# Patient Record
Sex: FEMALE | Race: BLACK OR AFRICAN AMERICAN | Employment: OTHER | URBAN - METROPOLITAN AREA
[De-identification: names, ages, dates, MRNs, and addresses within clinical notes are randomized per-mention and may not be internally consistent; named-entity substitution may affect disease eponyms.]

---

## 2016-12-19 LAB
HBA1C MFR BLD: 6.2 %
HBA1C MFR BLD: 6.2 %

## 2017-01-25 ENCOUNTER — OFFICE VISIT (OUTPATIENT)
Dept: INTERNAL MEDICINE | Age: 77
End: 2017-01-25
Attending: INTERNAL MEDICINE

## 2017-01-25 VITALS
HEART RATE: 71 BPM | WEIGHT: 142 LBS | BODY MASS INDEX: 24.37 KG/M2 | TEMPERATURE: 97.5 F | DIASTOLIC BLOOD PRESSURE: 83 MMHG | OXYGEN SATURATION: 97 % | SYSTOLIC BLOOD PRESSURE: 161 MMHG | RESPIRATION RATE: 22 BRPM

## 2017-01-25 DIAGNOSIS — N18.4 CKD (CHRONIC KIDNEY DISEASE), STAGE 4 (SEVERE): ICD-10-CM

## 2017-01-25 DIAGNOSIS — N18.4 CKD (CHRONIC KIDNEY DISEASE) STAGE 4, GFR 15-29 ML/MIN (HCC): Primary | ICD-10-CM

## 2017-01-25 LAB
ALBUMIN SERPL-MCNC: 3.6 G/DL (ref 3.4–5)
ANION GAP SERPL CALCULATED.3IONS-SCNC: 17 MMOL/L (ref 3–16)
BUN BLDV-MCNC: 67 MG/DL (ref 7–20)
CALCIUM SERPL-MCNC: 8.4 MG/DL (ref 8.3–10.6)
CHLORIDE BLD-SCNC: 105 MMOL/L (ref 99–110)
CO2: 23 MMOL/L (ref 21–32)
CREAT SERPL-MCNC: 4.3 MG/DL (ref 0.6–1.2)
GFR AFRICAN AMERICAN: 12
GFR NON-AFRICAN AMERICAN: 10
GLUCOSE BLD-MCNC: 213 MG/DL (ref 70–99)
PHOSPHORUS: 3.7 MG/DL (ref 2.5–4.9)
POTASSIUM SERPL-SCNC: 4.6 MMOL/L (ref 3.5–5.1)
SODIUM BLD-SCNC: 145 MMOL/L (ref 136–145)

## 2017-01-25 RX ORDER — FLUDROCORTISONE ACETATE 0.1 MG/1
0.2 TABLET ORAL DAILY
Qty: 60 TABLET | Refills: 3 | Status: ON HOLD | OUTPATIENT
Start: 2017-01-25 | End: 2017-03-03 | Stop reason: HOSPADM

## 2017-02-07 ENCOUNTER — TELEPHONE (OUTPATIENT)
Dept: INTERNAL MEDICINE | Age: 77
End: 2017-02-07

## 2017-02-15 ENCOUNTER — OFFICE VISIT (OUTPATIENT)
Dept: INTERNAL MEDICINE | Age: 77
End: 2017-02-15
Attending: INTERNAL MEDICINE

## 2017-02-15 VITALS
RESPIRATION RATE: 20 BRPM | HEART RATE: 69 BPM | DIASTOLIC BLOOD PRESSURE: 92 MMHG | TEMPERATURE: 97.4 F | OXYGEN SATURATION: 98 % | SYSTOLIC BLOOD PRESSURE: 161 MMHG

## 2017-02-15 DIAGNOSIS — Z12.31 ENCOUNTER FOR SCREENING MAMMOGRAM FOR BREAST CANCER: ICD-10-CM

## 2017-02-15 DIAGNOSIS — M25.551 RIGHT HIP PAIN: Primary | ICD-10-CM

## 2017-02-23 ENCOUNTER — HOSPITAL ENCOUNTER (OUTPATIENT)
Dept: OTHER | Age: 77
Discharge: HOME | End: 2017-02-23
Attending: INTERNAL MEDICINE | Admitting: INTERNAL MEDICINE

## 2017-02-23 ENCOUNTER — HOSPITAL ENCOUNTER (OUTPATIENT)
Dept: GENERAL RADIOLOGY | Age: 77
Discharge: HOME | End: 2017-02-23

## 2017-02-23 DIAGNOSIS — M25.551 RIGHT HIP PAIN: ICD-10-CM

## 2017-03-03 DIAGNOSIS — I50.22 SYSTOLIC HEART FAILURE, CHRONIC (HCC): ICD-10-CM

## 2017-03-03 DIAGNOSIS — I10 ESSENTIAL HYPERTENSION: ICD-10-CM

## 2017-03-03 RX ORDER — CARVEDILOL 25 MG/1
25 TABLET ORAL 2 TIMES DAILY WITH MEALS
Qty: 60 TABLET | Refills: 2
Start: 2017-03-03 | End: 2019-02-18 | Stop reason: SDUPTHER

## 2017-03-03 RX ORDER — FUROSEMIDE 40 MG/1
40 TABLET ORAL 2 TIMES DAILY
Qty: 60 TABLET | Refills: 2
Start: 2017-03-03 | End: 2018-11-12 | Stop reason: ALTCHOICE

## 2017-03-03 RX ORDER — ALLOPURINOL 100 MG/1
100 TABLET ORAL DAILY
Qty: 30 TABLET | Refills: 2
Start: 2017-03-03 | End: 2017-04-05 | Stop reason: SDUPTHER

## 2017-03-03 RX ORDER — SIMVASTATIN 40 MG
40 TABLET ORAL NIGHTLY
Qty: 30 TABLET | Refills: 2
Start: 2017-03-03 | End: 2019-02-18 | Stop reason: SDUPTHER

## 2017-03-04 ENCOUNTER — CARE COORDINATION (OUTPATIENT)
Dept: CASE MANAGEMENT | Age: 77
End: 2017-03-04

## 2017-03-06 ENCOUNTER — TELEPHONE (OUTPATIENT)
Dept: PHARMACY | Facility: CLINIC | Age: 77
End: 2017-03-06

## 2017-03-06 ENCOUNTER — CARE COORDINATION (OUTPATIENT)
Dept: CASE MANAGEMENT | Age: 77
End: 2017-03-06

## 2017-03-07 ENCOUNTER — HOSPITAL ENCOUNTER (OUTPATIENT)
Dept: OTHER | Age: 77
Discharge: HOME | End: 2017-03-07
Attending: INTERNAL MEDICINE | Admitting: INTERNAL MEDICINE

## 2017-03-07 LAB
ANION GAP SERPL CALCULATED.3IONS-SCNC: 17 MMOL/L (ref 3–16)
BUN BLDV-MCNC: 60 MG/DL (ref 7–20)
CALCIUM SERPL-MCNC: 6.8 MG/DL (ref 8.3–10.6)
CHLORIDE BLD-SCNC: 99 MMOL/L (ref 99–110)
CO2: 25 MMOL/L (ref 21–32)
CREAT SERPL-MCNC: 5.4 MG/DL (ref 0.6–1.2)
GFR AFRICAN AMERICAN: 9
GFR NON-AFRICAN AMERICAN: 8
GLUCOSE BLD-MCNC: 95 MG/DL (ref 70–99)
POTASSIUM SERPL-SCNC: 4.2 MMOL/L (ref 3.5–5.1)
SODIUM BLD-SCNC: 141 MMOL/L (ref 136–145)

## 2017-03-08 ENCOUNTER — TELEPHONE (OUTPATIENT)
Dept: INTERNAL MEDICINE | Age: 77
End: 2017-03-08

## 2017-03-08 ENCOUNTER — OFFICE VISIT (OUTPATIENT)
Dept: INTERNAL MEDICINE | Age: 77
End: 2017-03-08
Attending: INTERNAL MEDICINE

## 2017-03-08 VITALS
DIASTOLIC BLOOD PRESSURE: 90 MMHG | HEART RATE: 69 BPM | OXYGEN SATURATION: 100 % | BODY MASS INDEX: 2.29 KG/M2 | SYSTOLIC BLOOD PRESSURE: 190 MMHG | RESPIRATION RATE: 20 BRPM | WEIGHT: 14.6 LBS | TEMPERATURE: 98.4 F

## 2017-03-08 DIAGNOSIS — E11.22 CKD STAGE 4 DUE TO TYPE 2 DIABETES MELLITUS (HCC): Primary | ICD-10-CM

## 2017-03-08 DIAGNOSIS — N18.4 CKD STAGE 4 DUE TO TYPE 2 DIABETES MELLITUS (HCC): Primary | ICD-10-CM

## 2017-03-08 DIAGNOSIS — I10 ESSENTIAL HYPERTENSION: ICD-10-CM

## 2017-03-08 RX ORDER — ISOSORBIDE DINITRATE 10 MG/1
10 TABLET ORAL 3 TIMES DAILY
Qty: 90 TABLET | Refills: 2 | Status: SHIPPED | OUTPATIENT
Start: 2017-03-08 | End: 2017-07-28 | Stop reason: SDUPTHER

## 2017-03-08 RX ORDER — ERGOCALCIFEROL (VITAMIN D2) 1250 MCG
50000 CAPSULE ORAL WEEKLY
Qty: 4 CAPSULE | Refills: 3 | Status: SHIPPED | OUTPATIENT
Start: 2017-03-08 | End: 2017-06-06 | Stop reason: SDUPTHER

## 2017-03-13 ENCOUNTER — CARE COORDINATION (OUTPATIENT)
Dept: CASE MANAGEMENT | Age: 77
End: 2017-03-13

## 2017-03-16 ENCOUNTER — TELEPHONE (OUTPATIENT)
Dept: INTERNAL MEDICINE | Age: 77
End: 2017-03-16

## 2017-03-22 ENCOUNTER — CARE COORDINATION (OUTPATIENT)
Dept: CASE MANAGEMENT | Age: 77
End: 2017-03-22

## 2017-04-05 ENCOUNTER — OFFICE VISIT (OUTPATIENT)
Dept: INTERNAL MEDICINE | Age: 77
End: 2017-04-05
Attending: INTERNAL MEDICINE

## 2017-04-05 VITALS
HEART RATE: 62 BPM | SYSTOLIC BLOOD PRESSURE: 160 MMHG | HEIGHT: 67 IN | RESPIRATION RATE: 20 BRPM | DIASTOLIC BLOOD PRESSURE: 90 MMHG | BODY MASS INDEX: 23.23 KG/M2 | TEMPERATURE: 97.6 F | WEIGHT: 148 LBS | OXYGEN SATURATION: 98 %

## 2017-04-05 DIAGNOSIS — M16.11 PRIMARY OSTEOARTHRITIS OF RIGHT HIP: Primary | ICD-10-CM

## 2017-04-05 RX ORDER — ALLOPURINOL 100 MG/1
100 TABLET ORAL DAILY
Qty: 30 TABLET | Refills: 2 | Status: SHIPPED | OUTPATIENT
Start: 2017-04-05 | End: 2018-01-31

## 2017-04-05 RX ORDER — TRAMADOL HYDROCHLORIDE 50 MG/1
50 TABLET ORAL EVERY 6 HOURS PRN
Qty: 20 TABLET | Refills: 0 | Status: SHIPPED | OUTPATIENT
Start: 2017-04-05 | End: 2017-04-15

## 2017-04-27 ENCOUNTER — OFFICE VISIT (OUTPATIENT)
Dept: BARIATRICS/WEIGHT MGMT | Age: 77
End: 2017-04-27

## 2017-04-27 VITALS
SYSTOLIC BLOOD PRESSURE: 132 MMHG | DIASTOLIC BLOOD PRESSURE: 72 MMHG | WEIGHT: 134.6 LBS | BODY MASS INDEX: 21.12 KG/M2 | HEIGHT: 67 IN

## 2017-04-27 DIAGNOSIS — N18.5 CKD (CHRONIC KIDNEY DISEASE), STAGE 5: Primary | ICD-10-CM

## 2017-04-27 PROCEDURE — 3017F COLORECTAL CA SCREEN DOC REV: CPT | Performed by: SURGERY

## 2017-04-27 PROCEDURE — G8427 DOCREV CUR MEDS BY ELIG CLIN: HCPCS | Performed by: SURGERY

## 2017-04-27 PROCEDURE — 4004F PT TOBACCO SCREEN RCVD TLK: CPT | Performed by: SURGERY

## 2017-04-27 PROCEDURE — G8598 ASA/ANTIPLAT THER USED: HCPCS | Performed by: SURGERY

## 2017-04-27 PROCEDURE — 4040F PNEUMOC VAC/ADMIN/RCVD: CPT | Performed by: SURGERY

## 2017-04-27 PROCEDURE — 3014F SCREEN MAMMO DOC REV: CPT | Performed by: SURGERY

## 2017-04-27 PROCEDURE — 1090F PRES/ABSN URINE INCON ASSESS: CPT | Performed by: SURGERY

## 2017-04-27 PROCEDURE — 1123F ACP DISCUSS/DSCN MKR DOCD: CPT | Performed by: SURGERY

## 2017-04-27 PROCEDURE — G8399 PT W/DXA RESULTS DOCUMENT: HCPCS | Performed by: SURGERY

## 2017-04-27 PROCEDURE — 99203 OFFICE O/P NEW LOW 30 MIN: CPT | Performed by: SURGERY

## 2017-04-27 PROCEDURE — G8419 CALC BMI OUT NRM PARAM NOF/U: HCPCS | Performed by: SURGERY

## 2017-05-09 ENCOUNTER — TELEPHONE (OUTPATIENT)
Dept: BARIATRICS/WEIGHT MGMT | Age: 77
End: 2017-05-09

## 2017-05-12 ENCOUNTER — HOSPITAL ENCOUNTER (OUTPATIENT)
Dept: PREADMISSION TESTING | Age: 77
Discharge: HOME | End: 2017-05-12
Admitting: SURGERY

## 2017-05-12 VITALS — WEIGHT: 134 LBS | BODY MASS INDEX: 21.03 KG/M2 | HEIGHT: 67 IN

## 2017-05-12 RX ORDER — HEPARIN SODIUM 5000 [USP'U]/ML
5000 INJECTION, SOLUTION INTRAVENOUS; SUBCUTANEOUS ONCE
Status: CANCELLED | OUTPATIENT
Start: 2017-05-12 | End: 2017-05-12

## 2017-05-12 RX ORDER — CHLORHEXIDINE GLUCONATE 0.12 MG/ML
15 RINSE ORAL 2 TIMES DAILY
Status: CANCELLED | OUTPATIENT
Start: 2017-05-12

## 2017-05-17 ENCOUNTER — OFFICE VISIT (OUTPATIENT)
Dept: INTERNAL MEDICINE | Age: 77
End: 2017-05-17

## 2017-05-17 VITALS
SYSTOLIC BLOOD PRESSURE: 188 MMHG | BODY MASS INDEX: 20.83 KG/M2 | RESPIRATION RATE: 20 BRPM | OXYGEN SATURATION: 96 % | HEART RATE: 75 BPM | WEIGHT: 133 LBS | DIASTOLIC BLOOD PRESSURE: 101 MMHG | TEMPERATURE: 98.1 F

## 2017-05-17 DIAGNOSIS — I10 ESSENTIAL HYPERTENSION: Primary | ICD-10-CM

## 2017-05-18 RX ORDER — SODIUM CHLORIDE 9 MG/ML
INJECTION, SOLUTION INTRAVENOUS CONTINUOUS
Status: DISCONTINUED | OUTPATIENT
Start: 2017-05-18 | End: 2017-05-20 | Stop reason: HOSPADM

## 2017-05-19 ENCOUNTER — HOSPITAL ENCOUNTER (OUTPATIENT)
Dept: SURGERY | Age: 77
Discharge: HOME | End: 2017-05-19
Admitting: SURGERY

## 2017-05-19 VITALS
TEMPERATURE: 98.4 F | RESPIRATION RATE: 16 BRPM | BODY MASS INDEX: 20.88 KG/M2 | HEART RATE: 68 BPM | DIASTOLIC BLOOD PRESSURE: 81 MMHG | WEIGHT: 133 LBS | SYSTOLIC BLOOD PRESSURE: 160 MMHG | HEIGHT: 67 IN | OXYGEN SATURATION: 98 %

## 2017-05-19 LAB
GLUCOSE BLD-MCNC: 101 MG/DL (ref 70–99)
GLUCOSE BLD-MCNC: 133 MG/DL (ref 70–99)
PERFORMED ON: ABNORMAL
PERFORMED ON: ABNORMAL

## 2017-05-19 PROCEDURE — 49324 LAP INSERT TUNNEL IP CATH: CPT | Performed by: SURGERY

## 2017-05-19 PROCEDURE — 49326 LAP W/OMENTOPEXY ADD-ON: CPT | Performed by: SURGERY

## 2017-05-19 RX ORDER — HEPARIN SODIUM 5000 [USP'U]/ML
5000 INJECTION, SOLUTION INTRAVENOUS; SUBCUTANEOUS ONCE
Status: COMPLETED | OUTPATIENT
Start: 2017-05-19 | End: 2017-05-19

## 2017-05-19 RX ORDER — CHLORHEXIDINE GLUCONATE 0.12 MG/ML
15 RINSE ORAL 2 TIMES DAILY
Status: DISCONTINUED | OUTPATIENT
Start: 2017-05-19 | End: 2017-05-20 | Stop reason: HOSPADM

## 2017-05-19 RX ORDER — PROMETHAZINE HYDROCHLORIDE 25 MG/ML
6.25 INJECTION, SOLUTION INTRAMUSCULAR; INTRAVENOUS
Status: ACTIVE | OUTPATIENT
Start: 2017-05-19 | End: 2017-05-19

## 2017-05-19 RX ORDER — ALBUTEROL SULFATE 2.5 MG/3ML
2.5 SOLUTION RESPIRATORY (INHALATION) EVERY 6 HOURS PRN
Status: DISCONTINUED | OUTPATIENT
Start: 2017-05-19 | End: 2017-05-20 | Stop reason: HOSPADM

## 2017-05-19 RX ORDER — ONDANSETRON 2 MG/ML
4 INJECTION INTRAMUSCULAR; INTRAVENOUS
Status: ACTIVE | OUTPATIENT
Start: 2017-05-19 | End: 2017-05-19

## 2017-05-19 RX ORDER — HYDRALAZINE HYDROCHLORIDE 20 MG/ML
5 INJECTION INTRAMUSCULAR; INTRAVENOUS EVERY 10 MIN PRN
Status: DISCONTINUED | OUTPATIENT
Start: 2017-05-19 | End: 2017-05-20 | Stop reason: HOSPADM

## 2017-05-19 RX ORDER — FENTANYL CITRATE 50 UG/ML
50 INJECTION, SOLUTION INTRAMUSCULAR; INTRAVENOUS EVERY 5 MIN PRN
Status: DISCONTINUED | OUTPATIENT
Start: 2017-05-19 | End: 2017-05-20 | Stop reason: HOSPADM

## 2017-05-19 RX ORDER — OXYCODONE HYDROCHLORIDE AND ACETAMINOPHEN 5; 325 MG/1; MG/1
1 TABLET ORAL EVERY 6 HOURS PRN
Qty: 35 TABLET | Refills: 0 | Status: SHIPPED | OUTPATIENT
Start: 2017-05-19 | End: 2017-05-26

## 2017-05-19 RX ORDER — MEPERIDINE HYDROCHLORIDE 25 MG/ML
12.5 INJECTION INTRAMUSCULAR; INTRAVENOUS; SUBCUTANEOUS EVERY 5 MIN PRN
Status: DISCONTINUED | OUTPATIENT
Start: 2017-05-19 | End: 2017-05-20 | Stop reason: HOSPADM

## 2017-05-19 RX ORDER — FENTANYL CITRATE 50 UG/ML
25 INJECTION, SOLUTION INTRAMUSCULAR; INTRAVENOUS EVERY 5 MIN PRN
Status: DISCONTINUED | OUTPATIENT
Start: 2017-05-19 | End: 2017-05-20 | Stop reason: HOSPADM

## 2017-05-19 RX ORDER — DOCUSATE SODIUM 100 MG/1
100 CAPSULE, LIQUID FILLED ORAL DAILY PRN
Qty: 40 CAPSULE | Refills: 1 | Status: SHIPPED | OUTPATIENT
Start: 2017-05-19 | End: 2017-10-13 | Stop reason: SDUPTHER

## 2017-05-19 RX ORDER — LABETALOL HYDROCHLORIDE 5 MG/ML
5 INJECTION, SOLUTION INTRAVENOUS EVERY 10 MIN PRN
Status: DISCONTINUED | OUTPATIENT
Start: 2017-05-19 | End: 2017-05-20 | Stop reason: HOSPADM

## 2017-05-19 RX ADMIN — ALBUTEROL SULFATE 2.5 MG: 2.5 SOLUTION RESPIRATORY (INHALATION) at 09:24

## 2017-05-19 RX ADMIN — HEPARIN SODIUM 5000 UNITS: 5000 INJECTION, SOLUTION INTRAVENOUS; SUBCUTANEOUS at 07:20

## 2017-05-19 RX ADMIN — SODIUM CHLORIDE: 9 INJECTION, SOLUTION INTRAVENOUS at 06:31

## 2017-05-19 ASSESSMENT — PAIN SCALES - GENERAL
PAINLEVEL_OUTOF10: 0
PAINLEVEL_OUTOF10: 0

## 2017-06-05 ENCOUNTER — CARE COORDINATION (OUTPATIENT)
Dept: CASE MANAGEMENT | Age: 77
End: 2017-06-05

## 2017-06-05 DIAGNOSIS — N18.4 CKD STAGE 4 DUE TO TYPE 2 DIABETES MELLITUS (HCC): ICD-10-CM

## 2017-06-05 DIAGNOSIS — E11.22 CKD STAGE 4 DUE TO TYPE 2 DIABETES MELLITUS (HCC): ICD-10-CM

## 2017-06-06 RX ORDER — VIT B COMP NO.3/FOLIC/C/BIOTIN 1 MG-60 MG
1 TABLET ORAL
COMMUNITY
End: 2019-01-01 | Stop reason: SDUPTHER

## 2017-06-06 RX ORDER — METOCLOPRAMIDE 5 MG/1
5 TABLET ORAL 3 TIMES DAILY
Status: ON HOLD | COMMUNITY
End: 2019-02-12 | Stop reason: HOSPADM

## 2017-06-07 RX ORDER — ERGOCALCIFEROL (VITAMIN D2) 1250 MCG
50000 CAPSULE ORAL WEEKLY
Qty: 4 CAPSULE | Refills: 3 | OUTPATIENT
Start: 2017-06-07 | End: 2018-01-31

## 2017-06-12 ENCOUNTER — CARE COORDINATION (OUTPATIENT)
Dept: CASE MANAGEMENT | Age: 77
End: 2017-06-12

## 2017-06-20 ENCOUNTER — CARE COORDINATION (OUTPATIENT)
Dept: CASE MANAGEMENT | Age: 77
End: 2017-06-20

## 2017-07-28 DIAGNOSIS — I10 ESSENTIAL HYPERTENSION: ICD-10-CM

## 2017-07-28 RX ORDER — ISOSORBIDE DINITRATE 10 MG/1
10 TABLET ORAL 3 TIMES DAILY
Qty: 90 TABLET | Refills: 0
Start: 2017-07-28 | End: 2018-01-02 | Stop reason: SDUPTHER

## 2018-01-02 DIAGNOSIS — I10 ESSENTIAL HYPERTENSION: ICD-10-CM

## 2018-01-02 RX ORDER — ISOSORBIDE DINITRATE 10 MG/1
10 TABLET ORAL 3 TIMES DAILY
Qty: 90 TABLET | Refills: 0
Start: 2018-01-02 | End: 2018-01-31 | Stop reason: ALTCHOICE

## 2018-01-31 ENCOUNTER — OFFICE VISIT (OUTPATIENT)
Dept: INTERNAL MEDICINE | Age: 78
End: 2018-01-31

## 2018-01-31 VITALS
HEIGHT: 64 IN | RESPIRATION RATE: 32 BRPM | WEIGHT: 134.2 LBS | HEART RATE: 74 BPM | DIASTOLIC BLOOD PRESSURE: 90 MMHG | SYSTOLIC BLOOD PRESSURE: 157 MMHG | TEMPERATURE: 98.2 F | OXYGEN SATURATION: 98 % | BODY MASS INDEX: 22.91 KG/M2

## 2018-01-31 DIAGNOSIS — I70.209 DIABETES TYPE 2 WITH ATHEROSCLEROSIS OF ARTERIES OF EXTREMITIES (HCC): ICD-10-CM

## 2018-01-31 DIAGNOSIS — E11.51 DIABETES TYPE 2 WITH ATHEROSCLEROSIS OF ARTERIES OF EXTREMITIES (HCC): ICD-10-CM

## 2018-01-31 DIAGNOSIS — R60.9 PERIPHERAL EDEMA: Primary | ICD-10-CM

## 2018-01-31 DIAGNOSIS — I50.32 CHRONIC DIASTOLIC CONGESTIVE HEART FAILURE (HCC): ICD-10-CM

## 2018-01-31 DIAGNOSIS — N18.5 CHRONIC KIDNEY DISEASE, STAGE V (VERY SEVERE) (HCC): ICD-10-CM

## 2018-01-31 DIAGNOSIS — I70.1 RENAL ARTERY STENOSIS (HCC): ICD-10-CM

## 2018-01-31 RX ORDER — ISOSORBIDE MONONITRATE 60 MG/1
60 TABLET, EXTENDED RELEASE ORAL EVERY MORNING
Qty: 30 TABLET | Refills: 3 | Status: SHIPPED | OUTPATIENT
Start: 2018-01-31 | End: 2018-08-27 | Stop reason: SDUPTHER

## 2018-01-31 NOTE — PROGRESS NOTES
Department of Internal Medicine  Clinic Progress Note    Date: 01/31/18                                               Subjective     Last seen: my first time seeing her, seen by Dr. Reji Bright 5/17/2017   Patient presents to clinic today for routine follow up. Pt has started peritoneal dialysis nightly per Dr. Rhett Ho recommendations since she was last seen here. She saw him earlier this month and he thought she was doing well. She has no complaints other than some swelling in both of her feet that has been going on for a few weeks or so. She gets short of breath when she walks, but states that she is at her baseline. She denies any chest pain, palpitations, lightheadedness or dizziness. She does still smoke, about 1 pack every three days. She states she could quit if she wants to because she isn't addicted, but she doesn't want to. Objectives     Patient Active Problem List    Diagnosis Date Noted    ESRD (end stage renal disease) ([facility]     Unexplained weight loss 11/25/2015    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 mellitus ([facility] [address] 07/09/2015    Renal artery stenosis (HCC)     Acute renal insufficiency     Hyperlipidemia     Hyperkalemia 02/12/2015    Hypertensive urgency 05/30/2013    HTN (hypertension) 89/29/1266    Systolic heart failure ([facility] [address] 10/17/2011    DM (diabetes mellitus) ([facility] [address] 10/17/2011    History of macrocytic anemia 10/17/2011    Smoking 10/17/2011    Osteoarthritis 10/17/2011       Review of Systems  Pertinent information noted in HPI. A full review of systems was obtained; all others not listed above are negative.     Vitals:  BP (!) 157/90 (Site: Left Arm, Position: Sitting, Cuff Size: Medium Adult)   Pulse 74   Temp 98.2 °F (36.8 °C) (Oral)   Resp (!) 32   Ht 5' 4\" (1.626 m)   Wt 134 lb 3.2 oz (60.9 kg)   LMP  (LMP Unknown)   SpO2 98%   BMI 23.04 kg/m²     Physical Exam   Constitutional: She is oriented to person, place, and time and well-developed, well-nourished, and in no

## 2018-01-31 NOTE — PATIENT INSTRUCTIONS
Stop taking isosorbid dinitrate. Instead you will take Imdur 60mg once per day. Please wear compression stockings during the day when you are up walking around. Also please keep you feet elevated when sitting if you can. Both of these will help with the swelling in your feet. Please keep your appointment with the podiatry clinic (foot doctors) so they can evaluate the swelling in your feet. Return for Yusuf Mendez in 3 months. Call your pharmacy for medication refills at least 48 hours ahead at [phone] (Monday -Friday, 08:00 AM to 4:00 PM .If you become ill when the clinic is closed, please call [facility] [employer] [facility]  at [phone] and ask the  to page the AOD between 6:00 AM and 6:00 PM or page the AON between 6:00 PM & 6:00 AM.    The clinic is not able to process [facility] requests for appointments or messages including refill requests. Please call the [facility] at [phone] for appointments and messages. The [facility] [employer]  Telephone:[phone]    Instructions reviewed with patient and copy given to patient upon discharge.      1:47 PM1/31/2018

## 2018-05-16 ENCOUNTER — OFFICE VISIT (OUTPATIENT)
Dept: INTERNAL MEDICINE | Age: 78
End: 2018-05-16
Attending: STUDENT IN AN ORGANIZED HEALTH CARE EDUCATION/TRAINING PROGRAM

## 2018-05-16 VITALS
HEART RATE: 80 BPM | RESPIRATION RATE: 20 BRPM | SYSTOLIC BLOOD PRESSURE: 130 MMHG | WEIGHT: 130.5 LBS | HEIGHT: 62 IN | BODY MASS INDEX: 24.01 KG/M2 | TEMPERATURE: 98.5 F | OXYGEN SATURATION: 99 % | DIASTOLIC BLOOD PRESSURE: 77 MMHG

## 2018-05-16 DIAGNOSIS — I10 ESSENTIAL HYPERTENSION: ICD-10-CM

## 2018-05-16 DIAGNOSIS — E11.9 TYPE 2 DIABETES MELLITUS WITHOUT COMPLICATION, WITHOUT LONG-TERM CURRENT USE OF INSULIN (HCC): Primary | ICD-10-CM

## 2018-05-16 DIAGNOSIS — Z99.2 ESRD (END STAGE RENAL DISEASE) ON DIALYSIS (HCC): ICD-10-CM

## 2018-05-16 DIAGNOSIS — N18.6 ESRD (END STAGE RENAL DISEASE) ON DIALYSIS (HCC): ICD-10-CM

## 2018-05-16 LAB
A/G RATIO: 0.8 (ref 1.1–2.2)
ALBUMIN SERPL-MCNC: 2.7 G/DL (ref 3.4–5)
ALP BLD-CCNC: 52 U/L (ref 40–129)
ALT SERPL-CCNC: 11 U/L (ref 10–40)
ANION GAP SERPL CALCULATED.3IONS-SCNC: 17 MMOL/L (ref 3–16)
AST SERPL-CCNC: 11 U/L (ref 15–37)
BASOPHILS ABSOLUTE: 0.1 K/UL (ref 0–0.2)
BASOPHILS RELATIVE PERCENT: 1 %
BILIRUB SERPL-MCNC: 0.3 MG/DL (ref 0–1)
BUN BLDV-MCNC: 49 MG/DL (ref 7–20)
CALCIUM SERPL-MCNC: 7.5 MG/DL (ref 8.3–10.6)
CHLORIDE BLD-SCNC: 104 MMOL/L (ref 99–110)
CO2: 26 MMOL/L (ref 21–32)
CREAT SERPL-MCNC: 7.4 MG/DL (ref 0.6–1.2)
EOSINOPHILS ABSOLUTE: 0.1 K/UL (ref 0–0.6)
EOSINOPHILS RELATIVE PERCENT: 1.3 %
GFR AFRICAN AMERICAN: 6
GFR NON-AFRICAN AMERICAN: 5
GLOBULIN: 3.6 G/DL
GLUCOSE BLD-MCNC: 151 MG/DL (ref 70–99)
HCT VFR BLD CALC: 31.3 % (ref 36–48)
HEMOGLOBIN: 10.3 G/DL (ref 12–16)
LYMPHOCYTES ABSOLUTE: 1 K/UL (ref 1–5.1)
LYMPHOCYTES RELATIVE PERCENT: 12.2 %
MCH RBC QN AUTO: 32.8 PG (ref 26–34)
MCHC RBC AUTO-ENTMCNC: 32.9 G/DL (ref 31–36)
MCV RBC AUTO: 99.7 FL (ref 80–100)
MONOCYTES ABSOLUTE: 0.6 K/UL (ref 0–1.3)
MONOCYTES RELATIVE PERCENT: 7.7 %
NEUTROPHILS ABSOLUTE: 6.4 K/UL (ref 1.7–7.7)
NEUTROPHILS RELATIVE PERCENT: 77.8 %
PDW BLD-RTO: 13.6 % (ref 12.4–15.4)
PLATELET # BLD: 181 K/UL (ref 135–450)
PMV BLD AUTO: 8.8 FL (ref 5–10.5)
POTASSIUM SERPL-SCNC: 3.3 MMOL/L (ref 3.5–5.1)
RBC # BLD: 3.14 M/UL (ref 4–5.2)
SODIUM BLD-SCNC: 147 MMOL/L (ref 136–145)
TOTAL PROTEIN: 6.3 G/DL (ref 6.4–8.2)
TSH REFLEX FT4: 1.18 UIU/ML (ref 0.27–4.2)
WBC # BLD: 8.3 K/UL (ref 4–11)

## 2018-05-17 ENCOUNTER — TELEPHONE (OUTPATIENT)
Dept: INTERNAL MEDICINE | Age: 78
End: 2018-05-17

## 2018-05-17 LAB
ESTIMATED AVERAGE GLUCOSE: 231.7 MG/DL
HBA1C MFR BLD: 9.7 %

## 2018-06-27 ENCOUNTER — HOSPITAL ENCOUNTER (OUTPATIENT)
Dept: PHYSICAL THERAPY | Age: 78
Discharge: HOME | End: 2018-06-30
Attending: INTERNAL MEDICINE | Admitting: INTERNAL MEDICINE

## 2018-06-27 NOTE — PLAN OF CARE
Outpatient Physical Therapy  Phone: [phone] Fax: [phone]    To: Referring Practitioner: Dr. Maddison Vargas  From: Rosario Cates, PT, DPT [id number]   Date: 2018  Patient: Shannan Hankins     : 1941 MRN: [id number]  Diagnosis: Diagnosis: debility R53.81   Treatment Diagnosis: Treatment Diagnosis: B LE weakness, impaired balance     Physical Therapy Certification/Re-Certification Form  Dear Dr. Salud Peterson,   The following patient has been evaluated for physical therapy services and for therapy to continue, Medicare requires monthly physician review of the treatment plan. Please review the attached evaluation and/or summary of the patient's plan of care, and verify that you agree therapy should continue by signing the attached document and sending it back to our office.     Plan of Care/Treatment to date:  [x] Therapeutic Exercise (Review/Progress HEP and provide verbal/tactile cueing for activities related to strengthening, flexibility,  endurance, ROM.)       [x] Therapeutic Activity (Provide verbal/tactile cueing for dynamic activities to promote functional tasks.)          [x] Gait Training (Provide verbal/tactile/visual cueing for facilitation of normalized gait pattern without or with the least restrictive AD to decrease pain and/or risk for falling.)          [x] Neuromuscular Re-education (Review/Progress HEP and provide verbal/tactile cueing for activities related to improving balance, coordination, kinesthetic sense, posture, motor skill, proprioception.)         [x] Manual Therapy (Provide manual therapy to mobilize soft tissue/joints for the purpose of modulating pain, promoting relaxation, increasing ROM, reducing/eliminating soft tissue swelling/inflammation/tightness, improving soft tissue extensibility)                [] Modalities (For modulating pain/tenderness/paresthesias, reducing swelling/inflammation/tightness, improving soft tissue extensibility, and/or to increase muscle tone/strength):     [] Ultrasound  [] Electrical Stimulation        [] Cervical Traction [] Lumbar Traction    ? [] Cold/hotpack [] Iontophoresis   Other:      []          []      Assessment:  Conditions Requiring Skilled Therapeutic Intervention  Body structures, Functions, Activity limitations: Decreased functional mobility , Decreased strength, Decreased balance  Assessment: Pt presents with decreased strength, balance limiting N gait, steps, transfers, at risk for falls  Treatment Diagnosis: B LE weakness, impaired balance  Prognosis: Good  Decision Making: Medium Complexity  REQUIRES PT FOLLOW UP: Yes    Goals:  Short term goals  Time Frame for Short term goals: 4 weeks  Short term goal 1: Pt will demo good understanding and knowledge of HEP  Short term goal 2: TUG = 21 seconds with standard cane for decreased fall risk  Short term goal 3: 30 second chair stand test = 7 reps with B UE A for improved functional strength with sit<>stand  Long term goals  Time Frame for Long term goals : 8 weeks  Long term goal 1: Pt will demo good understanding and knowledge of HEP progressions  Long term goal 2: TUG = 16 seconds with standard cane for decreased fall risk  Long term goal 3: 30 second chair stand test = 10 reps with B UE A for improved functional strength with sit<>stand  Long term goal 4: B SLS balance 5 seconds each for decreased fall risk  Long term goal 5: Strength B LE 4+/5 to allow pt to ascend/descend steps reciprocally with railings    Frequency/Duration:  # Days per week: [x] 1 day # Weeks: [] 1 week [] 5 weeks     [] 2 days? [] 2 weeks [] 6 weeks     [] 3 days   [] 3 weeks [] 7 weeks     [] 4 days   [] 4 weeks [x] 8 weeks    Rehab Potential: [] Excellent [x] Good [] Fair  [] Poor       Electronically signed by: Ceasar Collins, PT, DPT [id number]        If you have any questions or concerns, please don't hesitate to call.   Thank you for your referral.      Physician Signature:________________________________Date:__________________  By signing above, therapists plan is approved by physician

## 2018-06-27 NOTE — FLOWSHEET NOTE
Physical Therapy Daily Treatment Note  Date:  2018    Patient Name:  Newton Montano    :  1941  MRN: [id number]  Restrictions/Precautions:  Hx DVT, DM, HTN, R WILDER, on kidney dialysis every weeknight at home, OA, L knee sx in 80's \"tore everything\", fall risk  Medical/Treatment Diagnosis Information:  · Diagnosis: debility R53.81  · Treatment Diagnosis: B LE weakness, impaired balance  Insurance/Certification information:  PT Insurance Information: Medicare, Medicaid  Physician Information:  Referring Practitioner: Dr. Jace Keith MD Follow-up: 18  Plan of care signed (Y/N):  Sent to Dr via [facility]  Visit# / total visits:    Pain level: 0/10     G-Code noted on 2018:   PT G-Codes  Functional Assessment Tool Used: TUG  Score: 26.40 seconds = 100% impaired  Functional Limitation: Mobility: Walking and moving around  Mobility: Walking and Moving Around Current Status (): 100 percent impaired, limited or restricted  Mobility: Walking and Moving Around Goal Status (): At least 20 percent but less than 40 percent impaired, limited or restricted    Progress Note: []  Yes  [x]  No  Next due by: Visit #10      Subjective:  18: Pt reports after having her R total hip replacement in  she slowed down after that - used walker and eventually was able to progress to no AD. However beginning in 2018 required use of a cane and has felt B leg weakness since with decreased mobility. Pt reports no complaints of pain or paresthesias, just \"weakness in legs. \"  Pt notes trouble walking, steps, getting in/out of car. Did do home health PT but ended a few months ago. Pt reports has not been shopping in mall for about a year due to difficulty with walking. Hx fall around [place] time  in which R leg gave out.   Pt with PLOF: no AD and improved mobility, able to shop/walk in mall    Objective: 18  Observation:    Observation: Pt ambulates with slow pace with

## 2018-06-27 NOTE — PROGRESS NOTES
Timed Get Up and Go Test  Measures mobility in people who are able to walk on their own (assistive device permitted)        Name: Dub Olszewski  Date: 6/27/2018    Instructions: The person may wear their usual footwear and can use any assistive device they normally use. 1. Have the person sit in the chair with their back to the chair and their arms resting on the back rests  2. Ask the person to stand up from a standard chair and walk a distance of 10 ft. (3 m). 3. Have the person turn around, walk back to the chair and sit down again. Timing begins when the person starts to rise from the chair and ends when he or she returns to the chair and sits down. Time to Complete:  26.40 seconds with standard cane      Predictive Results:    Seconds Rating    <10  Freely mobile    <20  Mostly independent    20-29  Variable mobility    >20  Impaired mobility       Source: Carlene Cruz S. The timed 'Up and Go' Test: a Test of Basic Functional Mobility for Frail Elderly Persons. Journal of [address] [place] [address]. 1991;39:142-148.         G-Code Crosswalk:  TUG time (onds) Disability Index CMS Modifier   12 or faster 0% []CH   13-14 1-19% []CI   15-16 20-39% []CJ   17-18 40-59% []CK   19-20  60-79% []CL   21-22  80-99% []CM   23 or slower 100% [x]ED Lindsay, PT, DPT [id number]

## 2018-07-01 ENCOUNTER — HOSPITAL ENCOUNTER (OUTPATIENT)
Dept: OTHER | Age: 78
Discharge: HOME | End: 2018-07-01
Attending: INTERNAL MEDICINE | Admitting: INTERNAL MEDICINE

## 2018-07-18 ENCOUNTER — HOSPITAL ENCOUNTER (OUTPATIENT)
Dept: PHYSICAL THERAPY | Age: 78
Setting detail: THERAPIES SERIES
Discharge: HOME | End: 2018-07-18
Payer: MEDICARE

## 2018-07-18 PROCEDURE — 97112 NEUROMUSCULAR REEDUCATION: CPT

## 2018-07-18 PROCEDURE — 97110 THERAPEUTIC EXERCISES: CPT

## 2018-07-18 NOTE — FLOWSHEET NOTE
Physical Therapy Daily Treatment Note  Date:  2018    Patient Name:  Luisito Pantoja    :  1940  MRN: [id number]  Restrictions/Precautions:  Hx DVT, DM, HTN, R WILDER, on kidney dialysis every weeknight at home, OA, L knee sx in 80's \"tore everything\", fall risk  Medical/Treatment Diagnosis Information:  · Diagnosis: debility R53.81  · Treatment Diagnosis: B LE weakness, impaired balance  Insurance/Certification information:  PT Insurance Information: Medicare, Medicaid  Physician Information:  Referring Practitioner: Dr. Lavinia Salazar MD Follow-up: 18  Plan of care signed (Y/N):  Y  Visit# / total visits:  3/8  Pain level: 0/10     G-Code noted on 2018:   PT G-Codes  Functional Assessment Tool Used: TUG  Score: 26.40 seconds = 100% impaired  Functional Limitation: Mobility: Walking and moving around  Mobility: Walking and Moving Around Current Status (): 100 percent impaired, limited or restricted  Mobility: Walking and Moving Around Goal Status (): At least 20 percent but less than 40 percent impaired, limited or restricted    Progress Note: []  Yes  [x]  No  Next due by: Visit #10      Subjective:  18: Pt reports after having her R total hip replacement in  she slowed down after that - used walker and eventually was able to progress to no AD. However beginning in 2018 required use of a cane and has felt B leg weakness since with decreased mobility. Pt reports no complaints of pain or paresthesias, just \"weakness in legs. \"  Pt notes trouble walking, steps, getting in/out of car. Did do home health PT but ended a few months ago. Pt reports has not been shopping in mall for about a year due to difficulty with walking. Hx fall around [place] time  in which R leg gave out. Pt with PLOF: no AD and improved mobility, able to shop/walk in mall    18: Pt reports good compliance and tolerance to initial HEP.   R leg has not given out on her since IE. Patient tolerated treatment well [] Patient limited by fatigue  [] Patient limited by pain  [] Patient limited by other medical complications  [] Other:     Assessment: good tolerance to exercises today, fatigued after though    Prognosis: [x] Good [] Fair  [] Poor    Patient Requires Follow-up: [x] Yes  [] No    Goals:  Short term goals  Time Frame for Short term goals: 4 weeks  Short term goal 1: Pt will demo good understanding and knowledge of HEP  Short term goal 2: TUG = 21 seconds with standard cane for decreased fall risk  Short term goal 3: 30 second chair stand test = 7 reps with B UE A for improved functional strength with sit<>stand  Long term goals  Time Frame for Long term goals : 8 weeks  Long term goal 1: Pt will demo good understanding and knowledge of HEP progressions  Long term goal 2: TUG = 16 seconds with standard cane for decreased fall risk  Long term goal 3: 30 second chair stand test = 10 reps with B UE A for improved functional strength with sit<>stand  Long term goal 4: B SLS balance 5 seconds each for decreased fall risk  Long term goal 5: Strength B LE 4+/5 to allow pt to ascend/descend steps reciprocally with railings    Plan:   [x] Continue per plan of care [] Alter current plan (see comments)  [] Plan of care initiated [] Hold pending MD visit [] Discharge    Plan for Next Session:  Review HEP, add exercises as tolerated    Electronically signed by:   Rachel Hernandez DPT [id number]

## 2018-07-27 ENCOUNTER — HOSPITAL ENCOUNTER (OUTPATIENT)
Dept: PHYSICAL THERAPY | Age: 78
Setting detail: THERAPIES SERIES
Discharge: HOME | End: 2018-07-27
Payer: MEDICARE

## 2018-07-27 PROCEDURE — G8979 MOBILITY GOAL STATUS: HCPCS

## 2018-07-27 PROCEDURE — 97110 THERAPEUTIC EXERCISES: CPT

## 2018-07-27 PROCEDURE — 97112 NEUROMUSCULAR REEDUCATION: CPT

## 2018-07-27 PROCEDURE — G8978 MOBILITY CURRENT STATUS: HCPCS

## 2018-07-27 NOTE — PROGRESS NOTES
Outpatient Physical Therapy  Phone: [phone] Fax: [phone]    To: Dr. Dominique Gutiérrez   From: Elías Ricci PT, DPT [id number]   Date: 2018  Patient: Pebbles Gannon     : 1940 MRN: [id number]  Medical Diagnosis:  debility R53.81  Treatment Diagnosis:    B LE weakness, impaired balance    Physical Therapy Progress Note    Time Period for Report:  18 - 18    Total Visits to date:  4  Cancels/No-shows to date:  0/0    Plan of Care/Treatment to date:  [x] Therapeutic Exercise (Review/Progress HEP and provide verbal/tactile cueing for activities related to strengthening, flexibility,  endurance, ROM.)       [] Therapeutic Activity (Provide verbal/tactile cueing for dynamic activities to promote functional tasks.)          [] Gait Training (Provide verbal/tactile/visual cueing for facilitation of normalized gait pattern without or with the least restrictive AD to decrease pain and/or risk for falling.)          [x] Neuromuscular Re-education (Review/Progress HEP and provide verbal/tactile cueing for activities related to improving balance, coordination, kinesthetic sense, posture, motor skill, proprioception.)         [] Manual Therapy (Provide manual therapy to mobilize soft tissue/joints for the purpose of modulating pain, promoting relaxation, increasing ROM, reducing/eliminating soft tissue swelling/inflammation/tightness, improving soft tissue extensibility)                [] Modalities (For modulating pain/tenderness/paresthesias, reducing swelling/inflammation/tightness, improving soft tissue extensibility, and/or to increase muscle tone/strength):     [] Ultrasound  [] Electrical Stimulation        [] Cervical Traction [] Lumbar Traction    ? [] Cold/hotpack [] Iontophoresis   Other:      []          []     Significant Findings At Last Visit/Comments:    · No complaints of pain. Pt reports that she thinks overall is walking better. Gets HEP done when thinks about it.   TUG = 19.9 seconds with standard cane. 30 second chair stand test = 5 reps with B UE A. Encouraged regular HEP consistently for maximum PT benefit. Fatigue noted B LE L > R with exercises. Progress towards goals:    Short term goals  Time Frame for Short term goals: 4 weeks  Short term goal 1: Pt will demo good understanding and knowledge of HEP partially met  Short term goal 2: TUG = 21 seconds with standard cane for decreased fall risk MET  Short term goal 3: 30 second chair stand test = 7 reps with B UE A for improved functional strength with sit<>stand partially met    Frequency/Duration:  # Days per week: [x] 1 day # Weeks: [] 1 week [] 4 weeks      [] 2 days? [] 2 weeks [] 5 weeks      [] 3 days   [] 3 weeks [x] 8 weeks     Rehab Potential: [] Excellent [x] Good [] Fair  [] Poor     Goal Status:  [] Achieved [x] Partially Achieved  [] Not Achieved     Patient Status: [x] Continue per initial plan of Care, pt has 4 visits remaining on initial POC. Pt has made steady progress towards goals but still with weakness present in B LE with exercises and still with fall risk per TUG score although is improving. Further improvement expected with additional therapy per POC. [] Patient now discharged     [] Additional visits requested, Please re-certify for additional visits:      Requested frequency/duration:  X/week for weeks    Electronically signed by: Rakesh Shannon, PT, DPT [id number]    If you have any questions or concerns, please don't hesitate to call.   Thank you for your referral.    Physician Signature:________________________________Date:__________________  By signing above, therapists plan is approved by physician

## 2018-07-27 NOTE — FLOWSHEET NOTE
Treatments:  NA    Modalities:  NA    Timed Code Treatment Minutes:  TE: 29, Neuro: 13     Total Treatment Minutes:  42    Treatment/Activity Tolerance:  [x] Patient tolerated treatment well [] Patient limited by fatigue  [] Patient limited by pain  [] Patient limited by other medical complications  [] Other:     Assessment: improving TUG score indicating decreasing fall risk and slightly improved 30 second chair stand test    Prognosis: [x] Good [] Fair  [] Poor    Patient Requires Follow-up: [x] Yes  [] No    Goals:  Short term goals  Time Frame for Short term goals: 4 weeks  Short term goal 1: Pt will demo good understanding and knowledge of HEP partially met  Short term goal 2: TUG = 21 seconds with standard cane for decreased fall risk MET  Short term goal 3: 30 second chair stand test = 7 reps with B UE A for improved functional strength with sit<>stand partially met  Long term goals  Time Frame for Long term goals : 8 weeks   Long term goal 1: Pt will demo good understanding and knowledge of HEP progressions  Long term goal 2: TUG = 16 seconds with standard cane for decreased fall risk  Long term goal 3: 30 second chair stand test = 10 reps with B UE A for improved functional strength with sit<>stand  Long term goal 4: B SLS balance 5 seconds each for decreased fall risk  Long term goal 5: Strength B LE 4+/5 to allow pt to ascend/descend steps reciprocally with railings    Plan:   [x] Continue per plan of care [] Alter current plan (see comments)  [] Plan of care initiated [] Hold pending MD visit [] Discharge    Plan for Next Session:  Review HEP, add exercises as tolerated    Electronically signed by:   Sarah Elias DPT [id number]

## 2018-08-03 ENCOUNTER — HOSPITAL ENCOUNTER (OUTPATIENT)
Dept: PHYSICAL THERAPY | Age: 78
Setting detail: THERAPIES SERIES
Discharge: HOME | End: 2018-08-03
Payer: MEDICARE

## 2018-08-03 PROCEDURE — 97112 NEUROMUSCULAR REEDUCATION: CPT

## 2018-08-03 PROCEDURE — 97110 THERAPEUTIC EXERCISES: CPT

## 2018-08-03 NOTE — FLOWSHEET NOTE
Physical Therapy Daily Treatment Note  Date:  8/3/2018    Patient Name:  Papo Cohen    :  1940  MRN: [id number]  Restrictions/Precautions:  Hx DVT, DM, HTN, R WILDER, on kidney dialysis every weeknight at home, OA, L knee sx in 80's \"tore everything\", fall risk  Medical/Treatment Diagnosis Information:  · Diagnosis: debility R53.81  · Treatment Diagnosis: B LE weakness, impaired balance  Insurance/Certification information:  PT Insurance Information: Medicare, Medicaid  Physician Information:  Referring Practitioner: Dr. Roxanne Samano MD Follow-up: 18  Plan of care signed (Y/N):  Y  Visit# / total visits:    Pain level: 0/10     G-Code noted on 2018:   PT G-Codes  Functional Assessment Tool Used: TUG  Score: 19.9seconds = 60-79% impaired  Functional Limitation: Mobility: Walking and moving around  Mobility: Walking and Moving Around Current Status (): CL  Mobility: Walking and Moving Around Goal Status (): At least 20 percent but less than 40 percent impaired, limited or restricted    Progress Note: []  Yes  [x]  No  Next due by: Visit #10      Subjective:  18: Pt reports after having her R total hip replacement in  she slowed down after that - used walker and eventually was able to progress to no AD. However beginning in 2018 required use of a cane and has felt B leg weakness since with decreased mobility. Pt reports no complaints of pain or paresthesias, just \"weakness in legs. \"  Pt notes trouble walking, steps, getting in/out of car. Did do home health PT but ended a few months ago. Pt reports has not been shopping in mall for about a year due to difficulty with walking. Hx fall around [place] time 2017 in which R leg gave out. Pt with PLOF: no AD and improved mobility, able to shop/walk in mall    18: Pt reports good compliance and tolerance to initial HEP.   R leg has not given out on her since IE.      18: Pt notes has done HEP when

## 2018-08-27 RX ORDER — ISOSORBIDE MONONITRATE 60 MG/1
60 TABLET, EXTENDED RELEASE ORAL EVERY MORNING
Qty: 30 TABLET | Refills: 0
Start: 2018-08-27 | End: 2018-11-12 | Stop reason: SDUPTHER

## 2018-09-21 ENCOUNTER — HOSPITAL ENCOUNTER (OUTPATIENT)
Dept: PHYSICAL THERAPY | Age: 78
Setting detail: THERAPIES SERIES
Discharge: HOME | End: 2018-09-21
Payer: MEDICARE

## 2018-09-21 NOTE — PROGRESS NOTES
Outpatient Physical Therapy Phone: [phone] Fax: [phone]    Discharge Date: 18    To: Dr. Eva Garay   From: Felix Collazo [place], [facility] [id number]     Patient: Rosenda Mcghee     : 1940 MRN: [id number]  Medical Diagnosis: debility R53.81  Treatment Diagnosis: B LE weakness, impaired balance    Physical Therapy Discharge Note    Time Period for Report:  18 - 18    Total Visits to date:   5   Cancels/No-shows to date: 0/1    Plan of Care/Treatment to date:  [x] Therapeutic Exercise  [] Therapeutic Activity   [] Gait Training  [x] Neuromuscular Re-education  [] Manual Therapy  [] Modalities:         [] Ultrasound  [] Electrical Stimulation            [] Cervical Traction [] Lumbar Traction    ? [] Cold/hotpack [] Iontophoresis   Comments:    [x] Pt did not adhere to Plan of Care/did not return for follow-up visits. Pain (Eval) 0/10   Pain (D/C) 0/10     Functional Outcome used:   TUG   Functional Outcome (Eval) 26.40 seconds = 100% impaired   Functional Outcome (D/C) 19.9 seconds = 60-79% impaired at visit 4     Other significant findings at last visit:  · At last visit seen, no complaints of pain. Pt reports that she thinks overall is walking better.  Able to do HEP more regularly the past week since previous visit. TUG = 19.9 seconds with standard cane.  30 second chair stand test = 5 reps with B UE A. Encouraged regular HEP consistently for maximum PT benefit. Fatigue noted B LE L > R with exercises. Significant quad lag noted with SLR on R - addressed with HEP.        Progress towards goals:    Short term goals  Time Frame for Short term goals: 4 weeks  Short term goal 1: Pt will demo good understanding and knowledge of HEP partially met  Short term goal 2: TUG = 21 seconds with standard cane for decreased fall risk MET  Short term goal 3: 30 second chair stand test = 7 reps with B UE A for improved functional strength with sit<>stand partially met  Long term goals  Time Frame for Long term goals : 8 weeks   Long term goal 1: Pt will demo good understanding and knowledge of HEP progressions partially met  Long term goal 2: TUG = 16 seconds with standard cane for decreased fall risk partially met  Long term goal 3: 30 second chair stand test = 10 reps with B UE A for improved functional strength with sit<>stand partially met  Long term goal 4: B SLS balance 5 seconds each for decreased fall risk not assessed  Long term goal 5: Strength B LE 4+/5 to allow pt to ascend/descend steps reciprocally with railings  not assessed     Goal Status:  [] Achieved [x] Partially Achieved  [] Not Achieved     Patient Status: [x] Patient now discharged, after last visit seen, pt no show visit and no return call to reschedule. Recommend pt continue with  HEP as instructed and F/U with Dr as needed. Electronically signed by:   Brando Martin, DPT [id number]

## 2018-09-26 ENCOUNTER — HOSPITAL ENCOUNTER (OUTPATIENT)
Dept: VASCULAR LAB | Age: 78
Discharge: HOME | End: 2018-09-26
Payer: MEDICARE

## 2018-09-26 PROCEDURE — 93971 EXTREMITY STUDY: CPT

## 2018-11-12 RX ORDER — ISOSORBIDE MONONITRATE 60 MG/1
TABLET, EXTENDED RELEASE ORAL
Qty: 30 TABLET | Refills: 0 | OUTPATIENT
Start: 2018-11-12

## 2018-11-12 RX ORDER — ISOSORBIDE MONONITRATE 60 MG/1
60 TABLET, EXTENDED RELEASE ORAL EVERY MORNING
Qty: 30 TABLET | Refills: 2 | Status: ON HOLD
Start: 2018-11-12 | End: 2019-02-12 | Stop reason: HOSPADM

## 2018-11-12 RX ORDER — FUROSEMIDE 80 MG
80 TABLET ORAL 2 TIMES DAILY
Qty: 60 TABLET | Refills: 0 | Status: ON HOLD
Start: 2018-11-12 | End: 2019-01-30 | Stop reason: HOSPADM

## 2019-01-01 ENCOUNTER — OFFICE VISIT (OUTPATIENT)
Dept: INTERNAL MEDICINE CLINIC | Age: 79
End: 2019-01-01
Payer: COMMERCIAL

## 2019-01-01 ENCOUNTER — APPOINTMENT (OUTPATIENT)
Dept: VASCULAR LAB | Age: 79
DRG: 299 | End: 2019-01-01
Payer: COMMERCIAL

## 2019-01-01 ENCOUNTER — APPOINTMENT (OUTPATIENT)
Dept: MRI IMAGING | Age: 79
DRG: 551 | End: 2019-01-01
Payer: COMMERCIAL

## 2019-01-01 ENCOUNTER — APPOINTMENT (OUTPATIENT)
Dept: GENERAL RADIOLOGY | Age: 79
DRG: 919 | End: 2019-01-01
Payer: COMMERCIAL

## 2019-01-01 ENCOUNTER — ANESTHESIA EVENT (OUTPATIENT)
Dept: ENDOSCOPY | Age: 79
DRG: 919 | End: 2019-01-01
Payer: COMMERCIAL

## 2019-01-01 ENCOUNTER — APPOINTMENT (OUTPATIENT)
Dept: CT IMAGING | Age: 79
DRG: 551 | End: 2019-01-01
Payer: COMMERCIAL

## 2019-01-01 ENCOUNTER — HOSPITAL ENCOUNTER (OUTPATIENT)
Age: 79
Discharge: HOME | End: 2019-08-26
Payer: COMMERCIAL

## 2019-01-01 ENCOUNTER — ANESTHESIA (OUTPATIENT)
Dept: ENDOSCOPY | Age: 79
DRG: 919 | End: 2019-01-01
Payer: COMMERCIAL

## 2019-01-01 ENCOUNTER — APPOINTMENT (OUTPATIENT)
Dept: VASCULAR LAB | Age: 79
DRG: 919 | End: 2019-01-01
Payer: COMMERCIAL

## 2019-01-01 ENCOUNTER — APPOINTMENT (OUTPATIENT)
Dept: GENERAL RADIOLOGY | Age: 79
DRG: 551 | End: 2019-01-01
Payer: COMMERCIAL

## 2019-01-01 ENCOUNTER — HOSPITAL ENCOUNTER (INPATIENT)
Age: 79
LOS: 7 days | Discharge: HOME | DRG: 299 | End: 2019-05-31
Attending: EMERGENCY MEDICINE | Admitting: STUDENT IN AN ORGANIZED HEALTH CARE EDUCATION/TRAINING PROGRAM
Payer: COMMERCIAL

## 2019-01-01 ENCOUNTER — HOSPITAL ENCOUNTER (INPATIENT)
Age: 79
LOS: 8 days | Discharge: NURSING, REHAB OR LONG-TERM CARE | DRG: 919 | End: 2019-08-15
Attending: EMERGENCY MEDICINE | Admitting: INTERNAL MEDICINE
Payer: COMMERCIAL

## 2019-01-01 ENCOUNTER — HOSPITAL ENCOUNTER (EMERGENCY)
Age: 79
Discharge: HOME | End: 2019-10-28
Attending: EMERGENCY MEDICINE
Payer: COMMERCIAL

## 2019-01-01 ENCOUNTER — HOSPITAL ENCOUNTER (INPATIENT)
Age: 79
LOS: 3 days | Discharge: NURSING, REHAB OR LONG-TERM CARE | DRG: 377 | End: 2019-08-21
Attending: EMERGENCY MEDICINE | Admitting: INTERNAL MEDICINE
Payer: COMMERCIAL

## 2019-01-01 ENCOUNTER — HOSPITAL ENCOUNTER (INPATIENT)
Age: 79
LOS: 3 days | Discharge: HOME HEALTH | DRG: 551 | End: 2019-03-27
Attending: EMERGENCY MEDICINE
Payer: COMMERCIAL

## 2019-01-01 ENCOUNTER — APPOINTMENT (OUTPATIENT)
Dept: CT IMAGING | Age: 79
DRG: 919 | End: 2019-01-01
Payer: COMMERCIAL

## 2019-01-01 ENCOUNTER — APPOINTMENT (OUTPATIENT)
Dept: MRI IMAGING | Age: 79
DRG: 947 | End: 2019-01-01
Payer: COMMERCIAL

## 2019-01-01 ENCOUNTER — APPOINTMENT (OUTPATIENT)
Dept: GENERAL RADIOLOGY | Age: 79
DRG: 947 | End: 2019-01-01
Payer: COMMERCIAL

## 2019-01-01 ENCOUNTER — NURSE TRIAGE (OUTPATIENT)
Dept: OTHER | Facility: CLINIC | Age: 79
End: 2019-01-01

## 2019-01-01 ENCOUNTER — HOSPITAL ENCOUNTER (OUTPATIENT)
Dept: GENERAL RADIOLOGY | Age: 79
Discharge: HOME | End: 2019-08-30
Payer: COMMERCIAL

## 2019-01-01 ENCOUNTER — TELEPHONE (OUTPATIENT)
Dept: INTERNAL MEDICINE CLINIC | Age: 79
End: 2019-01-01

## 2019-01-01 ENCOUNTER — HOSPITAL ENCOUNTER (OUTPATIENT)
Dept: GENERAL RADIOLOGY | Age: 79
Discharge: HOME | End: 2019-08-26
Payer: COMMERCIAL

## 2019-01-01 ENCOUNTER — APPOINTMENT (OUTPATIENT)
Dept: CT IMAGING | Age: 79
DRG: 947 | End: 2019-01-01
Payer: COMMERCIAL

## 2019-01-01 ENCOUNTER — APPOINTMENT (OUTPATIENT)
Dept: ULTRASOUND IMAGING | Age: 79
DRG: 377 | End: 2019-01-01
Payer: COMMERCIAL

## 2019-01-01 ENCOUNTER — HOSPITAL ENCOUNTER (OUTPATIENT)
Age: 79
Discharge: HOME | End: 2019-08-30
Payer: COMMERCIAL

## 2019-01-01 ENCOUNTER — HOSPITAL ENCOUNTER (INPATIENT)
Age: 79
LOS: 7 days | Discharge: NURSING, REHAB OR LONG-TERM CARE | DRG: 947 | End: 2019-04-15
Attending: EMERGENCY MEDICINE | Admitting: INTERNAL MEDICINE
Payer: COMMERCIAL

## 2019-01-01 VITALS
BODY MASS INDEX: 21.87 KG/M2 | HEIGHT: 67 IN | SYSTOLIC BLOOD PRESSURE: 117 MMHG | OXYGEN SATURATION: 98 % | DIASTOLIC BLOOD PRESSURE: 72 MMHG | RESPIRATION RATE: 16 BRPM | HEART RATE: 74 BPM | WEIGHT: 139.33 LBS | TEMPERATURE: 99.7 F

## 2019-01-01 VITALS
BODY MASS INDEX: 22.91 KG/M2 | HEIGHT: 67 IN | SYSTOLIC BLOOD PRESSURE: 96 MMHG | HEART RATE: 87 BPM | OXYGEN SATURATION: 98 % | TEMPERATURE: 98.8 F | DIASTOLIC BLOOD PRESSURE: 56 MMHG | RESPIRATION RATE: 16 BRPM | WEIGHT: 146 LBS

## 2019-01-01 VITALS
WEIGHT: 148.81 LBS | HEIGHT: 67 IN | BODY MASS INDEX: 23.36 KG/M2 | RESPIRATION RATE: 16 BRPM | OXYGEN SATURATION: 98 % | TEMPERATURE: 97.4 F | SYSTOLIC BLOOD PRESSURE: 132 MMHG | DIASTOLIC BLOOD PRESSURE: 74 MMHG | HEART RATE: 64 BPM

## 2019-01-01 VITALS
RESPIRATION RATE: 16 BRPM | WEIGHT: 133.6 LBS | SYSTOLIC BLOOD PRESSURE: 113 MMHG | BODY MASS INDEX: 21.47 KG/M2 | TEMPERATURE: 98.1 F | DIASTOLIC BLOOD PRESSURE: 75 MMHG | HEART RATE: 76 BPM | OXYGEN SATURATION: 97 % | HEIGHT: 66 IN

## 2019-01-01 VITALS
RESPIRATION RATE: 18 BRPM | TEMPERATURE: 97.7 F | BODY MASS INDEX: 22.35 KG/M2 | HEIGHT: 67 IN | HEART RATE: 95 BPM | SYSTOLIC BLOOD PRESSURE: 132 MMHG | OXYGEN SATURATION: 96 % | WEIGHT: 142.42 LBS | DIASTOLIC BLOOD PRESSURE: 82 MMHG

## 2019-01-01 VITALS
WEIGHT: 148.15 LBS | BODY MASS INDEX: 23.25 KG/M2 | OXYGEN SATURATION: 99 % | HEART RATE: 79 BPM | RESPIRATION RATE: 16 BRPM | DIASTOLIC BLOOD PRESSURE: 72 MMHG | SYSTOLIC BLOOD PRESSURE: 110 MMHG | HEIGHT: 67 IN | TEMPERATURE: 97.8 F

## 2019-01-01 VITALS
DIASTOLIC BLOOD PRESSURE: 75 MMHG | WEIGHT: 143.2 LBS | SYSTOLIC BLOOD PRESSURE: 119 MMHG | BODY MASS INDEX: 22.43 KG/M2 | RESPIRATION RATE: 18 BRPM | TEMPERATURE: 98 F | HEART RATE: 85 BPM

## 2019-01-01 VITALS — OXYGEN SATURATION: 98 % | SYSTOLIC BLOOD PRESSURE: 85 MMHG | DIASTOLIC BLOOD PRESSURE: 64 MMHG

## 2019-01-01 DIAGNOSIS — I10 ESSENTIAL HYPERTENSION: ICD-10-CM

## 2019-01-01 DIAGNOSIS — M79.89 PAIN AND SWELLING OF LEFT LOWER EXTREMITY: Primary | ICD-10-CM

## 2019-01-01 DIAGNOSIS — E86.0 DEHYDRATION: Primary | ICD-10-CM

## 2019-01-01 DIAGNOSIS — R60.9 EDEMA, UNSPECIFIED TYPE: Primary | ICD-10-CM

## 2019-01-01 DIAGNOSIS — I70.209 DIABETES TYPE 2 WITH ATHEROSCLEROSIS OF ARTERIES OF EXTREMITIES (HCC): Primary | ICD-10-CM

## 2019-01-01 DIAGNOSIS — K92.0 HEMATEMESIS WITH NAUSEA: Primary | ICD-10-CM

## 2019-01-01 DIAGNOSIS — B35.1 ONYCHOMYCOSIS: ICD-10-CM

## 2019-01-01 DIAGNOSIS — M79.671 FOOT PAIN, RIGHT: Primary | ICD-10-CM

## 2019-01-01 DIAGNOSIS — M79.605 PAIN AND SWELLING OF LEFT LOWER EXTREMITY: Primary | ICD-10-CM

## 2019-01-01 DIAGNOSIS — I96 DRY GANGRENE (HCC): Primary | ICD-10-CM
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
## 2019-01-01 DIAGNOSIS — M79.671 FOOT PAIN, RIGHT: ICD-10-CM

## 2019-01-01 DIAGNOSIS — R53.1 WEAKNESS: ICD-10-CM

## 2019-01-01 DIAGNOSIS — I96 GANGRENE OF TOE OF RIGHT FOOT (HCC): Primary | ICD-10-CM

## 2019-01-01 DIAGNOSIS — I50.22 SYSTOLIC HEART FAILURE, CHRONIC (HCC): ICD-10-CM

## 2019-01-01 DIAGNOSIS — E11.51 DIABETES TYPE 2 WITH ATHEROSCLEROSIS OF ARTERIES OF EXTREMITIES (HCC): Primary | ICD-10-CM

## 2019-01-01 DIAGNOSIS — I96 GANGRENE OF TOE OF RIGHT FOOT (HCC): ICD-10-CM

## 2019-01-01 DIAGNOSIS — I96 DRY GANGRENE (HCC): ICD-10-CM

## 2019-01-01 LAB
A/G RATIO: 0.6 (ref 1.1–2.2)
A/G RATIO: 0.7 (ref 1.1–2.2)
ABO/RH: NORMAL
ALBUMIN SERPL-MCNC: 1.8 G/DL (ref 3.4–5)
ALBUMIN SERPL-MCNC: 1.9 G/DL (ref 3.4–5)
ALBUMIN SERPL-MCNC: 1.9 G/DL (ref 3.4–5)
ALBUMIN SERPL-MCNC: 2 G/DL (ref 3.4–5)
ALBUMIN SERPL-MCNC: 2.1 G/DL (ref 3.4–5)
ALBUMIN SERPL-MCNC: 2.2 G/DL (ref 3.4–5)
ALBUMIN SERPL-MCNC: 2.3 G/DL (ref 3.4–5)
ALBUMIN SERPL-MCNC: 2.3 G/DL (ref 3.4–5)
ALBUMIN SERPL-MCNC: 2.4 G/DL (ref 3.4–5)
ALBUMIN SERPL-MCNC: 2.5 G/DL (ref 3.4–5)
ALBUMIN SERPL-MCNC: 2.5 G/DL (ref 3.4–5)
ALBUMIN SERPL-MCNC: 2.6 G/DL (ref 3.4–5)
ALBUMIN SERPL-MCNC: 2.8 G/DL (ref 3.4–5)
ALP BLD-CCNC: 68 U/L (ref 40–129)
ALP BLD-CCNC: 78 U/L (ref 40–129)
ALP BLD-CCNC: 84 U/L (ref 40–129)
ALP BLD-CCNC: 86 U/L (ref 40–129)
ALT SERPL-CCNC: 10 U/L (ref 10–40)
ALT SERPL-CCNC: 12 U/L (ref 10–40)
ALT SERPL-CCNC: 14 U/L (ref 10–40)
ALT SERPL-CCNC: 82 U/L (ref 10–40)
ANION GAP SERPL CALCULATED.3IONS-SCNC: 12 MMOL/L (ref 3–16)
ANION GAP SERPL CALCULATED.3IONS-SCNC: 14 MMOL/L (ref 3–16)
ANION GAP SERPL CALCULATED.3IONS-SCNC: 15 MMOL/L (ref 3–16)
ANION GAP SERPL CALCULATED.3IONS-SCNC: 16 MMOL/L (ref 3–16)
ANION GAP SERPL CALCULATED.3IONS-SCNC: 17 MMOL/L (ref 3–16)
ANION GAP SERPL CALCULATED.3IONS-SCNC: 18 MMOL/L (ref 3–16)
ANION GAP SERPL CALCULATED.3IONS-SCNC: 19 MMOL/L (ref 3–16)
ANION GAP SERPL CALCULATED.3IONS-SCNC: 19 MMOL/L (ref 3–16)
ANION GAP SERPL CALCULATED.3IONS-SCNC: 20 MMOL/L (ref 3–16)
ANION GAP SERPL CALCULATED.3IONS-SCNC: 21 MMOL/L (ref 3–16)
ANION GAP SERPL CALCULATED.3IONS-SCNC: 22 MMOL/L (ref 3–16)
ANION GAP SERPL CALCULATED.3IONS-SCNC: 22 MMOL/L (ref 3–16)
ANTI-NUCLEAR ANTIBODY (ANA): NEGATIVE
ANTI-XA UNFRAC HEPARIN: 0.35 IU/ML (ref 0.3–0.7)
ANTI-XA UNFRAC HEPARIN: 0.44 IU/ML (ref 0.3–0.7)
ANTI-XA UNFRAC HEPARIN: 0.46 IU/ML (ref 0.3–0.7)
ANTI-XA UNFRAC HEPARIN: 0.59 IU/ML (ref 0.3–0.7)
ANTI-XA UNFRAC HEPARIN: 0.63 IU/ML (ref 0.3–0.7)
ANTI-XA UNFRAC HEPARIN: 0.63 IU/ML (ref 0.3–0.7)
ANTI-XA UNFRAC HEPARIN: 0.69 IU/ML (ref 0.3–0.7)
ANTI-XA UNFRAC HEPARIN: 0.74 IU/ML (ref 0.3–0.7)
ANTI-XA UNFRAC HEPARIN: 1 IU/ML (ref 0.3–0.7)
ANTI-XA UNFRAC HEPARIN: 1.44 IU/ML (ref 0.3–0.7)
ANTIBODY SCREEN: NORMAL
APPEARANCE FLUID: CLEAR
APPEARANCE FLUID: NORMAL
APTT: 191.3 SEC (ref 26–36)
APTT: 39 SEC (ref 26–36)
AST SERPL-CCNC: 10 U/L (ref 15–37)
AST SERPL-CCNC: 11 U/L (ref 15–37)
AST SERPL-CCNC: 12 U/L (ref 15–37)
AST SERPL-CCNC: 34 U/L (ref 15–37)
BACTERIA: ABNORMAL /HPF
BASE EXCESS VENOUS: -0.8 MMOL/L (ref -2–3)
BASE EXCESS VENOUS: 8 (ref -3–3)
BASOPHILS ABSOLUTE: 0 K/UL (ref 0–0.2)
BASOPHILS ABSOLUTE: 0.1 K/UL (ref 0–0.2)
BASOPHILS ABSOLUTE: 0.2 K/UL (ref 0–0.2)
BASOPHILS ABSOLUTE: 0.2 K/UL (ref 0–0.2)
BASOPHILS RELATIVE PERCENT: 0.4 %
BASOPHILS RELATIVE PERCENT: 0.5 %
BASOPHILS RELATIVE PERCENT: 0.5 %
BASOPHILS RELATIVE PERCENT: 0.6 %
BASOPHILS RELATIVE PERCENT: 0.7 %
BASOPHILS RELATIVE PERCENT: 0.7 %
BASOPHILS RELATIVE PERCENT: 0.9 %
BASOPHILS RELATIVE PERCENT: 0.9 %
BASOPHILS RELATIVE PERCENT: 1 %
BASOPHILS RELATIVE PERCENT: 1.1 %
BASOPHILS RELATIVE PERCENT: 1.2 %
BASOPHILS RELATIVE PERCENT: 3.1 %
BASOPHILS RELATIVE PERCENT: 3.8 %
BILIRUB SERPL-MCNC: <0.2 MG/DL (ref 0–1)
BILIRUBIN DIRECT: <0.2 MG/DL (ref 0–0.3)
BILIRUBIN DIRECT: <0.2 MG/DL (ref 0–0.3)
BILIRUBIN URINE: ABNORMAL
BILIRUBIN, INDIRECT: ABNORMAL MG/DL (ref 0–1)
BILIRUBIN, INDIRECT: ABNORMAL MG/DL (ref 0–1)
BLOOD CULTURE, ROUTINE: NORMAL
BLOOD, URINE: ABNORMAL
BODY FLUID CULTURE, STERILE: NORMAL
BODY FLUID CULTURE, STERILE: NORMAL
BUN BLDV-MCNC: 38 MG/DL (ref 7–20)
BUN BLDV-MCNC: 38 MG/DL (ref 7–20)
BUN BLDV-MCNC: 42 MG/DL (ref 7–20)
BUN BLDV-MCNC: 43 MG/DL (ref 7–20)
BUN BLDV-MCNC: 44 MG/DL (ref 7–20)
BUN BLDV-MCNC: 46 MG/DL (ref 7–20)
BUN BLDV-MCNC: 48 MG/DL (ref 7–20)
BUN BLDV-MCNC: 48 MG/DL (ref 7–20)
BUN BLDV-MCNC: 49 MG/DL (ref 7–20)
BUN BLDV-MCNC: 50 MG/DL (ref 7–20)
BUN BLDV-MCNC: 51 MG/DL (ref 7–20)
BUN BLDV-MCNC: 53 MG/DL (ref 7–20)
BUN BLDV-MCNC: 54 MG/DL (ref 7–20)
BUN BLDV-MCNC: 54 MG/DL (ref 7–20)
BUN BLDV-MCNC: 55 MG/DL (ref 7–20)
BUN BLDV-MCNC: 55 MG/DL (ref 7–20)
BUN BLDV-MCNC: 56 MG/DL (ref 7–20)
BUN BLDV-MCNC: 56 MG/DL (ref 7–20)
BUN BLDV-MCNC: 57 MG/DL (ref 7–20)
BUN BLDV-MCNC: 58 MG/DL (ref 7–20)
BUN BLDV-MCNC: 62 MG/DL (ref 7–20)
BUN BLDV-MCNC: 62 MG/DL (ref 7–20)
BUN BLDV-MCNC: 63 MG/DL (ref 7–20)
BUN BLDV-MCNC: 98 MG/DL (ref 7–20)
CALCIUM SERPL-MCNC: 6.5 MG/DL (ref 8.3–10.6)
CALCIUM SERPL-MCNC: 6.6 MG/DL (ref 8.3–10.6)
CALCIUM SERPL-MCNC: 6.7 MG/DL (ref 8.3–10.6)
CALCIUM SERPL-MCNC: 6.8 MG/DL (ref 8.3–10.6)
CALCIUM SERPL-MCNC: 6.8 MG/DL (ref 8.3–10.6)
CALCIUM SERPL-MCNC: 6.9 MG/DL (ref 8.3–10.6)
CALCIUM SERPL-MCNC: 7 MG/DL (ref 8.3–10.6)
CALCIUM SERPL-MCNC: 7.1 MG/DL (ref 8.3–10.6)
CALCIUM SERPL-MCNC: 7.2 MG/DL (ref 8.3–10.6)
CALCIUM SERPL-MCNC: 7.3 MG/DL (ref 8.3–10.6)
CALCIUM SERPL-MCNC: 7.3 MG/DL (ref 8.3–10.6)
CALCIUM SERPL-MCNC: 7.4 MG/DL (ref 8.3–10.6)
CALCIUM SERPL-MCNC: 7.5 MG/DL (ref 8.3–10.6)
CALCIUM SERPL-MCNC: 7.5 MG/DL (ref 8.3–10.6)
CALCIUM SERPL-MCNC: 7.6 MG/DL (ref 8.3–10.6)
CALCIUM SERPL-MCNC: 7.7 MG/DL (ref 8.3–10.6)
CALCIUM SERPL-MCNC: 8.2 MG/DL (ref 8.3–10.6)
CALCIUM SERPL-MCNC: 8.4 MG/DL (ref 8.3–10.6)
CARBOXYHEMOGLOBIN: 3.3 % (ref 0–1.5)
CELL COUNT FLUID TYPE: NORMAL
CHLORIDE BLD-SCNC: 100 MMOL/L (ref 99–110)
CHLORIDE BLD-SCNC: 101 MMOL/L (ref 99–110)
CHLORIDE BLD-SCNC: 102 MMOL/L (ref 99–110)
CHLORIDE BLD-SCNC: 103 MMOL/L (ref 99–110)
CHLORIDE BLD-SCNC: 103 MMOL/L (ref 99–110)
CHLORIDE BLD-SCNC: 104 MMOL/L (ref 99–110)
CHLORIDE BLD-SCNC: 94 MMOL/L (ref 99–110)
CHLORIDE BLD-SCNC: 94 MMOL/L (ref 99–110)
CHLORIDE BLD-SCNC: 95 MMOL/L (ref 99–110)
CHLORIDE BLD-SCNC: 96 MMOL/L (ref 99–110)
CHLORIDE BLD-SCNC: 97 MMOL/L (ref 99–110)
CHLORIDE BLD-SCNC: 98 MMOL/L (ref 99–110)
CHLORIDE BLD-SCNC: 99 MMOL/L (ref 99–110)
CLARITY: ABNORMAL
CLOT EVALUATION: NORMAL
CO2: 16 MMOL/L (ref 21–32)
CO2: 19 MMOL/L (ref 21–32)
CO2: 22 MMOL/L (ref 21–32)
CO2: 22 MMOL/L (ref 21–32)
CO2: 23 MMOL/L (ref 21–32)
CO2: 24 MMOL/L (ref 21–32)
CO2: 25 MMOL/L (ref 21–32)
CO2: 26 MMOL/L (ref 21–32)
CO2: 27 MMOL/L (ref 21–32)
CO2: 28 MMOL/L (ref 21–32)
CO2: 29 MMOL/L (ref 21–32)
COLOR FLUID: COLORLESS
COLOR FLUID: NORMAL
COLOR: YELLOW
CORTISOL 30 MIN: 17.3 UG/DL
CORTISOL 60 MIN: 21.4 UG/DL
CORTISOL BASE: 10.7 UG/DL
CORTISOL TOTAL: 12.4 UG/DL
CREAT SERPL-MCNC: 10.1 MG/DL (ref 0.6–1.2)
CREAT SERPL-MCNC: 10.1 MG/DL (ref 0.6–1.2)
CREAT SERPL-MCNC: 10.2 MG/DL (ref 0.6–1.2)
CREAT SERPL-MCNC: 10.7 MG/DL (ref 0.6–1.2)
CREAT SERPL-MCNC: 11.2 MG/DL (ref 0.6–1.2)
CREAT SERPL-MCNC: 12.5 MG/DL (ref 0.6–1.2)
CREAT SERPL-MCNC: 6.7 MG/DL (ref 0.6–1.2)
CREAT SERPL-MCNC: 6.9 MG/DL (ref 0.6–1.2)
CREAT SERPL-MCNC: 6.9 MG/DL (ref 0.6–1.2)
CREAT SERPL-MCNC: 7.3 MG/DL (ref 0.6–1.2)
CREAT SERPL-MCNC: 8.1 MG/DL (ref 0.6–1.2)
CREAT SERPL-MCNC: 8.3 MG/DL (ref 0.6–1.2)
CREAT SERPL-MCNC: 8.5 MG/DL (ref 0.6–1.2)
CREAT SERPL-MCNC: 8.5 MG/DL (ref 0.6–1.2)
CREAT SERPL-MCNC: 8.7 MG/DL (ref 0.6–1.2)
CREAT SERPL-MCNC: 8.7 MG/DL (ref 0.6–1.2)
CREAT SERPL-MCNC: 8.8 MG/DL (ref 0.6–1.2)
CREAT SERPL-MCNC: 8.8 MG/DL (ref 0.6–1.2)
CREAT SERPL-MCNC: 9 MG/DL (ref 0.6–1.2)
CREAT SERPL-MCNC: 9.4 MG/DL (ref 0.6–1.2)
CREAT SERPL-MCNC: 9.4 MG/DL (ref 0.6–1.2)
CREAT SERPL-MCNC: 9.5 MG/DL (ref 0.6–1.2)
CREAT SERPL-MCNC: 9.6 MG/DL (ref 0.6–1.2)
CREAT SERPL-MCNC: 9.7 MG/DL (ref 0.6–1.2)
CREAT SERPL-MCNC: 9.7 MG/DL (ref 0.6–1.2)
CREAT SERPL-MCNC: 9.8 MG/DL (ref 0.6–1.2)
CREAT SERPL-MCNC: 9.8 MG/DL (ref 0.6–1.2)
CREAT SERPL-MCNC: 9.9 MG/DL (ref 0.6–1.2)
CULTURE, BLOOD 2: NORMAL
CULTURE, BLOOD 2: NORMAL
EKG ATRIAL RATE: 127 BPM
EKG ATRIAL RATE: 127 BPM
EKG ATRIAL RATE: 80 BPM
EKG ATRIAL RATE: 82 BPM
EKG ATRIAL RATE: 86 BPM
EKG ATRIAL RATE: 88 BPM
EKG ATRIAL RATE: 89 BPM
EKG DIAGNOSIS: NORMAL
EKG P AXIS: -8 DEGREES
EKG P AXIS: 28 DEGREES
EKG P AXIS: 48 DEGREES
EKG P AXIS: 48 DEGREES
EKG P AXIS: 66 DEGREES
EKG P-R INTERVAL: 140 MS
EKG P-R INTERVAL: 144 MS
EKG P-R INTERVAL: 148 MS
EKG P-R INTERVAL: 148 MS
EKG P-R INTERVAL: 162 MS
EKG Q-T INTERVAL: 358 MS
EKG Q-T INTERVAL: 364 MS
EKG Q-T INTERVAL: 394 MS
EKG Q-T INTERVAL: 406 MS
EKG Q-T INTERVAL: 418 MS
EKG Q-T INTERVAL: 422 MS
EKG Q-T INTERVAL: 424 MS
EKG QRS DURATION: 100 MS
EKG QRS DURATION: 102 MS
EKG QRS DURATION: 104 MS
EKG QRS DURATION: 92 MS
EKG QRS DURATION: 96 MS
EKG QRS DURATION: 96 MS
EKG QRS DURATION: 98 MS
EKG QTC CALCULATION (BAZETT): 470 MS
EKG QTC CALCULATION (BAZETT): 476 MS
EKG QTC CALCULATION (BAZETT): 476 MS
EKG QTC CALCULATION (BAZETT): 482 MS
EKG QTC CALCULATION (BAZETT): 485 MS
EKG QTC CALCULATION (BAZETT): 495 MS
EKG QTC CALCULATION (BAZETT): 513 MS
EKG R AXIS: -37 DEGREES
EKG R AXIS: -52 DEGREES
EKG R AXIS: -60 DEGREES
EKG R AXIS: -65 DEGREES
EKG R AXIS: -70 DEGREES
EKG R AXIS: -72 DEGREES
EKG R AXIS: -75 DEGREES
EKG T AXIS: -3 DEGREES
EKG T AXIS: 18 DEGREES
EKG T AXIS: 5 DEGREES
EKG T AXIS: 55 DEGREES
EKG T AXIS: 73 DEGREES
EKG T AXIS: 86 DEGREES
EKG T AXIS: 94 DEGREES
EKG VENTRICULAR RATE: 103 BPM
EKG VENTRICULAR RATE: 104 BPM
EKG VENTRICULAR RATE: 80 BPM
EKG VENTRICULAR RATE: 82 BPM
EKG VENTRICULAR RATE: 86 BPM
EKG VENTRICULAR RATE: 88 BPM
EKG VENTRICULAR RATE: 89 BPM
EOSINOPHILS ABSOLUTE: 0 K/UL (ref 0–0.6)
EOSINOPHILS ABSOLUTE: 0.1 K/UL (ref 0–0.6)
EOSINOPHILS ABSOLUTE: 0.2 K/UL (ref 0–0.6)
EOSINOPHILS ABSOLUTE: 0.3 K/UL (ref 0–0.6)
EOSINOPHILS ABSOLUTE: 0.4 K/UL (ref 0–0.6)
EOSINOPHILS ABSOLUTE: 0.5 K/UL (ref 0–0.6)
EOSINOPHILS ABSOLUTE: 0.5 K/UL (ref 0–0.6)
EOSINOPHILS RELATIVE PERCENT: 0 %
EOSINOPHILS RELATIVE PERCENT: 0 %
EOSINOPHILS RELATIVE PERCENT: 0.4 %
EOSINOPHILS RELATIVE PERCENT: 0.6 %
EOSINOPHILS RELATIVE PERCENT: 0.8 %
EOSINOPHILS RELATIVE PERCENT: 0.8 %
EOSINOPHILS RELATIVE PERCENT: 1.2 %
EOSINOPHILS RELATIVE PERCENT: 1.2 %
EOSINOPHILS RELATIVE PERCENT: 1.9 %
EOSINOPHILS RELATIVE PERCENT: 2 %
EOSINOPHILS RELATIVE PERCENT: 2 %
EOSINOPHILS RELATIVE PERCENT: 2.3 %
EOSINOPHILS RELATIVE PERCENT: 2.4 %
EOSINOPHILS RELATIVE PERCENT: 2.6 %
EOSINOPHILS RELATIVE PERCENT: 2.7 %
EOSINOPHILS RELATIVE PERCENT: 2.8 %
EOSINOPHILS RELATIVE PERCENT: 2.9 %
EOSINOPHILS RELATIVE PERCENT: 3.2 %
EOSINOPHILS RELATIVE PERCENT: 4.5 %
EOSINOPHILS RELATIVE PERCENT: 4.7 %
EOSINOPHILS RELATIVE PERCENT: 4.8 %
EOSINOPHILS RELATIVE PERCENT: 5 %
EOSINOPHILS RELATIVE PERCENT: 5.9 %
EOSINOPHILS RELATIVE PERCENT: 6.6 %
EOSINOPHILS RELATIVE PERCENT: 7 %
EPITHELIAL CELLS, UA: ABNORMAL /HPF
ESTIMATED AVERAGE GLUCOSE: 139.9 MG/DL
FLUID DIFF COMMENT: NORMAL
FOLATE: 13.41 NG/ML (ref 4.78–24.2)
FOLATE: 9.18 NG/ML (ref 4.78–24.2)
FOLATE: >20 NG/ML (ref 4.78–24.2)
FUNGUS (MYCOLOGY) CULTURE: NORMAL
FUNGUS STAIN: NORMAL
GFR AFRICAN AMERICAN: 4
GFR AFRICAN AMERICAN: 5
GFR AFRICAN AMERICAN: 6
GFR AFRICAN AMERICAN: 6
GFR AFRICAN AMERICAN: 7
GFR NON-AFRICAN AMERICAN: 3
GFR NON-AFRICAN AMERICAN: 4
GFR NON-AFRICAN AMERICAN: 5
GFR NON-AFRICAN AMERICAN: 6
GLOBULIN: 4 G/DL
GLOBULIN: 4.2 G/DL
GLUCOSE BLD-MCNC: 100 MG/DL (ref 70–99)
GLUCOSE BLD-MCNC: 101 MG/DL (ref 70–99)
GLUCOSE BLD-MCNC: 101 MG/DL (ref 70–99)
GLUCOSE BLD-MCNC: 103 MG/DL (ref 70–99)
GLUCOSE BLD-MCNC: 105 MG/DL (ref 70–99)
GLUCOSE BLD-MCNC: 106 MG/DL (ref 70–99)
GLUCOSE BLD-MCNC: 114 MG/DL (ref 70–99)
GLUCOSE BLD-MCNC: 115 MG/DL (ref 70–99)
GLUCOSE BLD-MCNC: 116 MG/DL (ref 70–99)
GLUCOSE BLD-MCNC: 117 MG/DL (ref 70–99)
GLUCOSE BLD-MCNC: 118 MG/DL (ref 70–99)
GLUCOSE BLD-MCNC: 120 MG/DL (ref 70–99)
GLUCOSE BLD-MCNC: 120 MG/DL (ref 70–99)
GLUCOSE BLD-MCNC: 121 MG/DL (ref 70–99)
GLUCOSE BLD-MCNC: 122 MG/DL (ref 70–99)
GLUCOSE BLD-MCNC: 122 MG/DL (ref 70–99)
GLUCOSE BLD-MCNC: 123 MG/DL (ref 70–99)
GLUCOSE BLD-MCNC: 125 MG/DL (ref 70–99)
GLUCOSE BLD-MCNC: 125 MG/DL (ref 70–99)
GLUCOSE BLD-MCNC: 128 MG/DL (ref 70–99)
GLUCOSE BLD-MCNC: 128 MG/DL (ref 70–99)
GLUCOSE BLD-MCNC: 131 MG/DL (ref 70–99)
GLUCOSE BLD-MCNC: 132 MG/DL (ref 70–99)
GLUCOSE BLD-MCNC: 133 MG/DL (ref 70–99)
GLUCOSE BLD-MCNC: 136 MG/DL (ref 70–99)
GLUCOSE BLD-MCNC: 137 MG/DL (ref 70–99)
GLUCOSE BLD-MCNC: 138 MG/DL (ref 70–99)
GLUCOSE BLD-MCNC: 139 MG/DL (ref 70–99)
GLUCOSE BLD-MCNC: 140 MG/DL (ref 70–99)
GLUCOSE BLD-MCNC: 140 MG/DL (ref 70–99)
GLUCOSE BLD-MCNC: 141 MG/DL (ref 70–99)
GLUCOSE BLD-MCNC: 141 MG/DL (ref 70–99)
GLUCOSE BLD-MCNC: 142 MG/DL (ref 70–99)
GLUCOSE BLD-MCNC: 143 MG/DL (ref 70–99)
GLUCOSE BLD-MCNC: 144 MG/DL (ref 70–99)
GLUCOSE BLD-MCNC: 144 MG/DL (ref 70–99)
GLUCOSE BLD-MCNC: 145 MG/DL (ref 70–99)
GLUCOSE BLD-MCNC: 145 MG/DL (ref 70–99)
GLUCOSE BLD-MCNC: 146 MG/DL (ref 70–99)
GLUCOSE BLD-MCNC: 147 MG/DL (ref 70–99)
GLUCOSE BLD-MCNC: 147 MG/DL (ref 70–99)
GLUCOSE BLD-MCNC: 148 MG/DL (ref 70–99)
GLUCOSE BLD-MCNC: 150 MG/DL (ref 70–99)
GLUCOSE BLD-MCNC: 151 MG/DL (ref 70–99)
GLUCOSE BLD-MCNC: 151 MG/DL (ref 70–99)
GLUCOSE BLD-MCNC: 152 MG/DL (ref 70–99)
GLUCOSE BLD-MCNC: 153 MG/DL (ref 70–99)
GLUCOSE BLD-MCNC: 153 MG/DL (ref 70–99)
GLUCOSE BLD-MCNC: 154 MG/DL (ref 70–99)
GLUCOSE BLD-MCNC: 155 MG/DL (ref 70–99)
GLUCOSE BLD-MCNC: 157 MG/DL (ref 70–99)
GLUCOSE BLD-MCNC: 157 MG/DL (ref 70–99)
GLUCOSE BLD-MCNC: 159 MG/DL (ref 70–99)
GLUCOSE BLD-MCNC: 160 MG/DL (ref 70–99)
GLUCOSE BLD-MCNC: 160 MG/DL (ref 70–99)
GLUCOSE BLD-MCNC: 162 MG/DL (ref 70–99)
GLUCOSE BLD-MCNC: 163 MG/DL (ref 70–99)
GLUCOSE BLD-MCNC: 164 MG/DL (ref 70–99)
GLUCOSE BLD-MCNC: 165 MG/DL (ref 70–99)
GLUCOSE BLD-MCNC: 165 MG/DL (ref 70–99)
GLUCOSE BLD-MCNC: 167 MG/DL (ref 70–99)
GLUCOSE BLD-MCNC: 168 MG/DL (ref 70–99)
GLUCOSE BLD-MCNC: 169 MG/DL (ref 70–99)
GLUCOSE BLD-MCNC: 171 MG/DL (ref 70–99)
GLUCOSE BLD-MCNC: 173 MG/DL (ref 70–99)
GLUCOSE BLD-MCNC: 174 MG/DL (ref 70–99)
GLUCOSE BLD-MCNC: 175 MG/DL (ref 70–99)
GLUCOSE BLD-MCNC: 175 MG/DL (ref 70–99)
GLUCOSE BLD-MCNC: 177 MG/DL (ref 70–99)
GLUCOSE BLD-MCNC: 177 MG/DL (ref 70–99)
GLUCOSE BLD-MCNC: 179 MG/DL (ref 70–99)
GLUCOSE BLD-MCNC: 179 MG/DL (ref 70–99)
GLUCOSE BLD-MCNC: 180 MG/DL (ref 70–99)
GLUCOSE BLD-MCNC: 181 MG/DL (ref 70–99)
GLUCOSE BLD-MCNC: 183 MG/DL (ref 70–99)
GLUCOSE BLD-MCNC: 184 MG/DL (ref 70–99)
GLUCOSE BLD-MCNC: 186 MG/DL (ref 70–99)
GLUCOSE BLD-MCNC: 186 MG/DL (ref 70–99)
GLUCOSE BLD-MCNC: 187 MG/DL (ref 70–99)
GLUCOSE BLD-MCNC: 189 MG/DL (ref 70–99)
GLUCOSE BLD-MCNC: 190 MG/DL (ref 70–99)
GLUCOSE BLD-MCNC: 191 MG/DL (ref 70–99)
GLUCOSE BLD-MCNC: 193 MG/DL (ref 70–99)
GLUCOSE BLD-MCNC: 194 MG/DL (ref 70–99)
GLUCOSE BLD-MCNC: 195 MG/DL (ref 70–99)
GLUCOSE BLD-MCNC: 197 MG/DL (ref 70–99)
GLUCOSE BLD-MCNC: 199 MG/DL (ref 70–99)
GLUCOSE BLD-MCNC: 208 MG/DL (ref 70–99)
GLUCOSE BLD-MCNC: 211 MG/DL (ref 70–99)
GLUCOSE BLD-MCNC: 220 MG/DL (ref 70–99)
GLUCOSE BLD-MCNC: 222 MG/DL (ref 70–99)
GLUCOSE BLD-MCNC: 225 MG/DL (ref 70–99)
GLUCOSE BLD-MCNC: 230 MG/DL (ref 70–99)
GLUCOSE BLD-MCNC: 235 MG/DL (ref 70–99)
GLUCOSE BLD-MCNC: 237 MG/DL (ref 70–99)
GLUCOSE BLD-MCNC: 252 MG/DL (ref 70–99)
GLUCOSE BLD-MCNC: 258 MG/DL (ref 70–99)
GLUCOSE BLD-MCNC: 263 MG/DL (ref 70–99)
GLUCOSE BLD-MCNC: 264 MG/DL (ref 70–99)
GLUCOSE BLD-MCNC: 271 MG/DL (ref 70–99)
GLUCOSE BLD-MCNC: 285 MG/DL (ref 70–99)
GLUCOSE BLD-MCNC: 294 MG/DL (ref 70–99)
GLUCOSE BLD-MCNC: 302 MG/DL (ref 70–99)
GLUCOSE BLD-MCNC: 330 MG/DL (ref 70–99)
GLUCOSE BLD-MCNC: 347 MG/DL (ref 70–99)
GLUCOSE BLD-MCNC: 50 MG/DL (ref 70–99)
GLUCOSE BLD-MCNC: 54 MG/DL (ref 70–99)
GLUCOSE BLD-MCNC: 55 MG/DL (ref 70–99)
GLUCOSE BLD-MCNC: 59 MG/DL (ref 70–99)
GLUCOSE BLD-MCNC: 60 MG/DL (ref 70–99)
GLUCOSE BLD-MCNC: 74 MG/DL (ref 70–99)
GLUCOSE BLD-MCNC: 79 MG/DL (ref 70–99)
GLUCOSE BLD-MCNC: 81 MG/DL (ref 70–99)
GLUCOSE BLD-MCNC: 82 MG/DL (ref 70–99)
GLUCOSE BLD-MCNC: 86 MG/DL (ref 70–99)
GLUCOSE BLD-MCNC: 87 MG/DL (ref 70–99)
GLUCOSE BLD-MCNC: 88 MG/DL (ref 70–99)
GLUCOSE BLD-MCNC: 90 MG/DL (ref 70–99)
GLUCOSE BLD-MCNC: 94 MG/DL (ref 70–99)
GLUCOSE BLD-MCNC: 95 MG/DL (ref 70–99)
GLUCOSE BLD-MCNC: 97 MG/DL (ref 70–99)
GLUCOSE BLD-MCNC: 99 MG/DL (ref 70–99)
GLUCOSE URINE: 100 MG/DL
GRAM STAIN RESULT: ABNORMAL
GRAM STAIN RESULT: NORMAL
GRAM STAIN RESULT: NORMAL
HAV IGM SER IA-ACNC: NORMAL
HBA1C MFR BLD: 6.5 %
HBV SURFACE AB TITR SER: <3.5 MIU/ML
HCO3 VENOUS: 24.7 MMOL/L (ref 24–28)
HCO3 VENOUS: 33.2 MMOL/L (ref 23–29)
HCT VFR BLD CALC: 28.3 % (ref 36–48)
HCT VFR BLD CALC: 28.4 % (ref 36–48)
HCT VFR BLD CALC: 28.6 % (ref 36–48)
HCT VFR BLD CALC: 28.9 % (ref 36–48)
HCT VFR BLD CALC: 29.1 % (ref 36–48)
HCT VFR BLD CALC: 29.1 % (ref 36–48)
HCT VFR BLD CALC: 29.2 % (ref 36–48)
HCT VFR BLD CALC: 29.6 % (ref 36–48)
HCT VFR BLD CALC: 29.6 % (ref 36–48)
HCT VFR BLD CALC: 30 % (ref 36–48)
HCT VFR BLD CALC: 30.2 % (ref 36–48)
HCT VFR BLD CALC: 30.3 % (ref 36–48)
HCT VFR BLD CALC: 30.8 % (ref 36–48)
HCT VFR BLD CALC: 31.3 % (ref 36–48)
HCT VFR BLD CALC: 31.6 % (ref 36–48)
HCT VFR BLD CALC: 31.9 % (ref 36–48)
HCT VFR BLD CALC: 32 % (ref 36–48)
HCT VFR BLD CALC: 32.4 % (ref 36–48)
HCT VFR BLD CALC: 32.7 % (ref 36–48)
HCT VFR BLD CALC: 33.1 % (ref 36–48)
HCT VFR BLD CALC: 33.9 % (ref 36–48)
HCT VFR BLD CALC: 34.7 % (ref 36–48)
HCT VFR BLD CALC: 35.3 % (ref 36–48)
HCT VFR BLD CALC: 35.8 % (ref 36–48)
HCT VFR BLD CALC: 36.2 % (ref 36–48)
HCT VFR BLD CALC: 38.5 % (ref 36–48)
HCT VFR BLD CALC: 38.9 % (ref 36–48)
HCT VFR BLD CALC: 39.2 % (ref 36–48)
HCT VFR BLD CALC: 40.1 % (ref 36–48)
HCT VFR BLD CALC: 40.6 % (ref 36–48)
HCT VFR BLD CALC: 41 % (ref 36–48)
HCT VFR BLD CALC: 42 % (ref 36–48)
HCT VFR BLD CALC: 42.7 % (ref 36–48)
HCT VFR BLD CALC: 44.2 % (ref 36–48)
HCT VFR BLD CALC: 46.1 % (ref 36–48)
HCT VFR BLD CALC: 46.5 % (ref 36–48)
HEMOGLOBIN, VEN, REDUCED: 18.2 %
HEMOGLOBIN: 10.1 G/DL (ref 12–16)
HEMOGLOBIN: 10.2 G/DL (ref 12–16)
HEMOGLOBIN: 10.2 G/DL (ref 12–16)
HEMOGLOBIN: 10.3 G/DL (ref 12–16)
HEMOGLOBIN: 10.3 G/DL (ref 12–16)
HEMOGLOBIN: 10.5 G/DL (ref 12–16)
HEMOGLOBIN: 10.7 G/DL (ref 12–16)
HEMOGLOBIN: 11.1 G/DL (ref 12–16)
HEMOGLOBIN: 11.4 G/DL (ref 12–16)
HEMOGLOBIN: 11.5 G/DL (ref 12–16)
HEMOGLOBIN: 11.8 G/DL (ref 12–16)
HEMOGLOBIN: 12.3 G/DL (ref 12–16)
HEMOGLOBIN: 12.6 G/DL (ref 12–16)
HEMOGLOBIN: 12.7 G/DL (ref 12–16)
HEMOGLOBIN: 12.9 G/DL (ref 12–16)
HEMOGLOBIN: 13 G/DL (ref 12–16)
HEMOGLOBIN: 13 G/DL (ref 12–16)
HEMOGLOBIN: 13.1 G/DL (ref 12–16)
HEMOGLOBIN: 13.7 G/DL (ref 12–16)
HEMOGLOBIN: 14.1 G/DL (ref 12–16)
HEMOGLOBIN: 14.7 G/DL (ref 12–16)
HEMOGLOBIN: 14.8 G/DL (ref 12–16)
HEMOGLOBIN: 9.1 G/DL (ref 12–16)
HEMOGLOBIN: 9.4 G/DL (ref 12–16)
HEMOGLOBIN: 9.6 G/DL (ref 12–16)
HEMOGLOBIN: 9.7 G/DL (ref 12–16)
HEMOGLOBIN: 9.7 G/DL (ref 12–16)
HEMOGLOBIN: 9.8 G/DL (ref 12–16)
HEPATITIS B CORE IGM ANTIBODY: NORMAL
HEPATITIS B SURFACE ANTIGEN INTERPRETATION: NORMAL
HEPATITIS C ANTIBODY INTERPRETATION: NORMAL
INR BLD: 0.93 (ref 0.86–1.14)
INR BLD: 1.03 (ref 0.86–1.14)
INR BLD: 1.06 (ref 0.86–1.14)
INR BLD: 1.08 (ref 0.86–1.14)
INR BLD: 1.09 (ref 0.86–1.14)
INR BLD: 1.17 (ref 0.86–1.14)
INR BLD: 1.37 (ref 0.86–1.14)
INR BLD: 2.9 (ref 0.86–1.14)
KETONES, URINE: ABNORMAL MG/DL
LACTATE: 2.34 MMOL/L (ref 0.4–2)
LACTIC ACID: 1.2 MMOL/L (ref 0.4–2)
LACTIC ACID: 1.5 MMOL/L (ref 0.4–2)
LACTIC ACID: 1.6 MMOL/L (ref 0.4–2)
LACTIC ACID: 1.9 MMOL/L (ref 0.4–2)
LACTIC ACID: 1.9 MMOL/L (ref 0.4–2)
LACTIC ACID: 2 MMOL/L (ref 0.4–2)
LACTIC ACID: 2.4 MMOL/L (ref 0.4–2)
LACTIC ACID: 2.5 MMOL/L (ref 0.4–2)
LACTIC ACID: 2.7 MMOL/L (ref 0.4–2)
LACTIC ACID: 2.7 MMOL/L (ref 0.4–2)
LEUKOCYTE ESTERASE, URINE: ABNORMAL
LIPASE: 22 U/L (ref 13–60)
LIPASE: 52 U/L (ref 13–60)
LV EF: 65 %
LVEF MODALITY: NORMAL
LYMPHOCYTES ABSOLUTE: 0.6 K/UL (ref 1–5.1)
LYMPHOCYTES ABSOLUTE: 0.8 K/UL (ref 1–5.1)
LYMPHOCYTES ABSOLUTE: 0.9 K/UL (ref 1–5.1)
LYMPHOCYTES ABSOLUTE: 1 K/UL (ref 1–5.1)
LYMPHOCYTES ABSOLUTE: 1.1 K/UL (ref 1–5.1)
LYMPHOCYTES ABSOLUTE: 1.2 K/UL (ref 1–5.1)
LYMPHOCYTES ABSOLUTE: 1.3 K/UL (ref 1–5.1)
LYMPHOCYTES ABSOLUTE: 1.4 K/UL (ref 1–5.1)
LYMPHOCYTES ABSOLUTE: 1.6 K/UL (ref 1–5.1)
LYMPHOCYTES ABSOLUTE: 1.7 K/UL (ref 1–5.1)
LYMPHOCYTES RELATIVE PERCENT: 10.7 %
LYMPHOCYTES RELATIVE PERCENT: 10.8 %
LYMPHOCYTES RELATIVE PERCENT: 12.3 %
LYMPHOCYTES RELATIVE PERCENT: 12.3 %
LYMPHOCYTES RELATIVE PERCENT: 12.4 %
LYMPHOCYTES RELATIVE PERCENT: 12.5 %
LYMPHOCYTES RELATIVE PERCENT: 12.7 %
LYMPHOCYTES RELATIVE PERCENT: 13.1 %
LYMPHOCYTES RELATIVE PERCENT: 13.7 %
LYMPHOCYTES RELATIVE PERCENT: 14.3 %
LYMPHOCYTES RELATIVE PERCENT: 14.7 %
LYMPHOCYTES RELATIVE PERCENT: 14.9 %
LYMPHOCYTES RELATIVE PERCENT: 15 %
LYMPHOCYTES RELATIVE PERCENT: 15.2 %
LYMPHOCYTES RELATIVE PERCENT: 15.3 %
LYMPHOCYTES RELATIVE PERCENT: 16.7 %
LYMPHOCYTES RELATIVE PERCENT: 17.4 %
LYMPHOCYTES RELATIVE PERCENT: 17.9 %
LYMPHOCYTES RELATIVE PERCENT: 18.9 %
LYMPHOCYTES RELATIVE PERCENT: 19.4 %
LYMPHOCYTES RELATIVE PERCENT: 19.8 %
LYMPHOCYTES RELATIVE PERCENT: 20.2 %
LYMPHOCYTES RELATIVE PERCENT: 20.4 %
LYMPHOCYTES RELATIVE PERCENT: 21.5 %
LYMPHOCYTES RELATIVE PERCENT: 5.2 %
LYMPHOCYTES RELATIVE PERCENT: 8.4 %
LYMPHOCYTES RELATIVE PERCENT: 9 %
LYMPHOCYTES, BODY FLUID: 3 %
LYMPHOCYTES, BODY FLUID: 8 %
MAGNESIUM: 1.5 MG/DL (ref 1.8–2.4)
MAGNESIUM: 1.6 MG/DL (ref 1.8–2.4)
MAGNESIUM: 1.7 MG/DL (ref 1.8–2.4)
MAGNESIUM: 1.8 MG/DL (ref 1.8–2.4)
MAGNESIUM: 1.9 MG/DL (ref 1.8–2.4)
MAGNESIUM: 1.9 MG/DL (ref 1.8–2.4)
MAGNESIUM: 2.6 MG/DL (ref 1.8–2.4)
MAGNESIUM: 3 MG/DL (ref 1.8–2.4)
MCH RBC QN AUTO: 29.4 PG (ref 26–34)
MCH RBC QN AUTO: 31.6 PG (ref 26–34)
MCH RBC QN AUTO: 32 PG (ref 26–34)
MCH RBC QN AUTO: 32.2 PG (ref 26–34)
MCH RBC QN AUTO: 32.3 PG (ref 26–34)
MCH RBC QN AUTO: 32.4 PG (ref 26–34)
MCH RBC QN AUTO: 32.5 PG (ref 26–34)
MCH RBC QN AUTO: 32.6 PG (ref 26–34)
MCH RBC QN AUTO: 32.7 PG (ref 26–34)
MCH RBC QN AUTO: 32.7 PG (ref 26–34)
MCH RBC QN AUTO: 32.8 PG (ref 26–34)
MCH RBC QN AUTO: 32.8 PG (ref 26–34)
MCH RBC QN AUTO: 32.9 PG (ref 26–34)
MCH RBC QN AUTO: 33 PG (ref 26–34)
MCH RBC QN AUTO: 33.2 PG (ref 26–34)
MCH RBC QN AUTO: 33.3 PG (ref 26–34)
MCH RBC QN AUTO: 33.4 PG (ref 26–34)
MCH RBC QN AUTO: 33.4 PG (ref 26–34)
MCH RBC QN AUTO: 33.5 PG (ref 26–34)
MCH RBC QN AUTO: 33.5 PG (ref 26–34)
MCH RBC QN AUTO: 34.7 PG (ref 26–34)
MCHC RBC AUTO-ENTMCNC: 30.6 G/DL (ref 31–36)
MCHC RBC AUTO-ENTMCNC: 30.9 G/DL (ref 31–36)
MCHC RBC AUTO-ENTMCNC: 31.3 G/DL (ref 31–36)
MCHC RBC AUTO-ENTMCNC: 31.4 G/DL (ref 31–36)
MCHC RBC AUTO-ENTMCNC: 31.5 G/DL (ref 31–36)
MCHC RBC AUTO-ENTMCNC: 31.6 G/DL (ref 31–36)
MCHC RBC AUTO-ENTMCNC: 31.7 G/DL (ref 31–36)
MCHC RBC AUTO-ENTMCNC: 31.8 G/DL (ref 31–36)
MCHC RBC AUTO-ENTMCNC: 31.9 G/DL (ref 31–36)
MCHC RBC AUTO-ENTMCNC: 31.9 G/DL (ref 31–36)
MCHC RBC AUTO-ENTMCNC: 32 G/DL (ref 31–36)
MCHC RBC AUTO-ENTMCNC: 32.1 G/DL (ref 31–36)
MCHC RBC AUTO-ENTMCNC: 32.2 G/DL (ref 31–36)
MCHC RBC AUTO-ENTMCNC: 32.4 G/DL (ref 31–36)
MCHC RBC AUTO-ENTMCNC: 32.5 G/DL (ref 31–36)
MCHC RBC AUTO-ENTMCNC: 32.6 G/DL (ref 31–36)
MCHC RBC AUTO-ENTMCNC: 32.9 G/DL (ref 31–36)
MCV RBC AUTO: 100.2 FL (ref 80–100)
MCV RBC AUTO: 100.3 FL (ref 80–100)
MCV RBC AUTO: 100.4 FL (ref 80–100)
MCV RBC AUTO: 100.7 FL (ref 80–100)
MCV RBC AUTO: 101 FL (ref 80–100)
MCV RBC AUTO: 101 FL (ref 80–100)
MCV RBC AUTO: 101.2 FL (ref 80–100)
MCV RBC AUTO: 101.3 FL (ref 80–100)
MCV RBC AUTO: 101.4 FL (ref 80–100)
MCV RBC AUTO: 101.6 FL (ref 80–100)
MCV RBC AUTO: 101.6 FL (ref 80–100)
MCV RBC AUTO: 101.7 FL (ref 80–100)
MCV RBC AUTO: 102 FL (ref 80–100)
MCV RBC AUTO: 102 FL (ref 80–100)
MCV RBC AUTO: 102.3 FL (ref 80–100)
MCV RBC AUTO: 102.5 FL (ref 80–100)
MCV RBC AUTO: 102.7 FL (ref 80–100)
MCV RBC AUTO: 102.9 FL (ref 80–100)
MCV RBC AUTO: 103 FL (ref 80–100)
MCV RBC AUTO: 103.2 FL (ref 80–100)
MCV RBC AUTO: 103.2 FL (ref 80–100)
MCV RBC AUTO: 103.4 FL (ref 80–100)
MCV RBC AUTO: 103.6 FL (ref 80–100)
MCV RBC AUTO: 104.1 FL (ref 80–100)
MCV RBC AUTO: 104.9 FL (ref 80–100)
MCV RBC AUTO: 105.3 FL (ref 80–100)
MCV RBC AUTO: 105.6 FL (ref 80–100)
MCV RBC AUTO: 105.8 FL (ref 80–100)
MCV RBC AUTO: 94.9 FL (ref 80–100)
MCV RBC AUTO: 98.8 FL (ref 80–100)
MCV RBC AUTO: 99.8 FL (ref 80–100)
MESOTHELIAL FLUID: 7 %
METAMYELOCYTES RELATIVE PERCENT: 1 %
METHEMOGLOBIN VENOUS: 0.2 % (ref 0–1.5)
METHYLMALONIC ACID: 1.05 UMOL/L (ref 0–0.4)
MICROSCOPIC EXAMINATION: YES
MONOCYTE, FLUID: 60 %
MONOCYTE, FLUID: 86 %
MONOCYTES ABSOLUTE: 0.2 K/UL (ref 0–1.3)
MONOCYTES ABSOLUTE: 0.3 K/UL (ref 0–1.3)
MONOCYTES ABSOLUTE: 0.4 K/UL (ref 0–1.3)
MONOCYTES ABSOLUTE: 0.6 K/UL (ref 0–1.3)
MONOCYTES ABSOLUTE: 0.7 K/UL (ref 0–1.3)
MONOCYTES ABSOLUTE: 0.8 K/UL (ref 0–1.3)
MONOCYTES ABSOLUTE: 0.9 K/UL (ref 0–1.3)
MONOCYTES ABSOLUTE: 1 K/UL (ref 0–1.3)
MONOCYTES ABSOLUTE: 1.2 K/UL (ref 0–1.3)
MONOCYTES ABSOLUTE: 1.3 K/UL (ref 0–1.3)
MONOCYTES RELATIVE PERCENT: 10 %
MONOCYTES RELATIVE PERCENT: 10.3 %
MONOCYTES RELATIVE PERCENT: 10.8 %
MONOCYTES RELATIVE PERCENT: 11 %
MONOCYTES RELATIVE PERCENT: 11.4 %
MONOCYTES RELATIVE PERCENT: 11.5 %
MONOCYTES RELATIVE PERCENT: 11.8 %
MONOCYTES RELATIVE PERCENT: 12.5 %
MONOCYTES RELATIVE PERCENT: 12.6 %
MONOCYTES RELATIVE PERCENT: 12.8 %
MONOCYTES RELATIVE PERCENT: 2 %
MONOCYTES RELATIVE PERCENT: 2.6 %
MONOCYTES RELATIVE PERCENT: 6.6 %
MONOCYTES RELATIVE PERCENT: 7.4 %
MONOCYTES RELATIVE PERCENT: 7.5 %
MONOCYTES RELATIVE PERCENT: 7.6 %
MONOCYTES RELATIVE PERCENT: 7.8 %
MONOCYTES RELATIVE PERCENT: 8.2 %
MONOCYTES RELATIVE PERCENT: 8.5 %
MONOCYTES RELATIVE PERCENT: 9 %
MONOCYTES RELATIVE PERCENT: 9.1 %
MONOCYTES RELATIVE PERCENT: 9.4 %
MONOCYTES RELATIVE PERCENT: 9.5 %
MONOCYTES RELATIVE PERCENT: 9.5 %
MONOCYTES RELATIVE PERCENT: 9.8 %
NEUTROPHIL, FLUID: 30 %
NEUTROPHIL, FLUID: 6 %
NEUTROPHILS ABSOLUTE: 11.4 K/UL (ref 1.7–7.7)
NEUTROPHILS ABSOLUTE: 3.8 K/UL (ref 1.7–7.7)
NEUTROPHILS ABSOLUTE: 3.9 K/UL (ref 1.7–7.7)
NEUTROPHILS ABSOLUTE: 4 K/UL (ref 1.7–7.7)
NEUTROPHILS ABSOLUTE: 4 K/UL (ref 1.7–7.7)
NEUTROPHILS ABSOLUTE: 4.1 K/UL (ref 1.7–7.7)
NEUTROPHILS ABSOLUTE: 4.2 K/UL (ref 1.7–7.7)
NEUTROPHILS ABSOLUTE: 4.3 K/UL (ref 1.7–7.7)
NEUTROPHILS ABSOLUTE: 4.3 K/UL (ref 1.7–7.7)
NEUTROPHILS ABSOLUTE: 4.4 K/UL (ref 1.7–7.7)
NEUTROPHILS ABSOLUTE: 5 K/UL (ref 1.7–7.7)
NEUTROPHILS ABSOLUTE: 5.5 K/UL (ref 1.7–7.7)
NEUTROPHILS ABSOLUTE: 5.5 K/UL (ref 1.7–7.7)
NEUTROPHILS ABSOLUTE: 5.8 K/UL (ref 1.7–7.7)
NEUTROPHILS ABSOLUTE: 5.8 K/UL (ref 1.7–7.7)
NEUTROPHILS ABSOLUTE: 5.9 K/UL (ref 1.7–7.7)
NEUTROPHILS ABSOLUTE: 6.3 K/UL (ref 1.7–7.7)
NEUTROPHILS ABSOLUTE: 6.7 K/UL (ref 1.7–7.7)
NEUTROPHILS ABSOLUTE: 6.7 K/UL (ref 1.7–7.7)
NEUTROPHILS ABSOLUTE: 7.1 K/UL (ref 1.7–7.7)
NEUTROPHILS ABSOLUTE: 7.1 K/UL (ref 1.7–7.7)
NEUTROPHILS ABSOLUTE: 7.8 K/UL (ref 1.7–7.7)
NEUTROPHILS ABSOLUTE: 7.9 K/UL (ref 1.7–7.7)
NEUTROPHILS ABSOLUTE: 8.2 K/UL (ref 1.7–7.7)
NEUTROPHILS ABSOLUTE: 8.8 K/UL (ref 1.7–7.7)
NEUTROPHILS ABSOLUTE: 9.2 K/UL (ref 1.7–7.7)
NEUTROPHILS ABSOLUTE: 9.3 K/UL (ref 1.7–7.7)
NEUTROPHILS RELATIVE PERCENT: 58.6 %
NEUTROPHILS RELATIVE PERCENT: 60.1 %
NEUTROPHILS RELATIVE PERCENT: 60.2 %
NEUTROPHILS RELATIVE PERCENT: 63.4 %
NEUTROPHILS RELATIVE PERCENT: 64.2 %
NEUTROPHILS RELATIVE PERCENT: 64.6 %
NEUTROPHILS RELATIVE PERCENT: 66.6 %
NEUTROPHILS RELATIVE PERCENT: 67.3 %
NEUTROPHILS RELATIVE PERCENT: 69.1 %
NEUTROPHILS RELATIVE PERCENT: 70.2 %
NEUTROPHILS RELATIVE PERCENT: 70.5 %
NEUTROPHILS RELATIVE PERCENT: 70.8 %
NEUTROPHILS RELATIVE PERCENT: 70.9 %
NEUTROPHILS RELATIVE PERCENT: 71.2 %
NEUTROPHILS RELATIVE PERCENT: 73.9 %
NEUTROPHILS RELATIVE PERCENT: 73.9 %
NEUTROPHILS RELATIVE PERCENT: 74.8 %
NEUTROPHILS RELATIVE PERCENT: 75.2 %
NEUTROPHILS RELATIVE PERCENT: 75.5 %
NEUTROPHILS RELATIVE PERCENT: 76.3 %
NEUTROPHILS RELATIVE PERCENT: 76.6 %
NEUTROPHILS RELATIVE PERCENT: 78.4 %
NEUTROPHILS RELATIVE PERCENT: 80.2 %
NEUTROPHILS RELATIVE PERCENT: 80.3 %
NEUTROPHILS RELATIVE PERCENT: 81.8 %
NEUTROPHILS RELATIVE PERCENT: 87 %
NEUTROPHILS RELATIVE PERCENT: 91.8 %
NITRITE, URINE: NEGATIVE
NUCLEATED CELLS FLUID: 1 /CUMM
NUCLEATED CELLS FLUID: 112 /CUMM
NUCLEATED CELLS FLUID: 330 /CUMM
NUCLEATED CELLS FLUID: 59 /CUMM
NUMBER OF CELLS COUNTED FLUID: 100
O2 SAT, VEN: 27 %
O2 SAT, VEN: 81 %
OCCULT BLOOD DIAGNOSTIC: NORMAL
ORGANISM: ABNORMAL
PARATHYROID HORMONE INTACT: 348.2 PG/ML (ref 14–72)
PARATHYROID HORMONE INTACT: 468.9 PG/ML (ref 14–72)
PARATHYROID HORMONE INTACT: 537.2 PG/ML (ref 14–72)
PARATHYROID HORMONE INTACT: 604.7 PG/ML (ref 14–72)
PCO2, VEN: 46.4 MMHG (ref 41–51)
PCO2, VEN: 52.5 MM HG (ref 40–50)
PDW BLD-RTO: 13.3 % (ref 12.4–15.4)
PDW BLD-RTO: 13.3 % (ref 12.4–15.4)
PDW BLD-RTO: 13.4 % (ref 12.4–15.4)
PDW BLD-RTO: 13.4 % (ref 12.4–15.4)
PDW BLD-RTO: 13.5 % (ref 12.4–15.4)
PDW BLD-RTO: 13.6 % (ref 12.4–15.4)
PDW BLD-RTO: 13.7 % (ref 12.4–15.4)
PDW BLD-RTO: 14 % (ref 12.4–15.4)
PDW BLD-RTO: 14.1 % (ref 12.4–15.4)
PDW BLD-RTO: 14.1 % (ref 12.4–15.4)
PDW BLD-RTO: 14.2 % (ref 12.4–15.4)
PDW BLD-RTO: 14.5 % (ref 12.4–15.4)
PDW BLD-RTO: 14.8 % (ref 12.4–15.4)
PDW BLD-RTO: 15.7 % (ref 12.4–15.4)
PDW BLD-RTO: 16.1 % (ref 12.4–15.4)
PDW BLD-RTO: 16.3 % (ref 12.4–15.4)
PDW BLD-RTO: 16.4 % (ref 12.4–15.4)
PDW BLD-RTO: 16.6 % (ref 12.4–15.4)
PDW BLD-RTO: 16.8 % (ref 12.4–15.4)
PDW BLD-RTO: 16.9 % (ref 12.4–15.4)
PDW BLD-RTO: 17 % (ref 12.4–15.4)
PDW BLD-RTO: 17.1 % (ref 12.4–15.4)
PDW BLD-RTO: 17.1 % (ref 12.4–15.4)
PDW BLD-RTO: 17.5 % (ref 12.4–15.4)
PDW BLD-RTO: 17.5 % (ref 12.4–15.4)
PDW BLD-RTO: 17.7 % (ref 12.4–15.4)
PERFORMED ON: ABNORMAL
PERFORMED ON: NORMAL
PH UA: 6 (ref 5–8)
PH VENOUS: 7.34 (ref 7.35–7.45)
PH VENOUS: 7.41 (ref 7.35–7.45)
PHOSPHORUS: 2.3 MG/DL (ref 2.5–4.9)
PHOSPHORUS: 2.4 MG/DL (ref 2.5–4.9)
PHOSPHORUS: 2.5 MG/DL (ref 2.5–4.9)
PHOSPHORUS: 2.7 MG/DL (ref 2.5–4.9)
PHOSPHORUS: 2.8 MG/DL (ref 2.5–4.9)
PHOSPHORUS: 3.2 MG/DL (ref 2.5–4.9)
PHOSPHORUS: 3.5 MG/DL (ref 2.5–4.9)
PHOSPHORUS: 3.5 MG/DL (ref 2.5–4.9)
PHOSPHORUS: 3.6 MG/DL (ref 2.5–4.9)
PHOSPHORUS: 3.9 MG/DL (ref 2.5–4.9)
PHOSPHORUS: 4.4 MG/DL (ref 2.5–4.9)
PHOSPHORUS: 4.8 MG/DL (ref 2.5–4.9)
PHOSPHORUS: 5.1 MG/DL (ref 2.5–4.9)
PHOSPHORUS: 5.7 MG/DL (ref 2.5–4.9)
PHOSPHORUS: 5.8 MG/DL (ref 2.5–4.9)
PHOSPHORUS: 7.9 MG/DL (ref 2.5–4.9)
PLATELET # BLD: 134 K/UL (ref 135–450)
PLATELET # BLD: 138 K/UL (ref 135–450)
PLATELET # BLD: 140 K/UL (ref 135–450)
PLATELET # BLD: 155 K/UL (ref 135–450)
PLATELET # BLD: 160 K/UL (ref 135–450)
PLATELET # BLD: 161 K/UL (ref 135–450)
PLATELET # BLD: 161 K/UL (ref 135–450)
PLATELET # BLD: 163 K/UL (ref 135–450)
PLATELET # BLD: 163 K/UL (ref 135–450)
PLATELET # BLD: 170 K/UL (ref 135–450)
PLATELET # BLD: 172 K/UL (ref 135–450)
PLATELET # BLD: 174 K/UL (ref 135–450)
PLATELET # BLD: 175 K/UL (ref 135–450)
PLATELET # BLD: 176 K/UL (ref 135–450)
PLATELET # BLD: 181 K/UL (ref 135–450)
PLATELET # BLD: 182 K/UL (ref 135–450)
PLATELET # BLD: 184 K/UL (ref 135–450)
PLATELET # BLD: 189 K/UL (ref 135–450)
PLATELET # BLD: 191 K/UL (ref 135–450)
PLATELET # BLD: 192 K/UL (ref 135–450)
PLATELET # BLD: 193 K/UL (ref 135–450)
PLATELET # BLD: 195 K/UL (ref 135–450)
PLATELET # BLD: 198 K/UL (ref 135–450)
PLATELET # BLD: 201 K/UL (ref 135–450)
PLATELET # BLD: 202 K/UL (ref 135–450)
PLATELET # BLD: 215 K/UL (ref 135–450)
PLATELET # BLD: 217 K/UL (ref 135–450)
PLATELET # BLD: 217 K/UL (ref 135–450)
PLATELET # BLD: 228 K/UL (ref 135–450)
PLATELET # BLD: 251 K/UL (ref 135–450)
PLATELET # BLD: 256 K/UL (ref 135–450)
PMV BLD AUTO: 6.9 FL (ref 5–10.5)
PMV BLD AUTO: 7.3 FL (ref 5–10.5)
PMV BLD AUTO: 7.3 FL (ref 5–10.5)
PMV BLD AUTO: 7.4 FL (ref 5–10.5)
PMV BLD AUTO: 7.5 FL (ref 5–10.5)
PMV BLD AUTO: 7.5 FL (ref 5–10.5)
PMV BLD AUTO: 7.6 FL (ref 5–10.5)
PMV BLD AUTO: 7.7 FL (ref 5–10.5)
PMV BLD AUTO: 7.8 FL (ref 5–10.5)
PMV BLD AUTO: 7.9 FL (ref 5–10.5)
PMV BLD AUTO: 8 FL (ref 5–10.5)
PMV BLD AUTO: 8.2 FL (ref 5–10.5)
PMV BLD AUTO: 8.2 FL (ref 5–10.5)
PMV BLD AUTO: 8.3 FL (ref 5–10.5)
PMV BLD AUTO: 8.6 FL (ref 5–10.5)
PMV BLD AUTO: 8.7 FL (ref 5–10.5)
PO2, VEN: 18 MM HG
PO2, VEN: 52.7 MMHG (ref 25–40)
POC SAMPLE TYPE: ABNORMAL
POTASSIUM REFLEX MAGNESIUM: 2.7 MMOL/L (ref 3.5–5.1)
POTASSIUM REFLEX MAGNESIUM: 3.1 MMOL/L (ref 3.5–5.1)
POTASSIUM REFLEX MAGNESIUM: 3.2 MMOL/L (ref 3.5–5.1)
POTASSIUM REFLEX MAGNESIUM: 3.5 MMOL/L (ref 3.5–5.1)
POTASSIUM REFLEX MAGNESIUM: 3.5 MMOL/L (ref 3.5–5.1)
POTASSIUM REFLEX MAGNESIUM: 3.6 MMOL/L (ref 3.5–5.1)
POTASSIUM REFLEX MAGNESIUM: 3.6 MMOL/L (ref 3.5–5.1)
POTASSIUM REFLEX MAGNESIUM: 3.7 MMOL/L (ref 3.5–5.1)
POTASSIUM REFLEX MAGNESIUM: 3.8 MMOL/L (ref 3.5–5.1)
POTASSIUM REFLEX MAGNESIUM: 3.9 MMOL/L (ref 3.5–5.1)
POTASSIUM REFLEX MAGNESIUM: 3.9 MMOL/L (ref 3.5–5.1)
POTASSIUM REFLEX MAGNESIUM: 4 MMOL/L (ref 3.5–5.1)
POTASSIUM REFLEX MAGNESIUM: 4.1 MMOL/L (ref 3.5–5.1)
POTASSIUM REFLEX MAGNESIUM: 4.4 MMOL/L (ref 3.5–5.1)
POTASSIUM SERPL-SCNC: 2.8 MMOL/L (ref 3.5–5.1)
POTASSIUM SERPL-SCNC: 3 MMOL/L (ref 3.5–5.1)
POTASSIUM SERPL-SCNC: 3.2 MMOL/L (ref 3.5–5.1)
POTASSIUM SERPL-SCNC: 3.3 MMOL/L (ref 3.5–5.1)
POTASSIUM SERPL-SCNC: 3.4 MMOL/L (ref 3.5–5.1)
POTASSIUM SERPL-SCNC: 3.8 MMOL/L (ref 3.5–5.1)
POTASSIUM SERPL-SCNC: 3.8 MMOL/L (ref 3.5–5.1)
POTASSIUM SERPL-SCNC: 3.9 MMOL/L (ref 3.5–5.1)
POTASSIUM SERPL-SCNC: 3.9 MMOL/L (ref 3.5–5.1)
POTASSIUM SERPL-SCNC: 4.1 MMOL/L (ref 3.5–5.1)
POTASSIUM SERPL-SCNC: 4.2 MMOL/L (ref 3.5–5.1)
POTASSIUM SERPL-SCNC: 4.2 MMOL/L (ref 3.5–5.1)
POTASSIUM SERPL-SCNC: 4.3 MMOL/L (ref 3.5–5.1)
POTASSIUM SERPL-SCNC: 4.6 MMOL/L (ref 3.5–5.1)
POTASSIUM SERPL-SCNC: 5.2 MMOL/L (ref 3.5–5.1)
PRO-BNP: 6622 PG/ML (ref 0–449)
PRO-BNP: 9434 PG/ML (ref 0–449)
PROCALCITONIN: 0.51 NG/ML (ref 0–0.15)
PROTEIN UA: 100 MG/DL
PROTHROMBIN TIME: 10.6 SEC (ref 9.8–13)
PROTHROMBIN TIME: 11.7 SEC (ref 9.8–13)
PROTHROMBIN TIME: 12.1 SEC (ref 9.8–13)
PROTHROMBIN TIME: 12.3 SEC (ref 9.8–13)
PROTHROMBIN TIME: 12.4 SEC (ref 9.8–13)
PROTHROMBIN TIME: 13.3 SEC (ref 9.8–13)
PROTHROMBIN TIME: 15.6 SEC (ref 9.8–13)
PROTHROMBIN TIME: 33.1 SEC (ref 9.8–13)
RBC # BLD: 2.73 M/UL (ref 4–5.2)
RBC # BLD: 2.76 M/UL (ref 4–5.2)
RBC # BLD: 2.8 M/UL (ref 4–5.2)
RBC # BLD: 2.83 M/UL (ref 4–5.2)
RBC # BLD: 2.83 M/UL (ref 4–5.2)
RBC # BLD: 2.86 M/UL (ref 4–5.2)
RBC # BLD: 2.87 M/UL (ref 4–5.2)
RBC # BLD: 2.94 M/UL (ref 4–5.2)
RBC # BLD: 2.96 M/UL (ref 4–5.2)
RBC # BLD: 3.04 M/UL (ref 4–5.2)
RBC # BLD: 3.06 M/UL (ref 4–5.2)
RBC # BLD: 3.1 M/UL (ref 4–5.2)
RBC # BLD: 3.11 M/UL (ref 4–5.2)
RBC # BLD: 3.11 M/UL (ref 4–5.2)
RBC # BLD: 3.3 M/UL (ref 4–5.2)
RBC # BLD: 3.48 M/UL (ref 4–5.2)
RBC # BLD: 3.52 M/UL (ref 4–5.2)
RBC # BLD: 3.52 M/UL (ref 4–5.2)
RBC # BLD: 3.57 M/UL (ref 4–5.2)
RBC # BLD: 3.57 M/UL (ref 4–5.2)
RBC # BLD: 3.78 M/UL (ref 4–5.2)
RBC # BLD: 3.88 M/UL (ref 4–5.2)
RBC # BLD: 3.89 M/UL (ref 4–5.2)
RBC # BLD: 3.97 M/UL (ref 4–5.2)
RBC # BLD: 3.98 M/UL (ref 4–5.2)
RBC # BLD: 4.04 M/UL (ref 4–5.2)
RBC # BLD: 4.13 M/UL (ref 4–5.2)
RBC # BLD: 4.19 M/UL (ref 4–5.2)
RBC # BLD: 4.35 M/UL (ref 4–5.2)
RBC # BLD: 4.53 M/UL (ref 4–5.2)
RBC # BLD: 4.55 M/UL (ref 4–5.2)
RBC FLUID: 0 /CUMM
RBC FLUID: 1300 /CUMM
RBC FLUID: 4900 /CUMM
RBC FLUID: 73 /CUMM
RBC UA: ABNORMAL /HPF (ref 0–2)
REASON FOR REJECTION: NORMAL
REJECTED TEST: NORMAL
SEDIMENTATION RATE, ERYTHROCYTE: 53 MM/HR (ref 0–30)
SODIUM BLD-SCNC: 133 MMOL/L (ref 136–145)
SODIUM BLD-SCNC: 134 MMOL/L (ref 136–145)
SODIUM BLD-SCNC: 135 MMOL/L (ref 136–145)
SODIUM BLD-SCNC: 136 MMOL/L (ref 136–145)
SODIUM BLD-SCNC: 136 MMOL/L (ref 136–145)
SODIUM BLD-SCNC: 137 MMOL/L (ref 136–145)
SODIUM BLD-SCNC: 138 MMOL/L (ref 136–145)
SODIUM BLD-SCNC: 139 MMOL/L (ref 136–145)
SODIUM BLD-SCNC: 140 MMOL/L (ref 136–145)
SODIUM BLD-SCNC: 141 MMOL/L (ref 136–145)
SODIUM BLD-SCNC: 142 MMOL/L (ref 136–145)
SODIUM BLD-SCNC: 143 MMOL/L (ref 136–145)
SODIUM BLD-SCNC: 143 MMOL/L (ref 136–145)
SODIUM BLD-SCNC: 144 MMOL/L (ref 136–145)
SODIUM BLD-SCNC: 144 MMOL/L (ref 136–145)
SODIUM BLD-SCNC: 146 MMOL/L (ref 136–145)
SODIUM BLD-SCNC: 147 MMOL/L (ref 136–145)
SPECIFIC GRAVITY UA: 1.02 (ref 1–1.03)
TCO2 CALC VENOUS: 26 MMOL/L
TCO2 CALC VENOUS: 35 MMOL/L
TOTAL PROTEIN: 5.3 G/DL (ref 6.4–8.2)
TOTAL PROTEIN: 6.4 G/DL (ref 6.4–8.2)
TOTAL PROTEIN: 6.6 G/DL (ref 6.4–8.2)
TOTAL PROTEIN: 6.8 G/DL (ref 6.4–8.2)
TROPONIN: 0.08 NG/ML
TROPONIN: 0.12 NG/ML
TROPONIN: 0.13 NG/ML
TROPONIN: 0.13 NG/ML
TROPONIN: 0.14 NG/ML
TROPONIN: 0.14 NG/ML
TROPONIN: 0.16 NG/ML
TSH REFLEX: 1.07 UIU/ML (ref 0.27–4.2)
TSH REFLEX: 1.8 UIU/ML (ref 0.27–4.2)
URIC ACID, SERUM: 7 MG/DL (ref 2.6–6)
URINE CULTURE, ROUTINE: NORMAL
URINE TYPE: ABNORMAL
UROBILINOGEN, URINE: 0.2 E.U./DL
VITAMIN B-12: 1001 PG/ML (ref 211–911)
VITAMIN B-12: 1137 PG/ML (ref 211–911)
VITAMIN B-12: 761 PG/ML (ref 211–911)
VITAMIN D 25-HYDROXY: 19.3 NG/ML
WBC # BLD: 10.2 K/UL (ref 4–11)
WBC # BLD: 10.3 K/UL (ref 4–11)
WBC # BLD: 10.5 K/UL (ref 4–11)
WBC # BLD: 10.6 K/UL (ref 4–11)
WBC # BLD: 10.7 K/UL (ref 4–11)
WBC # BLD: 10.7 K/UL (ref 4–11)
WBC # BLD: 11.3 K/UL (ref 4–11)
WBC # BLD: 11.4 K/UL (ref 4–11)
WBC # BLD: 11.4 K/UL (ref 4–11)
WBC # BLD: 11.7 K/UL (ref 4–11)
WBC # BLD: 12.4 K/UL (ref 4–11)
WBC # BLD: 5.8 K/UL (ref 4–11)
WBC # BLD: 5.9 K/UL (ref 4–11)
WBC # BLD: 6.1 K/UL (ref 4–11)
WBC # BLD: 6.1 K/UL (ref 4–11)
WBC # BLD: 6.4 K/UL (ref 4–11)
WBC # BLD: 6.6 K/UL (ref 4–11)
WBC # BLD: 6.7 K/UL (ref 4–11)
WBC # BLD: 6.8 K/UL (ref 4–11)
WBC # BLD: 6.9 K/UL (ref 4–11)
WBC # BLD: 6.9 K/UL (ref 4–11)
WBC # BLD: 7.3 K/UL (ref 4–11)
WBC # BLD: 7.7 K/UL (ref 4–11)
WBC # BLD: 7.8 K/UL (ref 4–11)
WBC # BLD: 7.9 K/UL (ref 4–11)
WBC # BLD: 7.9 K/UL (ref 4–11)
WBC # BLD: 8.2 K/UL (ref 4–11)
WBC # BLD: 8.5 K/UL (ref 4–11)
WBC # BLD: 8.6 K/UL (ref 4–11)
WBC # BLD: 8.7 K/UL (ref 4–11)
WBC # BLD: 9.3 K/UL (ref 4–11)
WBC UA: ABNORMAL /HPF (ref 0–5)
WOUND/ABSCESS: ABNORMAL

## 2019-01-01 PROCEDURE — 6370000000 HC RX 637 (ALT 250 FOR IP): Performed by: INTERNAL MEDICINE

## 2019-01-01 PROCEDURE — 80076 HEPATIC FUNCTION PANEL: CPT

## 2019-01-01 PROCEDURE — 2580000003 HC RX 258: Performed by: STUDENT IN AN ORGANIZED HEALTH CARE EDUCATION/TRAINING PROGRAM

## 2019-01-01 PROCEDURE — 85520 HEPARIN ASSAY: CPT

## 2019-01-01 PROCEDURE — 74018 RADEX ABDOMEN 1 VIEW: CPT

## 2019-01-01 PROCEDURE — 96372 THER/PROPH/DIAG INJ SC/IM: CPT

## 2019-01-01 PROCEDURE — 6370000000 HC RX 637 (ALT 250 FOR IP): Performed by: EMERGENCY MEDICINE

## 2019-01-01 PROCEDURE — 5A1D70Z PERFORMANCE OF URINARY FILTRATION, INTERMITTENT, LESS THAN 6 HOURS PER DAY: ICD-10-PCS | Performed by: INTERNAL MEDICINE

## 2019-01-01 PROCEDURE — 97166 OT EVAL MOD COMPLEX 45 MIN: CPT

## 2019-01-01 PROCEDURE — 97116 GAIT TRAINING THERAPY: CPT

## 2019-01-01 PROCEDURE — 87040 BLOOD CULTURE FOR BACTERIA: CPT

## 2019-01-01 PROCEDURE — 6360000002 HC RX W HCPCS: Performed by: EMERGENCY MEDICINE

## 2019-01-01 PROCEDURE — 1200000000 HC SEMI PRIVATE

## 2019-01-01 PROCEDURE — 6370000000 HC RX 637 (ALT 250 FOR IP): Performed by: STUDENT IN AN ORGANIZED HEALTH CARE EDUCATION/TRAINING PROGRAM

## 2019-01-01 PROCEDURE — 6360000002 HC RX W HCPCS: Performed by: STUDENT IN AN ORGANIZED HEALTH CARE EDUCATION/TRAINING PROGRAM

## 2019-01-01 PROCEDURE — 87086 URINE CULTURE/COLONY COUNT: CPT

## 2019-01-01 PROCEDURE — 96375 TX/PRO/DX INJ NEW DRUG ADDON: CPT

## 2019-01-01 PROCEDURE — 94760 N-INVAS EAR/PLS OXIMETRY 1: CPT

## 2019-01-01 PROCEDURE — 87102 FUNGUS ISOLATION CULTURE: CPT

## 2019-01-01 PROCEDURE — 85025 COMPLETE CBC W/AUTO DIFF WBC: CPT

## 2019-01-01 PROCEDURE — 70491 CT SOFT TISSUE NECK W/DYE: CPT

## 2019-01-01 PROCEDURE — 73630 X-RAY EXAM OF FOOT: CPT

## 2019-01-01 PROCEDURE — 36415 COLL VENOUS BLD VENIPUNCTURE: CPT

## 2019-01-01 PROCEDURE — 90945 DIALYSIS ONE EVALUATION: CPT

## 2019-01-01 PROCEDURE — 80048 BASIC METABOLIC PNL TOTAL CA: CPT

## 2019-01-01 PROCEDURE — 84484 ASSAY OF TROPONIN QUANT: CPT

## 2019-01-01 PROCEDURE — 89051 BODY FLUID CELL COUNT: CPT

## 2019-01-01 PROCEDURE — 6360000002 HC RX W HCPCS: Performed by: INTERNAL MEDICINE

## 2019-01-01 PROCEDURE — 82607 VITAMIN B-12: CPT

## 2019-01-01 PROCEDURE — 2580000003 HC RX 258: Performed by: INTERNAL MEDICINE

## 2019-01-01 PROCEDURE — 6370000000 HC RX 637 (ALT 250 FOR IP)

## 2019-01-01 PROCEDURE — 97161 PT EVAL LOW COMPLEX 20 MIN: CPT

## 2019-01-01 PROCEDURE — 72141 MRI NECK SPINE W/O DYE: CPT

## 2019-01-01 PROCEDURE — G0257 UNSCHED DIALYSIS ESRD PT HOS: HCPCS

## 2019-01-01 PROCEDURE — 99285 EMERGENCY DEPT VISIT HI MDM: CPT

## 2019-01-01 PROCEDURE — 71046 X-RAY EXAM CHEST 2 VIEWS: CPT

## 2019-01-01 PROCEDURE — 36592 COLLECT BLOOD FROM PICC: CPT

## 2019-01-01 PROCEDURE — 76705 ECHO EXAM OF ABDOMEN: CPT

## 2019-01-01 PROCEDURE — 3E1M39Z IRRIGATION OF PERITONEAL CAVITY USING DIALYSATE, PERCUTANEOUS APPROACH: ICD-10-PCS | Performed by: INTERNAL MEDICINE

## 2019-01-01 PROCEDURE — 99221 1ST HOSP IP/OBS SF/LOW 40: CPT | Performed by: NURSE PRACTITIONER

## 2019-01-01 PROCEDURE — 80069 RENAL FUNCTION PANEL: CPT

## 2019-01-01 PROCEDURE — 83735 ASSAY OF MAGNESIUM: CPT

## 2019-01-01 PROCEDURE — 93005 ELECTROCARDIOGRAM TRACING: CPT | Performed by: STUDENT IN AN ORGANIZED HEALTH CARE EDUCATION/TRAINING PROGRAM

## 2019-01-01 PROCEDURE — 97110 THERAPEUTIC EXERCISES: CPT

## 2019-01-01 PROCEDURE — 85610 PROTHROMBIN TIME: CPT

## 2019-01-01 PROCEDURE — 97535 SELF CARE MNGMENT TRAINING: CPT

## 2019-01-01 PROCEDURE — 97530 THERAPEUTIC ACTIVITIES: CPT

## 2019-01-01 PROCEDURE — 83690 ASSAY OF LIPASE: CPT

## 2019-01-01 PROCEDURE — 85018 HEMOGLOBIN: CPT

## 2019-01-01 PROCEDURE — 96376 TX/PRO/DX INJ SAME DRUG ADON: CPT

## 2019-01-01 PROCEDURE — 99213 OFFICE O/P EST LOW 20 MIN: CPT | Performed by: STUDENT IN AN ORGANIZED HEALTH CARE EDUCATION/TRAINING PROGRAM

## 2019-01-01 PROCEDURE — 80074 ACUTE HEPATITIS PANEL: CPT

## 2019-01-01 PROCEDURE — 2060000000 HC ICU INTERMEDIATE R&B

## 2019-01-01 PROCEDURE — 86038 ANTINUCLEAR ANTIBODIES: CPT

## 2019-01-01 PROCEDURE — 97162 PT EVAL MOD COMPLEX 30 MIN: CPT

## 2019-01-01 PROCEDURE — 7100000010 HC PHASE II RECOVERY - FIRST 15 MIN: Performed by: INTERNAL MEDICINE

## 2019-01-01 PROCEDURE — 87205 SMEAR GRAM STAIN: CPT

## 2019-01-01 PROCEDURE — 84145 PROCALCITONIN (PCT): CPT

## 2019-01-01 PROCEDURE — C9113 INJ PANTOPRAZOLE SODIUM, VIA: HCPCS | Performed by: STUDENT IN AN ORGANIZED HEALTH CARE EDUCATION/TRAINING PROGRAM

## 2019-01-01 PROCEDURE — 93306 TTE W/DOPPLER COMPLETE: CPT

## 2019-01-01 PROCEDURE — 99213 OFFICE O/P EST LOW 20 MIN: CPT | Performed by: PODIATRIST

## 2019-01-01 PROCEDURE — 2580000003 HC RX 258: Performed by: PHYSICIAN ASSISTANT

## 2019-01-01 PROCEDURE — 99222 1ST HOSP IP/OBS MODERATE 55: CPT | Performed by: SURGERY

## 2019-01-01 PROCEDURE — 85652 RBC SED RATE AUTOMATED: CPT

## 2019-01-01 PROCEDURE — 85049 AUTOMATED PLATELET COUNT: CPT

## 2019-01-01 PROCEDURE — 72114 X-RAY EXAM L-S SPINE BENDING: CPT

## 2019-01-01 PROCEDURE — 99232 SBSQ HOSP IP/OBS MODERATE 35: CPT | Performed by: INTERNAL MEDICINE

## 2019-01-01 PROCEDURE — 6360000002 HC RX W HCPCS: Performed by: FAMILY MEDICINE

## 2019-01-01 PROCEDURE — 81001 URINALYSIS AUTO W/SCOPE: CPT

## 2019-01-01 PROCEDURE — 82040 ASSAY OF SERUM ALBUMIN: CPT

## 2019-01-01 PROCEDURE — 99233 SBSQ HOSP IP/OBS HIGH 50: CPT | Performed by: INTERNAL MEDICINE

## 2019-01-01 PROCEDURE — 02HV33Z INSERTION OF INFUSION DEVICE INTO SUPERIOR VENA CAVA, PERCUTANEOUS APPROACH: ICD-10-PCS | Performed by: SURGERY

## 2019-01-01 PROCEDURE — 88305 TISSUE EXAM BY PATHOLOGIST: CPT

## 2019-01-01 PROCEDURE — 96367 TX/PROPH/DG ADDL SEQ IV INF: CPT

## 2019-01-01 PROCEDURE — 83880 ASSAY OF NATRIURETIC PEPTIDE: CPT

## 2019-01-01 PROCEDURE — 96366 THER/PROPH/DIAG IV INF ADDON: CPT

## 2019-01-01 PROCEDURE — 7100000011 HC PHASE II RECOVERY - ADDTL 15 MIN: Performed by: INTERNAL MEDICINE

## 2019-01-01 PROCEDURE — 2580000003 HC RX 258: Performed by: NURSE ANESTHETIST, CERTIFIED REGISTERED

## 2019-01-01 PROCEDURE — 99232 SBSQ HOSP IP/OBS MODERATE 35: CPT | Performed by: NURSE PRACTITIONER

## 2019-01-01 PROCEDURE — 8040000000 HC CCPD INPATIENT

## 2019-01-01 PROCEDURE — 90935 HEMODIALYSIS ONE EVALUATION: CPT

## 2019-01-01 PROCEDURE — 87340 HEPATITIS B SURFACE AG IA: CPT

## 2019-01-01 PROCEDURE — 70450 CT HEAD/BRAIN W/O DYE: CPT

## 2019-01-01 PROCEDURE — 83605 ASSAY OF LACTIC ACID: CPT

## 2019-01-01 PROCEDURE — 80400 ACTH STIMULATION PANEL: CPT

## 2019-01-01 PROCEDURE — 2580000003 HC RX 258: Performed by: EMERGENCY MEDICINE

## 2019-01-01 PROCEDURE — 83921 ORGANIC ACID SINGLE QUANT: CPT

## 2019-01-01 PROCEDURE — 3700000001 HC ADD 15 MINUTES (ANESTHESIA): Performed by: INTERNAL MEDICINE

## 2019-01-01 PROCEDURE — 93005 ELECTROCARDIOGRAM TRACING: CPT | Performed by: EMERGENCY MEDICINE

## 2019-01-01 PROCEDURE — 0DB68ZX EXCISION OF STOMACH, VIA NATURAL OR ARTIFICIAL OPENING ENDOSCOPIC, DIAGNOSTIC: ICD-10-PCS | Performed by: INTERNAL MEDICINE

## 2019-01-01 PROCEDURE — 86850 RBC ANTIBODY SCREEN: CPT

## 2019-01-01 PROCEDURE — 0DD28ZX EXTRACTION OF MIDDLE ESOPHAGUS, VIA NATURAL OR ARTIFICIAL OPENING ENDOSCOPIC, DIAGNOSTIC: ICD-10-PCS | Performed by: INTERNAL MEDICINE

## 2019-01-01 PROCEDURE — 96361 HYDRATE IV INFUSION ADD-ON: CPT

## 2019-01-01 PROCEDURE — 82803 BLOOD GASES ANY COMBINATION: CPT

## 2019-01-01 PROCEDURE — 83970 ASSAY OF PARATHORMONE: CPT

## 2019-01-01 PROCEDURE — 84443 ASSAY THYROID STIM HORMONE: CPT

## 2019-01-01 PROCEDURE — C9113 INJ PANTOPRAZOLE SODIUM, VIA: HCPCS | Performed by: EMERGENCY MEDICINE

## 2019-01-01 PROCEDURE — 83036 HEMOGLOBIN GLYCOSYLATED A1C: CPT

## 2019-01-01 PROCEDURE — 85027 COMPLETE CBC AUTOMATED: CPT

## 2019-01-01 PROCEDURE — 11730 AVULSION NAIL PLATE SIMPLE 1: CPT | Performed by: PODIATRIST

## 2019-01-01 PROCEDURE — 0DD38ZX EXTRACTION OF LOWER ESOPHAGUS, VIA NATURAL OR ARTIFICIAL OPENING ENDOSCOPIC, DIAGNOSTIC: ICD-10-PCS | Performed by: INTERNAL MEDICINE

## 2019-01-01 PROCEDURE — C9113 INJ PANTOPRAZOLE SODIUM, VIA: HCPCS | Performed by: INTERNAL MEDICINE

## 2019-01-01 PROCEDURE — 87186 SC STD MICRODIL/AGAR DIL: CPT

## 2019-01-01 PROCEDURE — 99232 SBSQ HOSP IP/OBS MODERATE 35: CPT | Performed by: SURGERY

## 2019-01-01 PROCEDURE — 3700000000 HC ANESTHESIA ATTENDED CARE: Performed by: INTERNAL MEDICINE

## 2019-01-01 PROCEDURE — 82306 VITAMIN D 25 HYDROXY: CPT

## 2019-01-01 PROCEDURE — 71045 X-RAY EXAM CHEST 1 VIEW: CPT

## 2019-01-01 PROCEDURE — 0DB38ZX EXCISION OF LOWER ESOPHAGUS, VIA NATURAL OR ARTIFICIAL OPENING ENDOSCOPIC, DIAGNOSTIC: ICD-10-PCS | Performed by: INTERNAL MEDICINE

## 2019-01-01 PROCEDURE — 87070 CULTURE OTHR SPECIMN AEROBIC: CPT

## 2019-01-01 PROCEDURE — 3609012400 HC EGD TRANSORAL BIOPSY SINGLE/MULTIPLE: Performed by: INTERNAL MEDICINE

## 2019-01-01 PROCEDURE — 11721 DEBRIDE NAIL 6 OR MORE: CPT

## 2019-01-01 PROCEDURE — 85730 THROMBOPLASTIN TIME PARTIAL: CPT

## 2019-01-01 PROCEDURE — 85014 HEMATOCRIT: CPT

## 2019-01-01 PROCEDURE — 84550 ASSAY OF BLOOD/URIC ACID: CPT

## 2019-01-01 PROCEDURE — 2709999900 HC NON-CHARGEABLE SUPPLY: Performed by: INTERNAL MEDICINE

## 2019-01-01 PROCEDURE — 84100 ASSAY OF PHOSPHORUS: CPT

## 2019-01-01 PROCEDURE — 93970 EXTREMITY STUDY: CPT

## 2019-01-01 PROCEDURE — 82746 ASSAY OF FOLIC ACID SERUM: CPT

## 2019-01-01 PROCEDURE — 80053 COMPREHEN METABOLIC PANEL: CPT

## 2019-01-01 PROCEDURE — G0328 FECAL BLOOD SCRN IMMUNOASSAY: HCPCS

## 2019-01-01 PROCEDURE — 86900 BLOOD TYPING SEROLOGIC ABO: CPT

## 2019-01-01 PROCEDURE — 96365 THER/PROPH/DIAG IV INF INIT: CPT

## 2019-01-01 PROCEDURE — G0009 ADMIN PNEUMOCOCCAL VACCINE: HCPCS | Performed by: INTERNAL MEDICINE

## 2019-01-01 PROCEDURE — 72146 MRI CHEST SPINE W/O DYE: CPT

## 2019-01-01 PROCEDURE — 99223 1ST HOSP IP/OBS HIGH 75: CPT | Performed by: INTERNAL MEDICINE

## 2019-01-01 PROCEDURE — 93923 UPR/LXTR ART STDY 3+ LVLS: CPT

## 2019-01-01 PROCEDURE — 93010 ELECTROCARDIOGRAM REPORT: CPT | Performed by: INTERNAL MEDICINE

## 2019-01-01 PROCEDURE — 0DB78ZX EXCISION OF STOMACH, PYLORUS, VIA NATURAL OR ARTIFICIAL OPENING ENDOSCOPIC, DIAGNOSTIC: ICD-10-PCS | Performed by: INTERNAL MEDICINE

## 2019-01-01 PROCEDURE — 2500000003 HC RX 250 WO HCPCS: Performed by: NURSE ANESTHETIST, CERTIFIED REGISTERED

## 2019-01-01 PROCEDURE — 86706 HEP B SURFACE ANTIBODY: CPT

## 2019-01-01 PROCEDURE — 97165 OT EVAL LOW COMPLEX 30 MIN: CPT

## 2019-01-01 PROCEDURE — 90670 PCV13 VACCINE IM: CPT | Performed by: INTERNAL MEDICINE

## 2019-01-01 PROCEDURE — 82533 TOTAL CORTISOL: CPT

## 2019-01-01 PROCEDURE — 93005 ELECTROCARDIOGRAM TRACING: CPT | Performed by: INTERNAL MEDICINE

## 2019-01-01 PROCEDURE — 96360 HYDRATION IV INFUSION INIT: CPT

## 2019-01-01 PROCEDURE — 84132 ASSAY OF SERUM POTASSIUM: CPT

## 2019-01-01 PROCEDURE — P9047 ALBUMIN (HUMAN), 25%, 50ML: HCPCS | Performed by: INTERNAL MEDICINE

## 2019-01-01 PROCEDURE — 87077 CULTURE AEROBIC IDENTIFY: CPT

## 2019-01-01 PROCEDURE — 0DB28ZX EXCISION OF MIDDLE ESOPHAGUS, VIA NATURAL OR ARTIFICIAL OPENING ENDOSCOPIC, DIAGNOSTIC: ICD-10-PCS | Performed by: INTERNAL MEDICINE

## 2019-01-01 PROCEDURE — 6360000002 HC RX W HCPCS: Performed by: PHYSICIAN ASSISTANT

## 2019-01-01 PROCEDURE — 6370000000 HC RX 637 (ALT 250 FOR IP): Performed by: PHYSICIAN ASSISTANT

## 2019-01-01 PROCEDURE — 72148 MRI LUMBAR SPINE W/O DYE: CPT

## 2019-01-01 PROCEDURE — 36556 INSERT NON-TUNNEL CV CATH: CPT

## 2019-01-01 PROCEDURE — 86901 BLOOD TYPING SEROLOGIC RH(D): CPT

## 2019-01-01 PROCEDURE — 6360000004 HC RX CONTRAST MEDICATION: Performed by: STUDENT IN AN ORGANIZED HEALTH CARE EDUCATION/TRAINING PROGRAM

## 2019-01-01 RX ORDER — 0.9 % SODIUM CHLORIDE 0.9 %
1000 INTRAVENOUS SOLUTION INTRAVENOUS ONCE
Status: COMPLETED | OUTPATIENT
Start: 2019-01-01 | End: 2019-01-01

## 2019-01-01 RX ORDER — ALBUTEROL SULFATE 2.5 MG/3ML
SOLUTION RESPIRATORY (INHALATION)
Status: DISPENSED
Start: 2019-01-01 | End: 2019-01-01

## 2019-01-01 RX ORDER — CINACALCET 60 MG/1
TABLET, FILM COATED ORAL
Qty: 30 TABLET | Refills: 0 | Status: SHIPPED | OUTPATIENT
Start: 2019-01-01 | End: 2019-01-01 | Stop reason: SDUPTHER

## 2019-01-01 RX ORDER — HEPARIN SODIUM 10000 [USP'U]/100ML
15 INJECTION, SOLUTION INTRAVENOUS CONTINUOUS
Status: DISCONTINUED | OUTPATIENT
Start: 2019-01-01 | End: 2019-01-01 | Stop reason: DRUGHIGH

## 2019-01-01 RX ORDER — CHOLECALCIFEROL (VITAMIN D3) 10 MCG
1 TABLET ORAL
Status: DISCONTINUED | OUTPATIENT
Start: 2019-01-01 | End: 2019-01-01 | Stop reason: HOSPADM

## 2019-01-01 RX ORDER — HEPARIN SODIUM 5000 [USP'U]/ML
80 INJECTION, SOLUTION INTRAVENOUS; SUBCUTANEOUS PRN
Status: DISCONTINUED | OUTPATIENT
Start: 2019-01-01 | End: 2019-01-01

## 2019-01-01 RX ORDER — GABAPENTIN 100 MG/1
200 CAPSULE ORAL NIGHTLY
Qty: 60 CAPSULE | Refills: 0 | Status: ON HOLD | OUTPATIENT
Start: 2019-01-01 | End: 2019-01-01 | Stop reason: HOSPADM

## 2019-01-01 RX ORDER — MELATONIN
2000 DAILY
Qty: 60 TABLET | Refills: 5 | Status: SHIPPED | OUTPATIENT
Start: 2019-01-01 | End: 2019-01-01 | Stop reason: SDUPTHER

## 2019-01-01 RX ORDER — DEXTROSE MONOHYDRATE 25 G/50ML
12.5 INJECTION, SOLUTION INTRAVENOUS PRN
Status: DISCONTINUED | OUTPATIENT
Start: 2019-01-01 | End: 2019-01-01 | Stop reason: HOSPADM

## 2019-01-01 RX ORDER — ONDANSETRON 4 MG/1
4 TABLET, FILM COATED ORAL EVERY 8 HOURS PRN
Status: DISCONTINUED | OUTPATIENT
Start: 2019-01-01 | End: 2019-01-01 | Stop reason: HOSPADM

## 2019-01-01 RX ORDER — HEPARIN SODIUM 5000 [USP'U]/ML
40 INJECTION, SOLUTION INTRAVENOUS; SUBCUTANEOUS PRN
Status: DISCONTINUED | OUTPATIENT
Start: 2019-01-01 | End: 2019-01-01 | Stop reason: HOSPADM

## 2019-01-01 RX ORDER — SODIUM CHLORIDE 1000 MG
1 TABLET, SOLUBLE MISCELLANEOUS
Status: DISCONTINUED | OUTPATIENT
Start: 2019-01-01 | End: 2019-01-01

## 2019-01-01 RX ORDER — ERGOCALCIFEROL (VITAMIN D2) 1250 MCG
50000 CAPSULE ORAL WEEKLY
Status: ON HOLD | COMMUNITY
End: 2019-01-01 | Stop reason: HOSPADM

## 2019-01-01 RX ORDER — HEPARIN SODIUM 5000 [USP'U]/ML
80 INJECTION, SOLUTION INTRAVENOUS; SUBCUTANEOUS ONCE
Status: COMPLETED | OUTPATIENT
Start: 2019-01-01 | End: 2019-01-01

## 2019-01-01 RX ORDER — MAGNESIUM SULFATE IN WATER 40 MG/ML
4 INJECTION, SOLUTION INTRAVENOUS ONCE
Status: DISCONTINUED | OUTPATIENT
Start: 2019-01-01 | End: 2019-01-01 | Stop reason: CLARIF

## 2019-01-01 RX ORDER — ACETAMINOPHEN 325 MG/1
650 TABLET ORAL EVERY 6 HOURS PRN
Status: DISCONTINUED | OUTPATIENT
Start: 2019-01-01 | End: 2019-01-01 | Stop reason: HOSPADM

## 2019-01-01 RX ORDER — ASPIRIN 81 MG/1
81 TABLET ORAL DAILY
Qty: 30 TABLET | Refills: 3 | Status: ON HOLD | OUTPATIENT
Start: 2019-01-01 | End: 2019-01-01 | Stop reason: SDUPTHER

## 2019-01-01 RX ORDER — SODIUM CHLORIDE 9 MG/ML
INJECTION, SOLUTION INTRAVENOUS CONTINUOUS
Status: DISCONTINUED | OUTPATIENT
Start: 2019-01-01 | End: 2019-01-01

## 2019-01-01 RX ORDER — GENTAMICIN SULFATE 1 MG/G
CREAM TOPICAL PRN
Status: DISCONTINUED | OUTPATIENT
Start: 2019-01-01 | End: 2019-01-01 | Stop reason: HOSPADM

## 2019-01-01 RX ORDER — GABAPENTIN 100 MG/1
100 CAPSULE ORAL NIGHTLY
Status: DISCONTINUED | OUTPATIENT
Start: 2019-01-01 | End: 2019-01-01

## 2019-01-01 RX ORDER — CINACALCET 30 MG/1
60 TABLET, FILM COATED ORAL DAILY
Status: DISCONTINUED | OUTPATIENT
Start: 2019-01-01 | End: 2019-01-01

## 2019-01-01 RX ORDER — SEVELAMER CARBONATE 800 MG/1
400 TABLET, FILM COATED ORAL
Status: DISCONTINUED | OUTPATIENT
Start: 2019-01-01 | End: 2019-01-01 | Stop reason: HOSPADM

## 2019-01-01 RX ORDER — CALCITRIOL 0.25 UG/1
0.25 CAPSULE, LIQUID FILLED ORAL DAILY
Status: DISCONTINUED | OUTPATIENT
Start: 2019-01-01 | End: 2019-01-01

## 2019-01-01 RX ORDER — ACETAMINOPHEN 325 MG/1
650 TABLET ORAL EVERY 6 HOURS PRN
Qty: 60 TABLET | Refills: 0 | Status: SHIPPED | OUTPATIENT
Start: 2019-01-01

## 2019-01-01 RX ORDER — CINACALCET 30 MG/1
60 TABLET, FILM COATED ORAL DAILY
Status: DISCONTINUED | OUTPATIENT
Start: 2019-01-01 | End: 2019-01-01 | Stop reason: HOSPADM

## 2019-01-01 RX ORDER — CALCITRIOL 0.25 UG/1
0.25 CAPSULE, LIQUID FILLED ORAL DAILY
Qty: 30 CAPSULE | Refills: 0 | Status: SHIPPED | OUTPATIENT
Start: 2019-01-01 | End: 2019-01-01 | Stop reason: SDUPTHER

## 2019-01-01 RX ORDER — WARFARIN SODIUM 2.5 MG/1
2.5 TABLET ORAL DAILY
Qty: 30 TABLET | Refills: 5 | Status: SHIPPED | OUTPATIENT
Start: 2019-01-01 | End: 2019-01-01 | Stop reason: ALTCHOICE

## 2019-01-01 RX ORDER — SODIUM CHLORIDE 0.9 % (FLUSH) 0.9 %
10 SYRINGE (ML) INJECTION EVERY 12 HOURS SCHEDULED
Status: DISCONTINUED | OUTPATIENT
Start: 2019-01-01 | End: 2019-01-01 | Stop reason: HOSPADM

## 2019-01-01 RX ORDER — CARVEDILOL 25 MG/1
25 TABLET ORAL 2 TIMES DAILY WITH MEALS
Status: CANCELLED | OUTPATIENT
Start: 2019-01-01

## 2019-01-01 RX ORDER — LIDOCAINE 4 G/G
1 PATCH TOPICAL DAILY
Status: DISCONTINUED | OUTPATIENT
Start: 2019-01-01 | End: 2019-01-01 | Stop reason: HOSPADM

## 2019-01-01 RX ORDER — SODIUM CHLORIDE, SODIUM LACTATE, CALCIUM CHLORIDE, MAGNESIUM CHLORIDE AND DEXTROSE 1.5; 538; 448; 18.3; 5.08 G/100ML; MG/100ML; MG/100ML; MG/100ML; MG/100ML
5000 INJECTION, SOLUTION INTRAPERITONEAL CONTINUOUS
Status: DISCONTINUED | OUTPATIENT
Start: 2019-01-01 | End: 2019-01-01

## 2019-01-01 RX ORDER — CYCLOBENZAPRINE HCL 10 MG
10 TABLET ORAL 3 TIMES DAILY PRN
Status: DISCONTINUED | OUTPATIENT
Start: 2019-01-01 | End: 2019-01-01 | Stop reason: HOSPADM

## 2019-01-01 RX ORDER — SIMVASTATIN 40 MG
40 TABLET ORAL NIGHTLY
Status: DISCONTINUED | OUTPATIENT
Start: 2019-01-01 | End: 2019-01-01 | Stop reason: HOSPADM

## 2019-01-01 RX ORDER — PREGABALIN 75 MG/1
75 CAPSULE ORAL DAILY
Status: DISCONTINUED | OUTPATIENT
Start: 2019-01-01 | End: 2019-01-01 | Stop reason: HOSPADM

## 2019-01-01 RX ORDER — SEVELAMER CARBONATE 800 MG/1
1600 TABLET, FILM COATED ORAL
Status: DISCONTINUED | OUTPATIENT
Start: 2019-01-01 | End: 2019-01-01 | Stop reason: HOSPADM

## 2019-01-01 RX ORDER — GLYCOPYRROLATE 0.2 MG/ML
INJECTION INTRAMUSCULAR; INTRAVENOUS PRN
Status: DISCONTINUED | OUTPATIENT
Start: 2019-01-01 | End: 2019-01-01 | Stop reason: SDUPTHER

## 2019-01-01 RX ORDER — AMMONIUM LACTATE 12 G/100G
CREAM TOPICAL DAILY
Status: DISCONTINUED | OUTPATIENT
Start: 2019-01-01 | End: 2019-01-01 | Stop reason: HOSPADM

## 2019-01-01 RX ORDER — SODIUM CHLORIDE, SODIUM LACTATE, CALCIUM CHLORIDE, MAGNESIUM CHLORIDE AND DEXTROSE 2.5; 538; 448; 18.3; 5.08 G/100ML; MG/100ML; MG/100ML; MG/100ML; MG/100ML
5000 INJECTION, SOLUTION INTRAPERITONEAL CONTINUOUS
Status: DISCONTINUED | OUTPATIENT
Start: 2019-01-01 | End: 2019-01-01

## 2019-01-01 RX ORDER — 0.9 % SODIUM CHLORIDE 0.9 %
500 INTRAVENOUS SOLUTION INTRAVENOUS ONCE
Status: COMPLETED | OUTPATIENT
Start: 2019-01-01 | End: 2019-01-01

## 2019-01-01 RX ORDER — HEPARIN SODIUM 5000 [USP'U]/ML
5000 INJECTION, SOLUTION INTRAVENOUS; SUBCUTANEOUS EVERY 8 HOURS SCHEDULED
Status: DISCONTINUED | OUTPATIENT
Start: 2019-01-01 | End: 2019-01-01 | Stop reason: HOSPADM

## 2019-01-01 RX ORDER — UREA 10 %
3 LOTION (ML) TOPICAL ONCE
Status: DISCONTINUED | OUTPATIENT
Start: 2019-01-01 | End: 2019-01-01 | Stop reason: HOSPADM

## 2019-01-01 RX ORDER — SODIUM CHLORIDE 1000 MG
1 TABLET, SOLUBLE MISCELLANEOUS 2 TIMES DAILY WITH MEALS
Qty: 90 TABLET | Refills: 3 | Status: ON HOLD | DISCHARGE
Start: 2019-01-01 | End: 2020-01-01

## 2019-01-01 RX ORDER — POTASSIUM CHLORIDE 20 MEQ/1
40 TABLET, EXTENDED RELEASE ORAL ONCE
Status: COMPLETED | OUTPATIENT
Start: 2019-01-01 | End: 2019-01-01

## 2019-01-01 RX ORDER — SODIUM CHLORIDE 0.9 % (FLUSH) 0.9 %
10 SYRINGE (ML) INJECTION PRN
Status: DISCONTINUED | OUTPATIENT
Start: 2019-01-01 | End: 2019-01-01 | Stop reason: HOSPADM

## 2019-01-01 RX ORDER — SODIUM CHLORIDE 1000 MG
1 TABLET, SOLUBLE MISCELLANEOUS 2 TIMES DAILY WITH MEALS
Status: DISCONTINUED | OUTPATIENT
Start: 2019-01-01 | End: 2019-01-01 | Stop reason: HOSPADM

## 2019-01-01 RX ORDER — GABAPENTIN 300 MG/1
300 CAPSULE ORAL 3 TIMES DAILY
Status: DISCONTINUED | OUTPATIENT
Start: 2019-01-01 | End: 2019-01-01

## 2019-01-01 RX ORDER — 0.9 % SODIUM CHLORIDE 0.9 %
250 INTRAVENOUS SOLUTION INTRAVENOUS ONCE
Status: COMPLETED | OUTPATIENT
Start: 2019-01-01 | End: 2019-01-01

## 2019-01-01 RX ORDER — SENNA PLUS 8.6 MG/1
1 TABLET ORAL NIGHTLY
Status: DISCONTINUED | OUTPATIENT
Start: 2019-01-01 | End: 2019-01-01 | Stop reason: HOSPADM

## 2019-01-01 RX ORDER — POLYETHYLENE GLYCOL 3350 17 G/17G
17 POWDER, FOR SOLUTION ORAL DAILY PRN
Status: DISCONTINUED | OUTPATIENT
Start: 2019-01-01 | End: 2019-01-01 | Stop reason: HOSPADM

## 2019-01-01 RX ORDER — PREGABALIN 75 MG/1
75 CAPSULE ORAL DAILY
Qty: 30 CAPSULE | Refills: 0 | Status: SHIPPED | OUTPATIENT
Start: 2019-01-01 | End: 2020-01-01

## 2019-01-01 RX ORDER — HEPARIN SODIUM 5000 [USP'U]/ML
40 INJECTION, SOLUTION INTRAVENOUS; SUBCUTANEOUS PRN
Status: DISCONTINUED | OUTPATIENT
Start: 2019-01-01 | End: 2019-01-01

## 2019-01-01 RX ORDER — DOCUSATE SODIUM 100 MG/1
100 CAPSULE, LIQUID FILLED ORAL 2 TIMES DAILY
Qty: 30 CAPSULE | Refills: 0 | Status: SHIPPED | OUTPATIENT
Start: 2019-01-01 | End: 2019-01-01

## 2019-01-01 RX ORDER — GENTAMICIN SULFATE 1 MG/G
CREAM TOPICAL NIGHTLY PRN
Status: DISCONTINUED | OUTPATIENT
Start: 2019-01-01 | End: 2019-01-01 | Stop reason: HOSPADM

## 2019-01-01 RX ORDER — ASPIRIN 81 MG/1
162 TABLET, CHEWABLE ORAL ONCE
Status: COMPLETED | OUTPATIENT
Start: 2019-01-01 | End: 2019-01-01

## 2019-01-01 RX ORDER — GABAPENTIN 100 MG/1
100 CAPSULE ORAL 2 TIMES DAILY
Status: DISCONTINUED | OUTPATIENT
Start: 2019-01-01 | End: 2019-01-01

## 2019-01-01 RX ORDER — POTASSIUM CHLORIDE 7.45 MG/ML
10 INJECTION INTRAVENOUS ONCE
Status: DISCONTINUED | OUTPATIENT
Start: 2019-01-01 | End: 2019-01-01

## 2019-01-01 RX ORDER — SCOLOPAMINE TRANSDERMAL SYSTEM 1 MG/1
1 PATCH, EXTENDED RELEASE TRANSDERMAL
Status: DISCONTINUED | OUTPATIENT
Start: 2019-01-01 | End: 2019-01-01 | Stop reason: HOSPADM

## 2019-01-01 RX ORDER — UREA 10 %
3 LOTION (ML) TOPICAL NIGHTLY
Status: DISCONTINUED | OUTPATIENT
Start: 2019-01-01 | End: 2019-01-01 | Stop reason: HOSPADM

## 2019-01-01 RX ORDER — SEVELAMER CARBONATE 800 MG/1
1600 TABLET, FILM COATED ORAL
Qty: 90 TABLET | Refills: 2 | Status: SHIPPED | OUTPATIENT
Start: 2019-01-01

## 2019-01-01 RX ORDER — ONDANSETRON 4 MG/1
4 TABLET, ORALLY DISINTEGRATING ORAL EVERY 8 HOURS PRN
Status: DISCONTINUED | OUTPATIENT
Start: 2019-01-01 | End: 2019-01-01 | Stop reason: HOSPADM

## 2019-01-01 RX ORDER — MAGNESIUM SULFATE IN WATER 40 MG/ML
2 INJECTION, SOLUTION INTRAVENOUS
Status: COMPLETED | OUTPATIENT
Start: 2019-01-01 | End: 2019-01-01

## 2019-01-01 RX ORDER — ASPIRIN 81 MG/1
81 TABLET ORAL DAILY
Status: DISCONTINUED | OUTPATIENT
Start: 2019-01-01 | End: 2019-01-01 | Stop reason: HOSPADM

## 2019-01-01 RX ORDER — WARFARIN SODIUM 7.5 MG/1
7.5 TABLET ORAL
Status: COMPLETED | OUTPATIENT
Start: 2019-01-01 | End: 2019-01-01

## 2019-01-01 RX ORDER — CIPROFLOXACIN 2 MG/ML
400 INJECTION, SOLUTION INTRAVENOUS EVERY 24 HOURS
Status: DISCONTINUED | OUTPATIENT
Start: 2019-01-01 | End: 2019-01-01 | Stop reason: HOSPADM

## 2019-01-01 RX ORDER — MIDODRINE HYDROCHLORIDE 5 MG/1
5 TABLET ORAL
Qty: 90 TABLET | Refills: 3 | Status: ON HOLD | DISCHARGE
Start: 2019-01-01 | End: 2020-01-01 | Stop reason: DRUGHIGH

## 2019-01-01 RX ORDER — CINACALCET 60 MG/1
60 TABLET, FILM COATED ORAL DAILY
Qty: 30 TABLET | Refills: 0
Start: 2019-01-01 | End: 2019-01-01 | Stop reason: SDUPTHER

## 2019-01-01 RX ORDER — VIT B COMP NO.3/FOLIC/C/BIOTIN 1 MG-60 MG
1 TABLET ORAL
Qty: 30 TABLET | Refills: 3 | Status: SHIPPED | OUTPATIENT
Start: 2019-01-01

## 2019-01-01 RX ORDER — SODIUM CHLORIDE, SODIUM LACTATE, POTASSIUM CHLORIDE, AND CALCIUM CHLORIDE .6; .31; .03; .02 G/100ML; G/100ML; G/100ML; G/100ML
500 INJECTION, SOLUTION INTRAVENOUS ONCE
Status: COMPLETED | OUTPATIENT
Start: 2019-01-01 | End: 2019-01-01

## 2019-01-01 RX ORDER — PANTOPRAZOLE SODIUM 40 MG/1
40 TABLET, DELAYED RELEASE ORAL
Qty: 112 TABLET | Refills: 0 | Status: SHIPPED | OUTPATIENT
Start: 2019-01-01 | End: 2020-01-01

## 2019-01-01 RX ORDER — ONDANSETRON 2 MG/ML
4 INJECTION INTRAMUSCULAR; INTRAVENOUS EVERY 6 HOURS PRN
Status: DISCONTINUED | OUTPATIENT
Start: 2019-01-01 | End: 2019-01-01 | Stop reason: HOSPADM

## 2019-01-01 RX ORDER — POTASSIUM CHLORIDE 1.5 G/1.77G
40 POWDER, FOR SOLUTION ORAL ONCE
Status: COMPLETED | OUTPATIENT
Start: 2019-01-01 | End: 2019-01-01

## 2019-01-01 RX ORDER — NICOTINE POLACRILEX 4 MG
15 LOZENGE BUCCAL PRN
Status: DISCONTINUED | OUTPATIENT
Start: 2019-01-01 | End: 2019-01-01 | Stop reason: HOSPADM

## 2019-01-01 RX ORDER — POLYETHYLENE GLYCOL 3350 17 G/17G
17 POWDER, FOR SOLUTION ORAL 2 TIMES DAILY
Status: DISCONTINUED | OUTPATIENT
Start: 2019-01-01 | End: 2019-01-01 | Stop reason: HOSPADM

## 2019-01-01 RX ORDER — GENTAMICIN SULFATE 1 MG/G
CREAM TOPICAL DAILY
Status: DISCONTINUED | OUTPATIENT
Start: 2019-01-01 | End: 2019-01-01

## 2019-01-01 RX ORDER — FLUDROCORTISONE ACETATE 0.1 MG/1
0.1 TABLET ORAL 2 TIMES DAILY
Status: DISCONTINUED | OUTPATIENT
Start: 2019-01-01 | End: 2019-01-01

## 2019-01-01 RX ORDER — GABAPENTIN 100 MG/1
100 CAPSULE ORAL 3 TIMES DAILY
Status: DISCONTINUED | OUTPATIENT
Start: 2019-01-01 | End: 2019-01-01

## 2019-01-01 RX ORDER — CALCITRIOL 0.25 UG/1
0.25 CAPSULE, LIQUID FILLED ORAL DAILY
Status: DISCONTINUED | OUTPATIENT
Start: 2019-01-01 | End: 2019-01-01 | Stop reason: HOSPADM

## 2019-01-01 RX ORDER — GABAPENTIN 100 MG/1
200 CAPSULE ORAL NIGHTLY
Qty: 60 CAPSULE | Refills: 0 | Status: SHIPPED | OUTPATIENT
Start: 2019-01-01 | End: 2019-01-01

## 2019-01-01 RX ORDER — WARFARIN SODIUM 5 MG/1
5 TABLET ORAL DAILY
Status: DISCONTINUED | OUTPATIENT
Start: 2019-01-01 | End: 2019-01-01

## 2019-01-01 RX ORDER — SEVELAMER CARBONATE 800 MG/1
1600 TABLET, FILM COATED ORAL
Qty: 90 TABLET | Refills: 0 | Status: SHIPPED | OUTPATIENT
Start: 2019-01-01 | End: 2019-01-01 | Stop reason: SDUPTHER

## 2019-01-01 RX ORDER — POTASSIUM CHLORIDE 20 MEQ/1
20 TABLET, EXTENDED RELEASE ORAL 2 TIMES DAILY WITH MEALS
Status: DISCONTINUED | OUTPATIENT
Start: 2019-01-01 | End: 2019-01-01

## 2019-01-01 RX ORDER — WARFARIN SODIUM 2.5 MG/1
2.5 TABLET ORAL DAILY
Status: DISCONTINUED | OUTPATIENT
Start: 2019-01-01 | End: 2019-01-01 | Stop reason: HOSPADM

## 2019-01-01 RX ORDER — GABAPENTIN 100 MG/1
200 CAPSULE ORAL NIGHTLY
Qty: 90 CAPSULE | Refills: 3 | Status: SHIPPED | OUTPATIENT
Start: 2019-01-01 | End: 2019-01-01

## 2019-01-01 RX ORDER — CINACALCET 30 MG/1
30 TABLET, FILM COATED ORAL DAILY
Status: DISCONTINUED | OUTPATIENT
Start: 2019-01-01 | End: 2019-01-01 | Stop reason: HOSPADM

## 2019-01-01 RX ORDER — CALCITRIOL 0.25 UG/1
0.5 CAPSULE, LIQUID FILLED ORAL DAILY
Status: DISCONTINUED | OUTPATIENT
Start: 2019-01-01 | End: 2019-01-01 | Stop reason: HOSPADM

## 2019-01-01 RX ORDER — COSYNTROPIN 0.25 MG/ML
250 INJECTION, POWDER, FOR SOLUTION INTRAMUSCULAR; INTRAVENOUS ONCE
Status: COMPLETED | OUTPATIENT
Start: 2019-01-01 | End: 2019-01-01

## 2019-01-01 RX ORDER — SODIUM CHLORIDE, SODIUM LACTATE, POTASSIUM CHLORIDE, CALCIUM CHLORIDE 600; 310; 30; 20 MG/100ML; MG/100ML; MG/100ML; MG/100ML
INJECTION, SOLUTION INTRAVENOUS CONTINUOUS
Status: DISCONTINUED | OUTPATIENT
Start: 2019-01-01 | End: 2019-01-01

## 2019-01-01 RX ORDER — SEVELAMER CARBONATE 800 MG/1
800 TABLET, FILM COATED ORAL
Status: DISCONTINUED | OUTPATIENT
Start: 2019-01-01 | End: 2019-01-01

## 2019-01-01 RX ORDER — SIMVASTATIN 40 MG
40 TABLET ORAL NIGHTLY
Qty: 30 TABLET | Refills: 2 | Status: SHIPPED | OUTPATIENT
Start: 2019-01-01

## 2019-01-01 RX ORDER — HEPARIN SODIUM 1000 [USP'U]/ML
1000 INJECTION, SOLUTION INTRAVENOUS; SUBCUTANEOUS PRN
Status: DISCONTINUED | OUTPATIENT
Start: 2019-01-01 | End: 2019-01-01 | Stop reason: HOSPADM

## 2019-01-01 RX ORDER — HEPARIN SODIUM 1000 [USP'U]/ML
1000 INJECTION, SOLUTION INTRAVENOUS; SUBCUTANEOUS NIGHTLY
Status: DISCONTINUED | OUTPATIENT
Start: 2019-01-01 | End: 2019-01-01 | Stop reason: HOSPADM

## 2019-01-01 RX ORDER — HEPARIN SODIUM 5000 [USP'U]/ML
80 INJECTION, SOLUTION INTRAVENOUS; SUBCUTANEOUS PRN
Status: DISCONTINUED | OUTPATIENT
Start: 2019-01-01 | End: 2019-01-01 | Stop reason: HOSPADM

## 2019-01-01 RX ORDER — PANTOPRAZOLE SODIUM 40 MG/1
40 TABLET, DELAYED RELEASE ORAL
Qty: 60 TABLET | Refills: 0 | Status: CANCELLED | OUTPATIENT
Start: 2019-01-01

## 2019-01-01 RX ORDER — DOCUSATE SODIUM 100 MG/1
100 CAPSULE, LIQUID FILLED ORAL DAILY
Status: DISCONTINUED | OUTPATIENT
Start: 2019-01-01 | End: 2019-01-01

## 2019-01-01 RX ORDER — HEPARIN SODIUM 10000 [USP'U]/100ML
10 INJECTION, SOLUTION INTRAVENOUS CONTINUOUS
Status: DISCONTINUED | OUTPATIENT
Start: 2019-01-01 | End: 2019-01-01 | Stop reason: HOSPADM

## 2019-01-01 RX ORDER — MELATONIN
2000 DAILY
Qty: 60 TABLET | Refills: 5 | Status: SHIPPED | OUTPATIENT
Start: 2019-01-01

## 2019-01-01 RX ORDER — DOCUSATE SODIUM 100 MG/1
100 CAPSULE, LIQUID FILLED ORAL 2 TIMES DAILY
Status: DISCONTINUED | OUTPATIENT
Start: 2019-01-01 | End: 2019-01-01 | Stop reason: HOSPADM

## 2019-01-01 RX ORDER — POTASSIUM CHLORIDE 750 MG/1
10 TABLET, EXTENDED RELEASE ORAL 2 TIMES DAILY WITH MEALS
Status: DISCONTINUED | OUTPATIENT
Start: 2019-01-01 | End: 2019-01-01

## 2019-01-01 RX ORDER — ASPIRIN 81 MG/1
81 TABLET ORAL DAILY
Qty: 30 TABLET | Refills: 3 | Status: SHIPPED | OUTPATIENT
Start: 2019-01-01

## 2019-01-01 RX ORDER — DEXTROSE MONOHYDRATE 50 MG/ML
100 INJECTION, SOLUTION INTRAVENOUS PRN
Status: DISCONTINUED | OUTPATIENT
Start: 2019-01-01 | End: 2019-01-01 | Stop reason: HOSPADM

## 2019-01-01 RX ORDER — DEXAMETHASONE SODIUM PHOSPHATE 4 MG/ML
4 INJECTION, SOLUTION INTRA-ARTICULAR; INTRALESIONAL; INTRAMUSCULAR; INTRAVENOUS; SOFT TISSUE EVERY 6 HOURS
Status: DISCONTINUED | OUTPATIENT
Start: 2019-01-01 | End: 2019-01-01

## 2019-01-01 RX ORDER — 0.9 % SODIUM CHLORIDE 0.9 %
250 INTRAVENOUS SOLUTION INTRAVENOUS ONCE
Status: DISCONTINUED | OUTPATIENT
Start: 2019-01-01 | End: 2019-01-01

## 2019-01-01 RX ORDER — SEVELAMER CARBONATE 800 MG/1
2400 TABLET, FILM COATED ORAL
Status: DISCONTINUED | OUTPATIENT
Start: 2019-01-01 | End: 2019-01-01 | Stop reason: HOSPADM

## 2019-01-01 RX ORDER — SUCRALFATE ORAL 1 G/10ML
1 SUSPENSION ORAL EVERY 6 HOURS SCHEDULED
Status: DISCONTINUED | OUTPATIENT
Start: 2019-01-01 | End: 2019-01-01 | Stop reason: HOSPADM

## 2019-01-01 RX ORDER — POLYETHYLENE GLYCOL 3350 17 G/17G
17 POWDER, FOR SOLUTION ORAL DAILY PRN
Status: DISCONTINUED | OUTPATIENT
Start: 2019-01-01 | End: 2019-01-01

## 2019-01-01 RX ORDER — SEVELAMER CARBONATE 800 MG/1
1600 TABLET, FILM COATED ORAL
Status: DISCONTINUED | OUTPATIENT
Start: 2019-01-01 | End: 2019-01-01

## 2019-01-01 RX ORDER — PANTOPRAZOLE SODIUM 40 MG/10ML
40 INJECTION, POWDER, LYOPHILIZED, FOR SOLUTION INTRAVENOUS 2 TIMES DAILY
Status: DISCONTINUED | OUTPATIENT
Start: 2019-01-01 | End: 2019-01-01 | Stop reason: HOSPADM

## 2019-01-01 RX ORDER — SUCRALFATE ORAL 1 G/10ML
1 SUSPENSION ORAL
Qty: 1200 ML | Refills: 3 | Status: ON HOLD | DISCHARGE
Start: 2019-01-01 | End: 2020-01-01

## 2019-01-01 RX ORDER — HEPARIN SODIUM 10000 [USP'U]/100ML
18 INJECTION, SOLUTION INTRAVENOUS CONTINUOUS
Status: DISCONTINUED | OUTPATIENT
Start: 2019-01-01 | End: 2019-01-01

## 2019-01-01 RX ORDER — ACETAMINOPHEN 325 MG/1
650 TABLET ORAL ONCE
Status: COMPLETED | OUTPATIENT
Start: 2019-01-01 | End: 2019-01-01

## 2019-01-01 RX ORDER — SENNA PLUS 8.6 MG/1
1 TABLET ORAL NIGHTLY PRN
Qty: 30 TABLET | Refills: 0 | Status: SHIPPED | OUTPATIENT
Start: 2019-01-01 | End: 2019-01-01

## 2019-01-01 RX ORDER — MAGNESIUM SULFATE IN WATER 40 MG/ML
2 INJECTION, SOLUTION INTRAVENOUS ONCE
Status: COMPLETED | OUTPATIENT
Start: 2019-01-01 | End: 2019-01-01

## 2019-01-01 RX ORDER — ONDANSETRON 4 MG/1
4 TABLET, ORALLY DISINTEGRATING ORAL ONCE
Status: DISCONTINUED | OUTPATIENT
Start: 2019-01-01 | End: 2019-01-01

## 2019-01-01 RX ORDER — VITAMIN E 268 MG
400 CAPSULE ORAL NIGHTLY
Qty: 30 CAPSULE | Refills: 3 | Status: SHIPPED | OUTPATIENT
Start: 2019-01-01 | End: 2019-01-01 | Stop reason: SDUPTHER

## 2019-01-01 RX ORDER — POLYETHYLENE GLYCOL 3350 17 G/17G
17 POWDER, FOR SOLUTION ORAL DAILY PRN
Status: DISCONTINUED | OUTPATIENT
Start: 2019-01-01 | End: 2019-01-01 | Stop reason: SDUPTHER

## 2019-01-01 RX ORDER — GABAPENTIN 100 MG/1
200 CAPSULE ORAL NIGHTLY
Status: DISCONTINUED | OUTPATIENT
Start: 2019-01-01 | End: 2019-01-01 | Stop reason: HOSPADM

## 2019-01-01 RX ORDER — POTASSIUM CHLORIDE 20 MEQ/1
20 TABLET, EXTENDED RELEASE ORAL
Status: DISCONTINUED | OUTPATIENT
Start: 2019-01-01 | End: 2019-01-01 | Stop reason: HOSPADM

## 2019-01-01 RX ORDER — ONDANSETRON 4 MG/1
4 TABLET, FILM COATED ORAL EVERY 8 HOURS PRN
Qty: 20 TABLET | Refills: 0 | Status: SHIPPED | OUTPATIENT
Start: 2019-01-01

## 2019-01-01 RX ORDER — CINACALCET 60 MG/1
60 TABLET, FILM COATED ORAL DAILY
Qty: 30 TABLET | Refills: 0 | Status: SHIPPED | OUTPATIENT
Start: 2019-01-01 | End: 2019-01-01 | Stop reason: SDUPTHER

## 2019-01-01 RX ORDER — ALBUMIN (HUMAN) 12.5 G/50ML
25 SOLUTION INTRAVENOUS ONCE
Status: COMPLETED | OUTPATIENT
Start: 2019-01-01 | End: 2019-01-01

## 2019-01-01 RX ORDER — HEPARIN SODIUM 5000 [USP'U]/ML
40 INJECTION, SOLUTION INTRAVENOUS; SUBCUTANEOUS PRN
Status: DISCONTINUED | OUTPATIENT
Start: 2019-01-01 | End: 2019-01-01 | Stop reason: DRUGHIGH

## 2019-01-01 RX ORDER — GABAPENTIN 100 MG/1
200 CAPSULE ORAL NIGHTLY
Status: DISCONTINUED | OUTPATIENT
Start: 2019-01-01 | End: 2019-01-01

## 2019-01-01 RX ORDER — MIDODRINE HYDROCHLORIDE 5 MG/1
5 TABLET ORAL
Status: DISCONTINUED | OUTPATIENT
Start: 2019-01-01 | End: 2019-01-01 | Stop reason: HOSPADM

## 2019-01-01 RX ORDER — POLYETHYLENE GLYCOL 3350 17 G/17G
17 POWDER, FOR SOLUTION ORAL DAILY PRN
Qty: 527 G | Refills: 1 | DISCHARGE
Start: 2019-01-01 | End: 2019-01-01

## 2019-01-01 RX ORDER — ONDANSETRON 4 MG/1
4 TABLET, ORALLY DISINTEGRATING ORAL EVERY 8 HOURS PRN
Status: DISCONTINUED | OUTPATIENT
Start: 2019-01-01 | End: 2019-01-01

## 2019-01-01 RX ORDER — UREA 10 %
3 LOTION (ML) TOPICAL NIGHTLY
Qty: 3 TABLET | Refills: 0 | DISCHARGE
Start: 2019-01-01

## 2019-01-01 RX ORDER — VITAMIN E 268 MG
400 CAPSULE ORAL NIGHTLY
Status: DISCONTINUED | OUTPATIENT
Start: 2019-01-01 | End: 2019-01-01 | Stop reason: HOSPADM

## 2019-01-01 RX ORDER — LIDOCAINE 4 G/G
1 PATCH TOPICAL DAILY
Qty: 30 PATCH | Refills: 0 | Status: SHIPPED | OUTPATIENT
Start: 2019-01-01 | End: 2019-01-01 | Stop reason: CLARIF

## 2019-01-01 RX ORDER — HEPARIN SODIUM 5000 [USP'U]/ML
80 INJECTION, SOLUTION INTRAVENOUS; SUBCUTANEOUS PRN
Status: DISCONTINUED | OUTPATIENT
Start: 2019-01-01 | End: 2019-01-01 | Stop reason: DRUGHIGH

## 2019-01-01 RX ORDER — SEVELAMER CARBONATE 800 MG/1
1600 TABLET, FILM COATED ORAL
Qty: 90 TABLET | Refills: 2
Start: 2019-01-01 | End: 2019-01-01 | Stop reason: SDUPTHER

## 2019-01-01 RX ORDER — VITAMIN E 268 MG
400 CAPSULE ORAL NIGHTLY
Qty: 30 CAPSULE | Refills: 3 | Status: SHIPPED | OUTPATIENT
Start: 2019-01-01

## 2019-01-01 RX ORDER — POLYETHYLENE GLYCOL 3350 17 G/17G
17 POWDER, FOR SOLUTION ORAL DAILY
Status: DISCONTINUED | OUTPATIENT
Start: 2019-01-01 | End: 2019-01-01 | Stop reason: HOSPADM

## 2019-01-01 RX ORDER — SODIUM CHLORIDE 9 MG/ML
INJECTION, SOLUTION INTRAVENOUS CONTINUOUS PRN
Status: DISCONTINUED | OUTPATIENT
Start: 2019-01-01 | End: 2019-01-01 | Stop reason: SDUPTHER

## 2019-01-01 RX ADMIN — DOCUSATE SODIUM 100 MG: 100 CAPSULE, LIQUID FILLED ORAL at 21:27

## 2019-01-01 RX ADMIN — SEVELAMER CARBONATE 2400 MG: 800 TABLET, FILM COATED ORAL at 13:06

## 2019-01-01 RX ADMIN — SEVELAMER CARBONATE 1600 MG: 800 TABLET, FILM COATED ORAL at 12:16

## 2019-01-01 RX ADMIN — VITAMIN D, TAB 1000IU (100/BT) 2000 UNITS: 25 TAB at 09:04

## 2019-01-01 RX ADMIN — VITAMIN E CAP 400 UNIT 400 UNITS: 400 CAP at 21:09

## 2019-01-01 RX ADMIN — DICLOFENAC 4 G: 10 GEL TOPICAL at 07:09

## 2019-01-01 RX ADMIN — PANTOPRAZOLE SODIUM 40 MG: 40 INJECTION, POWDER, LYOPHILIZED, FOR SOLUTION INTRAVENOUS at 22:24

## 2019-01-01 RX ADMIN — NEPHROCAP 1 MG: 1 CAP ORAL at 09:07

## 2019-01-01 RX ADMIN — ACETAMINOPHEN 650 MG: 325 TABLET ORAL at 14:03

## 2019-01-01 RX ADMIN — Medication 1 PACKET: at 10:31

## 2019-01-01 RX ADMIN — SEVELAMER CARBONATE 1600 MG: 800 TABLET, FILM COATED ORAL at 18:11

## 2019-01-01 RX ADMIN — SEVELAMER CARBONATE 1600 MG: 800 TABLET, FILM COATED ORAL at 17:19

## 2019-01-01 RX ADMIN — SEVELAMER CARBONATE 1600 MG: 800 TABLET, FILM COATED ORAL at 09:19

## 2019-01-01 RX ADMIN — SIMVASTATIN 40 MG: 40 TABLET, FILM COATED ORAL at 20:54

## 2019-01-01 RX ADMIN — CINACALCET HYDROCHLORIDE 60 MG: 30 TABLET, FILM COATED ORAL at 08:37

## 2019-01-01 RX ADMIN — ZIKS ARTHRITIS PAIN RELIEF: 6.79; .566; .014 CREAM TOPICAL at 13:51

## 2019-01-01 RX ADMIN — CYCLOBENZAPRINE HYDROCHLORIDE 10 MG: 10 TABLET, FILM COATED ORAL at 17:59

## 2019-01-01 RX ADMIN — STANDARDIZED SENNA CONCENTRATE 8.6 MG: 8.6 TABLET ORAL at 22:21

## 2019-01-01 RX ADMIN — SEVELAMER CARBONATE 1600 MG: 800 TABLET, FILM COATED ORAL at 18:40

## 2019-01-01 RX ADMIN — GABAPENTIN 100 MG: 100 CAPSULE ORAL at 14:16

## 2019-01-01 RX ADMIN — NEPHROCAP 1 MG: 1 CAP ORAL at 10:46

## 2019-01-01 RX ADMIN — SIMVASTATIN 40 MG: 40 TABLET, FILM COATED ORAL at 22:04

## 2019-01-01 RX ADMIN — WARFARIN SODIUM 5 MG: 5 TABLET ORAL at 18:54

## 2019-01-01 RX ADMIN — HEPARIN SODIUM 5000 UNITS: 5000 INJECTION INTRAVENOUS; SUBCUTANEOUS at 15:32

## 2019-01-01 RX ADMIN — CALCITRIOL CAPSULES 0.25 MCG 0.25 MCG: 0.25 CAPSULE ORAL at 09:55

## 2019-01-01 RX ADMIN — INSULIN LISPRO 2 UNITS: 100 INJECTION, SOLUTION INTRAVENOUS; SUBCUTANEOUS at 08:07

## 2019-01-01 RX ADMIN — CINACALCET HYDROCHLORIDE 60 MG: 30 TABLET, COATED ORAL at 09:48

## 2019-01-01 RX ADMIN — DOCUSATE SODIUM 100 MG: 100 CAPSULE, LIQUID FILLED ORAL at 09:19

## 2019-01-01 RX ADMIN — HEPARIN SODIUM 5000 UNITS: 5000 INJECTION INTRAVENOUS; SUBCUTANEOUS at 22:18

## 2019-01-01 RX ADMIN — DOCUSATE SODIUM 100 MG: 100 CAPSULE, LIQUID FILLED ORAL at 21:15

## 2019-01-01 RX ADMIN — VITAMIN E CAP 400 UNIT 400 UNITS: 400 CAP at 21:46

## 2019-01-01 RX ADMIN — PREGABALIN 75 MG: 75 CAPSULE ORAL at 09:05

## 2019-01-01 RX ADMIN — ACETAMINOPHEN 650 MG: 325 TABLET ORAL at 22:56

## 2019-01-01 RX ADMIN — ZIKS ARTHRITIS PAIN RELIEF: 6.79; .566; .014 CREAM TOPICAL at 14:21

## 2019-01-01 RX ADMIN — HEPARIN SODIUM 5000 UNITS: 5000 INJECTION INTRAVENOUS; SUBCUTANEOUS at 13:48

## 2019-01-01 RX ADMIN — HEPARIN SODIUM 1000 UNITS: 1000 INJECTION INTRAVENOUS; SUBCUTANEOUS at 16:52

## 2019-01-01 RX ADMIN — VITAMIN E CAP 400 UNIT 400 UNITS: 400 CAP at 20:59

## 2019-01-01 RX ADMIN — SODIUM CHLORIDE: 900 INJECTION, SOLUTION INTRAVENOUS at 21:15

## 2019-01-01 RX ADMIN — HEPARIN SODIUM: 1000 INJECTION INTRAVENOUS; SUBCUTANEOUS at 18:45

## 2019-01-01 RX ADMIN — ASPIRIN 81 MG: 81 TABLET, COATED ORAL at 08:20

## 2019-01-01 RX ADMIN — DOCUSATE SODIUM 100 MG: 100 CAPSULE, LIQUID FILLED ORAL at 20:54

## 2019-01-01 RX ADMIN — SODIUM CHLORIDE 8 MG/HR: 9 INJECTION, SOLUTION INTRAVENOUS at 22:12

## 2019-01-01 RX ADMIN — NEPHROCAP 1 MG: 1 CAP ORAL at 10:31

## 2019-01-01 RX ADMIN — ASPIRIN 81 MG: 81 TABLET ORAL at 09:19

## 2019-01-01 RX ADMIN — Medication 10 ML: at 22:24

## 2019-01-01 RX ADMIN — SEVELAMER CARBONATE 1600 MG: 800 TABLET, FILM COATED ORAL at 12:55

## 2019-01-01 RX ADMIN — ACETAMINOPHEN 650 MG: 325 TABLET ORAL at 03:56

## 2019-01-01 RX ADMIN — CALCITRIOL CAPSULES 0.25 MCG 0.25 MCG: 0.25 CAPSULE ORAL at 09:21

## 2019-01-01 RX ADMIN — SEVELAMER CARBONATE 1600 MG: 800 TABLET, FILM COATED ORAL at 08:20

## 2019-01-01 RX ADMIN — ACETAMINOPHEN 650 MG: 325 TABLET ORAL at 04:05

## 2019-01-01 RX ADMIN — SEVELAMER CARBONATE 1600 MG: 800 TABLET, FILM COATED ORAL at 13:30

## 2019-01-01 RX ADMIN — DOCUSATE SODIUM 100 MG: 100 CAPSULE, LIQUID FILLED ORAL at 21:08

## 2019-01-01 RX ADMIN — ACETAMINOPHEN 650 MG: 325 TABLET ORAL at 17:30

## 2019-01-01 RX ADMIN — AMMONIUM LACTATE: 120 CREAM TOPICAL at 08:22

## 2019-01-01 RX ADMIN — INSULIN LISPRO 2 UNITS: 100 INJECTION, SOLUTION INTRAVENOUS; SUBCUTANEOUS at 12:15

## 2019-01-01 RX ADMIN — DEXAMETHASONE SODIUM PHOSPHATE 4 MG: 4 INJECTION, SOLUTION INTRAMUSCULAR; INTRAVENOUS at 06:58

## 2019-01-01 RX ADMIN — INSULIN LISPRO 1 UNITS: 100 INJECTION, SOLUTION INTRAVENOUS; SUBCUTANEOUS at 08:48

## 2019-01-01 RX ADMIN — Medication 10 ML: at 21:10

## 2019-01-01 RX ADMIN — SEVELAMER CARBONATE 1600 MG: 800 TABLET, FILM COATED ORAL at 08:22

## 2019-01-01 RX ADMIN — ACETAMINOPHEN 650 MG: 325 TABLET ORAL at 21:57

## 2019-01-01 RX ADMIN — ACETAMINOPHEN 650 MG: 325 TABLET ORAL at 21:17

## 2019-01-01 RX ADMIN — VITAMIN E CAP 400 UNIT 400 UNITS: 400 CAP at 02:56

## 2019-01-01 RX ADMIN — SODIUM CHLORIDE, POTASSIUM CHLORIDE, SODIUM LACTATE AND CALCIUM CHLORIDE: 600; 310; 30; 20 INJECTION, SOLUTION INTRAVENOUS at 22:38

## 2019-01-01 RX ADMIN — CALCITRIOL CAPSULES 0.25 MCG 0.25 MCG: 0.25 CAPSULE ORAL at 09:44

## 2019-01-01 RX ADMIN — SEVELAMER CARBONATE 1600 MG: 800 TABLET, FILM COATED ORAL at 12:10

## 2019-01-01 RX ADMIN — SODIUM CHLORIDE 250 ML: 900 INJECTION, SOLUTION INTRAVENOUS at 18:45

## 2019-01-01 RX ADMIN — NEPHROCAP 1 MG: 1 CAP ORAL at 12:10

## 2019-01-01 RX ADMIN — SEVELAMER CARBONATE 1600 MG: 800 TABLET, FILM COATED ORAL at 13:50

## 2019-01-01 RX ADMIN — MIDODRINE HYDROCHLORIDE 5 MG: 5 TABLET ORAL at 18:03

## 2019-01-01 RX ADMIN — Medication 10 ML: at 10:48

## 2019-01-01 RX ADMIN — HEPARIN SODIUM 10 UNITS/KG/HR: 10000 INJECTION, SOLUTION INTRAVENOUS at 01:03

## 2019-01-01 RX ADMIN — NEPHROCAP 1 MG: 1 CAP ORAL at 08:31

## 2019-01-01 RX ADMIN — VITAMIN D, TAB 1000IU (100/BT) 2000 UNITS: 25 TAB at 09:12

## 2019-01-01 RX ADMIN — SEVELAMER CARBONATE 1600 MG: 800 TABLET, FILM COATED ORAL at 08:53

## 2019-01-01 RX ADMIN — Medication 1 PACKET: at 09:20

## 2019-01-01 RX ADMIN — Medication 10 ML: at 09:02

## 2019-01-01 RX ADMIN — DOCUSATE SODIUM 100 MG: 100 CAPSULE, LIQUID FILLED ORAL at 22:13

## 2019-01-01 RX ADMIN — SEVELAMER CARBONATE 400 MG: 800 TABLET, FILM COATED ORAL at 09:29

## 2019-01-01 RX ADMIN — CINACALCET HYDROCHLORIDE 60 MG: 30 TABLET, FILM COATED ORAL at 09:07

## 2019-01-01 RX ADMIN — Medication 10 ML: at 09:44

## 2019-01-01 RX ADMIN — PREGABALIN 75 MG: 75 CAPSULE ORAL at 09:20

## 2019-01-01 RX ADMIN — ZIKS ARTHRITIS PAIN RELIEF: 6.79; .566; .014 CREAM TOPICAL at 02:21

## 2019-01-01 RX ADMIN — PANTOPRAZOLE SODIUM 40 MG: 40 INJECTION, POWDER, LYOPHILIZED, FOR SOLUTION INTRAVENOUS at 09:44

## 2019-01-01 RX ADMIN — Medication 10 ML: at 22:21

## 2019-01-01 RX ADMIN — SEVELAMER CARBONATE 1600 MG: 800 TABLET, FILM COATED ORAL at 09:00

## 2019-01-01 RX ADMIN — INSULIN LISPRO 1 UNITS: 100 INJECTION, SOLUTION INTRAVENOUS; SUBCUTANEOUS at 13:51

## 2019-01-01 RX ADMIN — ASPIRIN 81 MG: 81 TABLET, COATED ORAL at 08:30

## 2019-01-01 RX ADMIN — HEPARIN SODIUM 5000 UNITS: 5000 INJECTION INTRAVENOUS; SUBCUTANEOUS at 07:18

## 2019-01-01 RX ADMIN — ASPIRIN 81 MG: 81 TABLET, COATED ORAL at 09:36

## 2019-01-01 RX ADMIN — SEVELAMER CARBONATE 1600 MG: 800 TABLET, FILM COATED ORAL at 09:20

## 2019-01-01 RX ADMIN — Medication 1 PACKET: at 10:47

## 2019-01-01 RX ADMIN — HEPARIN SODIUM: 1000 INJECTION INTRAVENOUS; SUBCUTANEOUS at 19:50

## 2019-01-01 RX ADMIN — DOCUSATE SODIUM 100 MG: 100 CAPSULE, LIQUID FILLED ORAL at 22:23

## 2019-01-01 RX ADMIN — CALCITRIOL CAPSULES 0.25 MCG 0.25 MCG: 0.25 CAPSULE ORAL at 08:20

## 2019-01-01 RX ADMIN — HEPARIN SODIUM 5000 UNITS: 5000 INJECTION INTRAVENOUS; SUBCUTANEOUS at 21:15

## 2019-01-01 RX ADMIN — PANTOPRAZOLE SODIUM 40 MG: 40 INJECTION, POWDER, LYOPHILIZED, FOR SOLUTION INTRAVENOUS at 08:50

## 2019-01-01 RX ADMIN — APIXABAN 2.5 MG: 2.5 TABLET, FILM COATED ORAL at 09:05

## 2019-01-01 RX ADMIN — APIXABAN 2.5 MG: 2.5 TABLET, FILM COATED ORAL at 22:04

## 2019-01-01 RX ADMIN — ONDANSETRON 4 MG: 2 INJECTION INTRAMUSCULAR; INTRAVENOUS at 03:17

## 2019-01-01 RX ADMIN — CALCITRIOL CAPSULES 0.25 MCG 0.25 MCG: 0.25 CAPSULE ORAL at 08:22

## 2019-01-01 RX ADMIN — PIPERACILLIN SODIUM,TAZOBACTAM SODIUM 3.38 G: 3; .375 INJECTION, POWDER, FOR SOLUTION INTRAVENOUS at 18:24

## 2019-01-01 RX ADMIN — POLYETHYLENE GLYCOL (3350) 17 G: 17 POWDER, FOR SOLUTION ORAL at 18:40

## 2019-01-01 RX ADMIN — INSULIN LISPRO 2 UNITS: 100 INJECTION, SOLUTION INTRAVENOUS; SUBCUTANEOUS at 12:30

## 2019-01-01 RX ADMIN — POLYETHYLENE GLYCOL (3350) 17 G: 17 POWDER, FOR SOLUTION ORAL at 09:53

## 2019-01-01 RX ADMIN — SEVELAMER CARBONATE 400 MG: 800 TABLET, FILM COATED ORAL at 18:03

## 2019-01-01 RX ADMIN — ASPIRIN 81 MG: 81 TABLET, COATED ORAL at 09:48

## 2019-01-01 RX ADMIN — INSULIN LISPRO 3 UNITS: 100 INJECTION, SOLUTION INTRAVENOUS; SUBCUTANEOUS at 12:50

## 2019-01-01 RX ADMIN — SIMVASTATIN 40 MG: 40 TABLET, FILM COATED ORAL at 21:14

## 2019-01-01 RX ADMIN — INSULIN LISPRO 2 UNITS: 100 INJECTION, SOLUTION INTRAVENOUS; SUBCUTANEOUS at 09:36

## 2019-01-01 RX ADMIN — Medication 10 ML: at 21:49

## 2019-01-01 RX ADMIN — Medication 1 G: at 09:45

## 2019-01-01 RX ADMIN — SEVELAMER CARBONATE 800 MG: 800 TABLET, FILM COATED ORAL at 18:24

## 2019-01-01 RX ADMIN — VITAMIN E CAP 400 UNIT 400 UNITS: 400 CAP at 21:00

## 2019-01-01 RX ADMIN — SEVELAMER CARBONATE 1600 MG: 800 TABLET, FILM COATED ORAL at 11:55

## 2019-01-01 RX ADMIN — DOCUSATE SODIUM 100 MG: 100 CAPSULE, LIQUID FILLED ORAL at 21:07

## 2019-01-01 RX ADMIN — SEVELAMER CARBONATE 1600 MG: 800 TABLET, FILM COATED ORAL at 12:51

## 2019-01-01 RX ADMIN — SEVELAMER CARBONATE 1600 MG: 800 TABLET, FILM COATED ORAL at 17:30

## 2019-01-01 RX ADMIN — DEXAMETHASONE SODIUM PHOSPHATE 4 MG: 4 INJECTION, SOLUTION INTRAMUSCULAR; INTRAVENOUS at 10:26

## 2019-01-01 RX ADMIN — HEPARIN SODIUM 5000 UNITS: 5000 INJECTION INTRAVENOUS; SUBCUTANEOUS at 05:42

## 2019-01-01 RX ADMIN — ZIKS ARTHRITIS PAIN RELIEF: 6.79; .566; .014 CREAM TOPICAL at 11:43

## 2019-01-01 RX ADMIN — SEVELAMER CARBONATE 2400 MG: 800 TABLET, FILM COATED ORAL at 09:03

## 2019-01-01 RX ADMIN — POTASSIUM CHLORIDE 20 MEQ: 20 TABLET, EXTENDED RELEASE ORAL at 18:42

## 2019-01-01 RX ADMIN — PANTOPRAZOLE SODIUM 40 MG: 40 INJECTION, POWDER, LYOPHILIZED, FOR SOLUTION INTRAVENOUS at 09:55

## 2019-01-01 RX ADMIN — CYCLOBENZAPRINE HYDROCHLORIDE 10 MG: 10 TABLET, FILM COATED ORAL at 09:00

## 2019-01-01 RX ADMIN — CINACALCET HYDROCHLORIDE 60 MG: 30 TABLET, COATED ORAL at 09:00

## 2019-01-01 RX ADMIN — APIXABAN 2.5 MG: 2.5 TABLET, FILM COATED ORAL at 08:37

## 2019-01-01 RX ADMIN — DOCUSATE SODIUM 100 MG: 100 CAPSULE, LIQUID FILLED ORAL at 09:12

## 2019-01-01 RX ADMIN — Medication 1 PACKET: at 14:16

## 2019-01-01 RX ADMIN — DOCUSATE SODIUM 100 MG: 100 CAPSULE, LIQUID FILLED ORAL at 22:05

## 2019-01-01 RX ADMIN — CALCITRIOL CAPSULES 0.25 MCG 0.25 MCG: 0.25 CAPSULE ORAL at 09:19

## 2019-01-01 RX ADMIN — VITAMIN D, TAB 1000IU (100/BT) 2000 UNITS: 25 TAB at 09:53

## 2019-01-01 RX ADMIN — INSULIN LISPRO 2 UNITS: 100 INJECTION, SOLUTION INTRAVENOUS; SUBCUTANEOUS at 09:04

## 2019-01-01 RX ADMIN — SODIUM CHLORIDE 500 ML: 9 INJECTION, SOLUTION INTRAVENOUS at 16:46

## 2019-01-01 RX ADMIN — SEVELAMER CARBONATE 1600 MG: 800 TABLET, FILM COATED ORAL at 14:48

## 2019-01-01 RX ADMIN — CINACALCET HYDROCHLORIDE 60 MG: 30 TABLET, FILM COATED ORAL at 09:36

## 2019-01-01 RX ADMIN — HEPARIN SODIUM 5000 UNITS: 5000 INJECTION INTRAVENOUS; SUBCUTANEOUS at 15:15

## 2019-01-01 RX ADMIN — ACETAMINOPHEN 650 MG: 325 TABLET ORAL at 19:21

## 2019-01-01 RX ADMIN — INSULIN LISPRO 1 UNITS: 100 INJECTION, SOLUTION INTRAVENOUS; SUBCUTANEOUS at 09:56

## 2019-01-01 RX ADMIN — WARFARIN SODIUM 5 MG: 5 TABLET ORAL at 18:42

## 2019-01-01 RX ADMIN — SODIUM CHLORIDE 8 MG/HR: 9 INJECTION, SOLUTION INTRAVENOUS at 06:33

## 2019-01-01 RX ADMIN — CINACALCET HYDROCHLORIDE 60 MG: 30 TABLET, COATED ORAL at 09:26

## 2019-01-01 RX ADMIN — SIMVASTATIN 40 MG: 40 TABLET, FILM COATED ORAL at 21:52

## 2019-01-01 RX ADMIN — ASPIRIN 81 MG: 81 TABLET ORAL at 08:23

## 2019-01-01 RX ADMIN — NEPHROCAP 1 MG: 1 CAP ORAL at 09:05

## 2019-01-01 RX ADMIN — Medication 3 MG: at 21:05

## 2019-01-01 RX ADMIN — PREGABALIN 75 MG: 75 CAPSULE ORAL at 08:50

## 2019-01-01 RX ADMIN — Medication 10 ML: at 08:18

## 2019-01-01 RX ADMIN — SODIUM CHLORIDE 500 ML: 900 INJECTION, SOLUTION INTRAVENOUS at 10:21

## 2019-01-01 RX ADMIN — SEVELAMER CARBONATE 1600 MG: 800 TABLET, FILM COATED ORAL at 08:41

## 2019-01-01 RX ADMIN — Medication 10 ML: at 20:55

## 2019-01-01 RX ADMIN — Medication 10 ML: at 09:48

## 2019-01-01 RX ADMIN — MAGNESIUM SULFATE HEPTAHYDRATE 2 G: 40 INJECTION, SOLUTION INTRAVENOUS at 11:08

## 2019-01-01 RX ADMIN — SEVELAMER CARBONATE 400 MG: 800 TABLET, FILM COATED ORAL at 11:46

## 2019-01-01 RX ADMIN — SEVELAMER CARBONATE 1600 MG: 800 TABLET, FILM COATED ORAL at 18:19

## 2019-01-01 RX ADMIN — ACETAMINOPHEN 650 MG: 325 TABLET ORAL at 03:23

## 2019-01-01 RX ADMIN — POTASSIUM CHLORIDE: 2 INJECTION, SOLUTION, CONCENTRATE INTRAVENOUS at 04:53

## 2019-01-01 RX ADMIN — NEPHROCAP 1 MG: 1 CAP ORAL at 08:20

## 2019-01-01 RX ADMIN — POLYETHYLENE GLYCOL (3350) 17 G: 17 POWDER, FOR SOLUTION ORAL at 08:53

## 2019-01-01 RX ADMIN — Medication 10 ML: at 21:00

## 2019-01-01 RX ADMIN — Medication 10 ML: at 09:27

## 2019-01-01 RX ADMIN — DOCUSATE SODIUM 100 MG: 100 CAPSULE, LIQUID FILLED ORAL at 09:21

## 2019-01-01 RX ADMIN — ASPIRIN 81 MG: 81 TABLET, COATED ORAL at 08:02

## 2019-01-01 RX ADMIN — SODIUM CHLORIDE 500 ML: 9 INJECTION, SOLUTION INTRAVENOUS at 13:05

## 2019-01-01 RX ADMIN — SODIUM CHLORIDE TAB 1 GM 1 G: 1 TAB at 10:28

## 2019-01-01 RX ADMIN — ASPIRIN 81 MG: 81 TABLET, COATED ORAL at 09:14

## 2019-01-01 RX ADMIN — ACETAMINOPHEN 650 MG: 325 TABLET ORAL at 12:20

## 2019-01-01 RX ADMIN — POTASSIUM CHLORIDE 40 MEQ: 20 TABLET, EXTENDED RELEASE ORAL at 10:44

## 2019-01-01 RX ADMIN — HEPARIN SODIUM 5000 UNITS: 5000 INJECTION INTRAVENOUS; SUBCUTANEOUS at 05:14

## 2019-01-01 RX ADMIN — SIMVASTATIN 40 MG: 40 TABLET, FILM COATED ORAL at 21:06

## 2019-01-01 RX ADMIN — SODIUM CHLORIDE 80 MG: 9 INJECTION, SOLUTION INTRAVENOUS at 20:15

## 2019-01-01 RX ADMIN — SEVELAMER CARBONATE 1600 MG: 800 TABLET, FILM COATED ORAL at 18:46

## 2019-01-01 RX ADMIN — PREGABALIN 75 MG: 75 CAPSULE ORAL at 15:32

## 2019-01-01 RX ADMIN — Medication 10 ML: at 22:01

## 2019-01-01 RX ADMIN — ACETAMINOPHEN 650 MG: 325 TABLET ORAL at 19:50

## 2019-01-01 RX ADMIN — MAGNESIUM SULFATE HEPTAHYDRATE 2 G: 40 INJECTION, SOLUTION INTRAVENOUS at 13:45

## 2019-01-01 RX ADMIN — SUCRALFATE 1 G: 1 SUSPENSION ORAL at 00:13

## 2019-01-01 RX ADMIN — CALCITRIOL CAPSULES 0.25 MCG 0.25 MCG: 0.25 CAPSULE ORAL at 09:48

## 2019-01-01 RX ADMIN — SEVELAMER CARBONATE 1600 MG: 800 TABLET, FILM COATED ORAL at 13:02

## 2019-01-01 RX ADMIN — APIXABAN 2.5 MG: 2.5 TABLET, FILM COATED ORAL at 22:21

## 2019-01-01 RX ADMIN — Medication 10 ML: at 20:59

## 2019-01-01 RX ADMIN — SEVELAMER CARBONATE 800 MG: 800 TABLET, FILM COATED ORAL at 11:14

## 2019-01-01 RX ADMIN — DOCUSATE SODIUM 100 MG: 100 CAPSULE, LIQUID FILLED ORAL at 10:31

## 2019-01-01 RX ADMIN — ACETAMINOPHEN 650 MG: 325 TABLET ORAL at 04:34

## 2019-01-01 RX ADMIN — SEVELAMER CARBONATE 1600 MG: 800 TABLET, FILM COATED ORAL at 08:17

## 2019-01-01 RX ADMIN — HEPARIN SODIUM 5000 UNITS: 5000 INJECTION INTRAVENOUS; SUBCUTANEOUS at 09:05

## 2019-01-01 RX ADMIN — CALCITRIOL CAPSULES 0.25 MCG 0.25 MCG: 0.25 CAPSULE ORAL at 10:47

## 2019-01-01 RX ADMIN — Medication 3 MG: at 22:23

## 2019-01-01 RX ADMIN — GENTAMICIN SULFATE: 1 CREAM TOPICAL at 19:21

## 2019-01-01 RX ADMIN — Medication 10 ML: at 09:24

## 2019-01-01 RX ADMIN — ACETAMINOPHEN 650 MG: 325 TABLET ORAL at 20:11

## 2019-01-01 RX ADMIN — HEPARIN SODIUM 5000 UNITS: 5000 INJECTION INTRAVENOUS; SUBCUTANEOUS at 06:18

## 2019-01-01 RX ADMIN — MIDODRINE HYDROCHLORIDE 5 MG: 5 TABLET ORAL at 11:46

## 2019-01-01 RX ADMIN — SIMVASTATIN 40 MG: 40 TABLET, FILM COATED ORAL at 21:45

## 2019-01-01 RX ADMIN — CINACALCET HYDROCHLORIDE 60 MG: 30 TABLET, FILM COATED ORAL at 09:03

## 2019-01-01 RX ADMIN — CINACALCET HYDROCHLORIDE 60 MG: 30 TABLET, FILM COATED ORAL at 08:01

## 2019-01-01 RX ADMIN — SIMVASTATIN 40 MG: 40 TABLET, FILM COATED ORAL at 22:12

## 2019-01-01 RX ADMIN — SEVELAMER CARBONATE 1600 MG: 800 TABLET, FILM COATED ORAL at 13:58

## 2019-01-01 RX ADMIN — WARFARIN SODIUM 7.5 MG: 7.5 TABLET ORAL at 18:40

## 2019-01-01 RX ADMIN — GENTAMICIN SULFATE: 1 CREAM TOPICAL at 18:45

## 2019-01-01 RX ADMIN — AMMONIUM LACTATE: 120 CREAM TOPICAL at 09:27

## 2019-01-01 RX ADMIN — HEPARIN SODIUM 1000 UNITS: 1000 INJECTION, SOLUTION INTRAVENOUS; SUBCUTANEOUS at 19:30

## 2019-01-01 RX ADMIN — SODIUM CHLORIDE, POTASSIUM CHLORIDE, SODIUM LACTATE AND CALCIUM CHLORIDE: 600; 310; 30; 20 INJECTION, SOLUTION INTRAVENOUS at 18:54

## 2019-01-01 RX ADMIN — POTASSIUM CHLORIDE 20 MEQ: 20 TABLET, EXTENDED RELEASE ORAL at 09:14

## 2019-01-01 RX ADMIN — SEVELAMER CARBONATE 2400 MG: 800 TABLET, FILM COATED ORAL at 08:30

## 2019-01-01 RX ADMIN — NEPHROCAP 1 MG: 1 CAP ORAL at 09:03

## 2019-01-01 RX ADMIN — HEPARIN SODIUM AND DEXTROSE 15 UNITS/KG/HR: 10000; 5 INJECTION INTRAVENOUS at 08:09

## 2019-01-01 RX ADMIN — STANDARDIZED SENNA CONCENTRATE 8.6 MG: 8.6 TABLET ORAL at 21:09

## 2019-01-01 RX ADMIN — Medication 10 ML: at 08:53

## 2019-01-01 RX ADMIN — SEVELAMER CARBONATE 400 MG: 800 TABLET, FILM COATED ORAL at 09:54

## 2019-01-01 RX ADMIN — SODIUM CHLORIDE 250 ML: 9 INJECTION, SOLUTION INTRAVENOUS at 06:47

## 2019-01-01 RX ADMIN — HEPARIN SODIUM AND DEXTROSE 18 UNITS/KG/HR: 10000; 5 INJECTION INTRAVENOUS at 01:05

## 2019-01-01 RX ADMIN — VITAMIN D, TAB 1000IU (100/BT) 2000 UNITS: 25 TAB at 08:49

## 2019-01-01 RX ADMIN — ACETAMINOPHEN 650 MG: 325 TABLET ORAL at 11:34

## 2019-01-01 RX ADMIN — ASPIRIN 81 MG: 81 TABLET, COATED ORAL at 09:00

## 2019-01-01 RX ADMIN — HEPARIN SODIUM 5000 UNITS: 5000 INJECTION INTRAVENOUS; SUBCUTANEOUS at 20:57

## 2019-01-01 RX ADMIN — SEVELAMER CARBONATE 2400 MG: 800 TABLET, FILM COATED ORAL at 18:20

## 2019-01-01 RX ADMIN — HEPARIN SODIUM 5000 UNITS: 5000 INJECTION INTRAVENOUS; SUBCUTANEOUS at 04:54

## 2019-01-01 RX ADMIN — DICLOFENAC 4 G: 10 GEL TOPICAL at 06:34

## 2019-01-01 RX ADMIN — SIMVASTATIN 40 MG: 40 TABLET, FILM COATED ORAL at 20:41

## 2019-01-01 RX ADMIN — Medication 1 G: at 18:26

## 2019-01-01 RX ADMIN — ASPIRIN 162 MG: 81 TABLET, CHEWABLE ORAL at 12:29

## 2019-01-01 RX ADMIN — SUCRALFATE 1 G: 1 SUSPENSION ORAL at 18:08

## 2019-01-01 RX ADMIN — MIDODRINE HYDROCHLORIDE 5 MG: 5 TABLET ORAL at 09:00

## 2019-01-01 RX ADMIN — DOCUSATE SODIUM 100 MG: 100 CAPSULE, LIQUID FILLED ORAL at 09:27

## 2019-01-01 RX ADMIN — ALBUMIN (HUMAN) 25 G: 0.25 INJECTION, SOLUTION INTRAVENOUS at 09:10

## 2019-01-01 RX ADMIN — APIXABAN 2.5 MG: 2.5 TABLET, FILM COATED ORAL at 09:55

## 2019-01-01 RX ADMIN — CINACALCET HYDROCHLORIDE 60 MG: 30 TABLET, FILM COATED ORAL at 10:46

## 2019-01-01 RX ADMIN — Medication 10 ML: at 21:06

## 2019-01-01 RX ADMIN — HEPARIN SODIUM 5000 UNITS: 5000 INJECTION INTRAVENOUS; SUBCUTANEOUS at 22:21

## 2019-01-01 RX ADMIN — VITAMIN D, TAB 1000IU (100/BT) 2000 UNITS: 25 TAB at 09:19

## 2019-01-01 RX ADMIN — CINACALCET HYDROCHLORIDE 60 MG: 30 TABLET, COATED ORAL at 08:41

## 2019-01-01 RX ADMIN — DOCUSATE SODIUM 100 MG: 100 CAPSULE, LIQUID FILLED ORAL at 22:21

## 2019-01-01 RX ADMIN — POTASSIUM CHLORIDE 40 MEQ: 20 TABLET, EXTENDED RELEASE ORAL at 10:20

## 2019-01-01 RX ADMIN — Medication 1 G: at 18:02

## 2019-01-01 RX ADMIN — AMMONIUM LACTATE: 120 CREAM TOPICAL at 08:53

## 2019-01-01 RX ADMIN — ASPIRIN 81 MG: 81 TABLET ORAL at 08:37

## 2019-01-01 RX ADMIN — NEPHROCAP 1 MG: 1 CAP ORAL at 09:44

## 2019-01-01 RX ADMIN — SEVELAMER CARBONATE 400 MG: 800 TABLET, FILM COATED ORAL at 09:04

## 2019-01-01 RX ADMIN — ACETAMINOPHEN 650 MG: 325 TABLET ORAL at 16:27

## 2019-01-01 RX ADMIN — SEVELAMER CARBONATE 1600 MG: 800 TABLET, FILM COATED ORAL at 08:02

## 2019-01-01 RX ADMIN — POLYETHYLENE GLYCOL (3350) 17 G: 17 POWDER, FOR SOLUTION ORAL at 18:19

## 2019-01-01 RX ADMIN — DOCUSATE SODIUM 100 MG: 100 CAPSULE, LIQUID FILLED ORAL at 21:45

## 2019-01-01 RX ADMIN — MIDODRINE HYDROCHLORIDE 5 MG: 5 TABLET ORAL at 13:07

## 2019-01-01 RX ADMIN — HEPARIN SODIUM 5000 UNITS: 5000 INJECTION INTRAVENOUS; SUBCUTANEOUS at 21:09

## 2019-01-01 RX ADMIN — INSULIN LISPRO 2 UNITS: 100 INJECTION, SOLUTION INTRAVENOUS; SUBCUTANEOUS at 11:49

## 2019-01-01 RX ADMIN — SUCRALFATE 1 G: 1 SUSPENSION ORAL at 00:41

## 2019-01-01 RX ADMIN — DOCUSATE SODIUM 100 MG: 100 CAPSULE, LIQUID FILLED ORAL at 22:18

## 2019-01-01 RX ADMIN — Medication 1 PACKET: at 09:22

## 2019-01-01 RX ADMIN — PANTOPRAZOLE SODIUM 40 MG: 40 INJECTION, POWDER, LYOPHILIZED, FOR SOLUTION INTRAVENOUS at 09:04

## 2019-01-01 RX ADMIN — NEPHROCAP 1 MG: 1 CAP ORAL at 09:19

## 2019-01-01 RX ADMIN — Medication 1 PACKET: at 13:49

## 2019-01-01 RX ADMIN — SIMVASTATIN 40 MG: 40 TABLET, FILM COATED ORAL at 22:18

## 2019-01-01 RX ADMIN — CINACALCET HYDROCHLORIDE 30 MG: 30 TABLET, FILM COATED ORAL at 09:06

## 2019-01-01 RX ADMIN — MIDODRINE HYDROCHLORIDE 5 MG: 5 TABLET ORAL at 09:55

## 2019-01-01 RX ADMIN — Medication 10 ML: at 22:07

## 2019-01-01 RX ADMIN — NEPHROCAP 1 MG: 1 CAP ORAL at 09:54

## 2019-01-01 RX ADMIN — INSULIN LISPRO 2 UNITS: 100 INJECTION, SOLUTION INTRAVENOUS; SUBCUTANEOUS at 21:07

## 2019-01-01 RX ADMIN — CALCITRIOL CAPSULES 0.25 MCG 0.25 MCG: 0.25 CAPSULE ORAL at 08:02

## 2019-01-01 RX ADMIN — SEVELAMER CARBONATE 1600 MG: 800 TABLET, FILM COATED ORAL at 13:10

## 2019-01-01 RX ADMIN — HEPARIN SODIUM 5000 UNITS: 5000 INJECTION INTRAVENOUS; SUBCUTANEOUS at 22:13

## 2019-01-01 RX ADMIN — AMMONIUM LACTATE: 120 CREAM TOPICAL at 09:47

## 2019-01-01 RX ADMIN — POTASSIUM CHLORIDE 20 MEQ: 20 TABLET, EXTENDED RELEASE ORAL at 18:24

## 2019-01-01 RX ADMIN — Medication 10 ML: at 20:41

## 2019-01-01 RX ADMIN — DOCUSATE SODIUM 100 MG: 100 CAPSULE, LIQUID FILLED ORAL at 08:54

## 2019-01-01 RX ADMIN — Medication 1 PACKET: at 09:53

## 2019-01-01 RX ADMIN — Medication 10 ML: at 10:32

## 2019-01-01 RX ADMIN — SODIUM CHLORIDE 250 ML: 9 INJECTION, SOLUTION INTRAVENOUS at 07:10

## 2019-01-01 RX ADMIN — Medication 10 ML: at 20:44

## 2019-01-01 RX ADMIN — HEPARIN SODIUM 5000 UNITS: 5000 INJECTION INTRAVENOUS; SUBCUTANEOUS at 05:30

## 2019-01-01 RX ADMIN — SODIUM CHLORIDE: 900 INJECTION, SOLUTION INTRAVENOUS at 13:49

## 2019-01-01 RX ADMIN — GABAPENTIN 200 MG: 100 CAPSULE ORAL at 22:18

## 2019-01-01 RX ADMIN — SEVELAMER CARBONATE 1600 MG: 800 TABLET, FILM COATED ORAL at 18:53

## 2019-01-01 RX ADMIN — DOCUSATE SODIUM 100 MG: 100 CAPSULE, LIQUID FILLED ORAL at 08:38

## 2019-01-01 RX ADMIN — SODIUM CHLORIDE 250 ML: 900 INJECTION, SOLUTION INTRAVENOUS at 08:20

## 2019-01-01 RX ADMIN — POLYETHYLENE GLYCOL (3350) 17 G: 17 POWDER, FOR SOLUTION ORAL at 21:45

## 2019-01-01 RX ADMIN — DOCUSATE SODIUM 100 MG: 100 CAPSULE, LIQUID FILLED ORAL at 08:22

## 2019-01-01 RX ADMIN — GABAPENTIN 100 MG: 100 CAPSULE ORAL at 20:57

## 2019-01-01 RX ADMIN — SODIUM CHLORIDE 1000 ML: 9 INJECTION, SOLUTION INTRAVENOUS at 18:36

## 2019-01-01 RX ADMIN — INSULIN LISPRO 2 UNITS: 100 INJECTION, SOLUTION INTRAVENOUS; SUBCUTANEOUS at 22:22

## 2019-01-01 RX ADMIN — HEPARIN SODIUM 5000 UNITS: 5000 INJECTION INTRAVENOUS; SUBCUTANEOUS at 21:13

## 2019-01-01 RX ADMIN — HEPARIN SODIUM 6050 UNITS: 5000 INJECTION INTRAVENOUS; SUBCUTANEOUS at 01:04

## 2019-01-01 RX ADMIN — SUCRALFATE 1 G: 1 SUSPENSION ORAL at 07:20

## 2019-01-01 RX ADMIN — HEPARIN SODIUM 5000 UNITS: 5000 INJECTION INTRAVENOUS; SUBCUTANEOUS at 06:42

## 2019-01-01 RX ADMIN — SUCRALFATE 1 G: 1 SUSPENSION ORAL at 18:07

## 2019-01-01 RX ADMIN — DOCUSATE SODIUM 100 MG: 100 CAPSULE, LIQUID FILLED ORAL at 08:17

## 2019-01-01 RX ADMIN — Medication 10 ML: at 08:42

## 2019-01-01 RX ADMIN — Medication 10 ML: at 21:16

## 2019-01-01 RX ADMIN — Medication 3 MG: at 22:12

## 2019-01-01 RX ADMIN — NEPHROCAP 1 MG: 1 CAP ORAL at 08:41

## 2019-01-01 RX ADMIN — SEVELAMER CARBONATE 1600 MG: 800 TABLET, FILM COATED ORAL at 12:21

## 2019-01-01 RX ADMIN — CALCITRIOL CAPSULES 0.25 MCG 0.25 MCG: 0.25 CAPSULE ORAL at 13:30

## 2019-01-01 RX ADMIN — GABAPENTIN 200 MG: 100 CAPSULE ORAL at 20:53

## 2019-01-01 RX ADMIN — SIMVASTATIN 40 MG: 40 TABLET, FILM COATED ORAL at 19:47

## 2019-01-01 RX ADMIN — DOCUSATE SODIUM 100 MG: 100 CAPSULE, LIQUID FILLED ORAL at 08:30

## 2019-01-01 RX ADMIN — POLYETHYLENE GLYCOL (3350) 17 G: 17 POWDER, FOR SOLUTION ORAL at 09:47

## 2019-01-01 RX ADMIN — SODIUM CHLORIDE 250 ML: 900 INJECTION, SOLUTION INTRAVENOUS at 10:21

## 2019-01-01 RX ADMIN — VITAMIN D, TAB 1000IU (100/BT) 2000 UNITS: 25 TAB at 08:22

## 2019-01-01 RX ADMIN — POTASSIUM CHLORIDE 20 MEQ: 20 TABLET, EXTENDED RELEASE ORAL at 09:19

## 2019-01-01 RX ADMIN — Medication 1 PACKET: at 08:39

## 2019-01-01 RX ADMIN — Medication 1 G: at 11:43

## 2019-01-01 RX ADMIN — DEXAMETHASONE SODIUM PHOSPHATE 4 MG: 4 INJECTION, SOLUTION INTRAMUSCULAR; INTRAVENOUS at 18:03

## 2019-01-01 RX ADMIN — GENTAMICIN SULFATE: 1 CREAM TOPICAL at 16:53

## 2019-01-01 RX ADMIN — SEVELAMER CARBONATE 2400 MG: 800 TABLET, FILM COATED ORAL at 16:51

## 2019-01-01 RX ADMIN — POTASSIUM CHLORIDE 10 MEQ: 10 TABLET, EXTENDED RELEASE ORAL at 09:13

## 2019-01-01 RX ADMIN — VITAMIN D, TAB 1000IU (100/BT) 2000 UNITS: 25 TAB at 08:37

## 2019-01-01 RX ADMIN — ACETAMINOPHEN 650 MG: 325 TABLET ORAL at 23:27

## 2019-01-01 RX ADMIN — DOCUSATE SODIUM 100 MG: 100 CAPSULE, LIQUID FILLED ORAL at 09:07

## 2019-01-01 RX ADMIN — HEPARIN SODIUM 12 UNITS/KG/HR: 10000 INJECTION, SOLUTION INTRAVENOUS at 01:27

## 2019-01-01 RX ADMIN — CALCITRIOL CAPSULES 0.25 MCG 0.25 MCG: 0.25 CAPSULE ORAL at 08:17

## 2019-01-01 RX ADMIN — Medication 10 ML: at 20:27

## 2019-01-01 RX ADMIN — HEPARIN SODIUM 5000 UNITS: 5000 INJECTION INTRAVENOUS; SUBCUTANEOUS at 05:08

## 2019-01-01 RX ADMIN — HEPARIN SODIUM 5000 UNITS: 5000 INJECTION INTRAVENOUS; SUBCUTANEOUS at 13:52

## 2019-01-01 RX ADMIN — SODIUM CHLORIDE: 9 INJECTION, SOLUTION INTRAVENOUS at 16:02

## 2019-01-01 RX ADMIN — VITAMIN E CAP 400 UNIT 400 UNITS: 400 CAP at 21:07

## 2019-01-01 RX ADMIN — STANDARDIZED SENNA CONCENTRATE 8.6 MG: 8.6 TABLET ORAL at 00:00

## 2019-01-01 RX ADMIN — PREGABALIN 75 MG: 75 CAPSULE ORAL at 10:31

## 2019-01-01 RX ADMIN — GABAPENTIN 200 MG: 100 CAPSULE ORAL at 21:15

## 2019-01-01 RX ADMIN — INSULIN LISPRO 3 UNITS: 100 INJECTION, SOLUTION INTRAVENOUS; SUBCUTANEOUS at 20:39

## 2019-01-01 RX ADMIN — CINACALCET HYDROCHLORIDE 30 MG: 30 TABLET, FILM COATED ORAL at 09:44

## 2019-01-01 RX ADMIN — HEPARIN SODIUM 5000 UNITS: 5000 INJECTION INTRAVENOUS; SUBCUTANEOUS at 13:35

## 2019-01-01 RX ADMIN — HEPARIN SODIUM 12 UNITS/KG/HR: 10000 INJECTION, SOLUTION INTRAVENOUS at 15:33

## 2019-01-01 RX ADMIN — INSULIN LISPRO 1 UNITS: 100 INJECTION, SOLUTION INTRAVENOUS; SUBCUTANEOUS at 09:09

## 2019-01-01 RX ADMIN — MIDODRINE HYDROCHLORIDE 5 MG: 5 TABLET ORAL at 18:00

## 2019-01-01 RX ADMIN — SEVELAMER CARBONATE 1600 MG: 800 TABLET, FILM COATED ORAL at 17:56

## 2019-01-01 RX ADMIN — ASPIRIN 81 MG: 81 TABLET ORAL at 09:12

## 2019-01-01 RX ADMIN — ASPIRIN 81 MG: 81 TABLET, COATED ORAL at 10:46

## 2019-01-01 RX ADMIN — SEVELAMER CARBONATE 1600 MG: 800 TABLET, FILM COATED ORAL at 09:26

## 2019-01-01 RX ADMIN — ASPIRIN 81 MG: 81 TABLET, COATED ORAL at 09:07

## 2019-01-01 RX ADMIN — Medication 10 ML: at 09:18

## 2019-01-01 RX ADMIN — DOCUSATE SODIUM 100 MG: 100 CAPSULE, LIQUID FILLED ORAL at 09:44

## 2019-01-01 RX ADMIN — Medication 1 PACKET: at 09:48

## 2019-01-01 RX ADMIN — ASPIRIN 81 MG: 81 TABLET ORAL at 10:31

## 2019-01-01 RX ADMIN — STANDARDIZED SENNA CONCENTRATE 8.6 MG: 8.6 TABLET ORAL at 22:18

## 2019-01-01 RX ADMIN — DARBEPOETIN ALFA 60 MCG: 60 INJECTION, SOLUTION INTRAVENOUS; SUBCUTANEOUS at 13:49

## 2019-01-01 RX ADMIN — SEVELAMER CARBONATE 1600 MG: 800 TABLET, FILM COATED ORAL at 18:42

## 2019-01-01 RX ADMIN — CINACALCET HYDROCHLORIDE 60 MG: 30 TABLET, COATED ORAL at 09:21

## 2019-01-01 RX ADMIN — HEPARIN SODIUM 5000 UNITS: 5000 INJECTION INTRAVENOUS; SUBCUTANEOUS at 21:01

## 2019-01-01 RX ADMIN — CALCITRIOL CAPSULES 0.25 MCG 0.25 MCG: 0.25 CAPSULE ORAL at 08:54

## 2019-01-01 RX ADMIN — POLYETHYLENE GLYCOL (3350) 17 G: 17 POWDER, FOR SOLUTION ORAL at 10:53

## 2019-01-01 RX ADMIN — Medication 10 ML: at 09:07

## 2019-01-01 RX ADMIN — CINACALCET HYDROCHLORIDE 60 MG: 30 TABLET, COATED ORAL at 08:54

## 2019-01-01 RX ADMIN — Medication 1 PACKET: at 08:12

## 2019-01-01 RX ADMIN — SEVELAMER CARBONATE 1600 MG: 800 TABLET, FILM COATED ORAL at 09:07

## 2019-01-01 RX ADMIN — ACETAMINOPHEN 650 MG: 325 TABLET ORAL at 06:50

## 2019-01-01 RX ADMIN — ASPIRIN 81 MG: 81 TABLET, COATED ORAL at 09:21

## 2019-01-01 RX ADMIN — Medication 10 ML: at 09:37

## 2019-01-01 RX ADMIN — PREGABALIN 75 MG: 75 CAPSULE ORAL at 09:44

## 2019-01-01 RX ADMIN — MIDODRINE HYDROCHLORIDE 5 MG: 5 TABLET ORAL at 11:48

## 2019-01-01 RX ADMIN — ASPIRIN 81 MG: 81 TABLET, COATED ORAL at 09:03

## 2019-01-01 RX ADMIN — INSULIN LISPRO 1 UNITS: 100 INJECTION, SOLUTION INTRAVENOUS; SUBCUTANEOUS at 09:15

## 2019-01-01 RX ADMIN — SEVELAMER CARBONATE 1600 MG: 800 TABLET, FILM COATED ORAL at 09:43

## 2019-01-01 RX ADMIN — SEVELAMER CARBONATE 2400 MG: 800 TABLET, FILM COATED ORAL at 12:50

## 2019-01-01 RX ADMIN — NEPHROCAP 1 MG: 1 CAP ORAL at 09:21

## 2019-01-01 RX ADMIN — DOCUSATE SODIUM 100 MG: 100 CAPSULE, LIQUID FILLED ORAL at 21:00

## 2019-01-01 RX ADMIN — DOCUSATE SODIUM 100 MG: 100 CAPSULE, LIQUID FILLED ORAL at 08:20

## 2019-01-01 RX ADMIN — DOCUSATE SODIUM 100 MG: 100 CAPSULE, LIQUID FILLED ORAL at 20:59

## 2019-01-01 RX ADMIN — SIMVASTATIN 40 MG: 40 TABLET, FILM COATED ORAL at 22:21

## 2019-01-01 RX ADMIN — ASPIRIN 81 MG: 81 TABLET, COATED ORAL at 08:54

## 2019-01-01 RX ADMIN — SIMVASTATIN 40 MG: 40 TABLET, FILM COATED ORAL at 22:23

## 2019-01-01 RX ADMIN — PREGABALIN 75 MG: 75 CAPSULE ORAL at 09:12

## 2019-01-01 RX ADMIN — MAGNESIUM SULFATE HEPTAHYDRATE 2 G: 40 INJECTION, SOLUTION INTRAVENOUS at 04:52

## 2019-01-01 RX ADMIN — ACETAMINOPHEN 650 MG: 325 TABLET ORAL at 14:08

## 2019-01-01 RX ADMIN — AMMONIUM LACTATE: 120 CREAM TOPICAL at 09:17

## 2019-01-01 RX ADMIN — POLYETHYLENE GLYCOL (3350) 17 G: 17 POWDER, FOR SOLUTION ORAL at 20:42

## 2019-01-01 RX ADMIN — PANTOPRAZOLE SODIUM 40 MG: 40 INJECTION, POWDER, LYOPHILIZED, FOR SOLUTION INTRAVENOUS at 09:29

## 2019-01-01 RX ADMIN — INSULIN LISPRO 1 UNITS: 100 INJECTION, SOLUTION INTRAVENOUS; SUBCUTANEOUS at 12:22

## 2019-01-01 RX ADMIN — HEPARIN SODIUM 5000 UNITS: 5000 INJECTION INTRAVENOUS; SUBCUTANEOUS at 14:08

## 2019-01-01 RX ADMIN — NEPHROCAP 1 MG: 1 CAP ORAL at 09:12

## 2019-01-01 RX ADMIN — POTASSIUM CHLORIDE 20 MEQ: 20 TABLET, EXTENDED RELEASE ORAL at 09:00

## 2019-01-01 RX ADMIN — NEPHROCAP 1 MG: 1 CAP ORAL at 08:37

## 2019-01-01 RX ADMIN — CALCITRIOL CAPSULES 0.25 MCG 0.25 MCG: 0.25 CAPSULE ORAL at 08:31

## 2019-01-01 RX ADMIN — CALCITRIOL CAPSULES 0.25 MCG 0.25 MCG: 0.25 CAPSULE ORAL at 08:39

## 2019-01-01 RX ADMIN — SEVELAMER CARBONATE 1600 MG: 800 TABLET, FILM COATED ORAL at 17:59

## 2019-01-01 RX ADMIN — CIPROFLOXACIN 400 MG: 2 INJECTION, SOLUTION INTRAVENOUS at 11:46

## 2019-01-01 RX ADMIN — SEVELAMER CARBONATE 1600 MG: 800 TABLET, FILM COATED ORAL at 12:37

## 2019-01-01 RX ADMIN — POTASSIUM CHLORIDE 20 MEQ: 20 TABLET, EXTENDED RELEASE ORAL at 17:25

## 2019-01-01 RX ADMIN — SEVELAMER CARBONATE 1600 MG: 800 TABLET, FILM COATED ORAL at 18:26

## 2019-01-01 RX ADMIN — ASPIRIN 81 MG: 81 TABLET, COATED ORAL at 16:46

## 2019-01-01 RX ADMIN — VITAMIN E CAP 400 UNIT 400 UNITS: 400 CAP at 21:08

## 2019-01-01 RX ADMIN — MIDODRINE HYDROCHLORIDE 5 MG: 5 TABLET ORAL at 09:44

## 2019-01-01 RX ADMIN — Medication 10 ML: at 08:39

## 2019-01-01 RX ADMIN — VITAMIN D, TAB 1000IU (100/BT) 2000 UNITS: 25 TAB at 10:30

## 2019-01-01 RX ADMIN — DOCUSATE SODIUM 100 MG: 100 CAPSULE, LIQUID FILLED ORAL at 21:09

## 2019-01-01 RX ADMIN — Medication 10 ML: at 10:29

## 2019-01-01 RX ADMIN — ACETAMINOPHEN 650 MG: 325 TABLET ORAL at 11:39

## 2019-01-01 RX ADMIN — INSULIN LISPRO 1 UNITS: 100 INJECTION, SOLUTION INTRAVENOUS; SUBCUTANEOUS at 22:23

## 2019-01-01 RX ADMIN — SIMVASTATIN 40 MG: 40 TABLET, FILM COATED ORAL at 21:15

## 2019-01-01 RX ADMIN — SEVELAMER CARBONATE 1600 MG: 800 TABLET, FILM COATED ORAL at 09:48

## 2019-01-01 RX ADMIN — MIDODRINE HYDROCHLORIDE 5 MG: 5 TABLET ORAL at 08:50

## 2019-01-01 RX ADMIN — DOCUSATE SODIUM 100 MG: 100 CAPSULE, LIQUID FILLED ORAL at 09:36

## 2019-01-01 RX ADMIN — PREGABALIN 75 MG: 75 CAPSULE ORAL at 09:54

## 2019-01-01 RX ADMIN — CINACALCET HYDROCHLORIDE 60 MG: 30 TABLET, FILM COATED ORAL at 08:20

## 2019-01-01 RX ADMIN — SEVELAMER CARBONATE 1600 MG: 800 TABLET, FILM COATED ORAL at 12:20

## 2019-01-01 RX ADMIN — INSULIN LISPRO 2 UNITS: 100 INJECTION, SOLUTION INTRAVENOUS; SUBCUTANEOUS at 20:23

## 2019-01-01 RX ADMIN — SIMVASTATIN 40 MG: 40 TABLET, FILM COATED ORAL at 22:13

## 2019-01-01 RX ADMIN — GENTAMICIN SULFATE: 1 CREAM TOPICAL at 17:19

## 2019-01-01 RX ADMIN — NEPHROCAP 1 MG: 1 CAP ORAL at 08:54

## 2019-01-01 RX ADMIN — SODIUM CHLORIDE, POTASSIUM CHLORIDE, SODIUM LACTATE AND CALCIUM CHLORIDE: 600; 310; 30; 20 INJECTION, SOLUTION INTRAVENOUS at 04:49

## 2019-01-01 RX ADMIN — DEXTROSE 50 % IN WATER (D50W) INTRAVENOUS SYRINGE 12.5 G: at 16:48

## 2019-01-01 RX ADMIN — MIDODRINE HYDROCHLORIDE 5 MG: 5 TABLET ORAL at 18:25

## 2019-01-01 RX ADMIN — SEVELAMER CARBONATE 1600 MG: 800 TABLET, FILM COATED ORAL at 13:05

## 2019-01-01 RX ADMIN — SEVELAMER CARBONATE 1600 MG: 800 TABLET, FILM COATED ORAL at 12:27

## 2019-01-01 RX ADMIN — IOPAMIDOL 80 ML: 755 INJECTION, SOLUTION INTRAVENOUS at 16:11

## 2019-01-01 RX ADMIN — DOCUSATE SODIUM 100 MG: 100 CAPSULE, LIQUID FILLED ORAL at 08:41

## 2019-01-01 RX ADMIN — SEVELAMER CARBONATE 800 MG: 800 TABLET, FILM COATED ORAL at 16:51

## 2019-01-01 RX ADMIN — CINACALCET HYDROCHLORIDE 60 MG: 30 TABLET, COATED ORAL at 09:43

## 2019-01-01 RX ADMIN — CALCITRIOL CAPSULES 0.25 MCG 0.25 MCG: 0.25 CAPSULE ORAL at 10:31

## 2019-01-01 RX ADMIN — HEPARIN SODIUM: 1000 INJECTION INTRAVENOUS; SUBCUTANEOUS at 16:53

## 2019-01-01 RX ADMIN — GENTAMICIN SULFATE: 1 CREAM TOPICAL at 17:38

## 2019-01-01 RX ADMIN — SEVELAMER CARBONATE 1600 MG: 800 TABLET, FILM COATED ORAL at 10:30

## 2019-01-01 RX ADMIN — APIXABAN 2.5 MG: 2.5 TABLET, FILM COATED ORAL at 21:14

## 2019-01-01 RX ADMIN — HEPARIN SODIUM: 1000 INJECTION INTRAVENOUS; SUBCUTANEOUS at 17:15

## 2019-01-01 RX ADMIN — SIMVASTATIN 40 MG: 40 TABLET, FILM COATED ORAL at 20:31

## 2019-01-01 RX ADMIN — Medication 10 ML: at 08:51

## 2019-01-01 RX ADMIN — DOCUSATE SODIUM 100 MG: 100 CAPSULE, LIQUID FILLED ORAL at 21:52

## 2019-01-01 RX ADMIN — Medication 1 PACKET: at 08:31

## 2019-01-01 RX ADMIN — HEPARIN SODIUM 12 UNITS/KG/HR: 10000 INJECTION, SOLUTION INTRAVENOUS at 07:36

## 2019-01-01 RX ADMIN — DICLOFENAC 4 G: 10 GEL TOPICAL at 11:52

## 2019-01-01 RX ADMIN — PIPERACILLIN SODIUM,TAZOBACTAM SODIUM 3.38 G: 3; .375 INJECTION, POWDER, FOR SOLUTION INTRAVENOUS at 04:31

## 2019-01-01 RX ADMIN — INSULIN LISPRO 1 UNITS: 100 INJECTION, SOLUTION INTRAVENOUS; SUBCUTANEOUS at 02:36

## 2019-01-01 RX ADMIN — Medication 10 ML: at 08:20

## 2019-01-01 RX ADMIN — SODIUM CHLORIDE, POTASSIUM CHLORIDE, SODIUM LACTATE AND CALCIUM CHLORIDE 500 ML: 600; 310; 30; 20 INJECTION, SOLUTION INTRAVENOUS at 22:05

## 2019-01-01 RX ADMIN — DOCUSATE SODIUM 100 MG: 100 CAPSULE, LIQUID FILLED ORAL at 09:48

## 2019-01-01 RX ADMIN — INSULIN LISPRO 1 UNITS: 100 INJECTION, SOLUTION INTRAVENOUS; SUBCUTANEOUS at 21:01

## 2019-01-01 RX ADMIN — SODIUM CHLORIDE: 9 INJECTION, SOLUTION INTRAVENOUS at 15:50

## 2019-01-01 RX ADMIN — MAGNESIUM SULFATE HEPTAHYDRATE 2 G: 40 INJECTION, SOLUTION INTRAVENOUS at 11:54

## 2019-01-01 RX ADMIN — PANTOPRAZOLE SODIUM 40 MG: 40 INJECTION, POWDER, LYOPHILIZED, FOR SOLUTION INTRAVENOUS at 22:08

## 2019-01-01 RX ADMIN — SODIUM CHLORIDE 500 ML: 9 INJECTION, SOLUTION INTRAVENOUS at 20:12

## 2019-01-01 RX ADMIN — CINACALCET HYDROCHLORIDE 60 MG: 30 TABLET, FILM COATED ORAL at 08:31

## 2019-01-01 RX ADMIN — SEVELAMER CARBONATE 400 MG: 800 TABLET, FILM COATED ORAL at 16:39

## 2019-01-01 RX ADMIN — HEPARIN SODIUM 5000 UNITS: 5000 INJECTION INTRAVENOUS; SUBCUTANEOUS at 13:30

## 2019-01-01 RX ADMIN — SEVELAMER CARBONATE 1600 MG: 800 TABLET, FILM COATED ORAL at 08:49

## 2019-01-01 RX ADMIN — PANTOPRAZOLE SODIUM 40 MG: 40 INJECTION, POWDER, LYOPHILIZED, FOR SOLUTION INTRAVENOUS at 21:05

## 2019-01-01 RX ADMIN — DICLOFENAC 4 G: 10 GEL TOPICAL at 00:40

## 2019-01-01 RX ADMIN — HEPARIN SODIUM: 1000 INJECTION INTRAVENOUS; SUBCUTANEOUS at 19:21

## 2019-01-01 RX ADMIN — DOCUSATE SODIUM 100 MG: 100 CAPSULE, LIQUID FILLED ORAL at 20:31

## 2019-01-01 RX ADMIN — SIMVASTATIN 40 MG: 40 TABLET, FILM COATED ORAL at 21:09

## 2019-01-01 RX ADMIN — SEVELAMER CARBONATE 800 MG: 800 TABLET, FILM COATED ORAL at 08:39

## 2019-01-01 RX ADMIN — DOCUSATE SODIUM 100 MG: 100 CAPSULE, LIQUID FILLED ORAL at 21:14

## 2019-01-01 RX ADMIN — SEVELAMER CARBONATE 1600 MG: 800 TABLET, FILM COATED ORAL at 17:48

## 2019-01-01 RX ADMIN — SEVELAMER CARBONATE 1600 MG: 800 TABLET, FILM COATED ORAL at 09:36

## 2019-01-01 RX ADMIN — CALCITRIOL CAPSULES 0.25 MCG 0.25 MCG: 0.25 CAPSULE ORAL at 09:05

## 2019-01-01 RX ADMIN — VITAMIN E CAP 400 UNIT 400 UNITS: 400 CAP at 20:31

## 2019-01-01 RX ADMIN — SEVELAMER CARBONATE 1600 MG: 800 TABLET, FILM COATED ORAL at 17:02

## 2019-01-01 RX ADMIN — INSULIN LISPRO 2 UNITS: 100 INJECTION, SOLUTION INTRAVENOUS; SUBCUTANEOUS at 12:50

## 2019-01-01 RX ADMIN — ASPIRIN 81 MG: 81 TABLET, COATED ORAL at 09:43

## 2019-01-01 RX ADMIN — Medication 1 PACKET: at 09:30

## 2019-01-01 RX ADMIN — VITAMIN D, TAB 1000IU (100/BT) 2000 UNITS: 25 TAB at 09:43

## 2019-01-01 RX ADMIN — Medication 10 ML: at 22:25

## 2019-01-01 RX ADMIN — GLYCOPYRROLATE 0.2 MG: 0.2 INJECTION, SOLUTION INTRAMUSCULAR; INTRAVENOUS at 14:21

## 2019-01-01 RX ADMIN — HEPARIN SODIUM 5000 UNITS: 5000 INJECTION INTRAVENOUS; SUBCUTANEOUS at 13:04

## 2019-01-01 RX ADMIN — HEPARIN SODIUM 5000 UNITS: 5000 INJECTION INTRAVENOUS; SUBCUTANEOUS at 21:08

## 2019-01-01 RX ADMIN — SEVELAMER CARBONATE 1600 MG: 800 TABLET, FILM COATED ORAL at 08:38

## 2019-01-01 RX ADMIN — CALCITRIOL CAPSULES 0.25 MCG 0.25 MCG: 0.25 CAPSULE ORAL at 09:29

## 2019-01-01 RX ADMIN — DOCUSATE SODIUM 100 MG: 100 CAPSULE, LIQUID FILLED ORAL at 09:55

## 2019-01-01 RX ADMIN — ASPIRIN 81 MG: 81 TABLET, COATED ORAL at 09:27

## 2019-01-01 RX ADMIN — ASPIRIN 81 MG: 81 TABLET, COATED ORAL at 08:17

## 2019-01-01 RX ADMIN — POTASSIUM CHLORIDE 40 MEQ: 20 TABLET, EXTENDED RELEASE ORAL at 10:38

## 2019-01-01 RX ADMIN — POTASSIUM CHLORIDE 20 MEQ: 20 TABLET, EXTENDED RELEASE ORAL at 10:31

## 2019-01-01 RX ADMIN — ACETAMINOPHEN 650 MG: 325 TABLET ORAL at 19:30

## 2019-01-01 RX ADMIN — NEPHROCAP 1 MG: 1 CAP ORAL at 09:26

## 2019-01-01 RX ADMIN — Medication 10 ML: at 21:53

## 2019-01-01 RX ADMIN — SUCRALFATE 1 G: 1 SUSPENSION ORAL at 06:04

## 2019-01-01 RX ADMIN — POLYETHYLENE GLYCOL (3350) 17 G: 17 POWDER, FOR SOLUTION ORAL at 08:17

## 2019-01-01 RX ADMIN — SEVELAMER CARBONATE 1600 MG: 800 TABLET, FILM COATED ORAL at 11:48

## 2019-01-01 RX ADMIN — INSULIN LISPRO 1 UNITS: 100 INJECTION, SOLUTION INTRAVENOUS; SUBCUTANEOUS at 23:39

## 2019-01-01 RX ADMIN — CIPROFLOXACIN 400 MG: 2 INJECTION, SOLUTION INTRAVENOUS at 12:38

## 2019-01-01 RX ADMIN — MIDODRINE HYDROCHLORIDE 5 MG: 5 TABLET ORAL at 09:05

## 2019-01-01 RX ADMIN — NEPHROCAP 1 MG: 1 CAP ORAL at 09:36

## 2019-01-01 RX ADMIN — CALCITRIOL CAPSULES 0.25 MCG 0.25 MCG: 0.25 CAPSULE ORAL at 08:50

## 2019-01-01 RX ADMIN — CINACALCET HYDROCHLORIDE 60 MG: 30 TABLET, COATED ORAL at 08:17

## 2019-01-01 RX ADMIN — POTASSIUM CHLORIDE 40 MEQ: 1.5 POWDER, FOR SOLUTION ORAL at 20:22

## 2019-01-01 RX ADMIN — Medication 1 PACKET: at 09:03

## 2019-01-01 RX ADMIN — HEPARIN SODIUM 5000 UNITS: 5000 INJECTION INTRAVENOUS; SUBCUTANEOUS at 20:54

## 2019-01-01 RX ADMIN — Medication 10 ML: at 09:14

## 2019-01-01 RX ADMIN — COSYNTROPIN 250 MCG: 0.25 INJECTION, POWDER, LYOPHILIZED, FOR SOLUTION INTRAMUSCULAR; INTRAVENOUS at 14:24

## 2019-01-01 RX ADMIN — DOCUSATE SODIUM 100 MG: 100 CAPSULE, LIQUID FILLED ORAL at 20:41

## 2019-01-01 RX ADMIN — ASPIRIN 81 MG: 81 TABLET ORAL at 09:53

## 2019-01-01 RX ADMIN — POLYETHYLENE GLYCOL (3350) 17 G: 17 POWDER, FOR SOLUTION ORAL at 09:22

## 2019-01-01 RX ADMIN — SIMVASTATIN 40 MG: 40 TABLET, FILM COATED ORAL at 21:07

## 2019-01-01 RX ADMIN — SEVELAMER CARBONATE 1600 MG: 800 TABLET, FILM COATED ORAL at 18:04

## 2019-01-01 RX ADMIN — ACETAMINOPHEN 650 MG: 325 TABLET ORAL at 14:59

## 2019-01-01 RX ADMIN — CALCITRIOL CAPSULES 0.25 MCG 0.25 MCG: 0.25 CAPSULE ORAL at 09:36

## 2019-01-01 RX ADMIN — DOCUSATE SODIUM 100 MG: 100 CAPSULE, LIQUID FILLED ORAL at 08:02

## 2019-01-01 RX ADMIN — SEVELAMER CARBONATE 1600 MG: 800 TABLET, FILM COATED ORAL at 18:25

## 2019-01-01 RX ADMIN — HEPARIN SODIUM 5000 UNITS: 5000 INJECTION INTRAVENOUS; SUBCUTANEOUS at 06:39

## 2019-01-01 RX ADMIN — VANCOMYCIN HYDROCHLORIDE 1250 MG: 10 INJECTION, POWDER, LYOPHILIZED, FOR SOLUTION INTRAVENOUS at 14:43

## 2019-01-01 RX ADMIN — AMMONIUM LACTATE: 120 CREAM TOPICAL at 11:25

## 2019-01-01 RX ADMIN — NEPHROCAP 1 MG: 1 CAP ORAL at 08:01

## 2019-01-01 RX ADMIN — ASPIRIN 81 MG: 81 TABLET ORAL at 09:04

## 2019-01-01 RX ADMIN — INSULIN LISPRO 2 UNITS: 100 INJECTION, SOLUTION INTRAVENOUS; SUBCUTANEOUS at 08:29

## 2019-01-01 RX ADMIN — HEPARIN SODIUM 5000 UNITS: 5000 INJECTION INTRAVENOUS; SUBCUTANEOUS at 16:46

## 2019-01-01 RX ADMIN — PANTOPRAZOLE SODIUM 40 MG: 40 INJECTION, POWDER, LYOPHILIZED, FOR SOLUTION INTRAVENOUS at 22:23

## 2019-01-01 RX ADMIN — DOCUSATE SODIUM 100 MG: 100 CAPSULE, LIQUID FILLED ORAL at 10:47

## 2019-01-01 RX ADMIN — DEXAMETHASONE SODIUM PHOSPHATE 4 MG: 4 INJECTION, SOLUTION INTRAMUSCULAR; INTRAVENOUS at 20:55

## 2019-01-01 RX ADMIN — HEPARIN SODIUM 10 UNITS/KG/HR: 10000 INJECTION, SOLUTION INTRAVENOUS at 12:08

## 2019-01-01 RX ADMIN — CALCITRIOL CAPSULES 0.25 MCG 0.25 MCG: 0.25 CAPSULE ORAL at 09:07

## 2019-01-01 RX ADMIN — ASPIRIN 81 MG: 81 TABLET, COATED ORAL at 08:41

## 2019-01-01 RX ADMIN — ASPIRIN 81 MG: 81 TABLET, COATED ORAL at 08:39

## 2019-01-01 RX ADMIN — MIDODRINE HYDROCHLORIDE 5 MG: 5 TABLET ORAL at 13:58

## 2019-01-01 RX ADMIN — STANDARDIZED SENNA CONCENTRATE 8.6 MG: 8.6 TABLET ORAL at 21:08

## 2019-01-01 RX ADMIN — Medication 1 PACKET: at 10:29

## 2019-01-01 RX ADMIN — Medication 10 ML: at 10:12

## 2019-01-01 RX ADMIN — VITAMIN E CAP 400 UNIT 400 UNITS: 400 CAP at 20:44

## 2019-01-01 RX ADMIN — SEVELAMER CARBONATE 1600 MG: 800 TABLET, FILM COATED ORAL at 09:44

## 2019-01-01 RX ADMIN — SIMVASTATIN 40 MG: 40 TABLET, FILM COATED ORAL at 20:59

## 2019-01-01 RX ADMIN — DOCUSATE SODIUM 100 MG: 100 CAPSULE, LIQUID FILLED ORAL at 09:03

## 2019-01-01 RX ADMIN — SODIUM CHLORIDE, POTASSIUM CHLORIDE, SODIUM LACTATE AND CALCIUM CHLORIDE: 600; 310; 30; 20 INJECTION, SOLUTION INTRAVENOUS at 09:01

## 2019-01-01 RX ADMIN — INSULIN LISPRO 2 UNITS: 100 INJECTION, SOLUTION INTRAVENOUS; SUBCUTANEOUS at 21:24

## 2019-01-01 RX ADMIN — ACETAMINOPHEN 650 MG: 325 TABLET ORAL at 23:39

## 2019-01-01 RX ADMIN — VITAMIN E CAP 400 UNIT 400 UNITS: 400 CAP at 19:47

## 2019-01-01 RX ADMIN — SEVELAMER CARBONATE 1600 MG: 800 TABLET, FILM COATED ORAL at 09:13

## 2019-01-01 RX ADMIN — ACETAMINOPHEN 650 MG: 325 TABLET ORAL at 07:01

## 2019-01-01 RX ADMIN — NEPHROCAP 1 MG: 1 CAP ORAL at 09:48

## 2019-01-01 RX ADMIN — CINACALCET HYDROCHLORIDE 30 MG: 30 TABLET, FILM COATED ORAL at 08:49

## 2019-01-01 RX ADMIN — ACETAMINOPHEN 650 MG: 325 TABLET ORAL at 04:54

## 2019-01-01 RX ADMIN — SIMVASTATIN 40 MG: 40 TABLET, FILM COATED ORAL at 21:08

## 2019-01-01 RX ADMIN — SIMVASTATIN 40 MG: 40 TABLET, FILM COATED ORAL at 21:00

## 2019-01-01 RX ADMIN — ACETAMINOPHEN 650 MG: 325 TABLET ORAL at 09:12

## 2019-01-01 RX ADMIN — NEPHROCAP 1 MG: 1 CAP ORAL at 08:17

## 2019-01-01 RX ADMIN — PNEUMOCOCCAL 13-VALENT CONJUGATE VACCINE 0.5 ML: 2.2; 2.2; 2.2; 2.2; 2.2; 4.4; 2.2; 2.2; 2.2; 2.2; 2.2; 2.2; 2.2 INJECTION, SUSPENSION INTRAMUSCULAR at 09:00

## 2019-01-01 RX ADMIN — SEVELAMER CARBONATE 1600 MG: 800 TABLET, FILM COATED ORAL at 12:29

## 2019-01-01 RX ADMIN — CALCITRIOL CAPSULES 0.25 MCG 0.25 MCG: 0.25 CAPSULE ORAL at 09:03

## 2019-01-01 ASSESSMENT — PAIN SCALES - GENERAL
PAINLEVEL_OUTOF10: 0
PAINLEVEL_OUTOF10: 3
PAINLEVEL_OUTOF10: 0
PAINLEVEL_OUTOF10: 1
PAINLEVEL_OUTOF10: 0
PAINLEVEL_OUTOF10: 10
PAINLEVEL_OUTOF10: 9
PAINLEVEL_OUTOF10: 0
PAINLEVEL_OUTOF10: 6
PAINLEVEL_OUTOF10: 0
PAINLEVEL_OUTOF10: 6
PAINLEVEL_OUTOF10: 0
PAINLEVEL_OUTOF10: 0
PAINLEVEL_OUTOF10: 10
PAINLEVEL_OUTOF10: 0
PAINLEVEL_OUTOF10: 0
PAINLEVEL_OUTOF10: 5
PAINLEVEL_OUTOF10: 0
PAINLEVEL_OUTOF10: 0
PAINLEVEL_OUTOF10: 10
PAINLEVEL_OUTOF10: 0
PAINLEVEL_OUTOF10: 7
PAINLEVEL_OUTOF10: 0
PAINLEVEL_OUTOF10: 3
PAINLEVEL_OUTOF10: 6
PAINLEVEL_OUTOF10: 0
PAINLEVEL_OUTOF10: 10
PAINLEVEL_OUTOF10: 0
PAINLEVEL_OUTOF10: 3
PAINLEVEL_OUTOF10: 0
PAINLEVEL_OUTOF10: 0
PAINLEVEL_OUTOF10: 10
PAINLEVEL_OUTOF10: 9
PAINLEVEL_OUTOF10: 8
PAINLEVEL_OUTOF10: 0
PAINLEVEL_OUTOF10: 4
PAINLEVEL_OUTOF10: 0
PAINLEVEL_OUTOF10: 0
PAINLEVEL_OUTOF10: 8
PAINLEVEL_OUTOF10: 8
PAINLEVEL_OUTOF10: 4
PAINLEVEL_OUTOF10: 0
PAINLEVEL_OUTOF10: 10
PAINLEVEL_OUTOF10: 0
PAINLEVEL_OUTOF10: 3
PAINLEVEL_OUTOF10: 0
PAINLEVEL_OUTOF10: 4
PAINLEVEL_OUTOF10: 0
PAINLEVEL_OUTOF10: 3
PAINLEVEL_OUTOF10: 8
PAINLEVEL_OUTOF10: 2
PAINLEVEL_OUTOF10: 6
PAINLEVEL_OUTOF10: 10
PAINLEVEL_OUTOF10: 0
PAINLEVEL_OUTOF10: 2
PAINLEVEL_OUTOF10: 4
PAINLEVEL_OUTOF10: 0
PAINLEVEL_OUTOF10: 6
PAINLEVEL_OUTOF10: 0
PAINLEVEL_OUTOF10: 0
PAINLEVEL_OUTOF10: 10
PAINLEVEL_OUTOF10: 5
PAINLEVEL_OUTOF10: 9
PAINLEVEL_OUTOF10: 0
PAINLEVEL_OUTOF10: 10
PAINLEVEL_OUTOF10: 0
PAINLEVEL_OUTOF10: 2
PAINLEVEL_OUTOF10: 0
PAINLEVEL_OUTOF10: 0
PAINLEVEL_OUTOF10: 6
PAINLEVEL_OUTOF10: 0
PAINLEVEL_OUTOF10: 6
PAINLEVEL_OUTOF10: 0
PAINLEVEL_OUTOF10: 6
PAINLEVEL_OUTOF10: 5
PAINLEVEL_OUTOF10: 0
PAINLEVEL_OUTOF10: 1
PAINLEVEL_OUTOF10: 8
PAINLEVEL_OUTOF10: 0
PAINLEVEL_OUTOF10: 3
PAINLEVEL_OUTOF10: 0
PAINLEVEL_OUTOF10: 0
PAINLEVEL_OUTOF10: 8
PAINLEVEL_OUTOF10: 0
PAINLEVEL_OUTOF10: 7
PAINLEVEL_OUTOF10: 6

## 2019-01-01 ASSESSMENT — PAIN DESCRIPTION - PROGRESSION
CLINICAL_PROGRESSION: NOT CHANGED
CLINICAL_PROGRESSION: GRADUALLY IMPROVING
CLINICAL_PROGRESSION: NOT CHANGED
CLINICAL_PROGRESSION: GRADUALLY WORSENING
CLINICAL_PROGRESSION: NOT CHANGED
CLINICAL_PROGRESSION: GRADUALLY WORSENING
CLINICAL_PROGRESSION: NOT CHANGED
CLINICAL_PROGRESSION: GRADUALLY WORSENING
CLINICAL_PROGRESSION: NOT CHANGED
CLINICAL_PROGRESSION: GRADUALLY IMPROVING
CLINICAL_PROGRESSION: NOT CHANGED
CLINICAL_PROGRESSION: GRADUALLY WORSENING
CLINICAL_PROGRESSION: NOT CHANGED
CLINICAL_PROGRESSION: NOT CHANGED
CLINICAL_PROGRESSION: GRADUALLY WORSENING
CLINICAL_PROGRESSION: NOT CHANGED
CLINICAL_PROGRESSION: GRADUALLY WORSENING

## 2019-01-01 ASSESSMENT — ENCOUNTER SYMPTOMS
ABDOMINAL PAIN: 0
CONSTIPATION: 0
CONSTIPATION: 0
COUGH: 0
BLOOD IN STOOL: 0
PHOTOPHOBIA: 0
BACK PAIN: 0
NAUSEA: 1
DIARRHEA: 0
NAUSEA: 0
VOMITING: 0
COLOR CHANGE: 0
ABDOMINAL DISTENTION: 0
CHEST TIGHTNESS: 0
VOMITING: 1
BACK PAIN: 0
VOMITING: 0
TROUBLE SWALLOWING: 0
CHOKING: 0
DIARRHEA: 0
WHEEZING: 0
VOMITING: 0
ABDOMINAL PAIN: 0
WHEEZING: 0
SHORTNESS OF BREATH: 0
APNEA: 0
COLOR CHANGE: 0
DIARRHEA: 0
NAUSEA: 0
ABDOMINAL DISTENTION: 0
SHORTNESS OF BREATH: 0
SORE THROAT: 0
NAUSEA: 0
SHORTNESS OF BREATH: 1
NAUSEA: 1
SHORTNESS OF BREATH: 0
ABDOMINAL DISTENTION: 0
STRIDOR: 0
NAUSEA: 0
RECTAL PAIN: 0
ABDOMINAL PAIN: 0
RECTAL PAIN: 0
NAUSEA: 0
NAUSEA: 1
DIARRHEA: 0
ABDOMINAL DISTENTION: 0
ABDOMINAL PAIN: 0
VOMITING: 0
SHORTNESS OF BREATH: 0
SORE THROAT: 0
CHEST TIGHTNESS: 0
BACK PAIN: 0
DIARRHEA: 0
CHEST TIGHTNESS: 0
SHORTNESS OF BREATH: 0
ABDOMINAL PAIN: 0
WHEEZING: 0
COLOR CHANGE: 0
SHORTNESS OF BREATH: 0
DIARRHEA: 0
CONSTIPATION: 0
VOMITING: 1
VOMITING: 0
STRIDOR: 0
VOMITING: 1
EYE PAIN: 0
TROUBLE SWALLOWING: 0
CHEST TIGHTNESS: 0
ABDOMINAL PAIN: 0
SHORTNESS OF BREATH: 0
COUGH: 0
COUGH: 0
CONSTIPATION: 0
COUGH: 0
TROUBLE SWALLOWING: 0
COLOR CHANGE: 0
SORE THROAT: 0
VOMITING: 0
BACK PAIN: 0
SORE THROAT: 0
ABDOMINAL PAIN: 0
ABDOMINAL PAIN: 0
COUGH: 0
RESPIRATORY NEGATIVE: 1
TROUBLE SWALLOWING: 0
COLOR CHANGE: 0
DIARRHEA: 0
COUGH: 0
WHEEZING: 0
NAUSEA: 0
RHINORRHEA: 0
DIARRHEA: 0
SHORTNESS OF BREATH: 0

## 2019-01-01 ASSESSMENT — PAIN DESCRIPTION - LOCATION
LOCATION: FOOT
LOCATION: HEAD
LOCATION: FOOT
LOCATION: GENERALIZED
LOCATION: FOOT
LOCATION: FOOT;LEG
LOCATION: LEG
LOCATION: KNEE
LOCATION: FOOT
LOCATION: LEG
LOCATION: FOOT
LOCATION: LEG

## 2019-01-01 ASSESSMENT — PAIN SCALES - WONG BAKER: WONGBAKER_NUMERICALRESPONSE: 0

## 2019-01-01 ASSESSMENT — PAIN DESCRIPTION - PAIN TYPE
TYPE: CHRONIC PAIN
TYPE: CHRONIC PAIN;ACUTE PAIN
TYPE: CHRONIC PAIN
TYPE: ACUTE PAIN
TYPE: CHRONIC PAIN
TYPE: CHRONIC PAIN
TYPE: ACUTE PAIN
TYPE: CHRONIC PAIN
TYPE: ACUTE PAIN;CHRONIC PAIN
TYPE: ACUTE PAIN
TYPE: ACUTE PAIN
TYPE: CHRONIC PAIN
TYPE: CHRONIC PAIN
TYPE: ACUTE PAIN
TYPE: CHRONIC PAIN

## 2019-01-01 ASSESSMENT — PAIN DESCRIPTION - FREQUENCY
FREQUENCY: CONTINUOUS
FREQUENCY: INTERMITTENT
FREQUENCY: CONTINUOUS
FREQUENCY: INTERMITTENT
FREQUENCY: CONTINUOUS
FREQUENCY: INTERMITTENT
FREQUENCY: CONTINUOUS
FREQUENCY: CONTINUOUS
FREQUENCY: INTERMITTENT
FREQUENCY: CONTINUOUS

## 2019-01-01 ASSESSMENT — PAIN DESCRIPTION - ONSET
ONSET: ON-GOING

## 2019-01-01 ASSESSMENT — PAIN DESCRIPTION - DESCRIPTORS
DESCRIPTORS: ACHING;BURNING
DESCRIPTORS: ACHING
DESCRIPTORS: ACHING;BURNING
DESCRIPTORS: ACHING
DESCRIPTORS: BURNING
DESCRIPTORS: ACHING
DESCRIPTORS: BURNING
DESCRIPTORS: HEADACHE
DESCRIPTORS: NUMBNESS;ACHING;TINGLING
DESCRIPTORS: BURNING
DESCRIPTORS: ACHING
DESCRIPTORS: ACHING
DESCRIPTORS: TINGLING;NUMBNESS;ACHING
DESCRIPTORS: NUMBNESS;TINGLING
DESCRIPTORS: ACHING
DESCRIPTORS: NUMBNESS;PINS AND NEEDLES
DESCRIPTORS: ACHING
DESCRIPTORS: ACHING
DESCRIPTORS: BURNING
DESCRIPTORS: ACHING
DESCRIPTORS: ACHING
DESCRIPTORS: TINGLING;ACHING

## 2019-01-01 ASSESSMENT — PAIN DESCRIPTION - ORIENTATION
ORIENTATION: RIGHT
ORIENTATION: RIGHT;LEFT
ORIENTATION: RIGHT
ORIENTATION: RIGHT;LEFT
ORIENTATION: RIGHT;LEFT
ORIENTATION: RIGHT

## 2019-01-01 ASSESSMENT — PULMONARY FUNCTION TESTS
PIF_VALUE: 0

## 2019-01-01 ASSESSMENT — PAIN - FUNCTIONAL ASSESSMENT
PAIN_FUNCTIONAL_ASSESSMENT: FACES
PAIN_FUNCTIONAL_ASSESSMENT: PREVENTS OR INTERFERES SOME ACTIVE ACTIVITIES AND ADLS
PAIN_FUNCTIONAL_ASSESSMENT: 0-10
PAIN_FUNCTIONAL_ASSESSMENT: ACTIVITIES ARE NOT PREVENTED
PAIN_FUNCTIONAL_ASSESSMENT: PREVENTS OR INTERFERES SOME ACTIVE ACTIVITIES AND ADLS
PAIN_FUNCTIONAL_ASSESSMENT: PREVENTS OR INTERFERES SOME ACTIVE ACTIVITIES AND ADLS
PAIN_FUNCTIONAL_ASSESSMENT: PREVENTS OR INTERFERES WITH MANY ACTIVE NOT PASSIVE ACTIVITIES
PAIN_FUNCTIONAL_ASSESSMENT: PREVENTS OR INTERFERES SOME ACTIVE ACTIVITIES AND ADLS
PAIN_FUNCTIONAL_ASSESSMENT: ACTIVITIES ARE NOT PREVENTED
PAIN_FUNCTIONAL_ASSESSMENT: 0-10
PAIN_FUNCTIONAL_ASSESSMENT: 0-10
PAIN_FUNCTIONAL_ASSESSMENT: PREVENTS OR INTERFERES SOME ACTIVE ACTIVITIES AND ADLS
PAIN_FUNCTIONAL_ASSESSMENT: PREVENTS OR INTERFERES SOME ACTIVE ACTIVITIES AND ADLS
PAIN_FUNCTIONAL_ASSESSMENT: 0-10
PAIN_FUNCTIONAL_ASSESSMENT: ACTIVITIES ARE NOT PREVENTED
PAIN_FUNCTIONAL_ASSESSMENT: 0-10
PAIN_FUNCTIONAL_ASSESSMENT: FACES
PAIN_FUNCTIONAL_ASSESSMENT: PREVENTS OR INTERFERES SOME ACTIVE ACTIVITIES AND ADLS
PAIN_FUNCTIONAL_ASSESSMENT: ACTIVITIES ARE NOT PREVENTED
PAIN_FUNCTIONAL_ASSESSMENT: ACTIVITIES ARE NOT PREVENTED
PAIN_FUNCTIONAL_ASSESSMENT: PREVENTS OR INTERFERES SOME ACTIVE ACTIVITIES AND ADLS

## 2019-01-27 ENCOUNTER — APPOINTMENT (OUTPATIENT)
Dept: CT IMAGING | Age: 79
DRG: 553 | End: 2019-01-27
Payer: MEDICARE

## 2019-01-27 ENCOUNTER — HOSPITAL ENCOUNTER (INPATIENT)
Age: 79
LOS: 5 days | Discharge: NURSING, REHAB OR LONG-TERM CARE | DRG: 553 | End: 2019-02-01
Attending: EMERGENCY MEDICINE | Admitting: INTERNAL MEDICINE
Payer: MEDICARE

## 2019-01-27 ENCOUNTER — APPOINTMENT (OUTPATIENT)
Dept: GENERAL RADIOLOGY | Age: 79
DRG: 553 | End: 2019-01-27
Payer: MEDICARE

## 2019-01-27 DIAGNOSIS — M25.462 EFFUSION OF LEFT KNEE: Primary | ICD-10-CM

## 2019-01-27 PROBLEM — M25.562 KNEE PAIN, LEFT: Status: ACTIVE | Noted: 2019-01-27

## 2019-01-27 LAB
ANION GAP SERPL CALCULATED.3IONS-SCNC: 22 MMOL/L (ref 3–16)
BASOPHILS ABSOLUTE: 0.1 K/UL (ref 0–0.2)
BASOPHILS RELATIVE PERCENT: 0.8 %
BUN BLDV-MCNC: 59 MG/DL (ref 7–20)
CALCIUM SERPL-MCNC: 6.3 MG/DL (ref 8.3–10.6)
CHLORIDE BLD-SCNC: 101 MMOL/L (ref 99–110)
CO2: 21 MMOL/L (ref 21–32)
CREAT SERPL-MCNC: 10.8 MG/DL (ref 0.6–1.2)
EOSINOPHILS ABSOLUTE: 0.1 K/UL (ref 0–0.6)
EOSINOPHILS RELATIVE PERCENT: 0.4 %
GFR AFRICAN AMERICAN: 4
GFR NON-AFRICAN AMERICAN: 3
GLUCOSE BLD-MCNC: 86 MG/DL (ref 70–99)
HCT VFR BLD CALC: 34.7 % (ref 36–48)
HEMOGLOBIN: 10.9 G/DL (ref 12–16)
LYMPHOCYTES ABSOLUTE: 0.9 K/UL (ref 1–5.1)
LYMPHOCYTES RELATIVE PERCENT: 6.9 %
MCH RBC QN AUTO: 31.6 PG (ref 26–34)
MCHC RBC AUTO-ENTMCNC: 31.5 G/DL (ref 31–36)
MCV RBC AUTO: 100.3 FL (ref 80–100)
MONOCYTES ABSOLUTE: 1 K/UL (ref 0–1.3)
MONOCYTES RELATIVE PERCENT: 8.3 %
NEUTROPHILS ABSOLUTE: 10.6 K/UL (ref 1.7–7.7)
NEUTROPHILS RELATIVE PERCENT: 83.6 %
PDW BLD-RTO: 14.9 % (ref 12.4–15.4)
PLATELET # BLD: 223 K/UL (ref 135–450)
PMV BLD AUTO: 7.9 FL (ref 5–10.5)
POTASSIUM SERPL-SCNC: 5.1 MMOL/L (ref 3.5–5.1)
RBC # BLD: 3.46 M/UL (ref 4–5.2)
SODIUM BLD-SCNC: 144 MMOL/L (ref 136–145)
WBC # BLD: 12.6 K/UL (ref 4–11)

## 2019-01-27 PROCEDURE — 6370000000 HC RX 637 (ALT 250 FOR IP): Performed by: EMERGENCY MEDICINE

## 2019-01-27 PROCEDURE — 85025 COMPLETE CBC W/AUTO DIFF WBC: CPT

## 2019-01-27 PROCEDURE — 6370000000 HC RX 637 (ALT 250 FOR IP): Performed by: INTERNAL MEDICINE

## 2019-01-27 PROCEDURE — 6360000002 HC RX W HCPCS: Performed by: INTERNAL MEDICINE

## 2019-01-27 PROCEDURE — 73700 CT LOWER EXTREMITY W/O DYE: CPT

## 2019-01-27 PROCEDURE — 83036 HEMOGLOBIN GLYCOSYLATED A1C: CPT

## 2019-01-27 PROCEDURE — 80048 BASIC METABOLIC PNL TOTAL CA: CPT

## 2019-01-27 PROCEDURE — 73562 X-RAY EXAM OF KNEE 3: CPT

## 2019-01-27 PROCEDURE — 99284 EMERGENCY DEPT VISIT MOD MDM: CPT

## 2019-01-27 PROCEDURE — 3E1M39Z IRRIGATION OF PERITONEAL CAVITY USING DIALYSATE, PERCUTANEOUS APPROACH: ICD-10-PCS | Performed by: INTERNAL MEDICINE

## 2019-01-27 PROCEDURE — 90945 DIALYSIS ONE EVALUATION: CPT

## 2019-01-27 PROCEDURE — 1200000000 HC SEMI PRIVATE

## 2019-01-27 RX ORDER — ISOSORBIDE MONONITRATE 60 MG/1
60 TABLET, EXTENDED RELEASE ORAL EVERY MORNING
Status: DISCONTINUED | OUTPATIENT
Start: 2019-01-28 | End: 2019-02-01 | Stop reason: HOSPADM

## 2019-01-27 RX ORDER — CHOLECALCIFEROL (VITAMIN D3) 10 MCG
1 TABLET ORAL DAILY
Status: DISCONTINUED | OUTPATIENT
Start: 2019-01-28 | End: 2019-02-01 | Stop reason: HOSPADM

## 2019-01-27 RX ORDER — ACETAMINOPHEN 325 MG/1
650 TABLET ORAL EVERY 4 HOURS PRN
Status: DISCONTINUED | OUTPATIENT
Start: 2019-01-27 | End: 2019-02-01 | Stop reason: HOSPADM

## 2019-01-27 RX ORDER — HYDROCODONE BITARTRATE AND ACETAMINOPHEN 5; 325 MG/1; MG/1
1 TABLET ORAL EVERY 8 HOURS PRN
Qty: 6 TABLET | Refills: 0 | Status: SHIPPED | OUTPATIENT
Start: 2019-01-27 | End: 2019-01-29

## 2019-01-27 RX ORDER — FUROSEMIDE 80 MG
80 TABLET ORAL 2 TIMES DAILY
Status: DISCONTINUED | OUTPATIENT
Start: 2019-01-27 | End: 2019-01-28

## 2019-01-27 RX ORDER — GENTAMICIN SULFATE 1 MG/G
CREAM TOPICAL PRN
Status: DISCONTINUED | OUTPATIENT
Start: 2019-01-27 | End: 2019-02-01 | Stop reason: HOSPADM

## 2019-01-27 RX ORDER — ONDANSETRON 2 MG/ML
4 INJECTION INTRAMUSCULAR; INTRAVENOUS EVERY 6 HOURS PRN
Status: DISCONTINUED | OUTPATIENT
Start: 2019-01-27 | End: 2019-02-01 | Stop reason: HOSPADM

## 2019-01-27 RX ORDER — DEXTROSE MONOHYDRATE 50 MG/ML
100 INJECTION, SOLUTION INTRAVENOUS PRN
Status: DISCONTINUED | OUTPATIENT
Start: 2019-01-27 | End: 2019-02-01 | Stop reason: HOSPADM

## 2019-01-27 RX ORDER — ACETAMINOPHEN 325 MG/1
650 TABLET ORAL EVERY 6 HOURS PRN
Qty: 60 TABLET | Refills: 0 | Status: SHIPPED | OUTPATIENT
Start: 2019-01-27 | End: 2019-02-18 | Stop reason: SDUPTHER

## 2019-01-27 RX ORDER — OXYCODONE HYDROCHLORIDE 5 MG/1
5 TABLET ORAL ONCE
Status: COMPLETED | OUTPATIENT
Start: 2019-01-27 | End: 2019-01-27

## 2019-01-27 RX ORDER — SIMVASTATIN 40 MG
40 TABLET ORAL NIGHTLY
Status: DISCONTINUED | OUTPATIENT
Start: 2019-01-27 | End: 2019-02-01 | Stop reason: HOSPADM

## 2019-01-27 RX ORDER — DOCUSATE SODIUM 100 MG/1
100 CAPSULE, LIQUID FILLED ORAL 2 TIMES DAILY
Status: DISCONTINUED | OUTPATIENT
Start: 2019-01-27 | End: 2019-01-27

## 2019-01-27 RX ORDER — OXYCODONE HYDROCHLORIDE 5 MG/1
5 TABLET ORAL EVERY 6 HOURS PRN
Status: DISCONTINUED | OUTPATIENT
Start: 2019-01-27 | End: 2019-02-01 | Stop reason: HOSPADM

## 2019-01-27 RX ORDER — POTASSIUM CHLORIDE 20 MEQ/1
20 TABLET, EXTENDED RELEASE ORAL ONCE
Status: CANCELLED | OUTPATIENT
Start: 2019-01-27

## 2019-01-27 RX ORDER — SODIUM CHLORIDE 0.9 % (FLUSH) 0.9 %
10 SYRINGE (ML) INJECTION EVERY 12 HOURS SCHEDULED
Status: DISCONTINUED | OUTPATIENT
Start: 2019-01-27 | End: 2019-02-01 | Stop reason: HOSPADM

## 2019-01-27 RX ORDER — NICOTINE POLACRILEX 4 MG
15 LOZENGE BUCCAL PRN
Status: DISCONTINUED | OUTPATIENT
Start: 2019-01-27 | End: 2019-02-01 | Stop reason: HOSPADM

## 2019-01-27 RX ORDER — MORPHINE SULFATE 2 MG/ML
2 INJECTION, SOLUTION INTRAMUSCULAR; INTRAVENOUS EVERY 4 HOURS PRN
Status: DISCONTINUED | OUTPATIENT
Start: 2019-01-27 | End: 2019-02-01 | Stop reason: HOSPADM

## 2019-01-27 RX ORDER — VIT B COMP NO.3/FOLIC/C/BIOTIN 1 MG-60 MG
1 TABLET ORAL
Status: DISCONTINUED | OUTPATIENT
Start: 2019-01-28 | End: 2019-01-27

## 2019-01-27 RX ORDER — ASPIRIN 81 MG/1
81 TABLET ORAL DAILY
Status: DISCONTINUED | OUTPATIENT
Start: 2019-01-28 | End: 2019-02-01 | Stop reason: HOSPADM

## 2019-01-27 RX ORDER — HEPARIN SODIUM 5000 [USP'U]/ML
5000 INJECTION, SOLUTION INTRAVENOUS; SUBCUTANEOUS EVERY 8 HOURS SCHEDULED
Status: DISCONTINUED | OUTPATIENT
Start: 2019-01-27 | End: 2019-02-01 | Stop reason: HOSPADM

## 2019-01-27 RX ORDER — CARVEDILOL 25 MG/1
25 TABLET ORAL 2 TIMES DAILY WITH MEALS
Status: DISCONTINUED | OUTPATIENT
Start: 2019-01-28 | End: 2019-02-01 | Stop reason: HOSPADM

## 2019-01-27 RX ORDER — SEVELAMER CARBONATE 800 MG/1
800 TABLET, FILM COATED ORAL
Status: DISCONTINUED | OUTPATIENT
Start: 2019-01-28 | End: 2019-01-28

## 2019-01-27 RX ORDER — DOCUSATE SODIUM 100 MG/1
100 CAPSULE, LIQUID FILLED ORAL 2 TIMES DAILY
Status: DISCONTINUED | OUTPATIENT
Start: 2019-01-27 | End: 2019-02-01 | Stop reason: HOSPADM

## 2019-01-27 RX ORDER — OXYCODONE HYDROCHLORIDE AND ACETAMINOPHEN 5; 325 MG/1; MG/1
1 TABLET ORAL ONCE
Status: COMPLETED | OUTPATIENT
Start: 2019-01-27 | End: 2019-01-27

## 2019-01-27 RX ORDER — SODIUM CHLORIDE 0.9 % (FLUSH) 0.9 %
10 SYRINGE (ML) INJECTION PRN
Status: DISCONTINUED | OUTPATIENT
Start: 2019-01-27 | End: 2019-02-01 | Stop reason: HOSPADM

## 2019-01-27 RX ORDER — HYDRALAZINE HYDROCHLORIDE 20 MG/ML
5 INJECTION INTRAMUSCULAR; INTRAVENOUS EVERY 6 HOURS PRN
Status: DISCONTINUED | OUTPATIENT
Start: 2019-01-27 | End: 2019-02-01 | Stop reason: HOSPADM

## 2019-01-27 RX ORDER — DEXTROSE MONOHYDRATE 25 G/50ML
12.5 INJECTION, SOLUTION INTRAVENOUS PRN
Status: DISCONTINUED | OUTPATIENT
Start: 2019-01-27 | End: 2019-02-01 | Stop reason: HOSPADM

## 2019-01-27 RX ADMIN — HEPARIN SODIUM 5000 UNITS: 5000 INJECTION INTRAVENOUS; SUBCUTANEOUS at 23:58

## 2019-01-27 RX ADMIN — DOCUSATE SODIUM 100 MG: 100 CAPSULE, LIQUID FILLED ORAL at 21:00

## 2019-01-27 RX ADMIN — FUROSEMIDE 80 MG: 80 TABLET ORAL at 21:00

## 2019-01-27 RX ADMIN — SIMVASTATIN 40 MG: 40 TABLET, FILM COATED ORAL at 21:00

## 2019-01-27 RX ADMIN — OXYCODONE AND ACETAMINOPHEN 1 TABLET: 5; 325 TABLET ORAL at 14:18

## 2019-01-27 RX ADMIN — OXYCODONE HYDROCHLORIDE 5 MG: 5 TABLET ORAL at 19:32

## 2019-01-27 ASSESSMENT — PAIN DESCRIPTION - LOCATION: LOCATION: LEG

## 2019-01-27 ASSESSMENT — PAIN SCALES - WONG BAKER: WONGBAKER_NUMERICALRESPONSE: 0

## 2019-01-27 ASSESSMENT — PAIN DESCRIPTION - FREQUENCY: FREQUENCY: CONTINUOUS

## 2019-01-27 ASSESSMENT — PAIN SCALES - GENERAL
PAINLEVEL_OUTOF10: 4
PAINLEVEL_OUTOF10: 8
PAINLEVEL_OUTOF10: 7
PAINLEVEL_OUTOF10: 7

## 2019-01-27 ASSESSMENT — PAIN DESCRIPTION - DESCRIPTORS: DESCRIPTORS: ACHING;CONSTANT

## 2019-01-27 ASSESSMENT — PAIN DESCRIPTION - ORIENTATION: ORIENTATION: LEFT

## 2019-01-27 ASSESSMENT — PAIN DESCRIPTION - PAIN TYPE: TYPE: ACUTE PAIN

## 2019-01-28 LAB
ALBUMIN SERPL-MCNC: 2.4 G/DL (ref 3.4–5)
ANION GAP SERPL CALCULATED.3IONS-SCNC: 20 MMOL/L (ref 3–16)
BUN BLDV-MCNC: 57 MG/DL (ref 7–20)
CALCIUM SERPL-MCNC: 6.5 MG/DL (ref 8.3–10.6)
CHLORIDE BLD-SCNC: 99 MMOL/L (ref 99–110)
CO2: 24 MMOL/L (ref 21–32)
CREAT SERPL-MCNC: 10 MG/DL (ref 0.6–1.2)
GFR AFRICAN AMERICAN: 5
GFR NON-AFRICAN AMERICAN: 4
GLUCOSE BLD-MCNC: 155 MG/DL (ref 70–99)
GLUCOSE BLD-MCNC: 175 MG/DL (ref 70–99)
GLUCOSE BLD-MCNC: 204 MG/DL (ref 70–99)
GLUCOSE BLD-MCNC: 237 MG/DL (ref 70–99)
GLUCOSE BLD-MCNC: 76 MG/DL (ref 70–99)
MAGNESIUM: 1.7 MG/DL (ref 1.8–2.4)
PERFORMED ON: ABNORMAL
PERFORMED ON: NORMAL
PHOSPHORUS: 7.9 MG/DL (ref 2.5–4.9)
POTASSIUM REFLEX MAGNESIUM: 3.1 MMOL/L (ref 3.5–5.1)
SODIUM BLD-SCNC: 143 MMOL/L (ref 136–145)
URIC ACID, SERUM: 6.3 MG/DL (ref 2.6–6)

## 2019-01-28 PROCEDURE — 2580000003 HC RX 258: Performed by: INTERNAL MEDICINE

## 2019-01-28 PROCEDURE — 83735 ASSAY OF MAGNESIUM: CPT

## 2019-01-28 PROCEDURE — 36415 COLL VENOUS BLD VENIPUNCTURE: CPT

## 2019-01-28 PROCEDURE — 6360000002 HC RX W HCPCS: Performed by: INTERNAL MEDICINE

## 2019-01-28 PROCEDURE — 90945 DIALYSIS ONE EVALUATION: CPT

## 2019-01-28 PROCEDURE — 6370000000 HC RX 637 (ALT 250 FOR IP): Performed by: INTERNAL MEDICINE

## 2019-01-28 PROCEDURE — 80048 BASIC METABOLIC PNL TOTAL CA: CPT

## 2019-01-28 PROCEDURE — 82040 ASSAY OF SERUM ALBUMIN: CPT

## 2019-01-28 PROCEDURE — 1200000000 HC SEMI PRIVATE

## 2019-01-28 PROCEDURE — 84100 ASSAY OF PHOSPHORUS: CPT

## 2019-01-28 PROCEDURE — 84550 ASSAY OF BLOOD/URIC ACID: CPT

## 2019-01-28 RX ORDER — LIDOCAINE HYDROCHLORIDE 10 MG/ML
5 INJECTION, SOLUTION EPIDURAL; INFILTRATION; INTRACAUDAL; PERINEURAL ONCE
Status: COMPLETED | OUTPATIENT
Start: 2019-01-29 | End: 2019-01-29

## 2019-01-28 RX ORDER — METHYLPREDNISOLONE ACETATE 40 MG/ML
40 INJECTION, SUSPENSION INTRA-ARTICULAR; INTRALESIONAL; INTRAMUSCULAR; SOFT TISSUE ONCE
Status: COMPLETED | OUTPATIENT
Start: 2019-01-29 | End: 2019-01-29

## 2019-01-28 RX ORDER — SEVELAMER CARBONATE 800 MG/1
1600 TABLET, FILM COATED ORAL
Status: DISCONTINUED | OUTPATIENT
Start: 2019-01-28 | End: 2019-02-01 | Stop reason: HOSPADM

## 2019-01-28 RX ORDER — POTASSIUM CHLORIDE 20 MEQ/1
20 TABLET, EXTENDED RELEASE ORAL 2 TIMES DAILY WITH MEALS
Status: DISCONTINUED | OUTPATIENT
Start: 2019-01-28 | End: 2019-01-31

## 2019-01-28 RX ADMIN — SEVELAMER CARBONATE 800 MG: 800 TABLET, FILM COATED ORAL at 09:24

## 2019-01-28 RX ADMIN — INSULIN LISPRO 3 UNITS: 100 INJECTION, SOLUTION INTRAVENOUS; SUBCUTANEOUS at 12:28

## 2019-01-28 RX ADMIN — FUROSEMIDE 80 MG: 80 TABLET ORAL at 07:59

## 2019-01-28 RX ADMIN — NIFEDIPINE 90 MG: 60 TABLET, FILM COATED, EXTENDED RELEASE ORAL at 07:59

## 2019-01-28 RX ADMIN — Medication 10 ML: at 22:04

## 2019-01-28 RX ADMIN — GENTAMICIN SULFATE: 1 CREAM TOPICAL at 17:20

## 2019-01-28 RX ADMIN — SEVELAMER CARBONATE 1600 MG: 800 TABLET, FILM COATED ORAL at 17:12

## 2019-01-28 RX ADMIN — HEPARIN SODIUM 5000 UNITS: 5000 INJECTION INTRAVENOUS; SUBCUTANEOUS at 14:06

## 2019-01-28 RX ADMIN — CARVEDILOL 25 MG: 25 TABLET, FILM COATED ORAL at 07:59

## 2019-01-28 RX ADMIN — HEPARIN SODIUM 5000 UNITS: 5000 INJECTION INTRAVENOUS; SUBCUTANEOUS at 21:56

## 2019-01-28 RX ADMIN — DOCUSATE SODIUM 100 MG: 100 CAPSULE, LIQUID FILLED ORAL at 21:56

## 2019-01-28 RX ADMIN — POTASSIUM CHLORIDE 20 MEQ: 20 TABLET, EXTENDED RELEASE ORAL at 17:12

## 2019-01-28 RX ADMIN — SEVELAMER CARBONATE 1600 MG: 800 TABLET, FILM COATED ORAL at 12:27

## 2019-01-28 RX ADMIN — HEPARIN SODIUM 5000 UNITS: 5000 INJECTION INTRAVENOUS; SUBCUTANEOUS at 07:47

## 2019-01-28 RX ADMIN — INSULIN LISPRO 1 UNITS: 100 INJECTION, SOLUTION INTRAVENOUS; SUBCUTANEOUS at 21:56

## 2019-01-28 RX ADMIN — DOCUSATE SODIUM 100 MG: 100 CAPSULE, LIQUID FILLED ORAL at 07:58

## 2019-01-28 RX ADMIN — INSULIN LISPRO 1 UNITS: 100 INJECTION, SOLUTION INTRAVENOUS; SUBCUTANEOUS at 12:27

## 2019-01-28 RX ADMIN — NEPHROCAP 1 MG: 1 CAP ORAL at 08:00

## 2019-01-28 RX ADMIN — INSULIN LISPRO 3 UNITS: 100 INJECTION, SOLUTION INTRAVENOUS; SUBCUTANEOUS at 09:18

## 2019-01-28 RX ADMIN — SIMVASTATIN 40 MG: 40 TABLET, FILM COATED ORAL at 21:56

## 2019-01-28 RX ADMIN — INSULIN GLARGINE 10 UNITS: 100 INJECTION, SOLUTION SUBCUTANEOUS at 21:57

## 2019-01-28 RX ADMIN — ISOSORBIDE MONONITRATE 60 MG: 60 TABLET, EXTENDED RELEASE ORAL at 07:59

## 2019-01-28 RX ADMIN — ASPIRIN 81 MG: 81 TABLET, COATED ORAL at 07:59

## 2019-01-28 RX ADMIN — INSULIN LISPRO 1 UNITS: 100 INJECTION, SOLUTION INTRAVENOUS; SUBCUTANEOUS at 09:12

## 2019-01-28 RX ADMIN — Medication 10 ML: at 09:24

## 2019-01-28 RX ADMIN — OXYCODONE HYDROCHLORIDE 5 MG: 5 TABLET ORAL at 00:07

## 2019-01-28 RX ADMIN — CARVEDILOL 25 MG: 25 TABLET, FILM COATED ORAL at 17:08

## 2019-01-28 ASSESSMENT — PAIN DESCRIPTION - ONSET: ONSET: ON-GOING

## 2019-01-28 ASSESSMENT — PAIN DESCRIPTION - ORIENTATION: ORIENTATION: LEFT

## 2019-01-28 ASSESSMENT — PAIN DESCRIPTION - FREQUENCY: FREQUENCY: CONTINUOUS

## 2019-01-28 ASSESSMENT — PAIN SCALES - WONG BAKER
WONGBAKER_NUMERICALRESPONSE: 0
WONGBAKER_NUMERICALRESPONSE: 0

## 2019-01-28 ASSESSMENT — PAIN SCALES - GENERAL
PAINLEVEL_OUTOF10: 5
PAINLEVEL_OUTOF10: 0
PAINLEVEL_OUTOF10: 4

## 2019-01-28 ASSESSMENT — PAIN DESCRIPTION - PROGRESSION: CLINICAL_PROGRESSION: NOT CHANGED

## 2019-01-29 LAB
ANION GAP SERPL CALCULATED.3IONS-SCNC: 19 MMOL/L (ref 3–16)
BUN BLDV-MCNC: 51 MG/DL (ref 7–20)
CALCIUM SERPL-MCNC: 6.5 MG/DL (ref 8.3–10.6)
CHLORIDE BLD-SCNC: 101 MMOL/L (ref 99–110)
CO2: 25 MMOL/L (ref 21–32)
CREAT SERPL-MCNC: 10 MG/DL (ref 0.6–1.2)
ESTIMATED AVERAGE GLUCOSE: 114 MG/DL
GFR AFRICAN AMERICAN: 5
GFR NON-AFRICAN AMERICAN: 4
GLUCOSE BLD-MCNC: 154 MG/DL (ref 70–99)
GLUCOSE BLD-MCNC: 164 MG/DL (ref 70–99)
GLUCOSE BLD-MCNC: 238 MG/DL (ref 70–99)
GLUCOSE BLD-MCNC: 45 MG/DL (ref 70–99)
GLUCOSE BLD-MCNC: 56 MG/DL (ref 70–99)
GLUCOSE BLD-MCNC: 97 MG/DL (ref 70–99)
HBA1C MFR BLD: 5.6 %
HBV SURFACE AB TITR SER: <3.5 MIU/ML
HEPATITIS B SURFACE ANTIGEN INTERPRETATION: NORMAL
MAGNESIUM: 1.7 MG/DL (ref 1.8–2.4)
PERFORMED ON: ABNORMAL
PERFORMED ON: NORMAL
POTASSIUM REFLEX MAGNESIUM: 3.2 MMOL/L (ref 3.5–5.1)
SODIUM BLD-SCNC: 145 MMOL/L (ref 136–145)

## 2019-01-29 PROCEDURE — 80048 BASIC METABOLIC PNL TOTAL CA: CPT

## 2019-01-29 PROCEDURE — 2500000003 HC RX 250 WO HCPCS: Performed by: ORTHOPAEDIC SURGERY

## 2019-01-29 PROCEDURE — 6360000002 HC RX W HCPCS: Performed by: INTERNAL MEDICINE

## 2019-01-29 PROCEDURE — 83735 ASSAY OF MAGNESIUM: CPT

## 2019-01-29 PROCEDURE — 6370000000 HC RX 637 (ALT 250 FOR IP): Performed by: INTERNAL MEDICINE

## 2019-01-29 PROCEDURE — 3E0U3BZ INTRODUCTION OF ANESTHETIC AGENT INTO JOINTS, PERCUTANEOUS APPROACH: ICD-10-PCS | Performed by: ORTHOPAEDIC SURGERY

## 2019-01-29 PROCEDURE — 97535 SELF CARE MNGMENT TRAINING: CPT

## 2019-01-29 PROCEDURE — 87340 HEPATITIS B SURFACE AG IA: CPT

## 2019-01-29 PROCEDURE — 2580000003 HC RX 258: Performed by: INTERNAL MEDICINE

## 2019-01-29 PROCEDURE — 86706 HEP B SURFACE ANTIBODY: CPT

## 2019-01-29 PROCEDURE — 3E0U33Z INTRODUCTION OF ANTI-INFLAMMATORY INTO JOINTS, PERCUTANEOUS APPROACH: ICD-10-PCS | Performed by: ORTHOPAEDIC SURGERY

## 2019-01-29 PROCEDURE — 90945 DIALYSIS ONE EVALUATION: CPT

## 2019-01-29 PROCEDURE — 1200000000 HC SEMI PRIVATE

## 2019-01-29 PROCEDURE — 97116 GAIT TRAINING THERAPY: CPT

## 2019-01-29 PROCEDURE — 83036 HEMOGLOBIN GLYCOSYLATED A1C: CPT

## 2019-01-29 PROCEDURE — 86704 HEP B CORE ANTIBODY TOTAL: CPT

## 2019-01-29 PROCEDURE — 97162 PT EVAL MOD COMPLEX 30 MIN: CPT

## 2019-01-29 PROCEDURE — 97165 OT EVAL LOW COMPLEX 30 MIN: CPT

## 2019-01-29 PROCEDURE — 6360000002 HC RX W HCPCS: Performed by: ORTHOPAEDIC SURGERY

## 2019-01-29 PROCEDURE — 36415 COLL VENOUS BLD VENIPUNCTURE: CPT

## 2019-01-29 RX ORDER — MAGNESIUM SULFATE IN WATER 40 MG/ML
2 INJECTION, SOLUTION INTRAVENOUS ONCE
Status: COMPLETED | OUTPATIENT
Start: 2019-01-29 | End: 2019-01-29

## 2019-01-29 RX ORDER — SODIUM CHLORIDE 9 MG/ML
INJECTION, SOLUTION INTRAVENOUS
Status: DISPENSED
Start: 2019-01-29 | End: 2019-01-29

## 2019-01-29 RX ADMIN — HEPARIN SODIUM 5000 UNITS: 5000 INJECTION INTRAVENOUS; SUBCUTANEOUS at 21:31

## 2019-01-29 RX ADMIN — MAGNESIUM SULFATE HEPTAHYDRATE 2 G: 40 INJECTION, SOLUTION INTRAVENOUS at 10:13

## 2019-01-29 RX ADMIN — Medication 10 ML: at 21:00

## 2019-01-29 RX ADMIN — ASPIRIN 81 MG: 81 TABLET, COATED ORAL at 10:13

## 2019-01-29 RX ADMIN — DOCUSATE SODIUM 100 MG: 100 CAPSULE, LIQUID FILLED ORAL at 10:13

## 2019-01-29 RX ADMIN — CARVEDILOL 25 MG: 25 TABLET, FILM COATED ORAL at 10:13

## 2019-01-29 RX ADMIN — HEPARIN SODIUM 5000 UNITS: 5000 INJECTION INTRAVENOUS; SUBCUTANEOUS at 14:02

## 2019-01-29 RX ADMIN — SEVELAMER CARBONATE 1600 MG: 800 TABLET, FILM COATED ORAL at 12:32

## 2019-01-29 RX ADMIN — DOCUSATE SODIUM 100 MG: 100 CAPSULE, LIQUID FILLED ORAL at 21:31

## 2019-01-29 RX ADMIN — POTASSIUM CHLORIDE 20 MEQ: 20 TABLET, EXTENDED RELEASE ORAL at 21:31

## 2019-01-29 RX ADMIN — LIDOCAINE HYDROCHLORIDE 5 ML: 10 INJECTION, SOLUTION EPIDURAL; INFILTRATION; INTRACAUDAL; PERINEURAL at 11:44

## 2019-01-29 RX ADMIN — INSULIN LISPRO 1 UNITS: 100 INJECTION, SOLUTION INTRAVENOUS; SUBCUTANEOUS at 21:32

## 2019-01-29 RX ADMIN — METHYLPREDNISOLONE ACETATE 40 MG: 40 INJECTION, SUSPENSION INTRA-ARTICULAR; INTRALESIONAL; INTRAMUSCULAR; SOFT TISSUE at 11:44

## 2019-01-29 RX ADMIN — SEVELAMER CARBONATE 1600 MG: 800 TABLET, FILM COATED ORAL at 18:00

## 2019-01-29 RX ADMIN — SIMVASTATIN 40 MG: 40 TABLET, FILM COATED ORAL at 21:31

## 2019-01-29 RX ADMIN — NEPHROCAP 1 MG: 1 CAP ORAL at 10:13

## 2019-01-29 RX ADMIN — SEVELAMER CARBONATE 1600 MG: 800 TABLET, FILM COATED ORAL at 10:12

## 2019-01-29 RX ADMIN — INSULIN LISPRO 1 UNITS: 100 INJECTION, SOLUTION INTRAVENOUS; SUBCUTANEOUS at 18:01

## 2019-01-29 RX ADMIN — HEPARIN SODIUM 5000 UNITS: 5000 INJECTION INTRAVENOUS; SUBCUTANEOUS at 06:45

## 2019-01-29 RX ADMIN — GENTAMICIN SULFATE: 1 CREAM TOPICAL at 19:53

## 2019-01-29 RX ADMIN — Medication 15 G: at 07:47

## 2019-01-29 RX ADMIN — CARVEDILOL 25 MG: 25 TABLET, FILM COATED ORAL at 18:00

## 2019-01-29 RX ADMIN — POTASSIUM CHLORIDE 20 MEQ: 20 TABLET, EXTENDED RELEASE ORAL at 10:13

## 2019-01-29 ASSESSMENT — PAIN SCALES - GENERAL: PAINLEVEL_OUTOF10: 0

## 2019-01-30 LAB
ESTIMATED AVERAGE GLUCOSE: 114 MG/DL
GLUCOSE BLD-MCNC: 188 MG/DL (ref 70–99)
GLUCOSE BLD-MCNC: 221 MG/DL (ref 70–99)
GLUCOSE BLD-MCNC: 253 MG/DL (ref 70–99)
GLUCOSE BLD-MCNC: 313 MG/DL (ref 70–99)
HBA1C MFR BLD: 5.6 %
HEPATITIS B CORE TOTAL ANTIBODY: NEGATIVE
PERFORMED ON: ABNORMAL

## 2019-01-30 PROCEDURE — 97116 GAIT TRAINING THERAPY: CPT

## 2019-01-30 PROCEDURE — 97530 THERAPEUTIC ACTIVITIES: CPT

## 2019-01-30 PROCEDURE — 97110 THERAPEUTIC EXERCISES: CPT

## 2019-01-30 PROCEDURE — 6360000002 HC RX W HCPCS: Performed by: INTERNAL MEDICINE

## 2019-01-30 PROCEDURE — 90945 DIALYSIS ONE EVALUATION: CPT

## 2019-01-30 PROCEDURE — 2580000003 HC RX 258: Performed by: INTERNAL MEDICINE

## 2019-01-30 PROCEDURE — 6370000000 HC RX 637 (ALT 250 FOR IP): Performed by: INTERNAL MEDICINE

## 2019-01-30 PROCEDURE — 1200000000 HC SEMI PRIVATE

## 2019-01-30 RX ORDER — SEVELAMER CARBONATE 800 MG/1
1600 TABLET, FILM COATED ORAL
Qty: 90 TABLET | Refills: 3 | Status: SHIPPED | OUTPATIENT
Start: 2019-01-30 | End: 2019-02-18

## 2019-01-30 RX ADMIN — Medication 10 ML: at 23:00

## 2019-01-30 RX ADMIN — INSULIN LISPRO 2 UNITS: 100 INJECTION, SOLUTION INTRAVENOUS; SUBCUTANEOUS at 22:53

## 2019-01-30 RX ADMIN — HEPARIN SODIUM 5000 UNITS: 5000 INJECTION INTRAVENOUS; SUBCUTANEOUS at 06:13

## 2019-01-30 RX ADMIN — POTASSIUM CHLORIDE 20 MEQ: 20 TABLET, EXTENDED RELEASE ORAL at 08:18

## 2019-01-30 RX ADMIN — SIMVASTATIN 40 MG: 40 TABLET, FILM COATED ORAL at 22:54

## 2019-01-30 RX ADMIN — HEPARIN SODIUM 5000 UNITS: 5000 INJECTION INTRAVENOUS; SUBCUTANEOUS at 16:44

## 2019-01-30 RX ADMIN — NIFEDIPINE 90 MG: 60 TABLET, FILM COATED, EXTENDED RELEASE ORAL at 08:18

## 2019-01-30 RX ADMIN — HEPARIN SODIUM 5000 UNITS: 5000 INJECTION INTRAVENOUS; SUBCUTANEOUS at 22:53

## 2019-01-30 RX ADMIN — GENTAMICIN SULFATE: 1 CREAM TOPICAL at 08:18

## 2019-01-30 RX ADMIN — OXYCODONE HYDROCHLORIDE 5 MG: 5 TABLET ORAL at 00:47

## 2019-01-30 RX ADMIN — SEVELAMER CARBONATE 1600 MG: 800 TABLET, FILM COATED ORAL at 16:44

## 2019-01-30 RX ADMIN — Medication 10 ML: at 08:24

## 2019-01-30 RX ADMIN — DOCUSATE SODIUM 100 MG: 100 CAPSULE, LIQUID FILLED ORAL at 08:18

## 2019-01-30 RX ADMIN — SEVELAMER CARBONATE 1600 MG: 800 TABLET, FILM COATED ORAL at 08:23

## 2019-01-30 RX ADMIN — INSULIN LISPRO 4 UNITS: 100 INJECTION, SOLUTION INTRAVENOUS; SUBCUTANEOUS at 08:24

## 2019-01-30 RX ADMIN — NEPHROCAP 1 MG: 1 CAP ORAL at 08:18

## 2019-01-30 RX ADMIN — INSULIN LISPRO 1 UNITS: 100 INJECTION, SOLUTION INTRAVENOUS; SUBCUTANEOUS at 11:57

## 2019-01-30 RX ADMIN — INSULIN LISPRO 2 UNITS: 100 INJECTION, SOLUTION INTRAVENOUS; SUBCUTANEOUS at 16:45

## 2019-01-30 RX ADMIN — ISOSORBIDE MONONITRATE 60 MG: 60 TABLET, EXTENDED RELEASE ORAL at 08:18

## 2019-01-30 RX ADMIN — CARVEDILOL 25 MG: 25 TABLET, FILM COATED ORAL at 16:44

## 2019-01-30 RX ADMIN — ASPIRIN 81 MG: 81 TABLET, COATED ORAL at 08:18

## 2019-01-30 RX ADMIN — CARVEDILOL 25 MG: 25 TABLET, FILM COATED ORAL at 08:18

## 2019-01-30 RX ADMIN — DOCUSATE SODIUM 100 MG: 100 CAPSULE, LIQUID FILLED ORAL at 22:53

## 2019-01-30 RX ADMIN — INSULIN GLARGINE 5 UNITS: 100 INJECTION, SOLUTION SUBCUTANEOUS at 16:45

## 2019-01-30 RX ADMIN — DARBEPOETIN ALFA 60 MCG: 60 INJECTION, SOLUTION INTRAVENOUS; SUBCUTANEOUS at 16:53

## 2019-01-30 RX ADMIN — POTASSIUM CHLORIDE 20 MEQ: 20 TABLET, EXTENDED RELEASE ORAL at 16:44

## 2019-01-30 RX ADMIN — ONDANSETRON 4 MG: 2 INJECTION INTRAMUSCULAR; INTRAVENOUS at 21:11

## 2019-01-30 ASSESSMENT — PAIN DESCRIPTION - ORIENTATION: ORIENTATION: LEFT

## 2019-01-30 ASSESSMENT — PAIN DESCRIPTION - LOCATION: LOCATION: KNEE

## 2019-01-30 ASSESSMENT — PAIN DESCRIPTION - PROGRESSION
CLINICAL_PROGRESSION: NOT CHANGED

## 2019-01-30 ASSESSMENT — PAIN DESCRIPTION - ONSET: ONSET: ON-GOING

## 2019-01-30 ASSESSMENT — PAIN SCALES - WONG BAKER
WONGBAKER_NUMERICALRESPONSE: 0

## 2019-01-30 ASSESSMENT — PAIN DESCRIPTION - FREQUENCY: FREQUENCY: CONTINUOUS

## 2019-01-30 ASSESSMENT — PAIN SCALES - GENERAL
PAINLEVEL_OUTOF10: 0
PAINLEVEL_OUTOF10: 7

## 2019-01-30 ASSESSMENT — PAIN DESCRIPTION - PAIN TYPE: TYPE: ACUTE PAIN

## 2019-01-30 ASSESSMENT — PAIN DESCRIPTION - DESCRIPTORS: DESCRIPTORS: ACHING

## 2019-01-31 LAB
ALBUMIN SERPL-MCNC: 2.3 G/DL (ref 3.4–5)
ANION GAP SERPL CALCULATED.3IONS-SCNC: 17 MMOL/L (ref 3–16)
BUN BLDV-MCNC: 50 MG/DL (ref 7–20)
CALCIUM SERPL-MCNC: 6.8 MG/DL (ref 8.3–10.6)
CHLORIDE BLD-SCNC: 99 MMOL/L (ref 99–110)
CO2: 24 MMOL/L (ref 21–32)
CREAT SERPL-MCNC: 9.2 MG/DL (ref 0.6–1.2)
GFR AFRICAN AMERICAN: 5
GFR NON-AFRICAN AMERICAN: 4
GLUCOSE BLD-MCNC: 105 MG/DL (ref 70–99)
GLUCOSE BLD-MCNC: 155 MG/DL (ref 70–99)
GLUCOSE BLD-MCNC: 161 MG/DL (ref 70–99)
GLUCOSE BLD-MCNC: 171 MG/DL (ref 70–99)
GLUCOSE BLD-MCNC: 172 MG/DL (ref 70–99)
GLUCOSE BLD-MCNC: 195 MG/DL (ref 70–99)
GLUCOSE BLD-MCNC: 195 MG/DL (ref 70–99)
GLUCOSE BLD-MCNC: 41 MG/DL (ref 70–99)
PERFORMED ON: ABNORMAL
PHOSPHORUS: 5.7 MG/DL (ref 2.5–4.9)
POTASSIUM SERPL-SCNC: 4.2 MMOL/L (ref 3.5–5.1)
SODIUM BLD-SCNC: 140 MMOL/L (ref 136–145)

## 2019-01-31 PROCEDURE — 6370000000 HC RX 637 (ALT 250 FOR IP): Performed by: INTERNAL MEDICINE

## 2019-01-31 PROCEDURE — 90945 DIALYSIS ONE EVALUATION: CPT

## 2019-01-31 PROCEDURE — 2580000003 HC RX 258: Performed by: INTERNAL MEDICINE

## 2019-01-31 PROCEDURE — 82947 ASSAY GLUCOSE BLOOD QUANT: CPT

## 2019-01-31 PROCEDURE — 80069 RENAL FUNCTION PANEL: CPT

## 2019-01-31 PROCEDURE — 97116 GAIT TRAINING THERAPY: CPT

## 2019-01-31 PROCEDURE — 1200000000 HC SEMI PRIVATE

## 2019-01-31 PROCEDURE — 6360000002 HC RX W HCPCS: Performed by: INTERNAL MEDICINE

## 2019-01-31 PROCEDURE — 36415 COLL VENOUS BLD VENIPUNCTURE: CPT

## 2019-01-31 RX ORDER — POLYETHYLENE GLYCOL 3350 17 G/17G
17 POWDER, FOR SOLUTION ORAL DAILY
Status: DISCONTINUED | OUTPATIENT
Start: 2019-01-31 | End: 2019-02-01 | Stop reason: HOSPADM

## 2019-01-31 RX ADMIN — Medication 10 ML: at 10:08

## 2019-01-31 RX ADMIN — DOCUSATE SODIUM 100 MG: 100 CAPSULE, LIQUID FILLED ORAL at 22:23

## 2019-01-31 RX ADMIN — DOCUSATE SODIUM 100 MG: 100 CAPSULE, LIQUID FILLED ORAL at 10:06

## 2019-01-31 RX ADMIN — CARVEDILOL 25 MG: 25 TABLET, FILM COATED ORAL at 10:07

## 2019-01-31 RX ADMIN — HEPARIN SODIUM 5000 UNITS: 5000 INJECTION INTRAVENOUS; SUBCUTANEOUS at 15:00

## 2019-01-31 RX ADMIN — NEPHROCAP 1 MG: 1 CAP ORAL at 10:13

## 2019-01-31 RX ADMIN — INSULIN LISPRO 1 UNITS: 100 INJECTION, SOLUTION INTRAVENOUS; SUBCUTANEOUS at 13:35

## 2019-01-31 RX ADMIN — GENTAMICIN SULFATE: 1 CREAM TOPICAL at 18:44

## 2019-01-31 RX ADMIN — SEVELAMER CARBONATE 1600 MG: 800 TABLET, FILM COATED ORAL at 13:35

## 2019-01-31 RX ADMIN — HEPARIN SODIUM 5000 UNITS: 5000 INJECTION INTRAVENOUS; SUBCUTANEOUS at 22:24

## 2019-01-31 RX ADMIN — INSULIN GLARGINE 5 UNITS: 100 INJECTION, SOLUTION SUBCUTANEOUS at 10:13

## 2019-01-31 RX ADMIN — SIMVASTATIN 40 MG: 40 TABLET, FILM COATED ORAL at 22:23

## 2019-01-31 RX ADMIN — INSULIN LISPRO 1 UNITS: 100 INJECTION, SOLUTION INTRAVENOUS; SUBCUTANEOUS at 22:24

## 2019-01-31 RX ADMIN — Medication 10 ML: at 22:24

## 2019-01-31 RX ADMIN — ASPIRIN 81 MG: 81 TABLET, COATED ORAL at 10:07

## 2019-01-31 RX ADMIN — ISOSORBIDE MONONITRATE 60 MG: 60 TABLET, EXTENDED RELEASE ORAL at 10:07

## 2019-01-31 RX ADMIN — POLYETHYLENE GLYCOL (3350) 17 G: 17 POWDER, FOR SOLUTION ORAL at 10:13

## 2019-01-31 RX ADMIN — NIFEDIPINE 90 MG: 60 TABLET, FILM COATED, EXTENDED RELEASE ORAL at 10:06

## 2019-01-31 RX ADMIN — HEPARIN SODIUM 5000 UNITS: 5000 INJECTION INTRAVENOUS; SUBCUTANEOUS at 05:57

## 2019-01-31 RX ADMIN — INSULIN LISPRO 1 UNITS: 100 INJECTION, SOLUTION INTRAVENOUS; SUBCUTANEOUS at 18:44

## 2019-01-31 ASSESSMENT — PAIN SCALES - GENERAL
PAINLEVEL_OUTOF10: 0
PAINLEVEL_OUTOF10: 0

## 2019-02-01 ENCOUNTER — HOSPITAL ENCOUNTER (INPATIENT)
Age: 79
LOS: 12 days | Discharge: HOME HEALTH | DRG: 555 | End: 2019-02-13
Attending: PHYSICAL MEDICINE & REHABILITATION | Admitting: PHYSICAL MEDICINE & REHABILITATION
Payer: COMMERCIAL

## 2019-02-01 VITALS
TEMPERATURE: 99.1 F | SYSTOLIC BLOOD PRESSURE: 107 MMHG | BODY MASS INDEX: 21.93 KG/M2 | RESPIRATION RATE: 16 BRPM | HEIGHT: 66 IN | HEART RATE: 76 BPM | DIASTOLIC BLOOD PRESSURE: 67 MMHG | OXYGEN SATURATION: 99 % | WEIGHT: 136.47 LBS

## 2019-02-01 DIAGNOSIS — M25.562 ACUTE PAIN OF LEFT KNEE: Primary | ICD-10-CM

## 2019-02-01 PROBLEM — R53.81 DEBILITY: Status: ACTIVE | Noted: 2019-02-01

## 2019-02-01 LAB
GLUCOSE BLD-MCNC: 108 MG/DL (ref 70–99)
GLUCOSE BLD-MCNC: 109 MG/DL (ref 70–99)
GLUCOSE BLD-MCNC: 144 MG/DL (ref 70–99)
GLUCOSE BLD-MCNC: 233 MG/DL (ref 70–99)
PERFORMED ON: ABNORMAL

## 2019-02-01 PROCEDURE — 6370000000 HC RX 637 (ALT 250 FOR IP): Performed by: INTERNAL MEDICINE

## 2019-02-01 PROCEDURE — 6370000000 HC RX 637 (ALT 250 FOR IP): Performed by: PHYSICAL MEDICINE & REHABILITATION

## 2019-02-01 PROCEDURE — 1280000000 HC REHAB R&B

## 2019-02-01 PROCEDURE — 2580000003 HC RX 258: Performed by: INTERNAL MEDICINE

## 2019-02-01 PROCEDURE — 94761 N-INVAS EAR/PLS OXIMETRY MLT: CPT

## 2019-02-01 PROCEDURE — 6360000002 HC RX W HCPCS: Performed by: PHYSICAL MEDICINE & REHABILITATION

## 2019-02-01 PROCEDURE — 6360000002 HC RX W HCPCS: Performed by: INTERNAL MEDICINE

## 2019-02-01 PROCEDURE — 94664 DEMO&/EVAL PT USE INHALER: CPT

## 2019-02-01 RX ORDER — SIMVASTATIN 40 MG
40 TABLET ORAL NIGHTLY
Status: DISCONTINUED | OUTPATIENT
Start: 2019-02-01 | End: 2019-02-13 | Stop reason: HOSPADM

## 2019-02-01 RX ORDER — TRAZODONE HYDROCHLORIDE 50 MG/1
50 TABLET ORAL NIGHTLY PRN
Status: DISCONTINUED | OUTPATIENT
Start: 2019-02-01 | End: 2019-02-13 | Stop reason: HOSPADM

## 2019-02-01 RX ORDER — DOCUSATE SODIUM 100 MG/1
100 CAPSULE, LIQUID FILLED ORAL 2 TIMES DAILY
Status: CANCELLED | OUTPATIENT
Start: 2019-02-01

## 2019-02-01 RX ORDER — ONDANSETRON 4 MG/1
8 TABLET, ORALLY DISINTEGRATING ORAL EVERY 8 HOURS PRN
Status: CANCELLED | OUTPATIENT
Start: 2019-02-01

## 2019-02-01 RX ORDER — DEXTROSE MONOHYDRATE 50 MG/ML
100 INJECTION, SOLUTION INTRAVENOUS PRN
Status: CANCELLED | OUTPATIENT
Start: 2019-02-01

## 2019-02-01 RX ORDER — HEPARIN SODIUM 5000 [USP'U]/ML
5000 INJECTION, SOLUTION INTRAVENOUS; SUBCUTANEOUS EVERY 8 HOURS SCHEDULED
Status: CANCELLED | OUTPATIENT
Start: 2019-02-01

## 2019-02-01 RX ORDER — DEXTROSE MONOHYDRATE 25 G/50ML
12.5 INJECTION, SOLUTION INTRAVENOUS PRN
Status: CANCELLED | OUTPATIENT
Start: 2019-02-01

## 2019-02-01 RX ORDER — GENTAMICIN SULFATE 1 MG/G
CREAM TOPICAL PRN
Status: DISCONTINUED | OUTPATIENT
Start: 2019-02-01 | End: 2019-02-13 | Stop reason: HOSPADM

## 2019-02-01 RX ORDER — DOCUSATE SODIUM 100 MG/1
100 CAPSULE, LIQUID FILLED ORAL 2 TIMES DAILY
Status: DISCONTINUED | OUTPATIENT
Start: 2019-02-01 | End: 2019-02-01

## 2019-02-01 RX ORDER — SODIUM CHLORIDE 0.9 % (FLUSH) 0.9 %
10 SYRINGE (ML) INJECTION EVERY 12 HOURS SCHEDULED
Status: DISCONTINUED | OUTPATIENT
Start: 2019-02-01 | End: 2019-02-01

## 2019-02-01 RX ORDER — ONDANSETRON 8 MG/1
8 TABLET, ORALLY DISINTEGRATING ORAL EVERY 8 HOURS PRN
Status: DISCONTINUED | OUTPATIENT
Start: 2019-02-01 | End: 2019-02-13 | Stop reason: HOSPADM

## 2019-02-01 RX ORDER — SODIUM CHLORIDE 0.9 % (FLUSH) 0.9 %
10 SYRINGE (ML) INJECTION EVERY 12 HOURS SCHEDULED
Status: CANCELLED | OUTPATIENT
Start: 2019-02-01

## 2019-02-01 RX ORDER — SODIUM CHLORIDE 0.9 % (FLUSH) 0.9 %
10 SYRINGE (ML) INJECTION PRN
Status: CANCELLED | OUTPATIENT
Start: 2019-02-01

## 2019-02-01 RX ORDER — OXYCODONE HYDROCHLORIDE 5 MG/1
5 TABLET ORAL EVERY 6 HOURS PRN
Status: DISCONTINUED | OUTPATIENT
Start: 2019-02-01 | End: 2019-02-13 | Stop reason: HOSPADM

## 2019-02-01 RX ORDER — CARVEDILOL 25 MG/1
25 TABLET ORAL 2 TIMES DAILY WITH MEALS
Status: CANCELLED | OUTPATIENT
Start: 2019-02-01

## 2019-02-01 RX ORDER — TRAZODONE HYDROCHLORIDE 50 MG/1
50 TABLET ORAL NIGHTLY PRN
Status: CANCELLED | OUTPATIENT
Start: 2019-02-01

## 2019-02-01 RX ORDER — CHOLECALCIFEROL (VITAMIN D3) 10 MCG
1 TABLET ORAL DAILY
Status: CANCELLED | OUTPATIENT
Start: 2019-02-02

## 2019-02-01 RX ORDER — GENTAMICIN SULFATE 1 MG/G
CREAM TOPICAL PRN
Status: CANCELLED | OUTPATIENT
Start: 2019-02-01

## 2019-02-01 RX ORDER — NICOTINE POLACRILEX 4 MG
15 LOZENGE BUCCAL PRN
Status: DISCONTINUED | OUTPATIENT
Start: 2019-02-01 | End: 2019-02-13 | Stop reason: HOSPADM

## 2019-02-01 RX ORDER — NICOTINE POLACRILEX 4 MG
15 LOZENGE BUCCAL PRN
Status: CANCELLED | OUTPATIENT
Start: 2019-02-01

## 2019-02-01 RX ORDER — SODIUM CHLORIDE 0.9 % (FLUSH) 0.9 %
10 SYRINGE (ML) INJECTION PRN
Status: DISCONTINUED | OUTPATIENT
Start: 2019-02-01 | End: 2019-02-13 | Stop reason: HOSPADM

## 2019-02-01 RX ORDER — ASPIRIN 81 MG/1
81 TABLET ORAL DAILY
Status: DISCONTINUED | OUTPATIENT
Start: 2019-02-02 | End: 2019-02-13 | Stop reason: HOSPADM

## 2019-02-01 RX ORDER — DEXTROSE MONOHYDRATE 25 G/50ML
12.5 INJECTION, SOLUTION INTRAVENOUS PRN
Status: DISCONTINUED | OUTPATIENT
Start: 2019-02-01 | End: 2019-02-13 | Stop reason: HOSPADM

## 2019-02-01 RX ORDER — ASPIRIN 81 MG/1
81 TABLET ORAL DAILY
Status: CANCELLED | OUTPATIENT
Start: 2019-02-02

## 2019-02-01 RX ORDER — OXYCODONE HYDROCHLORIDE 5 MG/1
5 TABLET ORAL EVERY 6 HOURS PRN
Status: CANCELLED | OUTPATIENT
Start: 2019-02-01

## 2019-02-01 RX ORDER — ISOSORBIDE MONONITRATE 60 MG/1
60 TABLET, EXTENDED RELEASE ORAL EVERY MORNING
Status: CANCELLED | OUTPATIENT
Start: 2019-02-02

## 2019-02-01 RX ORDER — CARVEDILOL 25 MG/1
25 TABLET ORAL 2 TIMES DAILY WITH MEALS
Status: DISCONTINUED | OUTPATIENT
Start: 2019-02-01 | End: 2019-02-13 | Stop reason: HOSPADM

## 2019-02-01 RX ORDER — POLYETHYLENE GLYCOL 3350 17 G/17G
17 POWDER, FOR SOLUTION ORAL DAILY
Status: DISCONTINUED | OUTPATIENT
Start: 2019-02-02 | End: 2019-02-13 | Stop reason: HOSPADM

## 2019-02-01 RX ORDER — CHOLECALCIFEROL (VITAMIN D3) 10 MCG
1 TABLET ORAL DAILY
Status: DISCONTINUED | OUTPATIENT
Start: 2019-02-02 | End: 2019-02-13 | Stop reason: HOSPADM

## 2019-02-01 RX ORDER — HEPARIN SODIUM 5000 [USP'U]/ML
5000 INJECTION, SOLUTION INTRAVENOUS; SUBCUTANEOUS EVERY 8 HOURS SCHEDULED
Status: DISCONTINUED | OUTPATIENT
Start: 2019-02-01 | End: 2019-02-13 | Stop reason: HOSPADM

## 2019-02-01 RX ORDER — ACETAMINOPHEN 325 MG/1
650 TABLET ORAL EVERY 4 HOURS PRN
Status: CANCELLED | OUTPATIENT
Start: 2019-02-01

## 2019-02-01 RX ORDER — SIMVASTATIN 40 MG
40 TABLET ORAL NIGHTLY
Status: CANCELLED | OUTPATIENT
Start: 2019-02-01

## 2019-02-01 RX ORDER — DEXTROSE MONOHYDRATE 50 MG/ML
100 INJECTION, SOLUTION INTRAVENOUS PRN
Status: DISCONTINUED | OUTPATIENT
Start: 2019-02-01 | End: 2019-02-13 | Stop reason: HOSPADM

## 2019-02-01 RX ORDER — SEVELAMER CARBONATE 800 MG/1
1600 TABLET, FILM COATED ORAL
Status: CANCELLED | OUTPATIENT
Start: 2019-02-01

## 2019-02-01 RX ORDER — POLYETHYLENE GLYCOL 3350 17 G/17G
17 POWDER, FOR SOLUTION ORAL DAILY
Status: CANCELLED | OUTPATIENT
Start: 2019-02-02

## 2019-02-01 RX ORDER — ISOSORBIDE MONONITRATE 30 MG/1
60 TABLET, EXTENDED RELEASE ORAL EVERY MORNING
Status: DISCONTINUED | OUTPATIENT
Start: 2019-02-02 | End: 2019-02-05

## 2019-02-01 RX ORDER — ACETAMINOPHEN 325 MG/1
650 TABLET ORAL EVERY 4 HOURS PRN
Status: DISCONTINUED | OUTPATIENT
Start: 2019-02-01 | End: 2019-02-13 | Stop reason: HOSPADM

## 2019-02-01 RX ORDER — SEVELAMER CARBONATE 800 MG/1
1600 TABLET, FILM COATED ORAL
Status: DISCONTINUED | OUTPATIENT
Start: 2019-02-01 | End: 2019-02-13 | Stop reason: HOSPADM

## 2019-02-01 RX ADMIN — NIFEDIPINE 90 MG: 60 TABLET, FILM COATED, EXTENDED RELEASE ORAL at 09:36

## 2019-02-01 RX ADMIN — SEVELAMER CARBONATE 1600 MG: 800 TABLET, FILM COATED ORAL at 09:55

## 2019-02-01 RX ADMIN — HEPARIN SODIUM 5000 UNITS: 5000 INJECTION INTRAVENOUS; SUBCUTANEOUS at 06:26

## 2019-02-01 RX ADMIN — HEPARIN SODIUM 5000 UNITS: 5000 INJECTION INTRAVENOUS; SUBCUTANEOUS at 22:08

## 2019-02-01 RX ADMIN — CARVEDILOL 25 MG: 25 TABLET, FILM COATED ORAL at 18:55

## 2019-02-01 RX ADMIN — ISOSORBIDE MONONITRATE 60 MG: 60 TABLET, EXTENDED RELEASE ORAL at 09:36

## 2019-02-01 RX ADMIN — INSULIN GLARGINE 5 UNITS: 100 INJECTION, SOLUTION SUBCUTANEOUS at 09:37

## 2019-02-01 RX ADMIN — HEPARIN SODIUM 5000 UNITS: 5000 INJECTION INTRAVENOUS; SUBCUTANEOUS at 14:03

## 2019-02-01 RX ADMIN — SEVELAMER CARBONATE 1600 MG: 800 TABLET, FILM COATED ORAL at 13:57

## 2019-02-01 RX ADMIN — INSULIN LISPRO 1 UNITS: 100 INJECTION, SOLUTION INTRAVENOUS; SUBCUTANEOUS at 22:08

## 2019-02-01 RX ADMIN — SEVELAMER CARBONATE 1600 MG: 800 TABLET, FILM COATED ORAL at 18:54

## 2019-02-01 RX ADMIN — NEPHROCAP 1 MG: 1 CAP ORAL at 09:37

## 2019-02-01 RX ADMIN — POLYETHYLENE GLYCOL (3350) 17 G: 17 POWDER, FOR SOLUTION ORAL at 09:36

## 2019-02-01 RX ADMIN — DOCUSATE SODIUM 100 MG: 100 CAPSULE, LIQUID FILLED ORAL at 09:36

## 2019-02-01 RX ADMIN — Medication 10 ML: at 10:00

## 2019-02-01 RX ADMIN — INSULIN LISPRO 1 UNITS: 100 INJECTION, SOLUTION INTRAVENOUS; SUBCUTANEOUS at 13:58

## 2019-02-01 RX ADMIN — ASPIRIN 81 MG: 81 TABLET, COATED ORAL at 09:36

## 2019-02-01 RX ADMIN — CARVEDILOL 25 MG: 25 TABLET, FILM COATED ORAL at 09:37

## 2019-02-01 RX ADMIN — SIMVASTATIN 40 MG: 40 TABLET, FILM COATED ORAL at 22:08

## 2019-02-01 ASSESSMENT — PAIN SCALES - GENERAL: PAINLEVEL_OUTOF10: 0

## 2019-02-02 LAB
A/G RATIO: 0.6 (ref 1.1–2.2)
ALBUMIN SERPL-MCNC: 2.1 G/DL (ref 3.4–5)
ALP BLD-CCNC: 46 U/L (ref 40–129)
ALT SERPL-CCNC: 6 U/L (ref 10–40)
ANION GAP SERPL CALCULATED.3IONS-SCNC: 16 MMOL/L (ref 3–16)
AST SERPL-CCNC: 7 U/L (ref 15–37)
BILIRUB SERPL-MCNC: <0.2 MG/DL (ref 0–1)
BUN BLDV-MCNC: 46 MG/DL (ref 7–20)
CALCIUM SERPL-MCNC: 7.3 MG/DL (ref 8.3–10.6)
CHLORIDE BLD-SCNC: 97 MMOL/L (ref 99–110)
CO2: 26 MMOL/L (ref 21–32)
CREAT SERPL-MCNC: 9.4 MG/DL (ref 0.6–1.2)
GFR AFRICAN AMERICAN: 5
GFR NON-AFRICAN AMERICAN: 4
GLOBULIN: 3.4 G/DL
GLUCOSE BLD-MCNC: 105 MG/DL (ref 70–99)
GLUCOSE BLD-MCNC: 120 MG/DL (ref 70–99)
GLUCOSE BLD-MCNC: 153 MG/DL (ref 70–99)
GLUCOSE BLD-MCNC: 159 MG/DL (ref 70–99)
GLUCOSE BLD-MCNC: 205 MG/DL (ref 70–99)
HCT VFR BLD CALC: 28.4 % (ref 36–48)
HEMOGLOBIN: 8.9 G/DL (ref 12–16)
MCH RBC QN AUTO: 31.8 PG (ref 26–34)
MCHC RBC AUTO-ENTMCNC: 31.4 G/DL (ref 31–36)
MCV RBC AUTO: 101.3 FL (ref 80–100)
PDW BLD-RTO: 14.2 % (ref 12.4–15.4)
PERFORMED ON: ABNORMAL
PLATELET # BLD: 170 K/UL (ref 135–450)
PMV BLD AUTO: 8.4 FL (ref 5–10.5)
POTASSIUM REFLEX MAGNESIUM: 4.1 MMOL/L (ref 3.5–5.1)
RBC # BLD: 2.8 M/UL (ref 4–5.2)
SODIUM BLD-SCNC: 139 MMOL/L (ref 136–145)
TOTAL PROTEIN: 5.5 G/DL (ref 6.4–8.2)
WBC # BLD: 7.6 K/UL (ref 4–11)

## 2019-02-02 PROCEDURE — 90945 DIALYSIS ONE EVALUATION: CPT

## 2019-02-02 PROCEDURE — 97110 THERAPEUTIC EXERCISES: CPT

## 2019-02-02 PROCEDURE — 3E1M39Z IRRIGATION OF PERITONEAL CAVITY USING DIALYSATE, PERCUTANEOUS APPROACH: ICD-10-PCS | Performed by: INTERNAL MEDICINE

## 2019-02-02 PROCEDURE — 36415 COLL VENOUS BLD VENIPUNCTURE: CPT

## 2019-02-02 PROCEDURE — 80053 COMPREHEN METABOLIC PANEL: CPT

## 2019-02-02 PROCEDURE — 97116 GAIT TRAINING THERAPY: CPT

## 2019-02-02 PROCEDURE — 97165 OT EVAL LOW COMPLEX 30 MIN: CPT

## 2019-02-02 PROCEDURE — 6370000000 HC RX 637 (ALT 250 FOR IP): Performed by: PHYSICAL MEDICINE & REHABILITATION

## 2019-02-02 PROCEDURE — 85027 COMPLETE CBC AUTOMATED: CPT

## 2019-02-02 PROCEDURE — 97530 THERAPEUTIC ACTIVITIES: CPT

## 2019-02-02 PROCEDURE — 97161 PT EVAL LOW COMPLEX 20 MIN: CPT

## 2019-02-02 PROCEDURE — 97535 SELF CARE MNGMENT TRAINING: CPT

## 2019-02-02 PROCEDURE — 6360000002 HC RX W HCPCS: Performed by: PHYSICAL MEDICINE & REHABILITATION

## 2019-02-02 PROCEDURE — 1280000000 HC REHAB R&B

## 2019-02-02 RX ADMIN — SEVELAMER CARBONATE 1600 MG: 800 TABLET, FILM COATED ORAL at 12:00

## 2019-02-02 RX ADMIN — NEPHROCAP 1 MG: 1 CAP ORAL at 08:44

## 2019-02-02 RX ADMIN — NIFEDIPINE 90 MG: 60 TABLET, FILM COATED, EXTENDED RELEASE ORAL at 08:44

## 2019-02-02 RX ADMIN — SEVELAMER CARBONATE 1600 MG: 800 TABLET, FILM COATED ORAL at 17:40

## 2019-02-02 RX ADMIN — OXYCODONE HYDROCHLORIDE 5 MG: 5 TABLET ORAL at 11:29

## 2019-02-02 RX ADMIN — HEPARIN SODIUM 5000 UNITS: 5000 INJECTION INTRAVENOUS; SUBCUTANEOUS at 06:37

## 2019-02-02 RX ADMIN — SIMVASTATIN 40 MG: 40 TABLET, FILM COATED ORAL at 20:37

## 2019-02-02 RX ADMIN — HEPARIN SODIUM 5000 UNITS: 5000 INJECTION INTRAVENOUS; SUBCUTANEOUS at 20:38

## 2019-02-02 RX ADMIN — CARVEDILOL 25 MG: 25 TABLET, FILM COATED ORAL at 17:40

## 2019-02-02 RX ADMIN — SEVELAMER CARBONATE 1600 MG: 800 TABLET, FILM COATED ORAL at 08:43

## 2019-02-02 RX ADMIN — CARVEDILOL 25 MG: 25 TABLET, FILM COATED ORAL at 08:43

## 2019-02-02 RX ADMIN — ASPIRIN 81 MG: 81 TABLET, COATED ORAL at 08:43

## 2019-02-02 RX ADMIN — INSULIN LISPRO 1 UNITS: 100 INJECTION, SOLUTION INTRAVENOUS; SUBCUTANEOUS at 17:41

## 2019-02-02 RX ADMIN — INSULIN GLARGINE 5 UNITS: 100 INJECTION, SOLUTION SUBCUTANEOUS at 08:48

## 2019-02-02 RX ADMIN — POLYETHYLENE GLYCOL 3350 17 G: 17 POWDER, FOR SOLUTION ORAL at 08:44

## 2019-02-02 RX ADMIN — INSULIN LISPRO 1 UNITS: 100 INJECTION, SOLUTION INTRAVENOUS; SUBCUTANEOUS at 20:37

## 2019-02-02 RX ADMIN — ISOSORBIDE MONONITRATE 60 MG: 30 TABLET, EXTENDED RELEASE ORAL at 08:44

## 2019-02-02 RX ADMIN — HEPARIN SODIUM 5000 UNITS: 5000 INJECTION INTRAVENOUS; SUBCUTANEOUS at 15:40

## 2019-02-02 ASSESSMENT — PAIN SCALES - GENERAL
PAINLEVEL_OUTOF10: 8
PAINLEVEL_OUTOF10: 0

## 2019-02-03 LAB
GLUCOSE BLD-MCNC: 101 MG/DL (ref 70–99)
GLUCOSE BLD-MCNC: 133 MG/DL (ref 70–99)
GLUCOSE BLD-MCNC: 194 MG/DL (ref 70–99)
GLUCOSE BLD-MCNC: 195 MG/DL (ref 70–99)
GLUCOSE BLD-MCNC: 204 MG/DL (ref 70–99)
PERFORMED ON: ABNORMAL

## 2019-02-03 PROCEDURE — 1280000000 HC REHAB R&B

## 2019-02-03 PROCEDURE — 6360000002 HC RX W HCPCS: Performed by: PHYSICAL MEDICINE & REHABILITATION

## 2019-02-03 PROCEDURE — 94150 VITAL CAPACITY TEST: CPT

## 2019-02-03 PROCEDURE — 94664 DEMO&/EVAL PT USE INHALER: CPT

## 2019-02-03 PROCEDURE — 6370000000 HC RX 637 (ALT 250 FOR IP): Performed by: PHYSICAL MEDICINE & REHABILITATION

## 2019-02-03 PROCEDURE — 94761 N-INVAS EAR/PLS OXIMETRY MLT: CPT

## 2019-02-03 PROCEDURE — 90945 DIALYSIS ONE EVALUATION: CPT

## 2019-02-03 RX ADMIN — HEPARIN SODIUM 5000 UNITS: 5000 INJECTION INTRAVENOUS; SUBCUTANEOUS at 20:22

## 2019-02-03 RX ADMIN — NEPHROCAP 1 MG: 1 CAP ORAL at 08:17

## 2019-02-03 RX ADMIN — SEVELAMER CARBONATE 1600 MG: 800 TABLET, FILM COATED ORAL at 08:19

## 2019-02-03 RX ADMIN — INSULIN LISPRO 1 UNITS: 100 INJECTION, SOLUTION INTRAVENOUS; SUBCUTANEOUS at 11:59

## 2019-02-03 RX ADMIN — HEPARIN SODIUM 5000 UNITS: 5000 INJECTION INTRAVENOUS; SUBCUTANEOUS at 14:00

## 2019-02-03 RX ADMIN — CARVEDILOL 25 MG: 25 TABLET, FILM COATED ORAL at 17:00

## 2019-02-03 RX ADMIN — INSULIN GLARGINE 5 UNITS: 100 INJECTION, SOLUTION SUBCUTANEOUS at 08:31

## 2019-02-03 RX ADMIN — POLYETHYLENE GLYCOL 3350 17 G: 17 POWDER, FOR SOLUTION ORAL at 08:16

## 2019-02-03 RX ADMIN — ASPIRIN 81 MG: 81 TABLET, COATED ORAL at 08:19

## 2019-02-03 RX ADMIN — CARVEDILOL 25 MG: 25 TABLET, FILM COATED ORAL at 08:18

## 2019-02-03 RX ADMIN — GENTAMICIN SULFATE: 1 CREAM TOPICAL at 18:57

## 2019-02-03 RX ADMIN — INSULIN LISPRO 1 UNITS: 100 INJECTION, SOLUTION INTRAVENOUS; SUBCUTANEOUS at 20:19

## 2019-02-03 RX ADMIN — SEVELAMER CARBONATE 1600 MG: 800 TABLET, FILM COATED ORAL at 17:30

## 2019-02-03 RX ADMIN — HEPARIN SODIUM 5000 UNITS: 5000 INJECTION INTRAVENOUS; SUBCUTANEOUS at 06:36

## 2019-02-03 RX ADMIN — NIFEDIPINE 90 MG: 60 TABLET, FILM COATED, EXTENDED RELEASE ORAL at 08:18

## 2019-02-03 RX ADMIN — ISOSORBIDE MONONITRATE 60 MG: 30 TABLET, EXTENDED RELEASE ORAL at 08:17

## 2019-02-03 RX ADMIN — SIMVASTATIN 40 MG: 40 TABLET, FILM COATED ORAL at 20:14

## 2019-02-03 RX ADMIN — SEVELAMER CARBONATE 1600 MG: 800 TABLET, FILM COATED ORAL at 12:22

## 2019-02-03 ASSESSMENT — PAIN SCALES - GENERAL
PAINLEVEL_OUTOF10: 0

## 2019-02-04 LAB
ANION GAP SERPL CALCULATED.3IONS-SCNC: 17 MMOL/L (ref 3–16)
BASOPHILS ABSOLUTE: 0.1 K/UL (ref 0–0.2)
BASOPHILS RELATIVE PERCENT: 0.7 %
BUN BLDV-MCNC: 48 MG/DL (ref 7–20)
CALCIUM SERPL-MCNC: 7.6 MG/DL (ref 8.3–10.6)
CHLORIDE BLD-SCNC: 97 MMOL/L (ref 99–110)
CO2: 25 MMOL/L (ref 21–32)
CREAT SERPL-MCNC: 9.9 MG/DL (ref 0.6–1.2)
EOSINOPHILS ABSOLUTE: 0.1 K/UL (ref 0–0.6)
EOSINOPHILS RELATIVE PERCENT: 1.6 %
GFR AFRICAN AMERICAN: 5
GFR NON-AFRICAN AMERICAN: 4
GLUCOSE BLD-MCNC: 124 MG/DL (ref 70–99)
GLUCOSE BLD-MCNC: 144 MG/DL (ref 70–99)
GLUCOSE BLD-MCNC: 150 MG/DL (ref 70–99)
GLUCOSE BLD-MCNC: 160 MG/DL (ref 70–99)
GLUCOSE BLD-MCNC: 164 MG/DL (ref 70–99)
HCT VFR BLD CALC: 26.7 % (ref 36–48)
HEMOGLOBIN: 8.6 G/DL (ref 12–16)
LYMPHOCYTES ABSOLUTE: 1.3 K/UL (ref 1–5.1)
LYMPHOCYTES RELATIVE PERCENT: 15.7 %
MCH RBC QN AUTO: 32.2 PG (ref 26–34)
MCHC RBC AUTO-ENTMCNC: 32.2 G/DL (ref 31–36)
MCV RBC AUTO: 99.8 FL (ref 80–100)
MONOCYTES ABSOLUTE: 0.9 K/UL (ref 0–1.3)
MONOCYTES RELATIVE PERCENT: 11.5 %
NEUTROPHILS ABSOLUTE: 5.7 K/UL (ref 1.7–7.7)
NEUTROPHILS RELATIVE PERCENT: 70.5 %
PDW BLD-RTO: 14.3 % (ref 12.4–15.4)
PERFORMED ON: ABNORMAL
PLATELET # BLD: 175 K/UL (ref 135–450)
PMV BLD AUTO: 8.2 FL (ref 5–10.5)
POTASSIUM REFLEX MAGNESIUM: 4.1 MMOL/L (ref 3.5–5.1)
RBC # BLD: 2.67 M/UL (ref 4–5.2)
SODIUM BLD-SCNC: 139 MMOL/L (ref 136–145)
WBC # BLD: 8 K/UL (ref 4–11)

## 2019-02-04 PROCEDURE — 97530 THERAPEUTIC ACTIVITIES: CPT

## 2019-02-04 PROCEDURE — 1280000000 HC REHAB R&B

## 2019-02-04 PROCEDURE — 90740 HEPB VACC 3 DOSE IMMUNSUP IM: CPT | Performed by: INTERNAL MEDICINE

## 2019-02-04 PROCEDURE — 36415 COLL VENOUS BLD VENIPUNCTURE: CPT

## 2019-02-04 PROCEDURE — G0010 ADMIN HEPATITIS B VACCINE: HCPCS | Performed by: INTERNAL MEDICINE

## 2019-02-04 PROCEDURE — 97116 GAIT TRAINING THERAPY: CPT

## 2019-02-04 PROCEDURE — 97535 SELF CARE MNGMENT TRAINING: CPT

## 2019-02-04 PROCEDURE — 6360000002 HC RX W HCPCS: Performed by: INTERNAL MEDICINE

## 2019-02-04 PROCEDURE — 97110 THERAPEUTIC EXERCISES: CPT

## 2019-02-04 PROCEDURE — 85025 COMPLETE CBC W/AUTO DIFF WBC: CPT

## 2019-02-04 PROCEDURE — 6370000000 HC RX 637 (ALT 250 FOR IP): Performed by: PHYSICAL MEDICINE & REHABILITATION

## 2019-02-04 PROCEDURE — 80048 BASIC METABOLIC PNL TOTAL CA: CPT

## 2019-02-04 PROCEDURE — 6360000002 HC RX W HCPCS: Performed by: PHYSICAL MEDICINE & REHABILITATION

## 2019-02-04 RX ADMIN — ISOSORBIDE MONONITRATE 60 MG: 30 TABLET, EXTENDED RELEASE ORAL at 10:19

## 2019-02-04 RX ADMIN — INSULIN LISPRO 1 UNITS: 100 INJECTION, SOLUTION INTRAVENOUS; SUBCUTANEOUS at 12:34

## 2019-02-04 RX ADMIN — HEPARIN SODIUM 5000 UNITS: 5000 INJECTION INTRAVENOUS; SUBCUTANEOUS at 15:11

## 2019-02-04 RX ADMIN — POLYETHYLENE GLYCOL 3350 17 G: 17 POWDER, FOR SOLUTION ORAL at 10:21

## 2019-02-04 RX ADMIN — HEPATITIS B VACCINE (RECOMBINANT) 1 ML: 20 INJECTION, SUSPENSION INTRAMUSCULAR at 22:57

## 2019-02-04 RX ADMIN — HEPARIN SODIUM 5000 UNITS: 5000 INJECTION INTRAVENOUS; SUBCUTANEOUS at 05:35

## 2019-02-04 RX ADMIN — HEPARIN SODIUM 5000 UNITS: 5000 INJECTION INTRAVENOUS; SUBCUTANEOUS at 22:55

## 2019-02-04 RX ADMIN — INSULIN LISPRO 1 UNITS: 100 INJECTION, SOLUTION INTRAVENOUS; SUBCUTANEOUS at 18:04

## 2019-02-04 RX ADMIN — NEPHROCAP 1 MG: 1 CAP ORAL at 10:19

## 2019-02-04 RX ADMIN — SIMVASTATIN 40 MG: 40 TABLET, FILM COATED ORAL at 22:53

## 2019-02-04 RX ADMIN — ASPIRIN 81 MG: 81 TABLET, COATED ORAL at 10:19

## 2019-02-04 RX ADMIN — CARVEDILOL 25 MG: 25 TABLET, FILM COATED ORAL at 18:03

## 2019-02-04 RX ADMIN — INSULIN GLARGINE 5 UNITS: 100 INJECTION, SOLUTION SUBCUTANEOUS at 10:32

## 2019-02-04 RX ADMIN — NIFEDIPINE 90 MG: 60 TABLET, FILM COATED, EXTENDED RELEASE ORAL at 10:20

## 2019-02-04 RX ADMIN — CARVEDILOL 25 MG: 25 TABLET, FILM COATED ORAL at 10:20

## 2019-02-04 RX ADMIN — SEVELAMER CARBONATE 1600 MG: 800 TABLET, FILM COATED ORAL at 18:03

## 2019-02-04 RX ADMIN — SEVELAMER CARBONATE 1600 MG: 800 TABLET, FILM COATED ORAL at 10:19

## 2019-02-04 RX ADMIN — INSULIN LISPRO 1 UNITS: 100 INJECTION, SOLUTION INTRAVENOUS; SUBCUTANEOUS at 22:53

## 2019-02-04 ASSESSMENT — PAIN SCALES - GENERAL
PAINLEVEL_OUTOF10: 0

## 2019-02-05 LAB
GLUCOSE BLD-MCNC: 138 MG/DL (ref 70–99)
GLUCOSE BLD-MCNC: 143 MG/DL (ref 70–99)
GLUCOSE BLD-MCNC: 206 MG/DL (ref 70–99)
GLUCOSE BLD-MCNC: 96 MG/DL (ref 70–99)
PERFORMED ON: ABNORMAL
PERFORMED ON: NORMAL

## 2019-02-05 PROCEDURE — 97110 THERAPEUTIC EXERCISES: CPT

## 2019-02-05 PROCEDURE — 90945 DIALYSIS ONE EVALUATION: CPT

## 2019-02-05 PROCEDURE — 97530 THERAPEUTIC ACTIVITIES: CPT

## 2019-02-05 PROCEDURE — 6370000000 HC RX 637 (ALT 250 FOR IP): Performed by: INTERNAL MEDICINE

## 2019-02-05 PROCEDURE — 6360000002 HC RX W HCPCS: Performed by: PHYSICAL MEDICINE & REHABILITATION

## 2019-02-05 PROCEDURE — 97116 GAIT TRAINING THERAPY: CPT

## 2019-02-05 PROCEDURE — 1280000000 HC REHAB R&B

## 2019-02-05 PROCEDURE — 6370000000 HC RX 637 (ALT 250 FOR IP): Performed by: PHYSICAL MEDICINE & REHABILITATION

## 2019-02-05 RX ORDER — ISOSORBIDE MONONITRATE 30 MG/1
30 TABLET, EXTENDED RELEASE ORAL EVERY MORNING
Status: DISCONTINUED | OUTPATIENT
Start: 2019-02-05 | End: 2019-02-07

## 2019-02-05 RX ADMIN — ASPIRIN 81 MG: 81 TABLET, COATED ORAL at 10:12

## 2019-02-05 RX ADMIN — SEVELAMER CARBONATE 1600 MG: 800 TABLET, FILM COATED ORAL at 17:46

## 2019-02-05 RX ADMIN — HEPARIN SODIUM 5000 UNITS: 5000 INJECTION INTRAVENOUS; SUBCUTANEOUS at 15:08

## 2019-02-05 RX ADMIN — CARVEDILOL 25 MG: 25 TABLET, FILM COATED ORAL at 17:46

## 2019-02-05 RX ADMIN — ISOSORBIDE MONONITRATE 30 MG: 30 TABLET, EXTENDED RELEASE ORAL at 10:12

## 2019-02-05 RX ADMIN — SEVELAMER CARBONATE 1600 MG: 800 TABLET, FILM COATED ORAL at 11:54

## 2019-02-05 RX ADMIN — CARVEDILOL 25 MG: 25 TABLET, FILM COATED ORAL at 10:11

## 2019-02-05 RX ADMIN — HEPARIN SODIUM 5000 UNITS: 5000 INJECTION INTRAVENOUS; SUBCUTANEOUS at 21:16

## 2019-02-05 RX ADMIN — GENTAMICIN SULFATE: 1 CREAM TOPICAL at 17:15

## 2019-02-05 RX ADMIN — HEPARIN SODIUM 5000 UNITS: 5000 INJECTION INTRAVENOUS; SUBCUTANEOUS at 06:49

## 2019-02-05 RX ADMIN — POLYETHYLENE GLYCOL 3350 17 G: 17 POWDER, FOR SOLUTION ORAL at 10:13

## 2019-02-05 RX ADMIN — INSULIN LISPRO 1 UNITS: 100 INJECTION, SOLUTION INTRAVENOUS; SUBCUTANEOUS at 21:16

## 2019-02-05 RX ADMIN — SIMVASTATIN 40 MG: 40 TABLET, FILM COATED ORAL at 21:15

## 2019-02-05 RX ADMIN — NEPHROCAP 1 MG: 1 CAP ORAL at 10:11

## 2019-02-05 RX ADMIN — INSULIN GLARGINE 5 UNITS: 100 INJECTION, SOLUTION SUBCUTANEOUS at 10:15

## 2019-02-05 ASSESSMENT — PAIN SCALES - GENERAL
PAINLEVEL_OUTOF10: 0
PAINLEVEL_OUTOF10: 0

## 2019-02-06 LAB
ALBUMIN SERPL-MCNC: 2.2 G/DL (ref 3.4–5)
ANION GAP SERPL CALCULATED.3IONS-SCNC: 16 MMOL/L (ref 3–16)
BUN BLDV-MCNC: 46 MG/DL (ref 7–20)
CALCIUM SERPL-MCNC: 8 MG/DL (ref 8.3–10.6)
CHLORIDE BLD-SCNC: 100 MMOL/L (ref 99–110)
CO2: 25 MMOL/L (ref 21–32)
CREAT SERPL-MCNC: 9.4 MG/DL (ref 0.6–1.2)
GFR AFRICAN AMERICAN: 5
GFR NON-AFRICAN AMERICAN: 4
GLUCOSE BLD-MCNC: 112 MG/DL (ref 70–99)
GLUCOSE BLD-MCNC: 161 MG/DL (ref 70–99)
GLUCOSE BLD-MCNC: 182 MG/DL (ref 70–99)
GLUCOSE BLD-MCNC: 81 MG/DL (ref 70–99)
GLUCOSE BLD-MCNC: 89 MG/DL (ref 70–99)
PERFORMED ON: ABNORMAL
PERFORMED ON: NORMAL
PHOSPHORUS: 6.4 MG/DL (ref 2.5–4.9)
POTASSIUM SERPL-SCNC: 4.2 MMOL/L (ref 3.5–5.1)
SODIUM BLD-SCNC: 141 MMOL/L (ref 136–145)

## 2019-02-06 PROCEDURE — 1280000000 HC REHAB R&B

## 2019-02-06 PROCEDURE — 6360000002 HC RX W HCPCS: Performed by: PHYSICAL MEDICINE & REHABILITATION

## 2019-02-06 PROCEDURE — 97535 SELF CARE MNGMENT TRAINING: CPT

## 2019-02-06 PROCEDURE — 97110 THERAPEUTIC EXERCISES: CPT

## 2019-02-06 PROCEDURE — 6370000000 HC RX 637 (ALT 250 FOR IP): Performed by: PHYSICAL MEDICINE & REHABILITATION

## 2019-02-06 PROCEDURE — 36415 COLL VENOUS BLD VENIPUNCTURE: CPT

## 2019-02-06 PROCEDURE — 6370000000 HC RX 637 (ALT 250 FOR IP): Performed by: INTERNAL MEDICINE

## 2019-02-06 PROCEDURE — 80069 RENAL FUNCTION PANEL: CPT

## 2019-02-06 PROCEDURE — 97530 THERAPEUTIC ACTIVITIES: CPT

## 2019-02-06 PROCEDURE — 90945 DIALYSIS ONE EVALUATION: CPT

## 2019-02-06 PROCEDURE — 97116 GAIT TRAINING THERAPY: CPT

## 2019-02-06 RX ADMIN — HEPARIN SODIUM 5000 UNITS: 5000 INJECTION INTRAVENOUS; SUBCUTANEOUS at 21:37

## 2019-02-06 RX ADMIN — SEVELAMER CARBONATE 1600 MG: 800 TABLET, FILM COATED ORAL at 12:38

## 2019-02-06 RX ADMIN — HEPARIN SODIUM 5000 UNITS: 5000 INJECTION INTRAVENOUS; SUBCUTANEOUS at 15:25

## 2019-02-06 RX ADMIN — DARBEPOETIN ALFA 60 MCG: 60 INJECTION, SOLUTION INTRAVENOUS; SUBCUTANEOUS at 09:14

## 2019-02-06 RX ADMIN — CARVEDILOL 25 MG: 25 TABLET, FILM COATED ORAL at 17:25

## 2019-02-06 RX ADMIN — SIMVASTATIN 40 MG: 40 TABLET, FILM COATED ORAL at 21:28

## 2019-02-06 RX ADMIN — INSULIN GLARGINE 5 UNITS: 100 INJECTION, SOLUTION SUBCUTANEOUS at 09:15

## 2019-02-06 RX ADMIN — SEVELAMER CARBONATE 1600 MG: 800 TABLET, FILM COATED ORAL at 09:00

## 2019-02-06 RX ADMIN — ISOSORBIDE MONONITRATE 30 MG: 30 TABLET, EXTENDED RELEASE ORAL at 09:00

## 2019-02-06 RX ADMIN — GENTAMICIN SULFATE: 1 CREAM TOPICAL at 16:25

## 2019-02-06 RX ADMIN — INSULIN LISPRO 1 UNITS: 100 INJECTION, SOLUTION INTRAVENOUS; SUBCUTANEOUS at 12:27

## 2019-02-06 RX ADMIN — INSULIN LISPRO 1 UNITS: 100 INJECTION, SOLUTION INTRAVENOUS; SUBCUTANEOUS at 21:36

## 2019-02-06 RX ADMIN — CARVEDILOL 25 MG: 25 TABLET, FILM COATED ORAL at 09:00

## 2019-02-06 RX ADMIN — ASPIRIN 81 MG: 81 TABLET, COATED ORAL at 09:00

## 2019-02-06 RX ADMIN — NEPHROCAP 1 MG: 1 CAP ORAL at 09:00

## 2019-02-06 RX ADMIN — HEPARIN SODIUM 5000 UNITS: 5000 INJECTION INTRAVENOUS; SUBCUTANEOUS at 06:25

## 2019-02-06 RX ADMIN — POLYETHYLENE GLYCOL 3350 17 G: 17 POWDER, FOR SOLUTION ORAL at 09:00

## 2019-02-06 RX ADMIN — SEVELAMER CARBONATE 1600 MG: 800 TABLET, FILM COATED ORAL at 17:25

## 2019-02-06 ASSESSMENT — PAIN SCALES - GENERAL
PAINLEVEL_OUTOF10: 0
PAINLEVEL_OUTOF10: 0

## 2019-02-07 LAB
ANION GAP SERPL CALCULATED.3IONS-SCNC: 16 MMOL/L (ref 3–16)
BASOPHILS ABSOLUTE: 0.1 K/UL (ref 0–0.2)
BASOPHILS RELATIVE PERCENT: 1.1 %
BUN BLDV-MCNC: 43 MG/DL (ref 7–20)
CALCIUM SERPL-MCNC: 7.7 MG/DL (ref 8.3–10.6)
CHLORIDE BLD-SCNC: 98 MMOL/L (ref 99–110)
CO2: 24 MMOL/L (ref 21–32)
CREAT SERPL-MCNC: 10 MG/DL (ref 0.6–1.2)
EOSINOPHILS ABSOLUTE: 0.1 K/UL (ref 0–0.6)
EOSINOPHILS RELATIVE PERCENT: 2 %
GFR AFRICAN AMERICAN: 5
GFR NON-AFRICAN AMERICAN: 4
GLUCOSE BLD-MCNC: 106 MG/DL (ref 70–99)
GLUCOSE BLD-MCNC: 157 MG/DL (ref 70–99)
GLUCOSE BLD-MCNC: 78 MG/DL (ref 70–99)
GLUCOSE BLD-MCNC: 84 MG/DL (ref 70–99)
GLUCOSE BLD-MCNC: 95 MG/DL (ref 70–99)
HCT VFR BLD CALC: 26.8 % (ref 36–48)
HEMOGLOBIN: 8.6 G/DL (ref 12–16)
LYMPHOCYTES ABSOLUTE: 1.4 K/UL (ref 1–5.1)
LYMPHOCYTES RELATIVE PERCENT: 23.1 %
MCH RBC QN AUTO: 32.5 PG (ref 26–34)
MCHC RBC AUTO-ENTMCNC: 32.1 G/DL (ref 31–36)
MCV RBC AUTO: 101.2 FL (ref 80–100)
MONOCYTES ABSOLUTE: 0.9 K/UL (ref 0–1.3)
MONOCYTES RELATIVE PERCENT: 14.6 %
NEUTROPHILS ABSOLUTE: 3.5 K/UL (ref 1.7–7.7)
NEUTROPHILS RELATIVE PERCENT: 59.2 %
PDW BLD-RTO: 14.4 % (ref 12.4–15.4)
PERFORMED ON: ABNORMAL
PERFORMED ON: ABNORMAL
PERFORMED ON: NORMAL
PERFORMED ON: NORMAL
PLATELET # BLD: 171 K/UL (ref 135–450)
PMV BLD AUTO: 8.6 FL (ref 5–10.5)
POTASSIUM REFLEX MAGNESIUM: 3.9 MMOL/L (ref 3.5–5.1)
RBC # BLD: 2.65 M/UL (ref 4–5.2)
SODIUM BLD-SCNC: 138 MMOL/L (ref 136–145)
WBC # BLD: 6 K/UL (ref 4–11)

## 2019-02-07 PROCEDURE — 97530 THERAPEUTIC ACTIVITIES: CPT

## 2019-02-07 PROCEDURE — 6370000000 HC RX 637 (ALT 250 FOR IP): Performed by: PHYSICAL MEDICINE & REHABILITATION

## 2019-02-07 PROCEDURE — 6360000002 HC RX W HCPCS: Performed by: PHYSICAL MEDICINE & REHABILITATION

## 2019-02-07 PROCEDURE — 97110 THERAPEUTIC EXERCISES: CPT

## 2019-02-07 PROCEDURE — 97116 GAIT TRAINING THERAPY: CPT

## 2019-02-07 PROCEDURE — 1280000000 HC REHAB R&B

## 2019-02-07 PROCEDURE — 85025 COMPLETE CBC W/AUTO DIFF WBC: CPT

## 2019-02-07 PROCEDURE — 36415 COLL VENOUS BLD VENIPUNCTURE: CPT

## 2019-02-07 PROCEDURE — 80048 BASIC METABOLIC PNL TOTAL CA: CPT

## 2019-02-07 RX ADMIN — INSULIN LISPRO 1 UNITS: 100 INJECTION, SOLUTION INTRAVENOUS; SUBCUTANEOUS at 22:01

## 2019-02-07 RX ADMIN — HEPARIN SODIUM 5000 UNITS: 5000 INJECTION INTRAVENOUS; SUBCUTANEOUS at 13:55

## 2019-02-07 RX ADMIN — INSULIN GLARGINE 5 UNITS: 100 INJECTION, SOLUTION SUBCUTANEOUS at 09:02

## 2019-02-07 RX ADMIN — ASPIRIN 81 MG: 81 TABLET, COATED ORAL at 08:59

## 2019-02-07 RX ADMIN — HEPARIN SODIUM 5000 UNITS: 5000 INJECTION INTRAVENOUS; SUBCUTANEOUS at 06:17

## 2019-02-07 RX ADMIN — SEVELAMER CARBONATE 1600 MG: 800 TABLET, FILM COATED ORAL at 17:59

## 2019-02-07 RX ADMIN — SEVELAMER CARBONATE 1600 MG: 800 TABLET, FILM COATED ORAL at 08:58

## 2019-02-07 RX ADMIN — SIMVASTATIN 40 MG: 40 TABLET, FILM COATED ORAL at 21:56

## 2019-02-07 RX ADMIN — CARVEDILOL 25 MG: 25 TABLET, FILM COATED ORAL at 17:55

## 2019-02-07 RX ADMIN — SEVELAMER CARBONATE 1600 MG: 800 TABLET, FILM COATED ORAL at 12:43

## 2019-02-07 RX ADMIN — NEPHROCAP 1 MG: 1 CAP ORAL at 08:58

## 2019-02-07 RX ADMIN — OXYCODONE HYDROCHLORIDE 5 MG: 5 TABLET ORAL at 13:54

## 2019-02-07 RX ADMIN — HEPARIN SODIUM 5000 UNITS: 5000 INJECTION INTRAVENOUS; SUBCUTANEOUS at 22:02

## 2019-02-07 ASSESSMENT — PAIN DESCRIPTION - FREQUENCY: FREQUENCY: CONTINUOUS

## 2019-02-07 ASSESSMENT — PAIN DESCRIPTION - ORIENTATION: ORIENTATION: LEFT

## 2019-02-07 ASSESSMENT — PAIN SCALES - GENERAL
PAINLEVEL_OUTOF10: 0
PAINLEVEL_OUTOF10: 0
PAINLEVEL_OUTOF10: 7
PAINLEVEL_OUTOF10: 0

## 2019-02-07 ASSESSMENT — PAIN DESCRIPTION - PAIN TYPE: TYPE: ACUTE PAIN

## 2019-02-07 ASSESSMENT — PAIN DESCRIPTION - ONSET: ONSET: ON-GOING

## 2019-02-07 ASSESSMENT — PAIN DESCRIPTION - DESCRIPTORS: DESCRIPTORS: ACHING

## 2019-02-07 ASSESSMENT — PAIN DESCRIPTION - LOCATION: LOCATION: KNEE

## 2019-02-08 LAB
GLUCOSE BLD-MCNC: 165 MG/DL (ref 70–99)
GLUCOSE BLD-MCNC: 76 MG/DL (ref 70–99)
GLUCOSE BLD-MCNC: 90 MG/DL (ref 70–99)
GLUCOSE BLD-MCNC: 99 MG/DL (ref 70–99)
PERFORMED ON: ABNORMAL
PERFORMED ON: NORMAL

## 2019-02-08 PROCEDURE — 97530 THERAPEUTIC ACTIVITIES: CPT

## 2019-02-08 PROCEDURE — 6360000002 HC RX W HCPCS: Performed by: PHYSICAL MEDICINE & REHABILITATION

## 2019-02-08 PROCEDURE — 90945 DIALYSIS ONE EVALUATION: CPT

## 2019-02-08 PROCEDURE — 97535 SELF CARE MNGMENT TRAINING: CPT

## 2019-02-08 PROCEDURE — 6370000000 HC RX 637 (ALT 250 FOR IP): Performed by: PHYSICAL MEDICINE & REHABILITATION

## 2019-02-08 PROCEDURE — 97110 THERAPEUTIC EXERCISES: CPT

## 2019-02-08 PROCEDURE — 1280000000 HC REHAB R&B

## 2019-02-08 PROCEDURE — 97116 GAIT TRAINING THERAPY: CPT

## 2019-02-08 RX ADMIN — GENTAMICIN SULFATE: 1 CREAM TOPICAL at 18:47

## 2019-02-08 RX ADMIN — HEPARIN SODIUM 5000 UNITS: 5000 INJECTION INTRAVENOUS; SUBCUTANEOUS at 05:54

## 2019-02-08 RX ADMIN — SIMVASTATIN 40 MG: 40 TABLET, FILM COATED ORAL at 20:39

## 2019-02-08 RX ADMIN — INSULIN LISPRO 1 UNITS: 100 INJECTION, SOLUTION INTRAVENOUS; SUBCUTANEOUS at 20:39

## 2019-02-08 RX ADMIN — NEPHROCAP 1 MG: 1 CAP ORAL at 08:16

## 2019-02-08 RX ADMIN — SEVELAMER CARBONATE 1600 MG: 800 TABLET, FILM COATED ORAL at 08:16

## 2019-02-08 RX ADMIN — CARVEDILOL 25 MG: 25 TABLET, FILM COATED ORAL at 08:16

## 2019-02-08 RX ADMIN — HEPARIN SODIUM 5000 UNITS: 5000 INJECTION INTRAVENOUS; SUBCUTANEOUS at 20:40

## 2019-02-08 RX ADMIN — ASPIRIN 81 MG: 81 TABLET, COATED ORAL at 08:16

## 2019-02-08 RX ADMIN — INSULIN GLARGINE 5 UNITS: 100 INJECTION, SOLUTION SUBCUTANEOUS at 08:12

## 2019-02-08 RX ADMIN — CARVEDILOL 25 MG: 25 TABLET, FILM COATED ORAL at 18:27

## 2019-02-08 RX ADMIN — SEVELAMER CARBONATE 1600 MG: 800 TABLET, FILM COATED ORAL at 12:09

## 2019-02-08 RX ADMIN — HEPARIN SODIUM 5000 UNITS: 5000 INJECTION INTRAVENOUS; SUBCUTANEOUS at 14:05

## 2019-02-08 ASSESSMENT — PAIN SCALES - GENERAL
PAINLEVEL_OUTOF10: 0

## 2019-02-09 LAB
GLUCOSE BLD-MCNC: 108 MG/DL (ref 70–99)
GLUCOSE BLD-MCNC: 168 MG/DL (ref 70–99)
GLUCOSE BLD-MCNC: 210 MG/DL (ref 70–99)
GLUCOSE BLD-MCNC: 95 MG/DL (ref 70–99)
PERFORMED ON: ABNORMAL
PERFORMED ON: NORMAL

## 2019-02-09 PROCEDURE — 1280000000 HC REHAB R&B

## 2019-02-09 PROCEDURE — 6360000002 HC RX W HCPCS: Performed by: PHYSICAL MEDICINE & REHABILITATION

## 2019-02-09 PROCEDURE — 6370000000 HC RX 637 (ALT 250 FOR IP): Performed by: PHYSICAL MEDICINE & REHABILITATION

## 2019-02-09 RX ADMIN — CARVEDILOL 25 MG: 25 TABLET, FILM COATED ORAL at 18:57

## 2019-02-09 RX ADMIN — INSULIN LISPRO 1 UNITS: 100 INJECTION, SOLUTION INTRAVENOUS; SUBCUTANEOUS at 20:57

## 2019-02-09 RX ADMIN — SEVELAMER CARBONATE 1600 MG: 800 TABLET, FILM COATED ORAL at 12:49

## 2019-02-09 RX ADMIN — SIMVASTATIN 40 MG: 40 TABLET, FILM COATED ORAL at 20:56

## 2019-02-09 RX ADMIN — INSULIN GLARGINE 5 UNITS: 100 INJECTION, SOLUTION SUBCUTANEOUS at 12:51

## 2019-02-09 RX ADMIN — INSULIN LISPRO 2 UNITS: 100 INJECTION, SOLUTION INTRAVENOUS; SUBCUTANEOUS at 12:51

## 2019-02-09 RX ADMIN — CARVEDILOL 25 MG: 25 TABLET, FILM COATED ORAL at 09:14

## 2019-02-09 RX ADMIN — HEPARIN SODIUM 5000 UNITS: 5000 INJECTION INTRAVENOUS; SUBCUTANEOUS at 20:56

## 2019-02-09 RX ADMIN — HEPARIN SODIUM 5000 UNITS: 5000 INJECTION INTRAVENOUS; SUBCUTANEOUS at 06:45

## 2019-02-09 RX ADMIN — ACETAMINOPHEN 650 MG: 325 TABLET, FILM COATED ORAL at 22:50

## 2019-02-09 RX ADMIN — ASPIRIN 81 MG: 81 TABLET, COATED ORAL at 09:13

## 2019-02-09 RX ADMIN — HEPARIN SODIUM 5000 UNITS: 5000 INJECTION INTRAVENOUS; SUBCUTANEOUS at 14:38

## 2019-02-09 RX ADMIN — NEPHROCAP 1 MG: 1 CAP ORAL at 09:13

## 2019-02-09 RX ADMIN — SEVELAMER CARBONATE 1600 MG: 800 TABLET, FILM COATED ORAL at 09:14

## 2019-02-09 ASSESSMENT — PAIN SCALES - GENERAL
PAINLEVEL_OUTOF10: 0
PAINLEVEL_OUTOF10: 0
PAINLEVEL_OUTOF10: 3
PAINLEVEL_OUTOF10: 0

## 2019-02-09 ASSESSMENT — PAIN DESCRIPTION - ONSET: ONSET: AWAKENED FROM SLEEP

## 2019-02-09 ASSESSMENT — PAIN DESCRIPTION - LOCATION: LOCATION: HEAD

## 2019-02-09 ASSESSMENT — PAIN DESCRIPTION - PAIN TYPE: TYPE: ACUTE PAIN

## 2019-02-09 ASSESSMENT — PAIN DESCRIPTION - DESCRIPTORS: DESCRIPTORS: HEADACHE

## 2019-02-09 ASSESSMENT — PAIN DESCRIPTION - PROGRESSION: CLINICAL_PROGRESSION: GRADUALLY WORSENING

## 2019-02-10 LAB
GLUCOSE BLD-MCNC: 162 MG/DL (ref 70–99)
GLUCOSE BLD-MCNC: 204 MG/DL (ref 70–99)
GLUCOSE BLD-MCNC: 79 MG/DL (ref 70–99)
GLUCOSE BLD-MCNC: 92 MG/DL (ref 70–99)
PERFORMED ON: ABNORMAL
PERFORMED ON: ABNORMAL
PERFORMED ON: NORMAL
PERFORMED ON: NORMAL

## 2019-02-10 PROCEDURE — 1280000000 HC REHAB R&B

## 2019-02-10 PROCEDURE — 6370000000 HC RX 637 (ALT 250 FOR IP): Performed by: PHYSICAL MEDICINE & REHABILITATION

## 2019-02-10 PROCEDURE — 6360000002 HC RX W HCPCS: Performed by: PHYSICAL MEDICINE & REHABILITATION

## 2019-02-10 RX ADMIN — ASPIRIN 81 MG: 81 TABLET, COATED ORAL at 08:11

## 2019-02-10 RX ADMIN — POLYETHYLENE GLYCOL 3350 17 G: 17 POWDER, FOR SOLUTION ORAL at 09:30

## 2019-02-10 RX ADMIN — SIMVASTATIN 40 MG: 40 TABLET, FILM COATED ORAL at 21:01

## 2019-02-10 RX ADMIN — CARVEDILOL 25 MG: 25 TABLET, FILM COATED ORAL at 17:41

## 2019-02-10 RX ADMIN — NEPHROCAP 1 MG: 1 CAP ORAL at 08:11

## 2019-02-10 RX ADMIN — SEVELAMER CARBONATE 1600 MG: 800 TABLET, FILM COATED ORAL at 12:17

## 2019-02-10 RX ADMIN — HEPARIN SODIUM 5000 UNITS: 5000 INJECTION INTRAVENOUS; SUBCUTANEOUS at 06:30

## 2019-02-10 RX ADMIN — INSULIN LISPRO 1 UNITS: 100 INJECTION, SOLUTION INTRAVENOUS; SUBCUTANEOUS at 21:02

## 2019-02-10 RX ADMIN — INSULIN GLARGINE 5 UNITS: 100 INJECTION, SOLUTION SUBCUTANEOUS at 09:31

## 2019-02-10 RX ADMIN — SEVELAMER CARBONATE 1600 MG: 800 TABLET, FILM COATED ORAL at 17:41

## 2019-02-10 RX ADMIN — INSULIN LISPRO 1 UNITS: 100 INJECTION, SOLUTION INTRAVENOUS; SUBCUTANEOUS at 12:17

## 2019-02-10 RX ADMIN — HEPARIN SODIUM 5000 UNITS: 5000 INJECTION INTRAVENOUS; SUBCUTANEOUS at 21:01

## 2019-02-10 RX ADMIN — HEPARIN SODIUM 5000 UNITS: 5000 INJECTION INTRAVENOUS; SUBCUTANEOUS at 14:19

## 2019-02-10 RX ADMIN — CARVEDILOL 25 MG: 25 TABLET, FILM COATED ORAL at 08:11

## 2019-02-10 RX ADMIN — SEVELAMER CARBONATE 1600 MG: 800 TABLET, FILM COATED ORAL at 08:10

## 2019-02-10 RX ADMIN — GENTAMICIN SULFATE: 1 CREAM TOPICAL at 18:45

## 2019-02-10 ASSESSMENT — PAIN SCALES - GENERAL: PAINLEVEL_OUTOF10: 0

## 2019-02-11 LAB
ALBUMIN SERPL-MCNC: 2.3 G/DL (ref 3.4–5)
ANION GAP SERPL CALCULATED.3IONS-SCNC: 16 MMOL/L (ref 3–16)
BASOPHILS ABSOLUTE: 0.1 K/UL (ref 0–0.2)
BASOPHILS RELATIVE PERCENT: 0.8 %
BUN BLDV-MCNC: 45 MG/DL (ref 7–20)
CALCIUM SERPL-MCNC: 7.8 MG/DL (ref 8.3–10.6)
CHLORIDE BLD-SCNC: 100 MMOL/L (ref 99–110)
CO2: 26 MMOL/L (ref 21–32)
CREAT SERPL-MCNC: 9.4 MG/DL (ref 0.6–1.2)
EOSINOPHILS ABSOLUTE: 0.2 K/UL (ref 0–0.6)
EOSINOPHILS RELATIVE PERCENT: 1.9 %
GFR AFRICAN AMERICAN: 5
GFR NON-AFRICAN AMERICAN: 4
GLUCOSE BLD-MCNC: 125 MG/DL (ref 70–99)
GLUCOSE BLD-MCNC: 139 MG/DL (ref 70–99)
GLUCOSE BLD-MCNC: 142 MG/DL (ref 70–99)
GLUCOSE BLD-MCNC: 150 MG/DL (ref 70–99)
GLUCOSE BLD-MCNC: 96 MG/DL (ref 70–99)
HCT VFR BLD CALC: 26.4 % (ref 36–48)
HEMOGLOBIN: 8.6 G/DL (ref 12–16)
LYMPHOCYTES ABSOLUTE: 1.3 K/UL (ref 1–5.1)
LYMPHOCYTES RELATIVE PERCENT: 15.6 %
MCH RBC QN AUTO: 32.8 PG (ref 26–34)
MCHC RBC AUTO-ENTMCNC: 32.6 G/DL (ref 31–36)
MCV RBC AUTO: 100.6 FL (ref 80–100)
MONOCYTES ABSOLUTE: 0.8 K/UL (ref 0–1.3)
MONOCYTES RELATIVE PERCENT: 9.7 %
NEUTROPHILS ABSOLUTE: 5.8 K/UL (ref 1.7–7.7)
NEUTROPHILS RELATIVE PERCENT: 72 %
PDW BLD-RTO: 14 % (ref 12.4–15.4)
PERFORMED ON: ABNORMAL
PERFORMED ON: NORMAL
PHOSPHORUS: 4.7 MG/DL (ref 2.5–4.9)
PLATELET # BLD: 167 K/UL (ref 135–450)
PMV BLD AUTO: 7.4 FL (ref 5–10.5)
POTASSIUM REFLEX MAGNESIUM: 3.8 MMOL/L (ref 3.5–5.1)
POTASSIUM SERPL-SCNC: 3.8 MMOL/L (ref 3.5–5.1)
RBC # BLD: 2.63 M/UL (ref 4–5.2)
SODIUM BLD-SCNC: 142 MMOL/L (ref 136–145)
WBC # BLD: 8 K/UL (ref 4–11)

## 2019-02-11 PROCEDURE — 6360000002 HC RX W HCPCS: Performed by: PHYSICAL MEDICINE & REHABILITATION

## 2019-02-11 PROCEDURE — 97110 THERAPEUTIC EXERCISES: CPT

## 2019-02-11 PROCEDURE — 85025 COMPLETE CBC W/AUTO DIFF WBC: CPT

## 2019-02-11 PROCEDURE — 6370000000 HC RX 637 (ALT 250 FOR IP): Performed by: PHYSICAL MEDICINE & REHABILITATION

## 2019-02-11 PROCEDURE — 97530 THERAPEUTIC ACTIVITIES: CPT

## 2019-02-11 PROCEDURE — 97116 GAIT TRAINING THERAPY: CPT

## 2019-02-11 PROCEDURE — 1280000000 HC REHAB R&B

## 2019-02-11 PROCEDURE — 80069 RENAL FUNCTION PANEL: CPT

## 2019-02-11 PROCEDURE — 90945 DIALYSIS ONE EVALUATION: CPT

## 2019-02-11 PROCEDURE — 36415 COLL VENOUS BLD VENIPUNCTURE: CPT

## 2019-02-11 RX ADMIN — CARVEDILOL 25 MG: 25 TABLET, FILM COATED ORAL at 17:28

## 2019-02-11 RX ADMIN — GENTAMICIN SULFATE: 1 CREAM TOPICAL at 16:30

## 2019-02-11 RX ADMIN — SEVELAMER CARBONATE 1600 MG: 800 TABLET, FILM COATED ORAL at 08:28

## 2019-02-11 RX ADMIN — ASPIRIN 81 MG: 81 TABLET, COATED ORAL at 08:05

## 2019-02-11 RX ADMIN — HEPARIN SODIUM 5000 UNITS: 5000 INJECTION INTRAVENOUS; SUBCUTANEOUS at 05:54

## 2019-02-11 RX ADMIN — SEVELAMER CARBONATE 1600 MG: 800 TABLET, FILM COATED ORAL at 17:28

## 2019-02-11 RX ADMIN — NEPHROCAP 1 MG: 1 CAP ORAL at 08:05

## 2019-02-11 RX ADMIN — HEPARIN SODIUM 5000 UNITS: 5000 INJECTION INTRAVENOUS; SUBCUTANEOUS at 20:19

## 2019-02-11 RX ADMIN — INSULIN GLARGINE 5 UNITS: 100 INJECTION, SOLUTION SUBCUTANEOUS at 08:17

## 2019-02-11 RX ADMIN — INSULIN LISPRO 1 UNITS: 100 INJECTION, SOLUTION INTRAVENOUS; SUBCUTANEOUS at 20:19

## 2019-02-11 RX ADMIN — POLYETHYLENE GLYCOL 3350 17 G: 17 POWDER, FOR SOLUTION ORAL at 08:06

## 2019-02-11 RX ADMIN — SIMVASTATIN 40 MG: 40 TABLET, FILM COATED ORAL at 20:19

## 2019-02-11 RX ADMIN — HEPARIN SODIUM 5000 UNITS: 5000 INJECTION INTRAVENOUS; SUBCUTANEOUS at 14:29

## 2019-02-11 RX ADMIN — INSULIN LISPRO 1 UNITS: 100 INJECTION, SOLUTION INTRAVENOUS; SUBCUTANEOUS at 12:18

## 2019-02-11 RX ADMIN — CARVEDILOL 25 MG: 25 TABLET, FILM COATED ORAL at 08:05

## 2019-02-11 RX ADMIN — SEVELAMER CARBONATE 1600 MG: 800 TABLET, FILM COATED ORAL at 12:17

## 2019-02-11 ASSESSMENT — PAIN SCALES - GENERAL: PAINLEVEL_OUTOF10: 0

## 2019-02-12 LAB
GLUCOSE BLD-MCNC: 117 MG/DL (ref 70–99)
GLUCOSE BLD-MCNC: 187 MG/DL (ref 70–99)
GLUCOSE BLD-MCNC: 77 MG/DL (ref 70–99)
GLUCOSE BLD-MCNC: 90 MG/DL (ref 70–99)
PERFORMED ON: ABNORMAL
PERFORMED ON: ABNORMAL
PERFORMED ON: NORMAL
PERFORMED ON: NORMAL

## 2019-02-12 PROCEDURE — 97116 GAIT TRAINING THERAPY: CPT

## 2019-02-12 PROCEDURE — 6360000002 HC RX W HCPCS: Performed by: PHYSICAL MEDICINE & REHABILITATION

## 2019-02-12 PROCEDURE — 6370000000 HC RX 637 (ALT 250 FOR IP): Performed by: PHYSICAL MEDICINE & REHABILITATION

## 2019-02-12 PROCEDURE — 1280000000 HC REHAB R&B

## 2019-02-12 PROCEDURE — 97110 THERAPEUTIC EXERCISES: CPT

## 2019-02-12 PROCEDURE — 97530 THERAPEUTIC ACTIVITIES: CPT

## 2019-02-12 PROCEDURE — 97535 SELF CARE MNGMENT TRAINING: CPT

## 2019-02-12 RX ORDER — OXYCODONE HYDROCHLORIDE 5 MG/1
5 TABLET ORAL EVERY 6 HOURS PRN
Qty: 28 TABLET | Refills: 0 | Status: SHIPPED | OUTPATIENT
Start: 2019-02-12 | End: 2019-02-19

## 2019-02-12 RX ADMIN — NEPHROCAP 1 MG: 1 CAP ORAL at 10:51

## 2019-02-12 RX ADMIN — HEPARIN SODIUM 5000 UNITS: 5000 INJECTION INTRAVENOUS; SUBCUTANEOUS at 06:22

## 2019-02-12 RX ADMIN — HEPARIN SODIUM 5000 UNITS: 5000 INJECTION INTRAVENOUS; SUBCUTANEOUS at 15:38

## 2019-02-12 RX ADMIN — HEPARIN SODIUM 5000 UNITS: 5000 INJECTION INTRAVENOUS; SUBCUTANEOUS at 21:09

## 2019-02-12 RX ADMIN — CARVEDILOL 25 MG: 25 TABLET, FILM COATED ORAL at 10:51

## 2019-02-12 RX ADMIN — INSULIN LISPRO 1 UNITS: 100 INJECTION, SOLUTION INTRAVENOUS; SUBCUTANEOUS at 21:09

## 2019-02-12 RX ADMIN — CARVEDILOL 25 MG: 25 TABLET, FILM COATED ORAL at 17:20

## 2019-02-12 RX ADMIN — ASPIRIN 81 MG: 81 TABLET, COATED ORAL at 10:52

## 2019-02-12 RX ADMIN — SEVELAMER CARBONATE 1600 MG: 800 TABLET, FILM COATED ORAL at 17:21

## 2019-02-12 RX ADMIN — INSULIN GLARGINE 5 UNITS: 100 INJECTION, SOLUTION SUBCUTANEOUS at 10:52

## 2019-02-12 RX ADMIN — SEVELAMER CARBONATE 1600 MG: 800 TABLET, FILM COATED ORAL at 11:00

## 2019-02-12 RX ADMIN — SIMVASTATIN 40 MG: 40 TABLET, FILM COATED ORAL at 21:08

## 2019-02-12 ASSESSMENT — PAIN SCALES - GENERAL
PAINLEVEL_OUTOF10: 0

## 2019-02-13 VITALS
BODY MASS INDEX: 21.61 KG/M2 | HEART RATE: 69 BPM | HEIGHT: 66 IN | OXYGEN SATURATION: 97 % | WEIGHT: 134.48 LBS | DIASTOLIC BLOOD PRESSURE: 81 MMHG | RESPIRATION RATE: 18 BRPM | TEMPERATURE: 98.1 F | SYSTOLIC BLOOD PRESSURE: 144 MMHG

## 2019-02-13 LAB
GLUCOSE BLD-MCNC: 115 MG/DL (ref 70–99)
GLUCOSE BLD-MCNC: 147 MG/DL (ref 70–99)
PERFORMED ON: ABNORMAL
PERFORMED ON: ABNORMAL

## 2019-02-13 PROCEDURE — 6360000002 HC RX W HCPCS: Performed by: INTERNAL MEDICINE

## 2019-02-13 PROCEDURE — 6370000000 HC RX 637 (ALT 250 FOR IP): Performed by: PHYSICAL MEDICINE & REHABILITATION

## 2019-02-13 PROCEDURE — 6360000002 HC RX W HCPCS: Performed by: PHYSICAL MEDICINE & REHABILITATION

## 2019-02-13 RX ADMIN — INSULIN LISPRO 1 UNITS: 100 INJECTION, SOLUTION INTRAVENOUS; SUBCUTANEOUS at 12:26

## 2019-02-13 RX ADMIN — SEVELAMER CARBONATE 1600 MG: 800 TABLET, FILM COATED ORAL at 08:15

## 2019-02-13 RX ADMIN — NEPHROCAP 1 MG: 1 CAP ORAL at 08:15

## 2019-02-13 RX ADMIN — DARBEPOETIN ALFA 100 MCG: 100 INJECTION, SOLUTION INTRAVENOUS; SUBCUTANEOUS at 08:21

## 2019-02-13 RX ADMIN — CARVEDILOL 25 MG: 25 TABLET, FILM COATED ORAL at 08:15

## 2019-02-13 RX ADMIN — POLYETHYLENE GLYCOL 3350 17 G: 17 POWDER, FOR SOLUTION ORAL at 08:15

## 2019-02-13 RX ADMIN — SEVELAMER CARBONATE 1600 MG: 800 TABLET, FILM COATED ORAL at 12:27

## 2019-02-13 RX ADMIN — INSULIN GLARGINE 5 UNITS: 100 INJECTION, SOLUTION SUBCUTANEOUS at 08:23

## 2019-02-13 RX ADMIN — ASPIRIN 81 MG: 81 TABLET, COATED ORAL at 08:15

## 2019-02-13 RX ADMIN — HEPARIN SODIUM 5000 UNITS: 5000 INJECTION INTRAVENOUS; SUBCUTANEOUS at 06:44

## 2019-02-13 ASSESSMENT — PAIN SCALES - GENERAL
PAINLEVEL_OUTOF10: 0
PAINLEVEL_OUTOF10: 0

## 2019-02-18 ENCOUNTER — OFFICE VISIT (OUTPATIENT)
Dept: INTERNAL MEDICINE CLINIC | Age: 79
End: 2019-02-18
Payer: COMMERCIAL

## 2019-02-18 VITALS
WEIGHT: 135 LBS | OXYGEN SATURATION: 98 % | DIASTOLIC BLOOD PRESSURE: 85 MMHG | RESPIRATION RATE: 20 BRPM | HEART RATE: 80 BPM | BODY MASS INDEX: 21.69 KG/M2 | SYSTOLIC BLOOD PRESSURE: 133 MMHG | TEMPERATURE: 98.2 F | HEIGHT: 66 IN

## 2019-02-18 DIAGNOSIS — I10 ESSENTIAL HYPERTENSION: ICD-10-CM

## 2019-02-18 DIAGNOSIS — E11.8 TYPE 2 DIABETES MELLITUS WITH COMPLICATION, WITHOUT LONG-TERM CURRENT USE OF INSULIN (HCC): ICD-10-CM

## 2019-02-18 DIAGNOSIS — I50.22 SYSTOLIC HEART FAILURE, CHRONIC (HCC): ICD-10-CM

## 2019-02-18 DIAGNOSIS — N18.4 CKD STAGE 4 DUE TO TYPE 2 DIABETES MELLITUS (HCC): ICD-10-CM

## 2019-02-18 DIAGNOSIS — I70.209 DIABETES TYPE 2 WITH ATHEROSCLEROSIS OF ARTERIES OF EXTREMITIES (HCC): Primary | ICD-10-CM

## 2019-02-18 DIAGNOSIS — N18.5 CHRONIC KIDNEY DISEASE, STAGE V (HCC): ICD-10-CM

## 2019-02-18 DIAGNOSIS — M17.12 OSTEOARTHRITIS OF LEFT KNEE, UNSPECIFIED OSTEOARTHRITIS TYPE: ICD-10-CM

## 2019-02-18 DIAGNOSIS — Z99.2 ESRD (END STAGE RENAL DISEASE) ON DIALYSIS (HCC): ICD-10-CM

## 2019-02-18 DIAGNOSIS — E11.22 CKD STAGE 4 DUE TO TYPE 2 DIABETES MELLITUS (HCC): ICD-10-CM

## 2019-02-18 DIAGNOSIS — N18.6 ESRD (END STAGE RENAL DISEASE) ON DIALYSIS (HCC): ICD-10-CM

## 2019-02-18 DIAGNOSIS — R60.9 PERIPHERAL EDEMA: ICD-10-CM

## 2019-02-18 DIAGNOSIS — E11.51 DIABETES TYPE 2 WITH ATHEROSCLEROSIS OF ARTERIES OF EXTREMITIES (HCC): Primary | ICD-10-CM

## 2019-02-18 PROCEDURE — 99213 OFFICE O/P EST LOW 20 MIN: CPT | Performed by: STUDENT IN AN ORGANIZED HEALTH CARE EDUCATION/TRAINING PROGRAM

## 2019-02-18 RX ORDER — ONDANSETRON 4 MG/1
4 TABLET, FILM COATED ORAL EVERY 8 HOURS PRN
Qty: 20 TABLET | Refills: 0 | Status: SHIPPED | OUTPATIENT
Start: 2019-02-18 | End: 2019-01-01 | Stop reason: SDUPTHER

## 2019-02-18 RX ORDER — SIMVASTATIN 40 MG
40 TABLET ORAL NIGHTLY
Qty: 30 TABLET | Refills: 2 | Status: SHIPPED | OUTPATIENT
Start: 2019-02-18 | End: 2019-01-01 | Stop reason: SDUPTHER

## 2019-02-18 RX ORDER — ACETAMINOPHEN 325 MG/1
650 TABLET ORAL EVERY 6 HOURS PRN
Qty: 60 TABLET | Refills: 0 | Status: SHIPPED | OUTPATIENT
Start: 2019-02-18 | End: 2019-01-01 | Stop reason: SDUPTHER

## 2019-02-18 RX ORDER — ASPIRIN 81 MG/1
81 TABLET ORAL DAILY
Qty: 30 TABLET | Refills: 3 | Status: SHIPPED | OUTPATIENT
Start: 2019-02-18 | End: 2019-01-01 | Stop reason: SDUPTHER

## 2019-02-18 RX ORDER — DOCUSATE SODIUM 100 MG/1
100 CAPSULE, LIQUID FILLED ORAL 2 TIMES DAILY
Qty: 30 CAPSULE | Refills: 0 | Status: SHIPPED | OUTPATIENT
Start: 2019-02-18 | End: 2019-01-01 | Stop reason: SDUPTHER

## 2019-02-18 RX ORDER — CARVEDILOL 25 MG/1
25 TABLET ORAL 2 TIMES DAILY WITH MEALS
Qty: 60 TABLET | Refills: 2 | Status: ON HOLD | OUTPATIENT
Start: 2019-02-18 | End: 2019-01-01 | Stop reason: HOSPADM

## 2019-02-18 ASSESSMENT — ENCOUNTER SYMPTOMS
SHORTNESS OF BREATH: 0
BACK PAIN: 0
SORE THROAT: 0
WHEEZING: 0
ABDOMINAL PAIN: 0
TROUBLE SWALLOWING: 0
CONSTIPATION: 0
ABDOMINAL DISTENTION: 0
COUGH: 0
PHOTOPHOBIA: 0
EYE PAIN: 0
EYE ITCHING: 0
DIARRHEA: 0
EYE REDNESS: 0

## 2019-03-24 PROBLEM — R62.7 FAILURE TO THRIVE IN ADULT: Status: ACTIVE | Noted: 2019-01-01

## 2019-03-24 NOTE — ED TRIAGE NOTES
Pt brought in by squad. Per pt states that her right foot got numb and she fell. No injuries from the fall.

## 2019-03-24 NOTE — H&P
I certify that Delano Napoles is expected to be hospitalized for 2 midnights based on the following assessment and plan:      Patient seen and examined, plan of care discussed with residents. Agree with their assessment and plan with following addendum:  68 y/o female with medical history of ESRD, on peritoneal dialysis at home, also HTN, CAD, diet controlled diabetes, presents with multiple falls at home. She had falls before related to buckling of knee joints. This time she also felt her right leg was weak and foot was numb. She reported some lower back pain. On exam- patient has numbness in sock distribution and also 4.5/5 strength in right lower extremity, which she states is relatively new for her since 1 week ago. A/P: right leg weakness, increased falls, unsafe to discharge to home. Start neurological work up with getting head CT, MMA, folate, also get xrays of lumbar spine to rule out fracture. Will ask neurology to see. Recheck troponin, a;though she is not having any chest pain. Hold coreg and restart at lower dose once she is in hypertensive range.  PT/OT, Ryne Rueda

## 2019-03-24 NOTE — ED PROVIDER NOTES
[address] [facility]          ATTENDING PHYSICIAN NOTE       Date of evaluation: 3/24/2019    Chief Complaint     Numbness and Fall      History of Present Illness     Anastacia Savage is a 69 y.o. female who presents via EMS after reported fall at home. Patient reports for the past week she has had numbness in her right foot causing difficulty walking. Patient states while she was walking to go to [facility] today her leg gave out due to the numbness and she assisted her fall to the floor however did not hit her head. Patient reports no pain after the episode are currently. Patient does state that she sometimes has dyspnea on exertion and has been feeling more weak and fatigued over the recent time. Patient also reports a decreased appetite recently and is not eating much. Patient denies any chest pain, fever, headache, back pain, neck pain, abdominal pain, vomiting. Patient reports compliance with peritoneal dialysis. Patient states she lives alone. Review of Systems     Review of Systems   Constitutional: Positive for activity change, appetite change and fatigue. Negative for fever. Respiratory: Positive for shortness of breath. Cardiovascular: Negative for chest pain, palpitations and leg swelling. Gastrointestinal: Negative for abdominal pain and vomiting. Musculoskeletal: Negative for back pain and neck pain. Neurological: Positive for numbness. Negative for syncope. All other systems reviewed and are negative. Past Medical, Surgical, Family, and Social History     She has a past medical history of DVT (deep venous thrombosis) ([address] End stage renal disease ([address] Hyperlipidemia, Hypertension, Osteoarthritis, Pancreatitis chronic, Peritoneal dialysis status ([address] Type II or unspecified type diabetes mellitus without mention of complication, not stated as uncontrolled, and Vitamin D deficiency.   She has a past surgical history that includes symmetrically. No gross deformity. Normal strength. Skin: Warm and dry. Intact. Neuro: Awake, alert, and oriented ×3. Somewhat slurred speech however normal phonation. Patient able to follow commands without difficulty, answered questions appropriately. Patient clinically sober. Asymmetry. Tongue is midline. Shrug shoulders bilaterally without issue. Decreased sensation to right dorsal plantar foot as compared to left otherwise sensation equal throughout body and normal reported by patient. 5 out of 5 strength upper and lower extremities equal bilaterally. Psych: Appropriate mood and affect. Diagnostic Results     EKG   Performed 1125 interpreted by ER physician  Normal sinus rhythm rate 80  QRS 96 QTc 482  Poor inferior R-wave  Poor anterior R-wave  No ST or T-wave changes    RADIOLOGY:  XR CHEST STANDARD (2 VW)   Final Result      No acute cardiopulmonary disease      Prominent soft tissue in the right paratracheal region is probably similar to prior exams.           LABS:   Results for orders placed or performed during the hospital encounter of 03/24/19   CBC Auto Differential   Result Value Ref Range    WBC 10.6 4.0 - 11.0 K/uL    RBC 4.19 4.00 - 5.20 M/uL    Hemoglobin 13.7 12.0 - 16.0 g/dL    Hematocrit 42.0 36.0 - 48.0 %    .3 (H) 80.0 - 100.0 fL    MCH 32.6 26.0 - 34.0 pg    MCHC 32.5 31.0 - 36.0 g/dL    RDW 14.8 12.4 - 15.4 %    Platelets 658 719 - 151 K/uL    MPV 8.6 5.0 - 10.5 fL    Neutrophils % 87.0 %    Lymphocytes % 9.0 %    Monocytes % 2.0 %    Eosinophils % 0.0 %    Basophils % 1.0 %    Neutrophils # 9.3 (H) 1.7 - 7.7 K/uL    Lymphocytes # 1.0 1.0 - 5.1 K/uL    Monocytes # 0.2 0.0 - 1.3 K/uL    Eosinophils # 0.0 0.0 - 0.6 K/uL    Basophils # 0.1 0.0 - 0.2 K/uL    Metamyelocytes Relative 1 (A) %   Comprehensive Metabolic Panel w/ Reflex to MG   Result Value Ref Range    Sodium 142 136 - 145 mmol/L    Potassium reflex Magnesium 3.8 3.5 - 5.1 mmol/L    Chloride 97 (L) 99 - 110 mmol/L    CO2 24 21 - 32 mmol/L    Anion Gap 21 (H) 3 - 16    Glucose 128 (H) 70 - 99 mg/dL    BUN 63 (H) 7 - 20 mg/dL    CREATININE 12.5 (HH) 0.6 - 1.2 mg/dL    GFR Non-African American 3 (A) >60    GFR  4 (A) >60    Calcium 8.4 8.3 - 10.6 mg/dL    Total Protein 6.8 6.4 - 8.2 g/dL    Alb 2.8 (L) 3.4 - 5.0 g/dL    Albumin/Globulin Ratio 0.7 (L) 1.1 - 2.2    Total Bilirubin <0.2 0.0 - 1.0 mg/dL    Alkaline Phosphatase 78 40 - 129 U/L    ALT 10 10 - 40 U/L    AST 10 (L) 15 - 37 U/L    Globulin 4.0 g/dL   Troponin   Result Value Ref Range    Troponin 0.08 (H) <0.01 ng/mL   Brain Natriuretic Peptide   Result Value Ref Range    Pro-BNP 6,622 (H) 0 - 449 pg/mL   Lactic Acid, Plasma   Result Value Ref Range    Lactic Acid 1.9 0.4 - 2.0 mmol/L   Blood Gas, Venous   Result Value Ref Range    pH, Delroy 7.345 (L) 7.350 - 7.450    pCO2, Delroy 46.4 41.0 - 51.0 mmHg    pO2, Delroy 52.7 (H) 25.0 - 40.0 mmHg    HCO3, Venous 24.7 24.0 - 28.0 mmol/L    Base Excess, Delroy -0.8 -2.0 - 3.0 mmol/L    O2 Sat, Delroy 81 Not established %    Carboxyhemoglobin 3.3 (H) 0.0 - 1.5 %    MetHgb, Delroy 0.2 0.0 - 1.5 %    TC02 (Calc), Delroy 26 mmol/L    Hemoglobin, Delroy, Reduced 18.20 %   Phosphorus   Result Value Ref Range    Phosphorus 7.9 (H) 2.5 - 4.9 mg/dL   Lipase   Result Value Ref Range    Lipase 52.0 13.0 - 60.0 U/L         RECENT VITALS:  BP: 91/66, Temp: 98 °F (36.7 °C), Pulse: 72, Resp: 18,SpO2: 100 %       ED Course     Nursing Notes, Past Medical Hx, Past Surgical Hx, Social Hx, Allergies, and Family Hx were reviewed. The patient was given the following medications:  Orders Placed This Encounter   Medications    aspirin chewable tablet 162 mg       CONSULTS:  Samuel 26 / ASSESSMENT / Christian Elmer is a 69 y.o. female presents for evaluation of reported fall which was supported with no loss consciousness likely mechanical in nature.   Patient states that she has been falling due to her right foot being numb over the past week. Upon arrival patient initially hypotensive with systolic blood pressure of 90 however not tachycardic and does have normal mentation. Patient also reports having generalized weakness intermittent exertional dyspnea and decreased appetite over the past recent time. Patient does appear chronically ill with concern for failure to thrive which is certainly concerning as patient lives alone. Given patient's end-stage renal disease with peritoneal dialysis and increased risk for possibly going volume overload will provide gentle hydration given low blood pressure and obtain medical workup looking for possible infectious versus metabolic derangement as etiology to patient's presentation. We'll also obtain troponin and EKG to search for possible ischemic pathology. Patient's focal deficit included numbness to the right dorsal plantar foot otherwise noted To be symmetric bilaterally. I believe it is lower likelihood patient did have a stroke however she did it would've been more subacute to chronic in nature and believe clinical scenario would favor more peripheral neuropathy rather than central process.       Reevaluation 7002  Patient has a stable blood pressure though somewhat hypotensive  Patient does have a conversation renal function so unsure if she is been compliant with her peritoneal dialysis however there is no emergent need for hemodialysis at this point though likely will require urgently  Patient does appear to be chronically malnourished with potential failure to thrive especially in the setting of patient living alone  Given right foot numbness with frequent falls over the past week and the fact that patient does live alone I do believe it is reasonable to have patient admitted for further management with physical therapy and potentially social work for placement into the higher level facility until patient can restabilize and care for self at home      Clinical Impression     1. Fall, initial encounter    2.  Numbness of right foot        Disposition       DISPOSITION  DISPOSITION Decision To Admit 03/24/2019 12:23:05 PM          (Please note that portions of this note were completed with a voice recognition program.  Efforts weremade to edit the dictations but occasionally words are mis-transcribed.)       Kaleb Obrien MD  03/24/19 1499

## 2019-03-24 NOTE — H&P
Internal Medicine  PGY ***  History & Physical      CC : Fall and Right foot numbness    History Obtained From:  patient    HISTORY OF PRESENT ILLNESS:  Carson Gill is a 69 y.o. female with PMH of OA, DM type 2 (HbA1c 5.6 on 1/2019), ESRD on PD (M-F), CAD, and gout who presented to the ED with fall due to right foot numbness. Patient reports that she was walking to the elevator in order to go to the [employer] when her right knee \"locked\" itself and gave out due to numbness and subsequently fell down on both her knees, but did not hit her head. Patient reports that the numbness started about a week ago. Patient also reports that she also has right lower back pain but does not radiate anywhere. Denies weakness/numbness elsewhere, rectal incontinence. At baseline, patient is able to move around on her own with help of a cane/walker and lives by herself and able to do her ADL's but off late, has been struggling with her appetite. Denies headaches, bowel changes, dysuria, vision changes, neck pain, abdominal pain, vomiting. Reports that she is anuric. Patient reports compliance with peritoneal dialysis. Of note, patient had a recent admission for a similar problem but on the left knee and CT LLE showed severe OA and was given an intra-articular injection with glucocorticoids. ED course: Upon arrival, patient's vitals were remarkable for BP 90/71. Trop 0.08. CXR unchanged. Patient was admitted to internal medicine team for further work up. CT head pending.        Past Medical History:        Diagnosis Date    DVT (deep venous thrombosis) (HCC)     End stage renal disease (HCC)     Hyperlipidemia     Hypertension     Osteoarthritis     Pancreatitis chronic     Peritoneal dialysis status ([facility] [address]     Type II or unspecified type diabetes mellitus without mention of complication, not stated as uncontrolled     Vitamin D deficiency    ·     Past Surgical History:        Procedure Laterality Date    CATARACT REMOVAL Left 5/29/13    had elevated BP post op    CORONARY ANGIOPLASTY      JOINT REPLACEMENT  5/4/2011    R WILDER    OTHER SURGICAL HISTORY Left 05/19/2017    INSERTION OF LAPAROSCOPIC PERITONEAL DIALYSIS CATHETER;    THYROIDECTOMY, PARTIAL     ·     Medications Priorto Admission:    · Not in a hospital admission. Allergies:  Patient has no known allergies. Social History:   · TOBACCO:   reports that she has been smoking cigarettes. She has a 12.00 pack-year smoking history. She has never used smokeless tobacco.  · ETOH:   reports that she does not drink alcohol. · DRUGS : ***  · Patient currently lives {LIVING SITUATION:[id number]}  ·   Family History:   · History reviewed. No pertinent family history. [unfilled]    ROS: A 10 point review of systems was conducted, significant findings as noted in HPI. Physical Exam  Physical exam:       Vitals:    03/24/19 1330   BP: 120/61   Pulse: 80   Resp: 18   Temp:    SpO2: 96%     General: Elderly female, lying comfortably in bed, in no acute distress. HEENT: Head atraumatic. EOMI. Moist mucous membranes. Neck supple  Respirations: Breath sounds equal bilaterally. No crackles or wheezes  CV: Regular rate and rhythm with strong distal pulses. No murmurs, rubs or gallops. Equal radial pulses. No edema. Abd: Abdomen is soft, nontender, nondistended. PD catheter in place. Musculoskeletal: Moves all extremities symmetrically. No gross deformity. Normal strength. Tenderness to palpation in the right lower back. Skin: Warm and dry. Intact. Neuro: Awake, alert, and oriented ×3. Patient able to follow commands without difficulty, answered questions appropriately. Tongue is midline. Shrug shoulders bilaterally without issue. Decreased sensation to right dorsal plantar foot as compared to left otherwise sensation equal throughout body and normal reported by patient. 4/5 strength in UE bilaterally.  Thigh flexion 1/5 RLE and 4/5 LLE, but otherwise strength 0.08  - F/u Trop     ESRD on PD  - Renal diet  - Check BMP  - Elevated phos.  Start Sevelamer 800 mg TIDWM  - Nephrology consult     Type 2 DM   Last A1C 5.6 on 1/2019  Not on any medications per home medication list  - Check A1C  - Will monitor POC glucose check q4h    HTN  - Continue on Coreg     CAD  Continue ASA, Statin, and Coreg    DVT Prophylaxis: Heparin SC  Diet: DIET RENAL DIABETIC   Code Status: Full Code      Will discuss with attending physician Dr. Dmitry Min   3/24/2019,  2:28 PM

## 2019-03-24 NOTE — H&P
COMPRESSION STOCKINGS) MISC 1 each by Does not apply route daily 1 each 0    B complex-vitamin C-folic acid (NEPHRO-ZINA) 1 MG tablet Take 1 tablet by mouth daily (with breakfast)         Allergies: No Known Allergies    ==================================================     Current Vitals: /69   Pulse 71   Temp 98.2 °F (36.8 °C) (Oral)   Resp 18   Ht 5' 6\" (1.676 m)   Wt 135 lb (61.2 kg)   LMP  (LMP Unknown)   SpO2 100%   Breastfeeding? No   BMI 21.79 kg/m²     Physical Exam  General: Elderly female, lying comfortably in bed, in no acute distress. HEENT: Head atraumatic. EOMI. Moist mucous membranes. Neck supple  Respirations: Breath sounds equal bilaterally.  No crackles or wheezes  CV: Regular rate and rhythm with strong distal pulses. No murmurs, rubs or gallops. Equal radial pulses. No edema. Abd: Abdomen is soft, nontender, nondistended.  PD catheter in place. Musculoskeletal: Moves all extremities symmetrically. No gross deformity. Normal strength. Tenderness to palpation in the right lower back. Skin: Warm and dry. Intact. Neuro: Awake, alert, and oriented ×3.  Patient able to follow commands without difficulty, answered questions appropriately. Tongue is midline.  Shrug shoulders bilaterally without issue.  Decreased sensation to right dorsal plantar foot as compared to left otherwise sensation equal throughout body and normal reported by patient.  4/5 strength in UE bilaterally. Thigh flexion 1/5 RLE and 4/5 LLE, but otherwise strength intact and 4/5 in other muscle groups.    Psych: Appropriate mood and affect.         ==================================================  Consults:   IP CONSULT TO HOSPITALIST  IP CONSULT TO PALLIATIVE CARE  IP CONSULT TO NEPHROLOGY  IP CONSULT TO SOCIAL WORK  IP CONSULT TO NEUROLOGY  ==================================================  Laboratory Data:    CBC:   Lab Results   Component Value Date    WBC 10.6 03/24/2019    HGB 13.7 03/24/2019    HCT 42.0 03/24/2019    .3 (H) 03/24/2019     03/24/2019       BMP:   Lab Results   Component Value Date    CREATININE 12.5 (HH) 03/24/2019    BUN 63 (H) 03/24/2019     03/24/2019    K 3.8 03/24/2019    CL 97 (L) 03/24/2019    CO2 24 03/24/2019    PHOS 7.9 (H) 03/24/2019    MG 1.70 (L) 01/29/2019       LFT's:   Recent Labs     03/24/19  1143   AST 10*   ALT 10   BILITOT <0.2   ALKPHOS 78     Troponin:   Recent Labs     03/24/19  1143   TROPONINI 0.08*       ==================================================  Imaging, EKG and other studies:   XR CHEST STANDARD (2 VW)   Final Result      No acute cardiopulmonary disease      Prominent soft tissue in the right paratracheal region is probably similar to prior exams. CT Head WO Contrast    (Results Pending)   XR Lumbosacral W Obliques Flex and Ext    (Results Pending)       EKG: sinus rhythm     1=================================================    Assessment and plan:      Fall 2/2 right foot numbness   Probably multifactorial- diabetic neuropathy + Vit B12 deficiency (.3) + OA + generalized weakness and failure to thrive (poor appetite, living alone). Could be a gout flare (hx of gout, numbness around right great toe but no tenderness or erythema) vs lumbar radiculopathy/stenosis (unlikely as pain more concentrated towards right lower back)  - Consult Neurology (to evaluate the need for MRI)  - f/u HbA1c, Uric acid, vit B12 , folate, MMA, TSH  - f/u lumbosacral spine x-ray  - CT head   - PT/OT consulted  - IVF  - social work due to failure to thrive       Elevated Troponin likely 2/2 to ESRD   Initial trop 0.08  - F/u Trop      ESRD on PD  - Renal diet  - Check BMP  - Elevated phos.  Start Sevelamer 800 mg TIDWM  - Nephrology consult     Type 2 DM   Last A1C 5.6 on 1/2019  Not on any medications per home medication list  - Check A1C  - Will monitor POC glucose check q4h     HTN  - Continue on Coreg     CAD - stable  Continue ASA, Statin, and Coreg        CODE STATUS: Full Code   FEN: DIET RENAL;   PPX: heparin    DISPO: Wards    This patient has been staffed and discussed with Mccoy Shone, MD .     Saurabh Negro  Pager - [phone]  03/24/19

## 2019-03-24 NOTE — ED NOTES
Bed: B15-15  Expected date:   Expected time:   Means of arrival:   Comments:  [address], [facility]  03/24/19 4002

## 2019-03-25 NOTE — PROGRESS NOTES
Elevated Troponin 2/2 ESRD, stable    Anemia, 2/2?  ESRD   Daily CBC  -consult nephro if Hgb continues to trend down     Type 2 DM  -POC gluc q4h    HTN  -holding coreg for softer BP's here    CAD  -asa       DVT Prophylaxis:   Diet: DIET RENAL;  Code Status: Full Code    Dispo - [facility]    Roberto Reed MD    I will discuss the patient with the senior resident and Lorraine Estrada MD      -----------------------------  Елена Galo, PGY-1  [employer]  3/25/2019  9:41 AM

## 2019-03-25 NOTE — FLOWSHEET NOTE
03/25/19 0705   Vitals   BP 97/68   Temp 97.8 °F (36.6 °C)   Temp Source Oral   Pulse 78   Resp 16   SpO2 97 %   Weight 128 lb 1.4 oz (58.1 kg)         Deaccessed from PD cycler using aseptic technique. Tx summary; Total time; 10 hours 10 minutes  Gained 48 minutes  Total UF 83ml  Total volume 9995    Effluent clear, no fibrin noted. Dressing to PD exit site C/D/I. Report rendered and ACES charting placed into chart for future scanning to the EMR.

## 2019-03-25 NOTE — PLAN OF CARE
Problem: Falls - Risk of:  Goal: Will remain free from falls  Description  Will remain free from falls  Outcome: Ongoing  Note:   Pt remains free from falls. Pt is a fall risk. Fall protocol in place. Non-skid yellow socks on, bed alarm on, bed in lowest position, wheels locked. Problem: Risk for Impaired Skin Integrity  Goal: Tissue integrity - skin and mucous membranes  Description  Structural intactness and normal physiological function of skin and  mucous membranes. Outcome: Ongoing  Note:   Pt is at risk for impaired skin integrity. Turning and repositioning pt every two hours and as needed. Pt educated on the importance of turning for pressure ulcer prevention.

## 2019-03-25 NOTE — CONSULTS
Neurology Consult Note  Reason for Consult: right leg weakness  Chief complaint: right leg weakness  Dr Eliel Villaseñor MD asked me to see Isidoro Gupta in consultation for evaluation of right leg weakness    History of Present Illness:  Isidoro Gupta is a 69 y.o. female who presents with 1 week of progressive right leg numbness with pain and weakness. Worse in the foot. Steady progressive. Denies back pain. Medical History:  Past Medical History:   Diagnosis Date    DVT (deep venous thrombosis) (HCC)     End stage renal disease (HCC)     Hyperlipidemia     Hypertension     Osteoarthritis     Pancreatitis chronic     Peritoneal dialysis status ([facility] [address]     Type II or unspecified type diabetes mellitus without mention of complication, not stated as uncontrolled     Vitamin D deficiency      Past Surgical History:   Procedure Laterality Date    CATARACT REMOVAL Left 5/29/13    had elevated BP post op    CORONARY ANGIOPLASTY      JOINT REPLACEMENT  5/4/2011    R WILDER    OTHER SURGICAL HISTORY Left 05/19/2017    INSERTION OF LAPAROSCOPIC PERITONEAL DIALYSIS CATHETER;    THYROIDECTOMY, PARTIAL       Medications Prior to Admission: aspirin 81 MG EC tablet, Take 1 tablet by mouth daily  carvedilol (COREG) 25 MG tablet, Take 1 tablet by mouth 2 times daily (with meals)  docusate sodium (COLACE) 100 MG capsule, Take 1 capsule by mouth 2 times daily  ondansetron (ZOFRAN) 4 MG tablet, Take 1 tablet by mouth every 8 hours as needed for Nausea  simvastatin (ZOCOR) 40 MG tablet, Take 1 tablet by mouth nightly  B complex-vitamin C-folic acid (NEPHRO-ZINA) 1 MG tablet, Take 1 tablet by mouth daily (with breakfast)  acetaminophen (TYLENOL) 325 MG tablet, Take 2 tablets by mouth every 6 hours as needed for Pain  Elastic Bandages & Supports ([address]) MISC, 1 each by Does not apply route daily  No Known Allergies  History reviewed. No pertinent family history.   Social History     Tobacco Use Smoking Status Current Some Day Smoker    Packs/day: 0.30    Years: 40.00    Pack years: 12.00    Types: Cigarettes   Smokeless Tobacco Never Used   Tobacco Comment    trying to stop     Social History     Substance and Sexual Activity   Drug Use No     Social History     Substance and Sexual Activity   Alcohol Use No    Alcohol/week: 0.5 oz    Types: 1 Standard drinks or equivalent per week       ROS:  Constitutional- No weight loss or fevers  Eyes- No diplopia. No photophobia. Ears/nose/throat- No dysphagia. No Dysarthria  Cardiovascular- No palpitations. No chest pain  Respiratory- No dyspnea. No Cough  Gastrointestinal- No Abdominal pain. No Vomiting. Genitourinary- No incontinence. No urinary retention  Musculoskeletal- No myalgia. No arthralgia  Skin- No rash. No easy bruising. Psychiatric- No depression. No anxiety  Endocrine- No diabetes. No thyroid issues. Hematologic- No bleeding difficulty. No fatigue  Neurologic-+ right leg weakness. No Headache. Exam:  Blood pressure 106/68, pulse 67, temperature 97.9 °F (36.6 °C), temperature source Oral, resp. rate 16, height 5' 6\" (1.676 m), weight 128 lb 1.4 oz (58.1 kg), SpO2 97 %, not currently breastfeeding. Constitutional    Vital signs: BP, HR, and RR reviewed   General Alert, no distress, well-nourished  Eyes: fundoscopic exam revealed no hemorrhage   Cardiovascular: pulses symmetric in all 4 extremities. No peripheral edema. Psychiatric: cooperative with examination, no  psychotic behavior noted.   Neurologic  Mental status:   orientation to person and place   General fund of knowledge grossly intact   Memory grossly intact   Attention intact as able to attend well to the exam     Language fluent in conversation   Comprehension intact; follows simple commands  Cranial nerves:   CN2: Visual Fields full w/o extinction on confrontational testing,   CN 3,4,6: extraocular muscles intact,  CN5: facial sensation symmetric   CN7:face symmetric without dysarthria,   CN8: hearing grossly intact  CN9: palate elevated symmetrically  CN11: trap full strength on shoulder shrug  CN12: tongue midline with protrusion  Strength: some pain limitation in both lower limbs more on the right but at least 4/5 range in all muscles. Good strength in upper extremities bilaterally  Deep tendon reflexes: normal in upper limbs bilaterally with decreased in both lower extremities  Sensory: light touch intact in all 4 extremities. Cerebellar/coordination: finger nose finger normal without ataxia  Tone: normal in all 4 extremities  Gait: deferred for pain      Labs  TSH-1.8  Alb-2.1  Glu-133  Uric acid 7    Studies  Head CT with chronic ischemic changes. MRI Lumbar spine: with some central canal stenosis but more focal stenosis in right L4 more than L5. Impression:  Cailin Davison is a 69 y.o. female who presented with 1 week of progressive right leg weakness, numbness, and pain. Exam is limited by pain but suspcion for peripheral etiology is high. Suspected lumbar radiculopathy supported by MRI findings.     Recommendations:  -symptomatic therapy with muscle relaxors and pain meds is reasonable in the short term with PT.  -outpatient EMG would be very reasonable if not improving sooner than it can be completed  -spine surgery revaluation is reasonable but likely try symptomatic therapy first.    A copy of this note was provided for MD Sinan Nance MD  [phone]  Evenings, weekends, and off weeks please discuss neurologist on-call

## 2019-03-25 NOTE — CONSULTS
[facility]  Palliative Medicine Consultation Note      Date Of Admission:3/24/2019  Date of consult: 03/25/19  Seen by [facility] AND [facility] in the past:  No    Recommendations:        Introduced palliative care to pt. We discussed her pain but she became irritable with questions. She says she was doing well at home and has a fine quality of life, is doing well with peritoneal dialysis for the past year. She would want her brother or her son to be her health care power of  but did not want to complete paperwork at this time. Pt became irritable so I cut the visit short. 1. Goals of Care/Advanced Care planning/Code status: Full Code. Pt wants to continue her quality of life at home. 2. Pain: pt c/o right foot pain. MRI completed today, neurology suspects lumbar radiculopathy is the cause of her pain and numbness, recommend muscle relaxants and pain medication, possibly EMG and surgery in the future. 3. SOB: pt denies shortness of breath and is breathing comfortably on room air. 4. Weakness R foot/falls: pt reports falling at [employer]. She worked with PT/OT today and scored 23 and 17 on AMPAC scale. She lives at home alone, says she has COA for 2 hours per week and sporadic help from family. May need to consider SNF. 5. Disposition: d/c home vs SNF    Reason for Consult:         __x___ Goals of Care  __x___ Code Status Discussion/Advanced Care Planning   _____ Psychosocial/Family Support  _____ Symptom Management  _____ Other (Specify)    Requesting Physician: Dr. Maricruz [place]    History Obtained From:  patient, electronic medical record    History of Present Illness:         Ms Riddhi Edwards is a 69year old female with PMH of OA, DM type 2 (HbA1c 5.6 on 1/2019), ESRD on PD (M-F), CAD, and gout who presented to the ED after falling at [employer] due to right foot numbness.  The right foot numbness was attributed to diabetic neuropathy, vitamin B12 deficiency, osteoarthritis, and generalized weakness/FTT. Head CT found remote lacunar infarcts, parenchymal volume loss and chronic small vessel ischemic changes in the white matter, negative for recent intracranial hemorrhage. Subjective:                     Past Medical History:        Diagnosis Date    DVT (deep venous thrombosis) (HCC)     End stage renal disease (HCC)     Hyperlipidemia     Hypertension     Osteoarthritis     Pancreatitis chronic     Peritoneal dialysis status ([facility] [address]     Type II or unspecified type diabetes mellitus without mention of complication, not stated as uncontrolled     Vitamin D deficiency        Past Surgical History:        Procedure Laterality Date    CATARACT REMOVAL Left 5/29/13    had elevated BP post op    CORONARY ANGIOPLASTY      JOINT REPLACEMENT  5/4/2011    R WILDER    OTHER SURGICAL HISTORY Left 05/19/2017    INSERTION OF LAPAROSCOPIC PERITONEAL DIALYSIS CATHETER;    THYROIDECTOMY, PARTIAL         Current Medications:    Current Facility-Administered Medications: gentamicin (GARAMYCIN) 0.1 % cream, , Topical, PRN  aspirin EC tablet 81 mg, 81 mg, Oral, Daily  sodium chloride flush 0.9 % injection 10 mL, 10 mL, Intravenous, 2 times per day  sodium chloride flush 0.9 % injection 10 mL, 10 mL, Intravenous, PRN  magnesium hydroxide (MILK OF MAGNESIA) 400 MG/5ML suspension 30 mL, 30 mL, Oral, Daily PRN  ondansetron (ZOFRAN) injection 4 mg, 4 mg, Intravenous, Q6H PRN  [COMPLETED] 0.9 % sodium chloride bolus, 500 mL, Intravenous, Once **FOLLOWED BY** 0.9 % sodium chloride infusion, , Intravenous, Continuous  heparin (porcine) injection 5,000 Units, 5,000 Units, Subcutaneous, 3 times per day  lidocaine 4 % external patch 1 patch, 1 patch, Transdermal, Daily  sevelamer (RENVELA) tablet 800 mg, 800 mg, Oral, TID WC    Allergies:  Patient has no known allergies.     Social History:    Patient currently lives alone    Review of Systems -   General ROS: positive for  - fatigue  Psychological ROS: negative  ENT ROS: negative  Hematological and Lymphatic ROS: negative  Respiratory ROS: no cough, shortness of breath, or wheezing  Cardiovascular ROS: no chest pain or dyspnea on exertion  Gastrointestinal ROS: no abdominal pain, change in bowel habits, or black or bloody stools  Genito-Urinary ROS: no dysuria, trouble voiding, or hematuria  Musculoskeletal ROS: positive for - right foot pain  Neurological ROS: positive for - numbness/tingling R foot    Objective:        PHYSICAL EXAM:    General appearance: alert, appears stated age and cooperative  Lungs: clear to auscultation bilaterally  Heart: regular rate and rhythm  Abdomen: soft, non-tender; bowel sounds normal; no masses,  no organomegaly  Extremities: extremities normal, atraumatic, no cyanosis or edema  Skin: Skin color, texture, turgor normal. No rashes or lesions  Neurologic: Mental status: Alert, oriented, thought content appropriate, flat affect    Palliative Performance Scale:  60% ? Ambulation reduced; Significant disease; Can't do hobbies/housework; intake normal   or reduced; occasional assist; LOC full/confusion  50% ? Mainly sit/lie; Extensive disease; Can't do any work; Considerable assist; intake normal  Or reduced; LOC full/confusion  40% ? Mainly in bed; Extensive disease; Mainly assist; intake normal or reduced; occasional assist; LOC full/confusion  30% ? Bed Bound; Extensive disease; Total care; intake reduced; LOC full/confusion  20% ? Bed Bound; Extensive disease; Total care; intake minimal; Drowsy/coma  10% ? Bed Bound; Extensive disease; Total care; Mouth care only; Drowsy/coma  0 ? Death    PPS: 50%    Vitals:    /68   Pulse 67   Temp 97.9 °F (36.6 °C) (Oral)   Resp 16   Ht 5' 6\" (1.676 m)   Wt 128 lb 1.4 oz (58.1 kg)   LMP  (LMP Unknown)   SpO2 97%   Breastfeeding?  No   BMI 20.67 kg/m²     DATA:    CBC with Differential:    Lab Results   Component Value Date    WBC 7.9 03/25/2019    RBC 3.30 03/25/2019    HGB 10.7 03/25/2019    HCT 33.1 03/25/2019     03/25/2019    .4 03/25/2019    MCH 32.3 03/25/2019    MCHC 32.2 03/25/2019    RDW 14.5 03/25/2019    SEGSPCT 74.8 05/30/2013    BANDSPCT 5 03/31/2018    METASPCT 1 03/24/2019    LYMPHOPCT 13.7 03/25/2019    MONOPCT 9.0 03/25/2019    BASOPCT 0.6 03/25/2019    MONOSABS 0.7 03/25/2019    LYMPHSABS 1.1 03/25/2019    EOSABS 0.1 03/25/2019    BASOSABS 0.1 03/25/2019     BMP:    Lab Results   Component Value Date     03/24/2019    K 3.8 03/24/2019    CL 97 03/24/2019    CO2 24 03/24/2019    BUN 63 03/24/2019    LABALBU 2.8 03/24/2019    CREATININE 12.5 03/24/2019    CALCIUM 8.4 03/24/2019    GFRAA 4 03/24/2019    GFRAA 32 06/07/2013    LABGLOM 3 03/24/2019    GLUCOSE 128 03/24/2019     Albumin:    Lab Results   Component Value Date    LABALBU 2.8 03/24/2019         Conclusion/Time spent:         Recommendations see above    Pt [phone]  Time spent with patient and/or family: 10  Time reviewing records: 10  Time communicating with staff: 10    A total of 30 minutes spent with the patient and family on unit greater than 50% in counseling regarding palliative care and in goals of care for the patient. Thank you to Dr. Wing Vasquez for this consultation. We will continue to follow Ms. Orosco's care as needed.       James Bean, LOKESH  [address]

## 2019-03-25 NOTE — FLOWSHEET NOTE
Connected to [employer] cycler using strict aseptic technique and tx initiated after first verifying the orders. Initial effluent clear (391ml). Dianeal solution lot #[id number] exp 5/20 tubing [id number] exp 11/30/19, drainage bag lot #[id number]    20:00  03/24/2019  Lesta Carrier  Dressing changed and ordered gentamycin cream for exit site. Site WNL. No drainage or redness noted.          03/24/19 1950   Vitals   BP (!) 90/55   Temp 97.8 °F (36.6 °C)   Temp Source Oral   Pulse 80   Resp 22   SpO2 100 %   Weight 127 lb 13.9 oz (58 kg)   Cycler   Verification of Prescription CCPD   Total Volume Programmed 81233 mL   Therapy Time (Hours:Minutes) 10:00   Fill Volume 2000 mL   Last Fill Volume 0 mL   Dextrose Setting Same (Nonextraneal)   Number of Cycles 5   Bag Volume 40838 mL   Number of Bags Used 2   Dianeal Solution Other (Comment)  (1.5% in 5L bags x2)   I Drain (mL) 391 mL  (clear no fibrin noted)

## 2019-03-25 NOTE — PROGRESS NOTES
Occupational Therapy   Occupational Therapy Initial Assessment/Treatment  Date: 3/25/2019   Patient Name: Toña Fu  MRN: [id number]     : 1940    Date of Service: 3/25/2019    Discharge Recommendations:  Toña Fu scored a 19/24 on the AM-PAC ADL Inpatient form. Current research shows that an AM-PAC score of 18 or greater is typically associated with a discharge to the patient's home setting. Based on the patients AM-PAC score and their current ADL deficits, it is recommended that the patient have 2-3 sessions per week of Occupational Therapy at d/c to increase the patients independence. Should pt d/c home, recommend 24hr assist.         OT Equipment Recommendations  Equipment Needed: No    Assessment   Performance deficits / Impairments: Decreased functional mobility ; Decreased ADL status; Decreased strength;Decreased safe awareness;Decreased endurance  Assessment: Pt presents below her independent baseline function, requiring assist for all transfers and standing ADLs this date 2/2 R LE pain, numbness and decreased activity tolerance. Pt demo decreased safety awareness with mobility, needing cues for safety. Pt had a fall prior to admission and continues to be a fall risk. Should pt d/c home, recommend 24hr assist. Pt would benefit from ongoing OT tx  Treatment Diagnosis: Impaired ADLs, mobility, R LE strength  Prognosis: Good  Decision Making: Low Complexity  Patient Education: OT role, activity promotion, home safety concerns- pt verb understanding  REQUIRES OT FOLLOW UP: Yes  Activity Tolerance  Activity Tolerance: Patient limited by fatigue;Patient limited by pain  Safety Devices  Safety Devices in place: Not Applicable(transport taking pt off floor for testing)           Patient Diagnosis(es): The primary encounter diagnosis was Fall, initial encounter. A diagnosis of Numbness of right foot was also pertinent to this visit.      has a past medical history of DVT (deep venous thrombosis) Within Functional Limits     Balance  Sitting Balance: Independent  Standing Balance: Contact guard assistance(w/ RW)    Standing Balance  Time: 2 min, 30 sec  Activity: static stance, mobility in room    Functional Mobility  Functional - Mobility Device: Rolling Walker  Activity: Other(15' around bed to chair)  Assist Level: Contact guard assistance  Functional Mobility Comments: Slow and guarded mobility w/ RW. Pt reported R foot numbness and pain R LE    ADL  Grooming: Setup(wash face (Seated- pt unable to complete in stance 2/2 fatigue and R LE pain))  Additional Comments: Pt declined additional ADLs. Would require assist for balance during standing tasks        Bed mobility  Supine to Sit: Minimal assistance  Sit to Supine: Minimal assistance(assist to bring LEs into bed)  Scooting: Contact guard assistance  Comment: slow and effortful supine>Sit     Transfers  Sit to stand: Minimal assistance(min A from eob, cga from recliner. Required cuing for safe hand placement using walker)  Stand to sit: Contact guard assistance(to eob and recliner, poor safety awareness w/ pt sitting before backing completely up to transfer surface despite VC)     Cognition  Overall Cognitive Status: Exceptions  Following Commands: Follows one step commands with increased time(limited in part by [facility])  Problem Solving: Decreased awareness of errors  Insights: Decreased awareness of deficits        Sensation  Overall Sensation Status: Impaired(N and T R foot)        LUE AROM (degrees)  LUE AROM : WFL  RUE AROM (degrees)  RUE AROM : WFL  LUE Strength  Gross LUE Strength: WFL(4/5)  L Hand Grasp: 4/5  RUE Strength  Gross RUE Strength: WFL(4/5)  R Hand Grasp: 4/5           Treatment consisted of:  ADLs, transfer training, education on activity promotion and fall precautions.          Plan   Plan  Times per week: 2-5x  Times per day: Daily      AM-PAC Score        AM-[facility] Inpatient Daily Activity Raw Score: 19  AM-PAC Inpatient ADL T-Scale Score

## 2019-03-25 NOTE — PROGRESS NOTES
Minimal assistance  Quality of Gait: Decreased stance through R LE, small B steplengths, effortful  Distance: 10 feet and 4 feet     Balance  Standing - Dynamic: (Min assist with wheeled walker)      Treatment:  Functional mobility training and pt education    Plan   Plan  Times per week: 2-5  Current Treatment Recommendations: Gait Training, Balance Training, Functional Mobility Training, Transfer Training, Strengthening, Safety Education & Training  Safety Devices  Type of devices: Call light within reach, Nurse notified, Left in bed(Pt leaving floor with transportation)    AM-PAC Score  AM-PAC Inpatient Mobility Raw Score : 17  AM-PAC Inpatient T-Scale Score : 42.13  Mobility Inpatient CMS 0-100% Score: 50.57  Mobility Inpatient CMS G-Code Modifier : CK    Goals  Short term goals  Time Frame for Short term goals: by discharge  Short term goal 1: Bed mobility with SBA  Short term goal 2: Sit to stand with SBA  Short term goal 3: Pt will ambulate 60 feet with wheeled walker and SBA     Therapy Time   Individual Concurrent Group Co-treatment   Time In 1118         Time Out 1156         Minutes 38           Timed Code Treatment Minutes:   23    Total Treatment Minutes:  38     If pt d/c'd prior to next treatment, this note serves as a discharge note.   [place], [id number] [facility]

## 2019-03-25 NOTE — CONSULTS
Neurology consult Note    Dr. Kirti Burger requesting this consult. CC: Right leg numbness and weakness    HPI:   Mrs. Ivett Pinto is a 69year old woman with PMH DSRD, HLD, HTN, DVT who presented with falls at [facility]. She states that she's been having weakness and numbness in her right leg for the past week. She says that it has gotten progressively worse and that she has had several falls. She reports pain in her knee and foot that she describes as \"shooting\".       Past Medical History:   Diagnosis Date    DVT (deep venous thrombosis) (HCC)     End stage renal disease (HCC)     Hyperlipidemia     Hypertension     Osteoarthritis     Pancreatitis chronic     Peritoneal dialysis status ([facility] [address]     Type II or unspecified type diabetes mellitus without mention of complication, not stated as uncontrolled     Vitamin D deficiency      Past Surgical History:   Procedure Laterality Date    CATARACT REMOVAL Left 5/29/13    had elevated BP post op    CORONARY ANGIOPLASTY      JOINT REPLACEMENT  5/4/2011    R WILDER    OTHER SURGICAL HISTORY Left 05/19/2017    INSERTION OF LAPAROSCOPIC PERITONEAL DIALYSIS CATHETER;    THYROIDECTOMY, PARTIAL         CURRENT MEDICATIONS:    Current Facility-Administered Medications:     gentamicin (GARAMYCIN) 0.1 % cream, , Topical, PRN, Dennis Oden MD    potassium chloride (KLOR-CON M) extended release tablet 40 mEq, 40 mEq, Oral, Once, Erich Bell MD    aspirin EC tablet 81 mg, 81 mg, Oral, Daily, Radha Bolden MD, 81 mg at 03/25/19 0839    sodium chloride flush 0.9 % injection 10 mL, 10 mL, Intravenous, 2 times per day, Radha Bolden MD, 10 mL at 03/25/19 0839    sodium chloride flush 0.9 % injection 10 mL, 10 mL, Intravenous, PRN, Radha Bolden MD    magnesium hydroxide (MILK OF MAGNESIA) 400 MG/5ML suspension 30 mL, 30 mL, Oral, Daily PRN, Radha Bolden MD    ondansetron (ZOFRAN) injection 4 mg, 4 mg, Intravenous, Q6H PRN, Radha Bolden MD    [COMPLETED] 0.9 % sodium chloride bolus, 500 mL, Intravenous, Once, Stopped at 03/24/19 2116 **FOLLOWED BY** 0.9 % sodium chloride infusion, , Intravenous, Continuous, Feng Alegria MD, Last Rate: 75 mL/hr at 03/24/19 2115    heparin (porcine) injection 5,000 Units, 5,000 Units, Subcutaneous, 3 times per day, Feng Alegria MD, 5,000 Units at 03/25/19 0542    lidocaine 4 % external patch 1 patch, 1 patch, Transdermal, Daily, Feng Alegria MD, 1 patch at 03/25/19 0839    sevelamer (RENVELA) tablet 800 mg, 800 mg, Oral, TID WC, Christina Nunez MD, 800 mg at 03/25/19 0839      ROS:   Constitutional- No weight loss or fevers   Eyes- No diplopia. No photophobia. Ears/nose/throat- No dysphagia. No Dysarthria   Cardiovascular- No palpitations. No chest pain   Respiratory- No dyspnea. No Cough   Gastrointestinal- No Abdominal pain. No Vomiting. Genitourinary- No incontinence. No urinary retention   Musculoskeletal- No myalgia. No arthralgia   Skin- No rash. No easy bruising. Psychiatric- No depression. No anxiety   Endocrine- No diabetes. No thyroid issues. Hematologic- No bleeding difficulty. No fatigue   Neurologic- + weakness. No Headache. Constitutional  /68   Pulse 67   Temp 97.9 °F (36.6 °C) (Oral)   Resp 16   Ht 5' 6\" (1.676 m)   Wt 128 lb 1.4 oz (58.1 kg)   LMP  (LMP Unknown)   SpO2 97%   Breastfeeding?  No   BMI 20.67 kg/m²       General Alert, no distress, well-nourished      Neurologic  Mental status:   orientation to person, place, time, situation   Attention intact as able to attend well to the exam     Language fluent in conversation   Comprehension intact; follows simple commands    Cranial nerves:   CN2: Visual Fields full w/o extinction on confrontational testing  CN 3,4,6: pupils equal and reactive to light, extraocular muscles intact  CN5: facial sensation symmetric   CN7:face symmetric bilaterally   CN8: hearing symmetric to voice  CN9: palate elevated symmetrically  CN11: trap full strength on shoulder shrug  CN12: tongue midline with protrusion  Motor Exam:   R  L    Deltoid 5  5   Biceps 5 5   Triceps 5 5    5 5      R  L    Hip flexion  4 5-   Knee flexion  3 5   Knee extension  4 5   Ankle dorsiflexion  5 5   Ankle plantar flexion  1 5       Deep tendon reflexes:    R  L    Biceps  1 1   Brachioradialis  1 1   Patellar  0 0   Toes down down     Sensory:   Vibratory sensation to BUE intact and symmetric  Vibratory sensation to BLE impaired and normalizes at the bilateral patella    Intact sensation to temp and pinprick to bilateral feet and bilateral hands    Cerebellar/coordination: finger nose finger normal without ataxia  Tone: normal in all 4 extremities  Gait: deferred       Images:  CT head wo contrast:   1. No evidence of recent intracranial hemorrhage. 2. Remote lacunar infarcts as above. 3. Parenchymal volume loss and chronic small vessel ischemic changes in the white matter. Lumbar xray with obliques flex and ext:  Degenerative changes of the spine with a grade 1 degenerative anterolisthesis L4 on L5. Assessment: 69year old woman with progressive pain, sensory changes, and weakness to her right foot that is consistent with an L5-S1 process. She is generally weak with some bilateral neuropathy, but the weakness and pain to the right leg are new.       Plan:  -MRI lumbar spine wo contrast  -PT/OT eval and treat  -Gabapentin 100mg PO BID for radicular pain    Shelby Kanner, APRN-CNP  Neurology  [phone]

## 2019-03-26 NOTE — PROGRESS NOTES
She exhibits no edema. Neurological: She is alert and oriented to person, place, and time. A sensory deficit is present. Endorses numbness in right foot. Sensation otherwise intact bilaterally in lower extremities    Skin: Skin is warm and dry. Psychiatric: She has a normal mood and affect. Her behavior is normal.   Vitals reviewed. No results for input(s): PHART, [id number], PO2ART in the last 72 hours. DATA:       Labs  CBC:   Recent Labs     03/24/19  1143 03/25/19  0749 03/26/19  0750   WBC 10.6 7.9 5.8   HGB 13.7 10.7* 11.4*   HCT 42.0 33.1* 35.3*    138 140       BMP:   Recent Labs     03/24/19  1143 03/25/19  0749 03/25/19  1138 03/26/19  0750    143  --  140   K 3.8 2.8* 3.1* 3.3*   CL 97* 102  --  100   CO2 24 23  --  24   BUN 63* 57*  --  50*   CREATININE 12.5* 10.7*  --  9.5*   GLUCOSE 128* 133*  --  81     LFT's:   Recent Labs     03/24/19  1143   AST 10*   ALT 10   BILITOT <0.2   ALKPHOS 78     Troponin:   Recent Labs     03/24/19  1143 03/25/19  0058   TROPONINI 0.08* 0.08*     BNP: No results for input(s): BNP in the last 72 hours. ABGs: No results for input(s): PHART, [id number], PO2ART in the last 72 hours. INR: No results for input(s): INR in the last 72 hours. Lipids: No results for input(s): CHOL, HDL in the last 72 hours. Invalid input(s): LDLCALCU    U/A:No results for input(s): NITRITE, COLORU, PHUR, LABCAST, WBCUA, RBCUA, MUCUS, TRICHOMONAS, YEAST, BACTERIA, CLARITYU, SPECGRAV, LEUKOCYTESUR, UROBILINOGEN, BILIRUBINUR, BLOODU, GLUCOSEU, AMORPHOUS in the last 72 hours. Invalid input(s): KETONESU     ASSESSMENT AND PLAN:   Moni Varner, 69 y.o. female w/ ESRD on PD, HTN, DM2. Admitted for fall 2/2 right foot numbness.   Suspicion for diabetic neuropathy vs vitamin deficiency as etiology, workup still pending   Active Hospital Problems    Diagnosis Date Noted    Numbness of right foot [R20.0]     Encounter for palliative care [Z51.5]     Advance directive

## 2019-03-26 NOTE — PLAN OF CARE
Problem: Falls - Risk of:  Goal: Will remain free from falls  Description  Will remain free from falls  3/26/2019 1316 by Katarina Hair RN  Outcome: Met This Shift  Note:   Pt alert x 4. Calls out appropriately for assistance. In bed, up with assist. Will monitor safety. 3/26/2019 0016 by Fidelina Weber RN  Note:   Patient is a risk for falls. Bed locked in the lowest position. Bed alarm in use. Call light and personal belongings within reach. Instructed to call for help before getting up, patient verbalizes understanding. Door to room left open. Wearing non-skid socks. Fall sign posted. Will continue to monitor.

## 2019-03-26 NOTE — CONSULTS
Nephrology Consult Note  Patient's Name: Lily King  10:50 PM  3/25/2019    Reason for Consult:  ESRD   Requesting Physician:  Verenice Ackerman MD (Inactive)      Chief Complaint: fall     History of Present Ilness:    Lily King is a 69 y.o. female who presents with 1 week of progressive right leg numbness with pain and weakness. Worse in the foot. Steady progressive. Denies back pain. Pt is on PD for ESRD   Pt has debility and was in rehab   Phos was elevated   BP controlled   On IVF   K low     Pt seen on PD - clif[facility] well           Past Medical History:   Diagnosis Date    DVT (deep venous thrombosis) (HCC)     End stage renal disease (HCC)     Hyperlipidemia     Hypertension     Osteoarthritis     Pancreatitis chronic     Peritoneal dialysis status ([facility] [address]     Type II or unspecified type diabetes mellitus without mention of complication, not stated as uncontrolled     Vitamin D deficiency        Past Surgical History:   Procedure Laterality Date    CATARACT REMOVAL Left 5/29/13    had elevated BP post op    CORONARY ANGIOPLASTY      JOINT REPLACEMENT  5/4/2011    R WILDER    OTHER SURGICAL HISTORY Left 05/19/2017    INSERTION OF LAPAROSCOPIC PERITONEAL DIALYSIS CATHETER;    THYROIDECTOMY, PARTIAL         History reviewed. No pertinent family history. reports that she has been smoking cigarettes. She has a 12.00 pack-year smoking history. She has never used smokeless tobacco. She reports that she does not drink alcohol or use drugs. Allergies:  Patient has no known allergies.     Current Medications:      gentamicin (GARAMYCIN) 0.1 % cream PRN   gabapentin (NEURONTIN) capsule 100 mg TID   cyclobenzaprine (FLEXERIL) tablet 10 mg TID PRN   [START ON 3/26/2019] potassium chloride (KLOR-CON M) extended release tablet 20 mEq Daily with breakfast   [START ON 3/26/2019] sevelamer (RENVELA) tablet 1,600 mg TID WC   aspirin EC tablet 81 mg Daily   sodium chloride flush 0.9 % injection 10 mL 2 Wolm Juniper, BLOODU, PHUR, PROTEINU, UROBILINOGEN, NITRU, LEUKOCYTESUR, Lili Collar in the last 72 hours. Urine Microscopic: No results for input(s): LABCAST, BACTERIA, COMU, HYALCAST, WBCUA, RBCUA, EPIU in the last 72 hours. Urine Culture: No results for input(s): LABURIN in the last 72 hours. Urine Chemistry: No results for input(s): Woodard Bile, PROTEINUR, NAUR in the last 72 hours. IMAGING:  MRI LUMBAR SPINE WO CONTRAST   Final Result      XR Lumbosacral W Obliques Flex and Ext   Final Result   Impression:   1. Degenerative changes of the spine with a grade 1 degenerative anterolisthesis L4 on L5.      CT Head WO Contrast   Final Result   Impression:   1. No evidence of recent intracranial hemorrhage. 2. Remote lacunar infarcts as above. 3. Parenchymal volume loss and chronic small vessel ischemic changes in the white matter. XR CHEST STANDARD (2 VW)   Final Result      No acute cardiopulmonary disease      Prominent soft tissue in the right paratracheal region is probably similar to prior exams. Assessment/Plan   1. ESRD on PD     2. HTN     3. Anemia    4. Acid- base/ Electrolyte imbalance     5. Debility     6.  Numbness - likely radiculopathy     Plan   - Pt seen on PD - clifton well   - Monitor UF   - d/c IVF   - replace K   - encourage protien intake   - Phos binders Increased  - PTH in am   - Neuro following   - will need rehab                 Thank you for allowing us to participate in care of [address] free to contact me   [facility] [address]  Office : [phone]  Fax :[phone]

## 2019-03-26 NOTE — FLOWSHEET NOTE
Deaccessed from PD cath using aseptic technique and treatment ended. Effluent clear and pale yellow with no fibrin noted. Tx summary;     Total time 10 hours 12 minutes    Total UF -38  Total volume 9995    Gained 50 minutes      Report rendered and ACES charting placed into chart for future scanning to the EMR.         03/26/19 0530   Vitals   BP 95/59   Temp 98 °F (36.7 °C)   Pulse 71   Resp 20   SpO2 96 %   Weight 130 lb 15.3 oz (59.4 kg)

## 2019-03-26 NOTE — CARE COORDINATION
Case Management Daily Note:    Current Plan of Care:       PT AM-PAC:17 /24  Per last eval on: 3/25    OT AM-PAC: 19/24 Per last eval on:3/25    DME needs: no      Discharge Plan: Home with [facility]    Tentative Discharge Date: TBD    Current Barriers to Discharge: neurosurgery consulted    Resources/Information given: SNF, [place] [facility]      Case Management Notes:  continuing to follow for safe discharge planning. Patient plans to return home, declines SNF at this time. [facility] to follow. Patient states her brother will transport home.        Ni Strange MSW/LSW    [phone]

## 2019-03-26 NOTE — PLAN OF CARE
Problem: Falls - Risk of:  Goal: Will remain free from falls  Description  Will remain free from falls  3/26/2019 0016 by Tad Ponce RN  Note:   Patient is a risk for falls. Bed locked in the lowest position. Bed alarm in use. Call light and personal belongings within reach. Instructed to call for help before getting up, patient verbalizes understanding. Door to room left open. Wearing non-skid socks. Fall sign posted. Will continue to monitor. Problem: Risk for Impaired Skin Integrity  Goal: Tissue integrity - skin and mucous membranes  Description  Structural intactness and normal physiological function of skin and  mucous membranes. 3/26/2019 0016 by Tad Ponce RN  Note:   Skin is warm, dry and intact. Patient able to turn and reposition self. Will continue to encourage the patient to change positions frequently.

## 2019-03-27 NOTE — PROGRESS NOTES
Temp 97.4 °F (36.3 °C) (Oral)   Resp 15   Ht 5' 6\" (1.676 m)   Wt 133 lb 9.6 oz (60.6 kg)   LMP  (LMP Unknown)   SpO2 96%   Breastfeeding? No   BMI 21.56 kg/m²   Constitutional:  OAA X3 NAD  Skin: no rash, turgor wnl  Heent:  eomi, mmm  Neck: no bruits or jvd noted  Cardiovascular:  S1, S2 without m/r/g  Respiratory: CTA B without w/r/r  Abdomen:  +bs, soft, nt, nd  Ext: no lower extremity edema  Psychiatric: mood and affect appropriate  Musculoskeletal:  Rom, muscular strength dec     Data:   Labs:  CBC:   Recent Labs     03/25/19  0749 03/26/19  0750 03/27/19  0900   WBC 7.9 5.8 5.9   HGB 10.7* 11.4* 12.6    140 134*     BMP:    Recent Labs     03/25/19  0749 03/25/19  1138 03/26/19  0750 03/27/19  0900     --  140 140   K 2.8* 3.1* 3.3* 3.4*     --  100 99   CO2 23  --  24 24   BUN 57*  --  50* 49*   CREATININE 10.7*  --  9.5* 9.0*   GLUCOSE 133*  --  81 146*     Ca/Mg/Phos:   Recent Labs     03/25/19  0749 03/25/19  1138 03/26/19  0750 03/27/19  0900   CALCIUM 7.1*  --  7.1* 7.4*   MG 1.60* 1.70*  --   --    PHOS 5.7*  --  5.1* 4.8     Hepatic:   Recent Labs     03/24/19  1143   AST 10*   ALT 10   BILITOT <0.2   ALKPHOS 78     Troponin:   Recent Labs     03/24/19  1143 03/25/19  0058   TROPONINI 0.08* 0.08*     BNP: No results for input(s): BNP in the last 72 hours. Lipids: No results for input(s): CHOL, TRIG, HDL, LDLCALC, LABVLDL in the last 72 hours. ABGs: No results for input(s): PHART, PO2ART, [id number] in the last 72 hours. INR: No results for input(s): INR in the last 72 hours. UA:No results for input(s): Sweta Cordia, GLUCOSEU, BILIRUBINUR, Arlys Records, BLOODU, PHUR, PROTEINU, UROBILINOGEN, NITRU, LEUKOCYTESUR, LABMICR, URINETYPE in the last 72 hours. Urine Microscopic: No results for input(s): LABCAST, BACTERIA, COMU, HYALCAST, WBCUA, RBCUA, EPIU in the last 72 hours. Urine Culture: No results for input(s): LABURIN in the last 72 hours.   Urine Chemistry: No results for input(s): CLUR, LABCREA, PROTEINUR, NAUR in the last 72 hours. IMAGING:  MRI LUMBAR SPINE WO CONTRAST   Final Result      XR Lumbosacral W Obliques Flex and Ext   Final Result   Impression:   1. Degenerative changes of the spine with a grade 1 degenerative anterolisthesis L4 on L5.      CT Head WO Contrast   Final Result   Impression:   1. No evidence of recent intracranial hemorrhage. 2. Remote lacunar infarcts as above. 3. Parenchymal volume loss and chronic small vessel ischemic changes in the white matter. XR CHEST STANDARD (2 VW)   Final Result      No acute cardiopulmonary disease      Prominent soft tissue in the right paratracheal region is probably similar to prior exams. Assessment/Plan   1. ESRD on PD     2. HTN     3. Anemia    4. Acid- base/ Electrolyte imbalance     5. Debility     6.  Numbness - likely radiculopathy     Plan   - Pt seen on PD - clif[facility] well   - Monitor UF   - d/c IVF   - replace K   - encourage protien intake   - Phos binders Increased  - PTH - 537   - Neuro following   - will need rehab                 Thank you for allowing us to participate in care of [address] free to contact me   [facility] [address]  Office : [phone]  Fax :[phone]

## 2019-03-27 NOTE — DISCHARGE SUMMARY
loss and chronic small vessel ischemic changes in the white matter. XR CHEST STANDARD (2 VW)   Final Result      No acute cardiopulmonary disease      Prominent soft tissue in the right paratracheal region is probably similar to prior exams. EKG     Rhythm: normal sinus   Rate: normal  Clinical Impression: no acute changes    Discharge Medications:   Senispar 60 mg daily   Gabapentin 100 mg bid   Lidocaine 4% patch   Renvela 800 mg daily     Stop Taking: Carvedilol 25 mg      Follow-up appointments:  Nephrology as scheduled. Provider Follow-up:    Dr. Sreekanth Anderson MD.     Condition at Discharge:  Good    The patient was seen and examined on day of discharge and this discharge summary is in conjunction with any daily progress note from day of discharge. Time spent on discharge is more than 30 minutes in the examination, evaluation, counseling and review of medications and discharge plan.       Signed:    Zandra Barnes MD   3/27/2019

## 2019-03-27 NOTE — PROGRESS NOTES
Spoke to pt's brother about pt's safety at home. He is concerned also. He is aware of her declining SNF. Offered support. Pt d/c'd to home per w/c via private car.

## 2019-03-27 NOTE — CARE COORDINATION
[facility]    DC order noted, all docs needed have been faxed to [facility] for home care services.         Gerson Keen  Work mobile: [phone]  [facility] office: [phone]

## 2019-03-27 NOTE — DISCHARGE SUMMARY
Hospital Medicine Discharge Summary    Patient: Purnima Reeves     Gender: female  : 1940   Age: 69 y.o. MRN: [id number]    Code Status: Full Code     Primary Care Provider: Akbar Hobson MD (Inactive)    Admit Date: 3/24/2019   Discharge Date:   3/27/2019    Admitting Physician: No admitting provider for patient encounter. Discharge Physician: Trent Tobias MD     Discharge Diagnoses: Active Hospital Problems    Diagnosis Date Noted    Numbness of right foot [R20.0]     Encounter for palliative care [Z51.5]     Advance directive discussed with patient [Z71.89]     Failure to thrive in adult [R62.7] 2019       Hospital Course: America Landaverde a 69 y. o. female with PMH of OA, DM type 2 (HbA1c 5.6 on 2019), ESRD on PD (M-F), CAD and gout who was admitted to [facility] on 3/24/19 due to experiencing a fall secondary to right leg weakness. During admission, an MRI of the lumbosacral spine was done and was significant for L4-L5 stenosis. The etiology of the fall is felt to be lumbar radiculopathy. The patient was started on gabapentin and muscle relaxants with improvement in symptoms. She does not wish to have any neurosurgical intervention done at this time. She is advised to follow-up with neurosurgery outpatient if symptoms do not improve with medical management. Disposition:  Home with Home Health Care    Exam:     BP 88/60   Pulse 70   Temp 97.4 °F (36.3 °C) (Oral)   Resp 15   Ht 5' 6\" (1.676 m)   Wt 133 lb 9.6 oz (60.6 kg)   LMP  (LMP Unknown)   SpO2 96%   Breastfeeding? No   BMI 21.56 kg/m²     Physical Exam   Constitutional: She is oriented to person, place, and time. She appears well-developed. She appears distressed. HENT:   Head: Normocephalic and atraumatic. Eyes: EOM are normal.   Neck: Neck supple. Cardiovascular: Normal rate and regular rhythm. No murmur heard. Pulmonary/Chest: Effort normal and breath sounds normal. No stridor.  She has no wheezes. She has no rales. Abdominal: Soft. Bowel sounds are normal. She exhibits no distension. There is no tenderness. PD catheter in place, no drainage or erythema around site. Musculoskeletal: She exhibits no edema. Neurological: She is alert and oriented to person, place, and time. A sensory deficit is present. Endorses numbness in right foot. Sensation otherwise intact bilaterally in lower extremities    Skin: Skin is warm and dry. Psychiatric: She has a normal mood and affect. Her behavior is normal.   Vitals reviewed. Consults:     IP CONSULT TO HOSPITALIST  IP CONSULT TO PALLIATIVE CARE  IP CONSULT TO NEPHROLOGY  IP CONSULT TO SOCIAL WORK  IP CONSULT TO NEUROLOGY  IP CONSULT TO SOCIAL WORK    Labs: For convenience and continuity at follow-up the following most recent labs are provided:    Lab Results   Component Value Date    WBC 5.9 03/27/2019    HGB 12.6 03/27/2019    HCT 38.5 03/27/2019    MCV 98.8 03/27/2019     03/27/2019     03/27/2019    K 3.4 03/27/2019    K 3.1 03/25/2019    CL 99 03/27/2019    CO2 24 03/27/2019    BUN 49 03/27/2019    CREATININE 9.0 03/27/2019    CALCIUM 7.4 03/27/2019    PHOS 4.8 03/27/2019     09/10/2012    ALKPHOS 78 03/24/2019    ALT 10 03/24/2019    AST 10 03/24/2019    BILITOT <0.2 03/24/2019    BILIDIR <0.2 10/12/2017    LABALBU 2.3 03/27/2019    LDLCALC 54 02/03/2015    TRIG 84 02/03/2015     Lab Results   Component Value Date    INR 0.89 02/16/2015    INR 0.93 02/03/2015    INR 1.0 05/29/2013       Radiology:  MRI LUMBAR SPINE WO CONTRAST   Final Result      XR Lumbosacral W Obliques Flex and Ext   Final Result   Impression:   1. Degenerative changes of the spine with a grade 1 degenerative anterolisthesis L4 on L5.      CT Head WO Contrast   Final Result   Impression:   1. No evidence of recent intracranial hemorrhage. 2. Remote lacunar infarcts as above.    3. Parenchymal volume loss and chronic small vessel ischemic changes in plan.      Signed:    So Nichols MD   3/27/2019

## 2019-03-27 NOTE — CARE COORDINATION
CM  followed up with d/c  Planning re: disposition , She  Spoke with SW earlier  Who stated  She refused  SNF  As well as  To  MD Dr Kimbrough Setting  She declined SNF:  [facility]  And  Note  By  Therapy reviewed but  She  Declined  SNF . Pt is from home alone and  declining SNF  ,  CM  reviewed evals and rec:  and  Current scores and safety concerns for her  Home  Alone , PT  Noted  Pt seemed open to the idea of discharging to a rehab facility,  but she continued to refuse  SNF , she says she prefers therapy to come to the house . She has a  RW  From previous admission   CM  spoke with  [facility]  Who stated  They have increased her  services to  An S 3 model   . Orders faxed  And  Family plans to transport her home later this evening . Shanique James RN Case Manager  [facility]  [address] [place] [address].   [facility] [id number]  [phone]  Fax [phone]

## 2019-03-27 NOTE — DISCHARGE INSTR - COC
Continuity of Care Form    Patient Name: Cristian Skaggs   :  1940  MRN:  [id number]    Admit date:  3/24/2019  Discharge date:  2019    Code Status Order: Full Code   Advance Directives:   [facility] Documentation     Date/Time Healthcare Directive Type of Healthcare Directive Copy in [address] [place] [address] [address] Agent's Name Healthcare Agent's Phone Number    [phone]  No, patient does not have an advance directive for healthcare treatment -- -- -- -- --          Admitting Physician:  No admitting provider for patient encounter.   PCP: Yasmany Cali MD (Inactive)    Discharging Nurse: [facility] Unit/Room#: 8002/6313-54  Discharging Unit Phone Number: ***    Emergency Contact:   Extended Emergency Contact Information  Primary Emergency Contact: Evelyn Garcia, [address] [place] [address] Phone: [phone]  Work Phone: [phone]  Mobile Phone: [phone]  Relation: Brother/Sister    Past Surgical History:  Past Surgical History:   Procedure Laterality Date    CATARACT REMOVAL Left 13    had elevated BP post op    CORONARY ANGIOPLASTY      JOINT REPLACEMENT  2011    R WILDER    OTHER SURGICAL HISTORY Left 2017    INSERTION OF LAPAROSCOPIC PERITONEAL DIALYSIS CATHETER;    THYROIDECTOMY, PARTIAL         Immunization History:   Immunization History   Administered Date(s) Administered    Hepatitis B Adult (Engerix-B) 2019    Pneumococcal Polysaccharide ([id number]) 2013       Active Problems:  Patient Active Problem List   Diagnosis Code    HTN (hypertension) Z41    Systolic heart failure ([facility] I50.20    DM (diabetes mellitus) ([facility] E11.9    History of macrocytic anemia Z86.2    Smoking F17.200    Osteoarthritis M19.90    Hypertensive urgency I16.0    Hyperkalemia E87.5    Renal artery stenosis (HCC) I70.1    Acute renal insufficiency N28.9    Hyperlipidemia E78.5    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 [place] [address]  Phone: [phone]  Fax: [phone]      / signature: Electronically signed by Faiza Palacios RN on 3/27/19 at 3:42 PM    PHYSICIAN SECTION    Prognosis: Fair    Condition at Discharge: Stable    Rehab Potential (if transferring to Rehab): Good    Recommended Labs or Other Treatments After Discharge:   - continue home PT/OT  - If your neuropathy worsens, you will have to see a neurosurgeon and possible require an EMG test     Physician Certification: I certify the above information and transfer of Yaneth Santo  is necessary for the continuing treatment of the diagnosis listed and that she requires Home Care for greater 30 days.      Update Admission H&P: No change in H&P    PHYSICIAN SIGNATURE:  Electronically signed by Carson Guerrero MD on 3/27/19 at 2:21 PM

## 2019-03-27 NOTE — PROGRESS NOTES
Ht 5' 6\" (1.676 m)   Wt 134 lb 7.7 oz (61 kg)   LMP  (LMP Unknown)   SpO2 98%   Breastfeeding? No   BMI 21.71 kg/m²   Constitutional:  OAA X3 NAD  Skin: no rash, turgor wnl  Heent:  eomi, mmm  Neck: no bruits or jvd noted  Cardiovascular:  S1, S2 without m/r/g  Respiratory: CTA B without w/r/r  Abdomen:  +bs, soft, nt, nd  Ext: no lower extremity edema  Psychiatric: mood and affect appropriate  Musculoskeletal:  Rom, muscular strength dec     Data:   Labs:  CBC:   Recent Labs     03/24/19  1143 03/25/19  0749 03/26/19  0750   WBC 10.6 7.9 5.8   HGB 13.7 10.7* 11.4*    138 140     BMP:    Recent Labs     03/24/19  1143 03/25/19  0749 03/25/19  1138 03/26/19  0750    143  --  140   K 3.8 2.8* 3.1* 3.3*   CL 97* 102  --  100   CO2 24 23  --  24   BUN 63* 57*  --  50*   CREATININE 12.5* 10.7*  --  9.5*   GLUCOSE 128* 133*  --  81     Ca/Mg/Phos:   Recent Labs     03/24/19  1143 03/25/19  0749 03/25/19  1138 03/26/19  0750   CALCIUM 8.4 7.1*  --  7.1*   MG  --  1.60* 1.70*  --    PHOS 7.9* 5.7*  --  5.1*     Hepatic:   Recent Labs     03/24/19  1143   AST 10*   ALT 10   BILITOT <0.2   ALKPHOS 78     Troponin:   Recent Labs     03/24/19  1143 03/25/19  0058   TROPONINI 0.08* 0.08*     BNP: No results for input(s): BNP in the last 72 hours. Lipids: No results for input(s): CHOL, TRIG, HDL, LDLCALC, LABVLDL in the last 72 hours. ABGs: No results for input(s): PHART, PO2ART, QOQ5POA in the last 72 hours. INR: No results for input(s): INR in the last 72 hours. UA:No results for input(s): Sarah Quiver, GLUCOSEU, BILIRUBINUR, Wolm Juniper, BLOODU, PHUR, PROTEINU, UROBILINOGEN, NITRU, LEUKOCYTESUR, LABMICR, URINETYPE in the last 72 hours. Urine Microscopic: No results for input(s): LABCAST, BACTERIA, COMU, HYALCAST, WBCUA, RBCUA, EPIU in the last 72 hours. Urine Culture: No results for input(s): LABURIN in the last 72 hours.   Urine Chemistry: No results for input(s): Woodard Bile, PROTEINUR,

## 2019-03-27 NOTE — PROGRESS NOTES
Physical Therapy  Facility/Department: [address] [facility] [address]  Daily Treatment Note  NAME: Anastacia Savage  : 1940  MRN: [id number]    Date of Service: 3/27/2019    Discharge Recommendations:  Anastacia Savage scored a 13/24 on the AM-PAC short mobility form. Current research shows that an AM-PAC score of 17 or less is typically not associated with a discharge to the patient's home setting. Based on the patients AM-PAC score and their current functional mobility deficits, it is recommended that the patient have 3-5 sessions per week of Physical Therapy at d/c to increase the patients independence. Assessment   Assessment: Pt has regressed compared to previous session; presenting with very poor functional mobility. Pt is a very high fall risk and is not safe to return home. Writer communicated this to Case management and RN Zakia Poon. Pt seemed open to the idea of discharging to a rehab facility.        Patient would benefit from continued therapy after discharge   PT Equipment Recommendations  Equipment Needed: No    Patient Diagnosis(es): The primary encounter diagnosis was Fall, initial encounter. A diagnosis of Numbness of right foot was also pertinent to this visit. has a past medical history of DVT (deep venous thrombosis) ([address] End stage renal disease ([address] Hyperlipidemia, Hypertension, Osteoarthritis, Pancreatitis chronic, Peritoneal dialysis status ([address] Type II or unspecified type diabetes mellitus without mention of complication, not stated as uncontrolled, and Vitamin D deficiency. has a past surgical history that includes Thyroidectomy, partial; Coronary angioplasty; joint replacement (2011); Cataract removal (Left, 13); and other surgical history (Left, 2017). Restrictions  Position Activity Restriction  Other position/activity restrictions:  Up with assistance, peritoneal dialysis  Subjective   General  Additional Pertinent Hx: Pt admitted on 3/24/19 with failure to thrive. H/o DVT, ESRD, HTN, OA, pancreatitis, peritoneal dialysis, DM2, vit D def, R THR, thyroidectomy. Referring Practitioner: Dr. Licona Coma: Pt was reluctant to participate \"I'm suppose to discharge. \"   Pain Screening  Patient Currently in Pain: No  Vital Signs  Patient Currently in Pain: No  Orientation  Orientation  Overall Orientation Status: Within Functional Limits  Objective   Bed mobility  Supine to Sit: Minimal assistance  Scooting: Stand by assistance  Transfers  Sit to Stand: Mod to max (pt buckled and collapsed on the bed 3 times). Stand to sit: CGA   Ambulation 1  Surface: level tile  Assistance: Minimal assistance  Quality of Gait: very unsteady and effortful shuffle like gait   Distance: x10 ft   Balance  Sitting - Static: Good  Sitting - Dynamic: Good  Standing - Static: Fair  Standing - Dynamic: Poor  Exercises  Quad Sets: 10x5 sec  Heelslides: x10 B   Gluteal Sets: 10x5 sec B   Hip Abduction: x10 B   Ankle Pumps: x20 B   Comments: pt struggles to give maximum effort. Assessment   Assessment: Pt has regressed compared to previous session presenting with very poor functional mobility. Pt is a very high fall risk and is not safe to return home. Writer communicated this to Case management and RN kamar. Pt seemed more open to the idea of discharging to a rehab facility.      Treatment Diagnosis: Decreased functional mobility related to failure to thrive in adult  Specific instructions for Next Treatment: increase gait distance   Prognosis: Good  Patient Education: Role of PT- pt verbalized understanding  REQUIRES PT FOLLOW UP: Yes     AM-PAC Score  AM-PAC Inpatient Mobility Raw Score : 13  AM-PAC Inpatient T-Scale Score : 36.74  Mobility Inpatient CMS 0-100% Score: 64.91  Mobility Inpatient CMS G-Code Modifier : CL          Goals  Short term goals  Time Frame for Short term goals: by discharge  Short term goal 1: Bed mobility with SBA  Short term goal 2: Sit to stand with

## 2019-04-08 PROBLEM — R29.898 WEAKNESS OF BOTH LEGS: Status: ACTIVE | Noted: 2019-01-01

## 2019-04-08 NOTE — ED NOTES
Bed: A06-06  Expected date:   Expected time:   Means of arrival:   Comments:  [address] [place], MIR  04/08/19 9036

## 2019-04-08 NOTE — ED NOTES
PT straight cathiderized using sterile technique to obtain a urine sample at this time.       Osman Hernandez RN  04/08/19 5716

## 2019-04-08 NOTE — ED NOTES
PT presents to the ED from home via ems after falling and was unable to get up from the floor. PT states she fell last night and called 911 as she was not able to get up and refused to come to the ED to be evaluated. Pt states she fell again after her legs \"gave out\" and the home health RN came to visit and she wasn't able to let the RN in the house so she called 911. Pt states both falls were results of her legs giving out from underneath her, denies hitting her head or loc. Pt a/ox4, moving x4 extrimities, bruising and swelling noted to pts left knee.       Norbert Torres, RN  04/08/19 1329

## 2019-04-08 NOTE — ED PROVIDER NOTES
[address] [facility]          PHYSICIAN ASSISTANT NOTE       Date of evaluation: 4/8/2019    Chief Complaint     Fall ([facility] brought [facility] for frequent falls at home. Pt states she fell last night and was on the floor for several hours. EMS came and she refused to come into the ED, but then she fell again. Dialysis at home. No Dialysis yesterday or two)      History of Present Illness     Kendal Domingo is a 69 y.o. female with a PMH of DVT, ESRD on peritoneal dialysis M-F, HTN, DM a who presents to the emergency department after a fall. Patient was discharged from the hospital on 3/27/19 after being admitted secondary to multiple falls. During her hospitalization she did have an MRI of her lumbar spine which showed stenosis of L4/L5. They believe that her fall being caused by lumbar radiculopathy. She states that the numbness/tingling in her right lower extremity has not changed since her discharge from the hospital. She states that yesterday night after finishing her peritoneal dialysis, she did have a fall. She denies any pre-fall symptoms as dizziness, chest pain, shortness of breath. She states that her right lower extremity just felt significantly weak and she could not support her own weight. She denies any loss of consciousness. She states that she fell backwards and felt very weak. She was on the ground for most of the night, however was able to contact the  this morning and came to help her. EMS was called and helped her back into her chair. At that time she refused transport to the hospital because she has not had any pain. The patient's brother was check on her later in the day and she was unable to stand up out of the chair due to weakness. He states that he contacted EMS to bring her to the hospital because he believes she is not safe to live by herself in an apartment.  He states that she needs to be placed in a facility with physical and history. She has never used smokeless tobacco. She reports that she does not drink alcohol or use drugs. Medications     Previous Medications    ACETAMINOPHEN (TYLENOL) 325 MG TABLET    Take 2 tablets by mouth every 6 hours as needed for Pain    ASPIRIN 81 MG EC TABLET    Take 1 tablet by mouth daily    B COMPLEX-VITAMIN C-FOLIC ACID (NEPHRO-ZINA) 1 MG TABLET    Take 1 tablet by mouth daily (with breakfast)    CINACALCET (SENSIPAR) 60 MG TABLET    Take 1 tablet by mouth daily    DOCUSATE SODIUM (COLACE) 100 MG CAPSULE    Take 1 capsule by mouth 2 times daily    ELASTIC BANDAGES & SUPPORTS (MEDICAL COMPRESSION STOCKINGS) MISC    1 each by Does not apply route daily    GABAPENTIN (NEURONTIN) 100 MG CAPSULE    Take 2 capsules by mouth nightly for 30 days. LIDOCAINE 4 % EXTERNAL PATCH    Place 1 patch onto the skin daily    ONDANSETRON (ZOFRAN) 4 MG TABLET    Take 1 tablet by mouth every 8 hours as needed for Nausea    SEVELAMER (RENVELA) 800 MG TABLET    Take 2 tablets by mouth 3 times daily (with meals)    SIMVASTATIN (ZOCOR) 40 MG TABLET    Take 1 tablet by mouth nightly       Allergies     She has No Known Allergies. Physical Exam     INITIAL VITALS: BP: 137/82, Temp: 98.3 °F (36.8 °C), Pulse: 79, Resp: 12, SpO2: 97 %  Physical Exam   Constitutional: She is oriented to person, place, and time. She appears well-developed and well-nourished. No distress. HENT:   Head: Normocephalic and atraumatic. Eyes: Pupils are equal, round, and reactive to light. EOM are normal.   Neck: Normal range of motion. Neck supple. Cardiovascular: Normal rate and regular rhythm. Exam reveals no gallop and no friction rub. No murmur heard. Pulmonary/Chest: Effort normal and breath sounds normal. No respiratory distress. Abdominal: Soft. There is no tenderness. Musculoskeletal: Normal range of motion. 4/5 strength in bilateral lower extremities. 5/5 strength in upper extremities bilaterally.   strength equal bilaterally. Neurological: She is alert and oriented to person, place, and time. No cranial nerve deficit or sensory deficit. She displays a negative Romberg sign. Normal finger to nose   Skin: Skin is warm and dry. Psychiatric: She has a normal mood and affect. Diagnostic Results     EKG   Interpreted in conjunction with emergency department physician No att. providers found  Rhythm: normal sinus   Rate: normal  Axis: normal  Ectopy: none  Conduction: left anterior fasciclar block  ST Segments: no acute change  T Waves: no acute change  Q Waves:none  Clinical Impression: no acute changes    RADIOLOGY:  CT Head WO Contrast   Final Result      No acute traumatic abnormality. 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                 LABS:   Results for orders placed or performed during the hospital encounter of 04/08/19   CBC Auto Differential   Result Value Ref Range    WBC 8.5 4.0 - 11.0 K/uL    RBC 4.13 4.00 - 5.20 M/uL    Hemoglobin 13.0 12.0 - 16.0 g/dL    Hematocrit 42.7 36.0 - 48.0 %    .4 (H) 80.0 - 100.0 fL    MCH 31.6 26.0 - 34.0 pg    MCHC 30.6 (L) 31.0 - 36.0 g/dL    RDW 15.7 (H) 12.4 - 15.4 %    Platelets 008 382 - 691 K/uL    MPV 7.4 5.0 - 10.5 fL    Neutrophils % 73.9 %    Lymphocytes % 13.1 %    Monocytes % 10.8 %    Eosinophils % 1.2 %    Basophils % 1.0 %    Neutrophils # 6.3 1.7 - 7.7 K/uL    Lymphocytes # 1.1 1.0 - 5.1 K/uL    Monocytes # 0.9 0.0 - 1.3 K/uL    Eosinophils # 0.1 0.0 - 0.6 K/uL    Basophils # 0.1 0.0 - 0.2 K/uL   Urinalysis, reflex to microscopic (Lab)   Result Value Ref Range    Color, UA Yellow Straw/Yellow    Clarity, UA CLOUDY (A) Clear    Glucose, Ur 100 (A) Negative mg/dL    Bilirubin Urine SMALL (A) Negative    Ketones, Urine TRACE (A) Negative mg/dL    Specific Gravity, UA 1.025 1.005 - 1.030    Blood, Urine LARGE (A) Negative    pH, UA 6.0 5.0 - 8.0    Protein,  (A) Negative mg/dL    Urobilinogen, Urine 0.2 <2.0 E.U./dL    Nitrite, Urine Negative Negative    Leukocyte Esterase, Urine MODERATE (A) Negative    Microscopic Examination YES     Urine Type Not Specified    Microscopic Urinalysis   Result Value Ref Range    WBC, UA 20-50 (A) 0 - 5 /HPF    RBC, UA 10-20 (A) 0 - 2 /HPF    Epi Cells  /HPF    Bacteria, UA 3+ (A) /HPF     RECENT VITALS:  BP: (!) 176/122, Temp: 98.3 °F (36.8 °C), Pulse: 86, Resp: 14, SpO2: 96 %         ED Course     Nursing Notes, Past Medical Hx,Past Surgical Hx, Social Hx, Allergies, and Family Hx were reviewed. The patient was given the following medications:  Orders Placed This Encounter   Medications    albuterol (PROVENTIL) (2.5 MG/3ML) 0.083% nebulizer solution     ALE GUSTAFSON: cabinet override       CONSULTS:  None    MEDICAL DECISION MAKING / ASSESSMENT / Raza Book is a 69 y.o. female who presents emergency Department with multiple falls. Signs were stable on presentation remained stable throughout her stay. Thorough history and physical exam was performed as detailed above. Patient presents emergency department after falling last night. She was recently admitted to the hospital because of her multiple falls and she is found to have stenosis at L4/5 on MRI. She states she has had a numb/tingling sensation that has been consistent since she was last in the hospital. She denies any acute changes. She states that last night she had a fall due to the numb/tingling sensation in her right lower extremity. She states that she was on the ground for most of the night and was able to contact the  in the morning. He then contacted EMS who helped her back into her chair. She refused transport at that time. Her brother came to check on her later in the day and she was unable to get out of the chair due to weakness. He contacted EMS to bring her to the hospital for evaluation and possible placement in a facility with physical/occupational therapy.  The patient's brother states that they had originally wanted the patient to go to a rehabilitation facility, however the patient wanted to be discharged home. This being with the patient, she is now agreeable to go to a facility if we think it is necessary. On my evaluation, the patient has no specific complaints today other than generalized weakness. She denies any loss of consciousness or trauma to her head/neck with her fall last night. She has full range of motion of upper and lower extremities without significant pain. Given her history of significant falls, I did obtain EKG, CBC, BMP, troponin, urinalysis, and CT scan of the patient's head. EKG showed no signs of ST or T-wave changes. CBC showed no signs of systemic infection or anemia with a white blood cell count of 8.5 and hemoglobin of 13.0. Urinalysis did come back significant for 100 glucose, small bilirubin, trace ketones, large blood, 100 protein, and moderate leukocyte esterase. However, microscopic urinalysis showed  epithelial cells. I have low suspicion for urinary tract infection causing the patient's symptoms at this time given the urine was drawn from her peritoneal dialysis site. I did send off for a urine culture. CT scan of the patient's head showed no acute traumatic abnormality. I believe the patient will likely need to be admitted to the hospital for PT/OT evaluation and placement into a skilled nursing facility. At this time, I am going off service. Neysa Cheadle, PA-C will be taking over care of this patient. Her responsibilities will include: follow up on labs, disposition. This patient was also evaluated by the attending physician. All care plans were discussed and agreed upon.        Marlin Stone PA-C  04/08/19 5011

## 2019-04-08 NOTE — ED NOTES
Several attempts for IV access with no access obtained, lab did come and draw blood     Fernanda Nielson RN  04/08/19 4360

## 2019-04-08 NOTE — H&P
Internal Medicine Resident History & Physical      PCP: Salas Garcia MD (Inactive)    Date of Admission: 4/8/2019    Date of Service: Pt seen/examined on 04/08/2019 and Admitted to Inpatient with expected LOS greater than two midnights dueto medical therapy. Chief Complaint:  Fall and weakness      History Of Present Illness:  Bebo Gonzales is a 69 y.o. female with a PMH of DVT, ESRD on peritoneal dialysis Sunday-Friday (anuric), HTN, DM presents with a fall last night. Patient states she trid to hook up for peritoneal dialysis last night but when she stood up her legs gave out under her due to weakness. Denies LOC, or head trauma. Patient has experienced leg weakness for a while now, with no worsening and has stated the same. She states her right leg is weaker than her left, with numbness and tingling from below the knee to her toes on her right leg. Patient states EMS was called by her manager when she did not answer her door because she could not get off the floor. Patient endorses use of a walker at home. Son and patient wants the patient to be admitted and because they believe she is not safe to live by herself in an apartment and wants to be placed in a SNF with physical and occupational therapy. Patient had recent discharge (3/27/2109) for similar symptoms and multiple falls secondary to lumbar radiculopathy During that hospitalization a lumbar spine MRI showed stenosis of L4/L5. Denies any fever, chills, SOB, chest pain, abdominal pain, n/v, dizziness, confusion, changes in bowel movements, LOC, trauma, loss of bowel or urine.       Past Medical History:          Diagnosis Date    DVT (deep venous thrombosis) (HCC)     End stage renal disease (HCC)     Hyperlipidemia     Hypertension     Osteoarthritis     Pancreatitis chronic     Peritoneal dialysis status ([facility] [address]     Type II or unspecified type diabetes mellitus without mention of complication, not stated as uncontrolled  Vitamin D deficiency        Past Surgical History:          Procedure Laterality Date    CATARACT REMOVAL Left 5/29/13    had elevated BP post op    CORONARY ANGIOPLASTY      JOINT REPLACEMENT  5/4/2011    R WILDER    OTHER SURGICAL HISTORY Left 05/19/2017    INSERTION OF LAPAROSCOPIC PERITONEAL DIALYSIS CATHETER;    THYROIDECTOMY, PARTIAL         Medications Prior to Admission:      Prior to Admission medications    Medication Sig Start Date End Date Taking? Authorizing Provider   lidocaine 4 % external patch Place 1 patch onto the skin daily 3/28/19   Rasheeda Hernandez MD   cinacalcet BON [facility]) 60 MG tablet Take 1 tablet by mouth daily 3/28/19   Rasheeda Hernandez MD   sevelamer (RENVELA) 800 MG tablet Take 2 tablets by mouth 3 times daily (with meals) 3/27/19   Rasheeda Hernandez MD   gabapentin (NEURONTIN) 100 MG capsule Take 2 capsules by mouth nightly for 30 days. 3/27/19 4/26/19  Rasheeda Hernandez MD   acetaminophen (TYLENOL) 325 MG tablet Take 2 tablets by mouth every 6 hours as needed for Pain 2/18/19   Juan Antonio Melendrez,    aspirin 81 MG EC tablet Take 1 tablet by mouth daily 2/18/19   Juan Antonio Melendrez, DO   docusate sodium (COLACE) 100 MG capsule Take 1 capsule by mouth 2 times daily 2/18/19   Juan Antonio Melendrez,    ondansetron (ZOFRAN) 4 MG tablet Take 1 tablet by mouth every 8 hours as needed for Nausea 2/18/19   Juan Antonio Melendrez, DO   simvastatin (ZOCOR) 40 MG tablet Take 1 tablet by mouth nightly 2/18/19   Juan Antonio Gan, DO   Elastic Bandages & Supports (MEDICAL COMPRESSION STOCKINGS) MISC 1 each by Does not apply route daily 1/31/18   Hill Avelar MD   B complex-vitamin C-folic acid (NEPHRO-ZINA) 1 MG tablet Take 1 tablet by mouth daily (with breakfast)    Historical Provider, MD       Allergies: Patient has no known allergies. Social History:      The patient currently lives at home alone with no home health care. Her son helps her out when he can. TOBACCO:   reports that she has been smoking cigarettes. She has a 12.00 pack-year smoking history. She has never used smokeless tobacco.  ETOH:   reports that she does not drink alcohol. Family History:      History reviewed. No pertinent family history. REVIEW OF SYSTEMS:   Pertinent positives as noted in theHPI. All other systems reviewed and negative. ROS: Review of Systems   Constitutional: Positive for activity change and fatigue. Negative for appetite change. HENT: Negative for hearing loss and trouble swallowing. Eyes: Positive for visual disturbance. Negative for pain. Respiratory: Negative for cough, chest tightness and shortness of breath. Cardiovascular: Positive for leg swelling. Negative for chest pain and palpitations. Gastrointestinal: Negative for abdominal pain, diarrhea, nausea and vomiting. Endocrine: Negative for heat intolerance and polyuria. Genitourinary: Negative for difficulty urinating, dysuria and urgency. Musculoskeletal: Negative for back pain, gait problem and myalgias. Skin: Negative for color change and pallor. Allergic/Immunologic: Negative for environmental allergies and food allergies. Neurological: Positive for weakness. Negative for dizziness, tremors and headaches. Psychiatric/Behavioral: Negative for behavioral problems, confusion and hallucinations. PHYSICAL EXAM PERFORMED:    BP (!) 176/122   Pulse 86   Temp 98.3 °F (36.8 °C) (Oral)   Resp 14   Ht 5' 7\" (1.702 m)   Wt 134 lb 9.6 oz (61.1 kg)   LMP  (LMP Unknown)   SpO2 96%   BMI 21.08 kg/m²     General appearance:  Noapparent distress, appears stated age and cooperative. HEENT:  Normal cephalic, atraumatic without obvious deformity. Pupils equal, round, and reactive to light. Extra ocular muscles intact. Conjunctivae/corneas clear. Neck: Supple, with full range of motion. No jugular venous distention. Trachea midline. Respiratory:Normal respiratory effort.  Clear to auscultation, bilaterally without Rales/Wheezes/Rhonchi. Cardiovascular:Regular rate and rhythm with normal S1/S2 without murmurs, rubs or gallops. Abdomen: Soft, non-tender,non-distended with normal bowel sounds. Musculoskeletal:  No clubbing, cyanosis. Edema 2+ in feet bilaterally. Full range of motion without deformity. Skin: Skin color, texture, turgor normal.  Cuts on the toes of right foot. Neurologic:  Strength 2/5 right LE, 3/5 LE, 5/5 bilateral UE. Decreased sensation to sharp touch from toes to above ankle Right LE, decreased sharp sensation from toes to mid-dorsal foot on left LE, full sensation in bilateral UE. Cranial nerves: II-XII intact, grossly non-focal.  Psychiatric:  Alert and oriented, thought content appropriate, normal insight  Capillary Refill: Brisk,< 3 seconds   Peripheral Pulses: +2 palpable, equal bilaterally       Labs:     Recent Labs     04/08/19  1617   WBC 8.5   HGB 13.0   HCT 42.7        Recent Labs     04/08/19  1617      K 4.4      CO2 16*   BUN 62*   CREATININE 11.2*   CALCIUM 7.6*     No results for input(s): AST, ALT, BILIDIR, BILITOT, ALKPHOS in the last 72 hours. No results for input(s): INR in the last 72 hours. Recent Labs     04/08/19  1617   TROPONINI 0.08*       Urinalysis:      Lab Results   Component Value Date    NITRU Negative 04/08/2019    WBCUA 20-50 04/08/2019    BACTERIA 3+ 04/08/2019    RBCUA 10-20 04/08/2019    BLOODU LARGE 04/08/2019    SPECGRAV 1.025 04/08/2019    GLUCOSEU 100 04/08/2019       Radiology:     CT Head WO Contrast   Final Result      No acute traumatic abnormality. 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                & PLAN:  Sejal Sanches is a 69 y.o. female with a PMH of DVT, ESRD on peritoneal dialysis Sunday-Friday (anuric), HTN, DM presents with a fall last night. Fall 2/2 lumbar radiculopathy   Pt has hx of multiple falls, numbness and tingling in the right leg. Previous MRI showed spinal stenosis of L4/L5.  Exam significant for decreased strength 3/5 right LE, 4/5 left LE, decreased sensation to sharp touch in feet bilaterally.   - CT head negative   - PT/OT consulted  - IVF  - social work due to failure to thrive        ESRD on PD  - Renal diet  - Check BMP  - Nephrology consult     Type 2 DM   Last A1C 6.5 on 3/2019  Not on any medications per home medication list  - Will monitor POC glucose check q4h     HTN  - Continue on Coreg     CAD - stable  Continue ASA, Statin, and Coreg    DVT Prophylaxis: subq heparin   Diet: No diet orders on file  Code Status: Prior    PT/OT Eval Status: consulted     Dispo - [facility]        Gar Conception    I will discuss the patient with the senior resident and Alma Flores MD    ***signature

## 2019-04-08 NOTE — H&P
Internal Medicine Resident History & Physical      PCP: Kim Troy MD (Inactive)    Date of Admission: 4/8/2019    Date of Service: Pt seen/examined on 04/08/2019 and Admitted to Inpatient with expected LOS greater than two midnights dueto medical therapy. Chief Complaint:  Fall and weakness      History Of Present Illness:  Saira Marvin is a 69 y.o. female with a PMH of DVT, ESRD on peritoneal dialysis Sunday-Friday (anuric), HTN, DM presents with a fall last night. Patient states she trid to hook up for peritoneal dialysis last night but when she stood up her legs gave out under her due to weakness. Denies LOC, or head trauma. Patient has experienced leg weakness for a while now, with no worsening and has stated the same. She states her right leg is weaker than her left, with numbness and tingling from below the knee to her toes on her right leg. Patient states EMS was called by her nurse when she did not answer her door because she could not get off the floor. Patient endorses use of a walker at home. Son and patient wants the patient to be admitted and because they believe she is not safe to live by herself in an apartment and wants to be placed in a SNF with physical and occupational therapy. Patient had recent discharge (3/27/2109) for similar symptoms and multiple falls secondary to lumbar radiculopathy During that hospitalization a lumbar spine MRI showed stenosis of L4/L5. Denies any fever, chills, SOB, chest pain, abdominal pain, n/v, dizziness, confusion, changes in bowel movements, LOC, trauma, loss of bowel or urine.       Past Medical History:          Diagnosis Date    DVT (deep venous thrombosis) (HCC)     End stage renal disease (HCC)     Hyperlipidemia     Hypertension     Osteoarthritis     Pancreatitis chronic     Peritoneal dialysis status ([facility] [address]     Type II or unspecified type diabetes mellitus without mention of complication, not stated as uncontrolled     Vitamin D deficiency        Past Surgical History:          Procedure Laterality Date    CATARACT REMOVAL Left 5/29/13    had elevated BP post op    CORONARY ANGIOPLASTY      JOINT REPLACEMENT  5/4/2011    R WILDER    OTHER SURGICAL HISTORY Left 05/19/2017    INSERTION OF LAPAROSCOPIC PERITONEAL DIALYSIS CATHETER;    THYROIDECTOMY, PARTIAL         Medications Prior to Admission:      Prior to Admission medications    Medication Sig Start Date End Date Taking? Authorizing Provider   lidocaine 4 % external patch Place 1 patch onto the skin daily 3/28/19   Eros Beverly MD   cinacalcet BON [facility]) 60 MG tablet Take 1 tablet by mouth daily 3/28/19   Eros Beverly MD   sevelamer (RENVELA) 800 MG tablet Take 2 tablets by mouth 3 times daily (with meals) 3/27/19   Eros Beverly MD   gabapentin (NEURONTIN) 100 MG capsule Take 2 capsules by mouth nightly for 30 days. 3/27/19 4/26/19  Eros Beverly MD   acetaminophen (TYLENOL) 325 MG tablet Take 2 tablets by mouth every 6 hours as needed for Pain 2/18/19   Juan Antonio Melendrez DO   aspirin 81 MG EC tablet Take 1 tablet by mouth daily 2/18/19   Juan Antonio Melendrez DO   docusate sodium (COLACE) 100 MG capsule Take 1 capsule by mouth 2 times daily 2/18/19   Juan Atnonio Melendrez DO   ondansetron (ZOFRAN) 4 MG tablet Take 1 tablet by mouth every 8 hours as needed for Nausea 2/18/19   Juan Antonio Melendrez DO   simvastatin (ZOCOR) 40 MG tablet Take 1 tablet by mouth nightly 2/18/19   Juan Antonio Licona DO   Elastic Bandages & Supports (MEDICAL COMPRESSION STOCKINGS) MISC 1 each by Does not apply route daily 1/31/18   Riley Giron MD   B complex-vitamin C-folic acid (NEPHRO-ZINA) 1 MG tablet Take 1 tablet by mouth daily (with breakfast)    Historical Provider, MD       Allergies: Patient has no known allergies. Social History:      The patient currently lives     TOBACCO:   reports that she has been smoking cigarettes. She has a 12.00 pack-year smoking history.  She has never used smokeless Rales/Wheezes/Rhonchi. Cardiovascular:Regular rate and rhythm with normal S1/S2 without murmurs, rubs or gallops. Abdomen: Soft, non-tender,non-distended with normal bowel sounds. Musculoskeletal:  No clubbing, cyanosis. Edema 2+ in feet bilaterally. Full range of motion without deformity. Skin: Skin color, texture, turgor normal.  Cuts on the toes of right foot. Neurologic:  Strength 2/5 right LE, 3/5 LE, 5/5 bilateral UE. Decreased sensation to sharp touch from toes to above ankle Right LE, decreased sharp sensation from toes to mid-dorsal foot on left LE, full sensation in bilateral UE. Cranial nerves: II-XII intact, grossly non-focal.  Psychiatric:  Alert and oriented, thought content appropriate, normal insight  Capillary Refill: Brisk,< 3 seconds   Peripheral Pulses: +2 palpable, equal bilaterally       Labs:     Recent Labs     04/08/19  1617   WBC 8.5   HGB 13.0   HCT 42.7        Recent Labs     04/08/19  1617      K 4.4      CO2 16*   BUN 62*   CREATININE 11.2*   CALCIUM 7.6*     No results for input(s): AST, ALT, BILIDIR, BILITOT, ALKPHOS in the last 72 hours. No results for input(s): INR in the last 72 hours. Recent Labs     04/08/19  1617   TROPONINI 0.08*       Urinalysis:      Lab Results   Component Value Date    NITRU Negative 04/08/2019    WBCUA 20-50 04/08/2019    BACTERIA 3+ 04/08/2019    RBCUA 10-20 04/08/2019    BLOODU LARGE 04/08/2019    SPECGRAV 1.025 04/08/2019    GLUCOSEU 100 04/08/2019       Radiology:     CT Head WO Contrast   Final Result      No acute traumatic abnormality. 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                & PLAN:  Delfin Farley is a 69 y.o. female with a PMH of DVT, ESRD on peritoneal dialysis Sunday-Friday (anuric), HTN, DM presents with a fall last night. Fall 2/2 lumbar radiculopathy   Pt has hx of multiple falls, numbness and tingling in the right leg. Previous MRI showed spinal stenosis of L4/L5.  Exam significant for decreased strength 3/5 right LE, 4/5 left LE, decreased sensation to sharp touch in feet bilaterally. - CT head negative   - PT/OT consulted  - IVF  - social work due to failure to thrive      UTI:  Urine Analysis showed WBC, leukoestrase, Nitrite positive   -Urine Cx  -rocephin       ESRD on PD  - Renal diet  - Check BMP  - Nephrology consult     Type 2 DM   Last A1C 6.5 on 3/2019  Not on any medications per home medication list  - Will monitor POC glucose check q4h     HTN  - Continue on Coreg     CAD - stable  Continue ASA, Statin, and Coreg    DVT Prophylaxis: Hepain  Diet: No diet orders on file  Code Status: Prior    PT/OT Eval Status: consulted    Dispo - floor       Zachary Posey MD    I will discuss the patient with the senior resident and Dr. James Aleman     I certify that Olga Bullard is expected to be hospitalized for 2 midnights based on the following assessment and plan:      Patient seen and examined, plan of care discussed with residents. Agree with their assessment and plan with following addendum:  Patient is a 70 y/o female with h/o ESRD on peritoneal dialysis at home, comes in with inability to take care of her self, could not do dialysis, fell and could not get up, could not get to the food. Will admit for dialysis. PT/OT, she might need placement. Has known L4L5 lumbar stenosis but also would be poor surgical candidate given ESRD and her debilitated state. Treat UTI. Monitor blood sugar.        Yogesh Scott

## 2019-04-08 NOTE — ED PROVIDER NOTES
K/uL    MPV 7.4 5.0 - 10.5 fL    Neutrophils % 73.9 %    Lymphocytes % 13.1 %    Monocytes % 10.8 %    Eosinophils % 1.2 %    Basophils % 1.0 %    Neutrophils # 6.3 1.7 - 7.7 K/uL    Lymphocytes # 1.1 1.0 - 5.1 K/uL    Monocytes # 0.9 0.0 - 1.3 K/uL    Eosinophils # 0.1 0.0 - 0.6 K/uL    Basophils # 0.1 0.0 - 0.2 K/uL   Basic Metabolic Panel w/ Reflex to MG   Result Value Ref Range    Sodium 141 136 - 145 mmol/L    Potassium reflex Magnesium 4.4 3.5 - 5.1 mmol/L    Chloride 103 99 - 110 mmol/L    CO2 16 (L) 21 - 32 mmol/L    Anion Gap 22 (H) 3 - 16    Glucose 55 (L) 70 - 99 mg/dL    BUN 62 (H) 7 - 20 mg/dL    CREATININE 11.2 (HH) 0.6 - 1.2 mg/dL    GFR Non-African American 3 (A) >60    GFR  4 (A) >60    Calcium 7.6 (L) 8.3 - 10.6 mg/dL   Troponin   Result Value Ref Range    Troponin 0.08 (H) <0.01 ng/mL   Brain Natriuretic Peptide   Result Value Ref Range    Pro-BNP 9,434 (H) 0 - 449 pg/mL   Urinalysis, reflex to microscopic (Lab)   Result Value Ref Range    Color, UA Yellow Straw/Yellow    Clarity, UA CLOUDY (A) Clear    Glucose, Ur 100 (A) Negative mg/dL    Bilirubin Urine SMALL (A) Negative    Ketones, Urine TRACE (A) Negative mg/dL    Specific Gravity, UA 1.025 1.005 - 1.030    Blood, Urine LARGE (A) Negative    pH, UA 6.0 5.0 - 8.0    Protein,  (A) Negative mg/dL    Urobilinogen, Urine 0.2 <2.0 E.U./dL    Nitrite, Urine Negative Negative    Leukocyte Esterase, Urine MODERATE (A) Negative    Microscopic Examination YES     Urine Type Not Specified    Microscopic Urinalysis   Result Value Ref Range    WBC, UA 20-50 (A) 0 - 5 /HPF    RBC, UA 10-20 (A) 0 - 2 /HPF    Epi Cells  /HPF    Bacteria, UA 3+ (A) /HPF   EKG 12 Lead   Result Value Ref Range    Ventricular Rate 82 BPM    Atrial Rate 82 BPM    P-R Interval 148 ms    QRS Duration 92 ms    Q-T Interval 424 ms    QTc Calculation (Bazett) 495 ms    P Axis -8 degrees    R Axis -60 degrees    T Axis 18 degrees    Diagnosis       EKG performed in ER and to be interpreted by ER physician. Confirmed by [facility] RICHIE MOLINA, Hardeep Moise ([id number]),  Jacalyn Bernheim ([id number]) on 4/8/2019 5:01:22 PM       RECENT VITALS:  BP: (!) 176/122, Temp: 98.3 °F (36.8 °C), Pulse: 86, Resp: 14, SpO2: 96 %       ED Course     The patient was given the following medications:  Orders Placed This Encounter   Medications    albuterol (PROVENTIL) (2.5 MG/3ML) 0.083% nebulizer solution     ALE GUSTAFSON: cabinet override       CONSULTS:  Samuel Wang / HARDEEP / Jennifer Barre is a 69 y.o. female with PMH of OA, DM type 2 (HbA1c 5.6 on 1/2019), ESRD on PD (M-F), CAD and gout who presented to ED with complaints of weakness. Patient admitted on 3/24/19 after multiple falls 2/2 right leg weakness. MRI of lumbar spine showed L4-L5 stenosis and symptoms felt to be related to lumbar radiculopathy. Patient states that she had a fall yesterday secondary to right leg numbness. She states that she was unable to get up on her own. EMS were called and assisted patient to chair. However, she was then unable to get up out of chair and was brought to the ED. She denies any other symptoms. On my evaluation, patient is alert and oriented. She is neurologically intact. She has 4/5 strength in lower extremities bilaterally. RRR. Lungs clear. Abdomen soft and non-tender. EKG showed no acute changes. Labs with creatinine 11.2, AG 22, bicarb 16. Troponin 0.08 (similar to prior). Wbc wnl. UA appears contaminated and urine culture sent. CT head without contrast negative for acute pathology. At this time, plan to admit patient to medicine for PT/OT and likely placement. This patient was also evaluated by the attending physician. All care plans were discussed and agreed upon. Clinical Impression     1. Fall, initial encounter    2. Weakness        Disposition       DISPOSITION  Decision to admit.           Gayatri Hudson PA-C  04/08/19 [phone]

## 2019-04-08 NOTE — ED PROVIDER NOTES
ED Attending Attestation Note     Date of evaluation: 4/8/2019    This patient was seen by the advance practice provider. I have seen and examined the patient, agree with the workup, evaluation, management and diagnosis. The care plan has been discussed. I have reviewed the ECG and concur with the EDDIE's interpretation. My assessment reveals a well-appearing female who presents with multiple falls with worsening numbness of the right leg and she is endorsing weakness of the left leg progressive since previous hospitalization where she was admitted and found to have cervical stenosis and bilateral L4 foraminal stenosis. Denies any loss of bowel control is end-stage renal disease. Denies any current pain. At this point in time concern for worsening stenosis and given multiple falls unable to stay at home do believe she needs admission for PT OT.       Laura Wood MD  04/08/19 0175

## 2019-04-09 NOTE — FLOWSHEET NOTE
04/09/19 1645   Vitals   BP 96/72   Temp 99 °F (37.2 °C)   Temp Source Oral   Pulse 101   Resp 18   SpO2 94 %   Weight 130 lb 15.3 oz (59.4 kg)   Cycler   Verification of Prescription CCPD   Total Volume Programmed 25387 mL   Therapy Time (Hours:Minutes) 10   Fill Volume 2000 mL   Number of Cycles 5   Bag Volume 5000 mL   Number of Bags Used 2   Orders verified. Supplies taken to pt's room. Report received. Cycler set up, primed and pre tested. Dressing changed using aseptic technique. No S/S infection. Using aseptic technique, pt connected to cycler. CCPD initiated.

## 2019-04-09 NOTE — PROGRESS NOTES
Pt had approximately 18 second run of SVT. Pt then returned to SR. Pt sleeping, Awoke and WNL. /56. Afebrile. 95-97% RA. Page and perfectserve sent to Dr. Isadora Huston. Waiting on response. No further issues at this time. Will continue to monitor VS, telemetry. Addendum: Dr. Isadora Huston aware. No new orders at this time.

## 2019-04-09 NOTE — CARE COORDINATION
CM attempted to meet with pt   Door closed  Sterile process procedure  In process . Will need to follow up at a later time to complete initial assessment . CCPD  And  Yaa Puente  Pre cert  Needed for SNF  At D/C . If  Agreeable  , from home  / [facility] services  .   Roula Bojorquez  RN Case Manager  [phone]

## 2019-04-09 NOTE — PROGRESS NOTES
Patient with complaints of bilateral lower leg pain. Nothing ordered for pain. Perfect serve sent to Dr. Miguel Nuñez.  Tera Salazar RN, BSN

## 2019-04-09 NOTE — PROGRESS NOTES
Pt admitted from ED to [address]. VSS. Afebrile. Pt alert and oriented x4. Pt admission paperwork completed. Pt . Ate dinner. Blood culture drawn by lab. Pt running cyclic PD ovenight. Fall precautions in place. Bed alarm on. Call light explained and within reach. Pt instructed on fall risk and bed alarm. Pt verbalized understanding of all instructions Will continue to monitor safety.

## 2019-04-09 NOTE — CONSULTS
Neurosurgery Consult:    Patient Name: Stephanie Barragan YOB: 1940   Sex: Female Age: 69 yrs     Medical Record Number: [id number] Acct Number: [de-identified]   Room Number: 8425/7245-64 Hospital Day: Hospital Day: 2     Requesting physician: Deidra Shone, MD    Reason for consultation: RLE numbness / weakness    History of present illness: Patient is a 69 y.o. female w/ PMH of HTN, HLD, chronic pancreatitis, DM2, Hx of DVT, ESRD on peritoneal dialysis & frequent falls who presented on 4/9/2019 s/p fall - stated her legs gave out d/t weakness. Patient lives at home & generally uses walker. Had recent admission s/p fall d/t same issue w/ BLE weakness. Denies back pain. Endorses constipation, but no incont of bowel. Anuric. States numbness to RLE started approximately 3 weeks ago and was unclear exactly when weakness started but felt it was after the numbness and most noticeable in the last week.      ROS:   Denies incont of bowel  Endorses BLE numbness on lateral aspect of calf   Endorses weakness of LE R>L  Denies back pain or leg pain      VITAL SIGNS   BP 96/72   Pulse 101   Temp 99 °F (37.2 °C) (Oral)   Resp 18   Ht 5' 7\" (1.702 m)   Wt 130 lb 15.3 oz (59.4 kg)   LMP  (LMP Unknown)   SpO2 94%   BMI 20.51 kg/m²    Height Height: 5' 7\" (170.2 cm)   Weight Weight: 130 lb 15.3 oz (59.4 kg)        Allergies No Known Allergies   NPO Status DIET LOW SODIUM 2 GM;   Isolation No active isolations     MEDICAL HISTORY   Past Medical History       Diagnosis Date    DVT (deep venous thrombosis) (HCC)     End stage renal disease (HCC)     Hyperlipidemia     Hypertension     Osteoarthritis     Pancreatitis chronic     [facility]     Type II or unspecified type diabetes mellitus without mention of complication, not stated as uncontrolled     Vitamin D deficiency       Surgical History    has a past surgical history that includes Thyroidectomy, partial; Coronary angioplasty; joint replacement (5/4/2011); Cataract removal (Left, 5/29/13); and other surgical history (Left, 05/19/2017). Social History   Social History     Occupational History    Not on file   Tobacco Use    Smoking status: Current Some Day Smoker     Packs/day: 0.30     Years: 40.00     Pack years: 12.00     Types: Cigarettes    Smokeless tobacco: Never Used    Tobacco comment: trying to stop   Substance and Sexual Activity    Alcohol use: No     Alcohol/week: 0.5 oz     Types: 1 Standard drinks or equivalent per week    Drug use: No    Sexual activity: Not on file        The medical history was obtained from the patient & the medical records. The nursing notes, primary physician's notes, and old charts (if applicable) were reviewed.     LABS   Basic Metabolic Profile Recent Labs     04/08/19  1617 04/09/19  0642    140    98*   CO2 16* 23   BUN 62* 55*   CREATININE 11.2* 10.1*   GLUCOSE 55* 195*   PHOS  --  4.4   MG  --  1.90      Complete Blood Count Recent Labs     04/08/19  1617 04/09/19  0642   WBC 8.5 6.6   RBC 4.13 3.48*      Coagulation Studies Recent Labs     04/09/19  0641   INR 1.06        MEDICATIONS   Inpatient Medications   ammonium lactate, , Topical, Daily    aspirin, 81 mg, Oral, Daily    cinacalcet, 60 mg, Oral, Daily    b complex-C-folic acid, 1 mg, Oral, Daily with breakfast    docusate sodium, 100 mg, Oral, BID    gabapentin, 200 mg, Oral, Nightly    sevelamer, 1,600 mg, Oral, TID WC    simvastatin, 40 mg, Oral, Nightly    sodium chloride flush, 10 mL, Intravenous, 2 times per day    heparin (porcine), 5,000 Units, Subcutaneous, 3 times per day   Infusions    dextrose        PRN     acetaminophen 650 mg Oral Q6H PRN   sodium chloride flush 10 mL Intravenous PRN   ondansetron 4 mg Intravenous Q6H PRN   glucose 15 g Oral PRN   dextrose 12.5 g Intravenous PRN   glucagon (rDNA) 1 mg Intramuscular PRN   dextrose 100 mL/hr Intravenous PRN      Antibiotics   Recent Abx Admin No antibiotic orders with administrations found. PHYSICAL EXAMINATION     Patient seen and examined   General: Appears weak, undernourished. Sleeping, wakes easily, cooperative in no acute distress. HENT: atraumatic, neck supple  Eyes: Optic discs: Not tested  Pulmonary: unlabored respiratory effort  Cardiovascular:  Warm well perfused. No peripheral edema  Gastrointestinal: abdomen soft, ND    Neurologic Exam:  Neurological:  Mental Status: Awake, alert, oriented, speech weak, but answers all questions  Attention: Intact  Language: No aphasia or dysarthria noted  Sensation: Intact to all extremities to light touch  Coordination: Intact    Musculoskeletal:   Gait: Not tested   Assist devices: None   Tone: weak throughout  Motor strength: Patient w/ generalized weakness   Right  Left    Right  Left    Deltoid  4 4  Hip Flex  3 4   Biceps  4 4  Knee Extensors  2 4   Triceps  4 4  Knee Flexors  3 4   Wrist Ext  4 4  Ankle Dorsiflex. 4 4   Wrist Flex  4 4  Ankle Plantarflex. 4 4   Handgrip  4 4  Ext Tony Longus  4 4   Thumb Ext  4 4         IMAGING     I personally reviewed the patient's imaging was consistent an MRI of the lumbar spine dated 3/25/2019. This demonstrates multilevel spondylosis worse at L4-5 where there is a grade 1 spondylolisthesis measuring 4 mm. This results in a pseudo-disc bulge with moderate central and bilateral moderate-severe foraminal stenosis. I also reviewed a head CT dated 4/8/2019 which demonstrated no acute abnormalities. ASSESSMENT & PLAN   Patient is a 70 y/o F w/ L4 foraminal stenosis and BLE weakness / numbness R>L    Plan:   - No clear surgical etiology to underlie patient's bilateral LE weakness. Regardless patient is a poor surgical candidate w/ malnutrition and ESRD   - BLE (R>L) weakness not explained by nerve root compression. Recommend MRI of thoracic and cervical spine. If able to tolerate.     - If negative agree w/ previous recommendations from Neurology - EMG as outpatient    Thank you for the consultation.     Maria Jacobs MD, PhD  [address], [address], [address] [phone] (c), [phone] (o)

## 2019-04-09 NOTE — PROGRESS NOTES
Internal Medicine PGY-1 Resident Progress Note        PCP: Cindy Salazar MD (Inactive)    Date of Admission: 4/8/2019    Chief Complaint: BLLE numbness    Hospital Course: Pham Carter is a 69 y.o. female with PMHx Diabetes Mellitus Type II, ESRD (2/2 HTN) on daily PD, HLD, BL knee osteoarthritis who presents with bilateral lower extremity weakness. She was recently discharged from inpatient rehab on 2/13/2019 for difficulty ambulating 2/2 knee osteoarthritis. She had another hospital admission end of March 2019 for fall secondary to R leg weakness started on gabapentin and muscle relaxants and home PT/OT with minimal improvement. She had a recurrent fall at home on Sunday evening 2/2 BLLE weakness. EMS came to get her Monday morning and brought her to hospital. They also provided her with a life-alert. She denies LOC, head trauma, dizziness or prodromal symptoms prior to fall. She describes BLLE numbness and a pins-and-needles sensation radiating down her entire R thigh, leg and foot. She has increased weakness of the RLE compared to left. She stated that the feeling she has walking on the ground is that of \"walking on needles or cold brick. \"   MRI lumbar spine (3/25) identified multilevel degenerative disc and facet arthropathy with moderate thecal sac stenosis at L3-L5. Severe R and moderate L L4 foraminal stenosis. Moderate R L5 foraminal stenosis. Subjective:   She describes BLLE numbness and a pins-and-needles sensation radiating down her entire R thigh, leg and foot. She has increased weakness of the RLE compared to left. She states that the feeling she has walking on the ground is that of \"walking on needles or cold brick. \" She is amenable to any neurosurgical interventions that could be offered to her should she be deemed a neurosurgical candidate. She denies urinary or bowel incontinence and denies perineal numbness.      Medications:  Reviewed    Infusion Medications    dextrose Scheduled Medications    aspirin  81 mg Oral Daily    cinacalcet  60 mg Oral Daily    b complex-C-folic acid  1 mg Oral Daily with breakfast    docusate sodium  100 mg Oral BID    gabapentin  200 mg Oral Nightly    sevelamer  1,600 mg Oral TID WC    simvastatin  40 mg Oral Nightly    sodium chloride flush  10 mL Intravenous 2 times per day    heparin (porcine)  5,000 Units Subcutaneous 3 times per day     PRN Meds: acetaminophen, sodium chloride flush, ondansetron, glucose, dextrose, glucagon (rDNA), dextrose      Intake/Output Summary (Last 24 hours) at 4/9/2019 1335  Last data filed at 4/9/2019 1230  Gross per 24 hour   Intake 720 ml   Output -298 ml   Net 1018 ml       Physical Exam Performed:    /70   Pulse 96   Temp 98.2 °F (36.8 °C) (Oral)   Resp 18   Ht 5' 7\" (1.702 m)   Wt 128 lb 8.5 oz (58.3 kg)   LMP  (LMP Unknown)   SpO2 96%   BMI 20.13 kg/m²     General appearance:  Noapparent distress, appears stated age and cooperative. HEENT:  Normal cephalic, atraumatic without obvious deformity. Pupils equal, round, and reactive to light. Extra ocular muscles intact. Conjunctivae/corneas clear. Neck: Supple, with full range of motion. No jugular venous distention. Trachea midline. Respiratory:Normal respiratory effort. Clear to auscultation, bilaterally without Rales/Wheezes/Rhonchi. Cardiovascular:Regular rate and rhythm with normal S1/S2 without murmurs, rubs or gallops. Abdomen: Soft, non-tender,non-distended with normal bowel sounds. Musculoskeletal:  No clubbing, cyanosis. Edema 2+ in feet bilaterally. Full range of motion without deformity. Skin: Skin color, texture, turgor normal.  Cuts on the toes of right foot. Neurologic:  Strength 2/5 right LE, 3/5 LE, 5/5 bilateral UE. Decreased sensation to sharp touch from toes to above ankle Right LE, decreased sharp sensation from toes to mid-dorsal foot on left LE, full sensation in bilateral UE.   Cranial nerves: II-XII intact, grossly non-focal.  Psychiatric:  Alert and oriented, thought content appropriate, normal insight  Capillary Refill: Brisk,< 3 seconds   Peripheral Pulses: +2 palpable, equal bilaterally    Labs:   Recent Labs     04/08/19  1617 04/09/19  0642   WBC 8.5 6.6   HGB 13.0 11.1*   HCT 42.7 34.7*    192     Recent Labs     04/08/19  1617 04/09/19  0642    140   K 4.4 3.5    98*   CO2 16* 23   BUN 62* 55*   CREATININE 11.2* 10.1*   CALCIUM 7.6* 7.2*   PHOS  --  4.4     No results for input(s): AST, ALT, BILIDIR, BILITOT, ALKPHOS in the last 72 hours. Recent Labs     04/09/19  0641   INR 1.06     Recent Labs     04/08/19  1617   TROPONINI 0.08*       Urinalysis:      Lab Results   Component Value Date    NITRU Negative 04/08/2019    WBCUA 20-50 04/08/2019    BACTERIA 3+ 04/08/2019    RBCUA 10-20 04/08/2019    BLOODU LARGE 04/08/2019    SPECGRAV 1.025 04/08/2019    GLUCOSEU 100 04/08/2019       Radiology:  CT Head WO Contrast   Final Result      No acute traumatic abnormality. 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 Assessment/Plan:    John Meyers a 69 y. o. female with a PMH of DVT, ESRD on peritoneal dialysis Sunday-Friday (anuric), HTN, DM presents with a fall last night. Active Hospital Problems    Diagnosis Date Noted    Weakness of both legs [R29.898] 04/08/2019     Fall 2/2 BLLE numbness and weakness due to known lumbar radiculopathy   Pt has hx of multiple falls, numbness and tingling in the right leg. Exam significant for decreased strength 3/5 right LE, 4/5 left LE, decreased sensation to sharp touch in feet bilaterally. - MRI lumbar spine (3/25) identified multilevel degenerative disc and facet arthropathy with moderate thecal sac stenosis at L3-L5. Severe R and moderate L L4 foraminal stenosis. Moderate R L5 foraminal stenosis.   - given patient has failed ARU and outpatient PT/OT on two prior hospital visits  - CT head negative   - PT/OT consulted  - patient amenable to possible neurosurgical intervention if deemed an appropriate surgical candidate  - appreciate neurosurgical recommendation       ESRD on PD, anuric  - unrestricted potassium, low salt diet  - renal panel daily   - UA: 20-50 WBC, however many epithelial cells, not concerning for UTI, no abx indicated  - Nephrology following for PD   - on renvela and sensipar      Type 2 DM   Last A1C 6.5 on 3/2019  Not on any medications per home medication list  - Will monitor POC glucose check q4h     HTN  - Continue on Coreg     CAD - stable  Continue ASA, Statin, and Coreg    DVT Prophylaxis:heparin  Diet: DIET LOW SODIUM 2 GM;  Code Status: Full Code    PT/OT Eval Status: ordered    Dispo - GMF, SNF placement pending    Kayla Jo MD    I will discuss the patient with the senior resident and MD Kayla Puckett MD  Internal Medicine Resident PGY-1      Patient seen and examined, plan of care discussed with residents. Agree with their assessment and plan with following addendum:  Patient looks better today after getting some food and having better blood sugars. She continues to report generalized and right leg weakness. She is interested in neurosurgical opinion. She states she cannot go on like this and her readmissions prove it. Will consult neurosurgery.  Will recheck vit D level      Varghese Martinez

## 2019-04-09 NOTE — FLOWSHEET NOTE
04/08/19 2150   Cycler   Verification of Prescription CCPD   Total Volume Programmed 82099 mL   Therapy Time (Hours:Minutes) 10   Fill Volume 2000 mL   Dextrose Setting Same (Nonextraneal)   Number of Cycles 5   Bag Volume 5000 mL   Number of Bags Used 2   Dianeal Solution Other (Comment)  (Delflex 1.5% 5000 ml x2)   Orders verified. Cycler and supplies taken to pt's room. Report Received. Cycler set up, primed and pre tested. Dressing changed, She still had dressing dated 3-26-19, and Extension set attached to Pt's Transfer set. Pt connected to cycler. CCPD initiated.

## 2019-04-09 NOTE — ED NOTES
Home Med List is complete. Source of medications in list is pt interview, review of patient's recent fill history (alice meds to beds program at [facility]) and call to patient's home health nurse ([address]).  [address]    Patient states she did not take any meds today or yesterday.      Please note:  1. Removed from pt's med list: lidocaine 4% patch (not on [facility] list and patient denies using)  2. Added to pt's med list: vitamin D 50,000 u weekly  3. Changes to pt's med list:   -Patient's home health care list shows patient taking oxycodone 5 mg and calcitriol 0.5mcg, no recent fill history to match either and nurse unsure where patient fills medications to verify. Patient unsure if still taking so they were not added to home med list.   -Patient received gabapentin, sevelamer, and cincalcet on 03/27 via meds to beds.     Iris Rose  PharmD Candidate 2019  4/8/2019 8:52 PM

## 2019-04-09 NOTE — CONSULTS
Nutrition Assessment (Low Risk)    Type and Reason for Visit: Initial, Consult    Nutrition Recommendations: For patients on PD, Renal diet is not appropriate. Diet recommendations for PD should be considered as follows: Unrestricted potassium, as this is easily cleared via exchange during PD; 1.2-1.3 g/kg of body weight, no added salt, and allowing increased phosphorus in diet to meet higher protein needs required during PD. (Clin Guide to Nutr Care in Kidney Ds, A.N.D, 2004)    Nutrition Assessment:  Patient assessed for nutritional risk. Deemed to be at low risk at this time. Will continue to monitor for changes in status. RD consult for Khurram score. Pt on PD, but she is nutritionally stable as evidenced by stable appetite w/ intake of % of meals and stable weight. Elytes w/in normal range. Pt states that she is compliant w/ low sodium diet. Renal diet not indicated for PD, will modify diet order and monitor per standards of care.      Malnutrition Assessment:  · Malnutrition Status: No malnutrition    Nutrition Risk Level   Risk Level: Low    Nutrition Diagnosis:   · Problem: No nutrition diagnosis at this time    Nutrition Intervention:  Food and/or Delivery: Modify current diet(declined need for ONS)  Nutrition Education/Counseling/Coordination of Care:  Continued Inpatient Monitoring      Electronically signed by Love Ricci RD, LD on 4/9/19 at 9:48 AM    SEYMOUR ESTES, NATALIIA  Pager:  [phone]  Office:  [phone]

## 2019-04-09 NOTE — PROGRESS NOTES
4 Eyes Admission Assessment     I agree as the admission nurse that 2 RN's have performed a thorough Head to Toe Skin Assessment on the patient. ALL assessment sites listed below have been assessed on admission. Areas assessed by both nurses:   [x]   Head, Face, and Ears   [x]   Shoulders, Back, and Chest  [x]   Arms, Elbows, and Hands   [x]   Coccyx, Sacrum, and Ischum  [x]   Legs, Feet, and Heels        Does the Patient have Skin Breakdown?   No         Khurram Prevention initiated:  NA   Wound Care Orders initiated:  NA      WOC nurse consulted for Pressure Injury (Stage 3,4, Unstageable, DTI, NWPT, and Complex wounds):  NA      Nurse 1 eSignature: Electronically signed by Glenn Dawson RN on 4/8/19 at 11:17 PM    **SHARE this note so that the co-signing nurse is able to place an eSignature**    Nurse 2 eSignature: Electronically signed by Nely Bianchi RN on 4/9/19 at 2:47 AM

## 2019-04-09 NOTE — CONSULTS
Nephrology Consult Note  Patient's Name: Delfin Farley  11:12 AM  4/9/2019    Reason for Consult:  ESRD on PD  Requesting Physician:  Sommer Rossi MD (Inactive)      Chief Complaint:  Lower Extremity Weakness    History of Present Ilness:    Delfin Farley is a 69 y.o. female with PMHx Diabetes Mellitus Type II, ESRD on PD< HTN, HLD, DVT who presents with bilateral lower extremity weakness. She was setting up for dialysis yesterday and when she stood up, her legs gave out. She denied any prodromal symptoms including dizzines, lightheadedness, nausea, emesis, diaphoresis. EMS was called as she could not get off the floor. She was recently seen in the hospital when it was recommended that she should go to SNF, however the patient wanted to return home. She has had multiple falls due to lumbar radiculopathy. She did not get PD at home, however got PD in the hospital last night. Past Medical History:   Diagnosis Date    DVT (deep venous thrombosis) (HCC)     End stage renal disease (HCC)     Hyperlipidemia     Hypertension     Osteoarthritis     Pancreatitis chronic     [facility]     Type II or unspecified type diabetes mellitus without mention of complication, not stated as uncontrolled     Vitamin D deficiency        Past Surgical History:   Procedure Laterality Date    CATARACT REMOVAL Left 5/29/13    had elevated BP post op    CORONARY ANGIOPLASTY      JOINT REPLACEMENT  5/4/2011    R WILDER    OTHER SURGICAL HISTORY Left 05/19/2017    INSERTION OF LAPAROSCOPIC PERITONEAL DIALYSIS CATHETER;    THYROIDECTOMY, PARTIAL         History reviewed. No pertinent family history. reports that she has been smoking cigarettes. She has a 12.00 pack-year smoking history. She has never used smokeless tobacco. She reports that she does not drink alcohol or use drugs. Allergies:  Patient has no known allergies.     Current Medications:      acetaminophen (TYLENOL) tablet 650 mg Q6H PRN   aspirin EC tablet 81 mg Daily   cinacalcet (SENSIPAR) tablet 60 mg Daily   b complex-C-folic acid (NEPHROCAPS) capsule 1 mg Daily with breakfast   docusate sodium (COLACE) capsule 100 mg BID   gabapentin (NEURONTIN) capsule 200 mg Nightly   sevelamer (RENVELA) tablet 1,600 mg TID WC   simvastatin (ZOCOR) tablet 40 mg Nightly   sodium chloride flush 0.9 % injection 10 mL 2 times per day   sodium chloride flush 0.9 % injection 10 mL PRN   ondansetron (ZOFRAN) injection 4 mg Q6H PRN   heparin (porcine) injection 5,000 Units 3 times per day   glucose (GLUTOSE) 40 % oral gel 15 g PRN   dextrose 50 % solution 12.5 g PRN   glucagon (rDNA) injection 1 mg PRN   dextrose 5 % solution PRN       Review of Systems:   14 point ROS obtained but were negative except mentioned in HPI      Physical exam:     Vitals:  /74   Pulse 87   Temp 98.4 °F (36.9 °C) (Oral)   Resp 16   Ht 5' 7\" (1.702 m)   Wt 141 lb 12.8 oz (64.3 kg) Comment: during PD  LMP  (LMP Unknown)   SpO2 96%   BMI 22.21 kg/m²   Constitutional:  OAA X3 NAD  Skin: no rash, turgor wnl  Heent:  eomi, mmm  Neck: no bruits or jvd noted  Cardiovascular:  S1, S2 without m/r/g  Respiratory: CTA B without w/r/r  Abdomen:  +bs, soft, nt, nd  Ext: 1+ pitting edema in bilateral lower extremity edema  Psychiatric: mood and affect appropriate  Musculoskeletal:  Rom, muscular strength intact  Neuro: Bilateral UE strength 5/5, RLE strength 2/5, LLE strength 4/5. Decreased sensation to light touch and sharp in RLE.      Data:   Labs:  CBC:   Recent Labs     04/08/19  1617 04/09/19  0642   WBC 8.5 6.6   HGB 13.0 11.1*    192     BMP:  Recent Labs     04/08/19  1617 04/09/19  0642    140   K 4.4 3.5    98*   CO2 16* 23   BUN 62* 55*   CREATININE 11.2* 10.1*   GLUCOSE 55* 195*     Ca/Mg/Phos: Recent Labs     04/08/19  1617 04/09/19  0642   CALCIUM 7.6* 7.2*   MG  --  1.90   PHOS  --  4.4     Hepatic: No results for input(s): AST, ALT, ALB, BILITOT, ALKPHOS in the last 72 hours. Troponin:   Recent Labs     04/08/19  1617   TROPONINI 0.08*     BNP: No results for input(s): BNP in the last 72 hours. Lipids: No results for input(s): CHOL, TRIG, HDL, LDLCALC, LABVLDL in the last 72 hours. ABGs: No results for input(s): PHART, PO2ART, XVG2RRN in the last 72 hours. INR:   Recent Labs     04/09/19  0641   INR 1.06     UA:  Recent Labs     04/08/19  1528   COLORU Yellow   CLARITYU CLOUDY*   GLUCOSEU 100*   BILIRUBINUR SMALL*   KETUA TRACE*   SPECGRAV 1.025   BLOODU LARGE*   PHUR 6.0   PROTEINU 100*   UROBILINOGEN 0.2   NITRU Negative   LEUKOCYTESUR MODERATE*   LABMICR YES   URINETYPE Not Specified      Urine Microscopic: Recent Labs     04/08/19  1528   BACTERIA 3+*   WBCUA 20-50*   RBCUA 10-20*   EPIU      Urine Culture: No results for input(s): LABURIN in the last 72 hours. Urine Chemistry: No results for input(s): [place], PROTEINUR, NAUR in the last 72 hours. IMAGING:  CT Head WO Contrast   Final Result      No acute traumatic abnormality. 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 Assessment/Plan   1. ESRD on PD    2. Lumbar Radiculopathy    3. Anemia    4. Debility    5. Secondary Hyperparathyroidism    6.  Diabetes Mellitus Type II    Plan:  - Continue PD  - Continue renvela  - Home sensipar  - PT/OT  - Neurosurgery Consultation  - Replace electrolytes    Thank you for allowing us to participate in care of Allie DANIELLE Laurent 89 free to contact me via [employer]    Pt seen on PD clif[place] well   Ask Neurosx to see for nerve compression   PT eval   Will need ARU placement

## 2019-04-09 NOTE — CONSULTS
Neurosurgery Consult:    Patient Name: Fabrizio Garza YOB: 1940   Sex: Female Age: 69 yrs     Medical Record Number: [id number] Acct Number: [de-identified]   Room Number: 5636/6672-95 Hospital Day: Hospital Day: 2     Requesting physician: Jhonatan Hinds MD    Reason for consultation: RLE numbness / weakness    History of present illness: Patient is a 69 y.o. female w/ PMH of HTN, HLD, chronic pancreatitis, DM2, Hx of DVT, ESRD on peritoneal dialysis & frequent falls who presented on 4/9/2019 s/p fall - stated her legs gave out d/t weakness. Patient lives at home & generally uses walker. Had recent admission s/p fall d/t same issue w/ BLE weakness. Denies back pain. Endorses constipation, but no incont of bowel. Anuric. States numbness to RLE started approximately 3 weeks ago and was unclear exactly when weakness started but felt it was after the numbness and most noticeable in the last week.      ROS:   Denies incont of bowel  Endorses BLE numbness on lateral aspect of calf   Endorses weakness of LE R>L  Denies back pain or leg pain      VITAL SIGNS   /70   Pulse 96   Temp 98.2 °F (36.8 °C) (Oral)   Resp 18   Ht 5' 7\" (1.702 m)   Wt 128 lb 8.5 oz (58.3 kg)   LMP  (LMP Unknown)   SpO2 96%   BMI 20.13 kg/m²    Height Height: 5' 7\" (170.2 cm)   Weight Weight: 128 lb 8.5 oz (58.3 kg)        Allergies No Known Allergies   NPO Status DIET LOW SODIUM 2 GM;   Isolation No active isolations     MEDICAL HISTORY   Past Medical History       Diagnosis Date    DVT (deep venous thrombosis) (HCC)     End stage renal disease (HCC)     Hyperlipidemia     Hypertension     Osteoarthritis     Pancreatitis chronic     [facility]     Type II or unspecified type diabetes mellitus without mention of complication, not stated as uncontrolled     Vitamin D deficiency       Surgical History    has a past surgical history that includes Thyroidectomy, partial; Coronary angioplasty; joint replacement (5/4/2011); Cataract removal (Left, 5/29/13); and other surgical history (Left, 05/19/2017). Social History   Social History     Occupational History    Not on file   Tobacco Use    Smoking status: Current Some Day Smoker     Packs/day: 0.30     Years: 40.00     Pack years: 12.00     Types: Cigarettes    Smokeless tobacco: Never Used    Tobacco comment: trying to stop   Substance and Sexual Activity    Alcohol use: No     Alcohol/week: 0.5 oz     Types: 1 Standard drinks or equivalent per week    Drug use: No    Sexual activity: Not on file        The medical history was obtained from the patient & the medical records. The nursing notes, primary physician's notes, and old charts (if applicable) were reviewed.     LABS   Basic Metabolic Profile Recent Labs     04/08/19  1617 04/09/19  0642    140    98*   CO2 16* 23   BUN 62* 55*   CREATININE 11.2* 10.1*   GLUCOSE 55* 195*   PHOS  --  4.4   MG  --  1.90      Complete Blood Count Recent Labs     04/08/19  1617 04/09/19  0642   WBC 8.5 6.6   RBC 4.13 3.48*      Coagulation Studies Recent Labs     04/09/19  0641   INR 1.06        MEDICATIONS   Inpatient Medications   aspirin, 81 mg, Oral, Daily    cinacalcet, 60 mg, Oral, Daily    b complex-C-folic acid, 1 mg, Oral, Daily with breakfast    docusate sodium, 100 mg, Oral, BID    gabapentin, 200 mg, Oral, Nightly    sevelamer, 1,600 mg, Oral, TID WC    simvastatin, 40 mg, Oral, Nightly    sodium chloride flush, 10 mL, Intravenous, 2 times per day    heparin (porcine), 5,000 Units, Subcutaneous, 3 times per day   Infusions    dextrose        PRN     acetaminophen 650 mg Oral Q6H PRN   sodium chloride flush 10 mL Intravenous PRN   ondansetron 4 mg Intravenous Q6H PRN   glucose 15 g Oral PRN   dextrose 12.5 g Intravenous PRN   glucagon (rDNA) 1 mg Intramuscular PRN   dextrose 100 mL/hr Intravenous PRN      Antibiotics   Recent Abx Admin      No antibiotic orders with administrations found. PHYSICAL EXAMINATION     Patient seen and examined   General: Appears weak, undernourished. Sleeping, wakes easily, cooperative in no acute distress. HENT: atraumatic, neck supple  Eyes: Optic discs: Not tested  Pulmonary: unlabored respiratory effort  Cardiovascular:  Warm well perfused. No peripheral edema  Gastrointestinal: abdomen soft, ND    Neurologic Exam:  Neurological:  Mental Status: Awake, alert, oriented, speech weak, but answers all questions  Attention: Intact  Language: No aphasia or dysarthria noted  Sensation: Intact to all extremities to light touch  Coordination: Intact    Musculoskeletal:   Gait: Not tested   Assist devices: None   Tone: weak throughout  Motor strength: Patient w/ generalized weakness   Right  Left    Right  Left    Deltoid  4 4  Hip Flex  3 4   Biceps  4 4  Knee Extensors  2 4   Triceps  4 4  Knee Flexors  3 4   Wrist Ext  4 4  Ankle Dorsiflex. 4 4   Wrist Flex  4 4  Ankle Plantarflex. 4 4   Handgrip  4 4  Ext Tony Longus  4 4   Thumb Ext  4 4         IMAGING     EXAM: MRI LUMBAR SPINE WO CONTRAST       INDICATION: Right leg weakness       COMPARISON: None       TECHNIQUE: Standard per department protocol without contrast.       FINDINGS:       The vertebrae demonstrate normal bone marrow signal. There is 1 mm retrolisthesis of L1 and L2, L2 on L3, and L3 on L4. There is 4 mm anterolisthesis of L4 on L5. Vertebral body heights are normal.        The conus medullaris demonstrates normal signal and morphology, terminating at the mid L2 level.       Multiple bilateral renal cysts. Atherosclerotic plaque in the aorta without aneurysm.       Degenerative changes are described by level below:       L1/L2: Mild bilateral facet arthrosis. Minimal noncompressive disc bulge. L2/L3:  Moderate bilateral facet arthrosis with ligamentum flavum hypertrophy. Mild disc bulge and hypertrophic change. Mild thecal sac stenosis.  Mild bilateral foraminal stenosis. L3/L4:  Moderate bilateral facet arthrosis with ligamentum flavum hypertrophy. Mild disc bulge and hypertrophic change. Moderate thecal sac stenosis. Mild bilateral foraminal stenosis. L4/L5:  Severe bilateral facet arthrosis with ligamentum flavum hypertrophy. Mild disc bulge and hypertrophic change. Moderate thecal sac stenosis. Severe right and moderate left foraminal stenosis. L5/S1:  Moderate bilateral facet arthrosis. Minimal disc bulge. No spinal stenosis. Moderate right and mild left foraminal stenosis.         Impression       1.  Multilevel degenerative disc and facet arthropathy as above with moderate thecal sac stenosis at L3-4 and L4-5. 2.  Severe right and moderate left L4 foraminal stenosis. Moderate right L5 foraminal stenosis. 3.  Grade 1 retrolisthesis of L1 on L2, L2 on L3, and L3 on L4. Grade 1 anterolisthesis of L4 on L5.     ASSESSMENT & PLAN   Patient is a 70 y/o F w/ L4 foraminal stenosis and BLE weakness / numbness R>L    Plan:   - Poor surgical candidate w/ malnutrition and ESRD   - BLE (R>L) weakness not explained by nerve root compression. Recommend MRI of thoracic and cervical spine. If able to tolerates. - If negative agree w/ previous recommendations from Neurology - EMG as outpatient   - Thanks for consult. Patient was examined with Dr. Anthony Tijerina who agrees with above assessment and plan.      Electronically signed by: Stacy Solano, 4/9/2019 1:19 PM  Neurosurgery Nurse Practitioner  Phone: [phone]

## 2019-04-10 NOTE — CONSULTS
Neurology Consult Note  Reason for Consult: recurrent falls  Chief complaint: falls  Dr Kan Gabriel MD asked me to see Brandon Humphrey in consultation for evaluation of recurrent falls    History of Present Illness:  Brandon Humphrey is a 69 y.o. female who presents with recurrent falls over the past one month or so. At least 4 different falls. They come on acutely without warning. Results from legs giving out. She has right leg difficulty that is chronic but between the episodes she feels like she walks okay. On questioning about myoclonic jerks in upper limbs she reports they have been there for several weeks intermittently. Medical History:  Past Medical History:   Diagnosis Date    DVT (deep venous thrombosis) (HCC)     End stage renal disease (HCC)     Hyperlipidemia     Hypertension     Osteoarthritis     Pancreatitis chronic     [facility] ([facility]     Type II or unspecified type diabetes mellitus without mention of complication, not stated as uncontrolled     Vitamin D deficiency      Past Surgical History:   Procedure Laterality Date    CATARACT REMOVAL Left 5/29/13    had elevated BP post op    CORONARY ANGIOPLASTY      JOINT REPLACEMENT  5/4/2011    R WILDER    OTHER SURGICAL HISTORY Left 05/19/2017    INSERTION OF LAPAROSCOPIC PERITONEAL DIALYSIS CATHETER;    THYROIDECTOMY, PARTIAL       Medications Prior to Admission: cinacalcet (SENSIPAR) 60 MG tablet, Take 1 tablet by mouth daily  sevelamer (RENVELA) 800 MG tablet, Take 2 tablets by mouth 3 times daily (with meals)  gabapentin (NEURONTIN) 100 MG capsule, Take 2 capsules by mouth nightly for 30 days.   acetaminophen (TYLENOL) 325 MG tablet, Take 2 tablets by mouth every 6 hours as needed for Pain  aspirin 81 MG EC tablet, Take 1 tablet by mouth daily  docusate sodium (COLACE) 100 MG capsule, Take 1 capsule by mouth 2 times daily  ondansetron (ZOFRAN) 4 MG tablet, Take 1 tablet by mouth every 8 hours as needed for as may worsening underlying gait difficulty by other mechanisms.     A copy of this note was provided for MD Pepe Hunter MD  [phone]  Evenings, weekends, and off weeks please discuss neurologist on-call

## 2019-04-10 NOTE — PROGRESS NOTES
Pt on PD clifton well   Discussed with PT - she was able to walk 500 feet a month ago now unable to kick   She has to have neurological cause   Neurosx kayla noted - no surgical intervention needed  Will consult neurology to help with diagnosis

## 2019-04-10 NOTE — PROGRESS NOTES
Physical Therapy    Facility/Department: [address]  Initial Assessment / treatment    NAME: Sejal Sanches  : 1940  MRN: [id number]    Date of Service: 4/10/2019    Discharge Recommendations: Sejal Sanches scored a 17/24 on the AM-PAC short mobility form. Current research shows that an AM-PAC score of 17 or less is typically not associated with a discharge to the patient's home setting. Based on the patients AM-PAC score and their current functional mobility deficits, it is recommended that the patient have 3-5 sessions per week of Physical Therapy at d/c to increase the patients independence. PT Equipment Recommendations  Equipment Needed: No    Assessment   Body structures, Functions, Activity limitations: Decreased functional mobility   Assessment: Pt is 69 y.o. female admit from home with fall requiring EMS to come and help her up. A few hours later, pt was unable to stand from chair. R LE weakness noted with atrophy of R quads, superiorly located R patella. Pt demonstrates severe R knee hyperextension with ambulation. Rec neurology consult. Given multiple recent falls and R LE weakness, pt is not safe to be home alone. Rec continued IP PT. Social work aware. Treatment Diagnosis: impaired gait and transfers, R LE weakness  Prognosis: Good  Decision Making: Medium Complexity  Patient Education: role of PT, use of call light, d/c planning; pt demonstrates understanding. REQUIRES PT FOLLOW UP: Yes       Patient Diagnosis(es): The primary encounter diagnosis was Fall, initial encounter. A diagnosis of Weakness was also pertinent to this visit.      has a past medical history of DVT (deep venous thrombosis) ([facility] End stage renal disease ([facility] Hyperlipidemia, Hypertension, Osteoarthritis, Pancreatitis chronic, Peritoneal dialysis status ([facility] Type II or unspecified type diabetes mellitus without mention of complication, not stated as uncontrolled, and Vitamin D next treatment, this note will serve as the discharge summary.   Daniel Youngblood, PT, DPT  [id number]

## 2019-04-10 NOTE — PROGRESS NOTES
NEUROSURGERY CONSULT PROGRESS NOTE  [facility]  [id number]     Subjective: No new complaints     Objective:   BP 99/67   Pulse 78   Temp 97.8 °F (36.6 °C) (Oral)   Resp 16   Ht 5' 7\" (1.702 m)   Wt 132 lb 0.9 oz (59.9 kg)   LMP  (LMP Unknown)   SpO2 95%   BMI 20.68 kg/m²     Neurologic Exam:  Mental status: AA&O, speech clear but soft    Motor / Sensory: generalized weakness    Right  Left      Right  Left    Deltoid  4 4   Hip Flex  3 4   Biceps  4 4   Knee Extensors  2 4   Triceps  4 4   Knee Flexors  3 4   Wrist Ext  4 4   Ankle Dorsiflex. 4 4   Wrist Flex  4 4   Ankle Plantarflex. 4 4   Handgrip  4 4   Ext Tony Longus  4 4   Thumb Ext  4 4              Assessment:  Patient is a 70 y/o F w/ L4 foraminal stenosis and BLE weakness / numbness R>L     Plan:   - No clear surgical etiology to underlie patient's bilateral LE weakness. Regardless patient is a poor surgical candidate w/ malnutrition and ESRD   - BLE (R>L) weakness not explained by nerve root compression.    - Recommend MRI of thoracic and cervical spine. Ordered, awaiting completion and will f/u w/ further recs    - If negative agree w/ previous recommendations from Neurology - EMG as outpatient      Suma Godoy [facility], [place]  Neurosurgery Nurse Practitioner  4/10/2019  12:59 PM  [phone]    Patient was discussed with Dr. Randy Carias who agrees with above assessment and plan.

## 2019-04-10 NOTE — PROGRESS NOTES
Internal Medicine PGY-1 Resident Progress Note        PCP: Jaren Strong MD (Inactive)    Date of Admission: 4/8/2019    Chief Complaint: BLLE numbness    Hospital Course: Esther Jesus is a 69 y.o. female with PMHx Diabetes Mellitus Type II, ESRD (2/2 HTN) on daily PD, HLD, BL knee osteoarthritis who presents with bilateral lower extremity weakness. She was recently discharged from inpatient rehab on 2/13/2019 for difficulty ambulating 2/2 knee osteoarthritis. She had another hospital admission end of March 2019 for fall secondary to R leg weakness started on gabapentin and muscle relaxants and home PT/OT with minimal improvement. She had a recurrent fall at home on Sunday evening 2/2 BLLE weakness. EMS came to get her Monday morning and brought her to hospital. They also provided her with a life-alert. She denies LOC, head trauma, dizziness or prodromal symptoms prior to fall. She describes BLLE numbness and a pins-and-needles sensation radiating down her entire R thigh, leg and foot. She has increased weakness of the RLE compared to left. She stated that the feeling she has walking on the ground is that of \"walking on needles or cold brick. \"   MRI lumbar spine (3/25) identified multilevel degenerative disc and facet arthropathy with moderate thecal sac stenosis at L3-L5. Severe R and moderate L L4 foraminal stenosis. Moderate R L5 foraminal stenosis. MRI cervical spine was without significant stenosis. MRI thoracic spine identified multilevel DDD, more pronounced in lower thoracic spine at T11-12. Mild marrow edema within Schmorl node in inferior endplate of M81. Subjective:    Patient complains of persistent BLLE numbness and weakness, worse on the R lateral thigh, leg and foot. She was seen by PT/OT, recommended not safe for patient to return to home. Patient agreeable to SNF upon discharge. Denies pain. Denies perineal numbness, urinary or bowel incontinence.  Awaiting final neurology and neurosurgical recommendations. Medications:  Reviewed    Infusion Medications    dextrose       Scheduled Medications    calcitRIOL  0.25 mcg Oral Daily    polyethylene glycol  17 g Oral Daily    senna  1 tablet Oral Nightly    ammonium lactate   Topical Daily    aspirin  81 mg Oral Daily    cinacalcet  60 mg Oral Daily    b complex-C-folic acid  1 mg Oral Daily with breakfast    docusate sodium  100 mg Oral BID    gabapentin  200 mg Oral Nightly    sevelamer  1,600 mg Oral TID WC    simvastatin  40 mg Oral Nightly    sodium chloride flush  10 mL Intravenous 2 times per day    heparin (porcine)  5,000 Units Subcutaneous 3 times per day     PRN Meds: acetaminophen, sodium chloride flush, ondansetron, glucose, dextrose, glucagon (rDNA), dextrose      Intake/Output Summary (Last 24 hours) at 4/10/2019 1637  Last data filed at 4/10/2019 1457  Gross per 24 hour   Intake 720 ml   Output 857 ml   Net -137 ml       Physical Exam Performed:    BP 97/65   Pulse 89   Temp 98.8 °F (37.1 °C) (Oral)   Resp 16   Ht 5' 7\" (1.702 m)   Wt 132 lb 0.9 oz (59.9 kg)   LMP  (LMP Unknown)   SpO2 95%   BMI 20.68 kg/m²     General appearance:  Noapparent distress, appears stated age and cooperative. HEENT:  Normal cephalic, atraumatic without obvious deformity. Pupils equal, round, and reactive to light. Extra ocular muscles intact. Conjunctivae/corneas clear. Neck: Supple, with full range of motion. No jugular venous distention. Trachea midline. Respiratory:Normal respiratory effort. Clear to auscultation, bilaterally without Rales/Wheezes/Rhonchi. Cardiovascular:Regular rate and rhythm with normal S1/S2 without murmurs, rubs or gallops. Abdomen: Soft, non-tender,non-distended with normal bowel sounds. Musculoskeletal:  No clubbing, cyanosis. Edema 2+ in feet bilaterally. Full range of motion without deformity. Skin: Skin color, texture, turgor normal.  Cuts on the toes of right foot.    Neurologic: Strength 2/5 right LE, 3/5 LE, 5/5 bilateral UE. Decreased sensation to sharp touch from toes to above ankle Right LE, decreased sharp sensation from toes to mid-dorsal foot on left LE, full sensation in bilateral UE. Cranial nerves: II-XII intact, grossly non-focal.  Psychiatric:  Alert and oriented, thought content appropriate, normal insight  Capillary Refill: Brisk,< 3 seconds   Peripheral Pulses: +2 palpable, equal bilaterally    Labs:   Recent Labs     04/08/19  1617 04/09/19  0642   WBC 8.5 6.6   HGB 13.0 11.1*   HCT 42.7 34.7*    192     Recent Labs     04/08/19  1617 04/09/19  0642 04/10/19  0631    140 140   K 4.4 3.5 3.2*    98* 98*   CO2 16* 23 24   BUN 62* 55* 51*   CREATININE 11.2* 10.1* 9.4*   CALCIUM 7.6* 7.2* 6.9*   PHOS  --  4.4  --      No results for input(s): AST, ALT, BILIDIR, BILITOT, ALKPHOS in the last 72 hours. Recent Labs     04/09/19  0641   INR 1.06     Recent Labs     04/08/19  1617   TROPONINI 0.08*       Urinalysis:      Lab Results   Component Value Date    NITRU Negative 04/08/2019    WBCUA 20-50 04/08/2019    BACTERIA 3+ 04/08/2019    RBCUA 10-20 04/08/2019    BLOODU LARGE 04/08/2019    SPECGRAV 1.025 04/08/2019    GLUCOSEU 100 04/08/2019       Radiology:  MRI THORACIC SPINE WO CONTRAST   Final Result      MRI CERVICAL SPINE:    1. Mild multilevel cervical spondylosis without significant spinal or foraminal stenosis. MRI THORACIC SPINE:    1. Multilevel degenerative disc and facet arthropathy, most pronounced in the lower thoracic spine, particularly at T11-12 with severe facet arthropathy resulting in severe left and moderate right T11 foraminal stenosis. 2. Mild marrow edema within a Schmorl's node in the inferior endplate of G01. MRI CERVICAL SPINE WO CONTRAST   Final Result      CT Head WO Contrast   Final Result      No acute traumatic abnormality. 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

## 2019-04-10 NOTE — PLAN OF CARE
Problem: Falls - Risk of:  Goal: Will remain free from falls  Description  Will remain free from falls  Outcome: Ongoing  Note:   Pt remains in fall precautions. Bed alarm on, call light within reach. Cyclic PD running overnight. Pt reports anuric. Instructed pt to call for needs. Pt verbalized understanding of all instructions. Will continue to monitor safety. Pr  Problem: Pain:  Goal: Pain level will decrease  Description  Pain level will decrease  Outcome: Ongoing  Note:   Pt denies discomfort tonight. Sleeping throughout most of night, easily awakened. Pt instructed to notify this RN for discomfort. Pt verbalized understanding of all instructions. Will continue to monitor comfort. Problem: Risk for Impaired Skin Integrity  Goal: Tissue integrity - skin and mucous membranes  Description  Structural intactness and normal physiological function of skin and  mucous membranes. Outcome: Ongoing  Note:   Pt does not have skin breakdown. Pt instructed on importance of turn and repositioning in bed. Pt able to turn self completely from side to side. Pt verbalized understanding of all instructions. Will continue to monitor skin integrity.

## 2019-04-10 NOTE — FLOWSHEET NOTE
04/10/19 1104   Encounter Summary   Services provided to: Patient   Referral/Consult From: Rounding   Continue Visiting   (Seen 4/10/19, DICK. )   Complexity of Encounter Moderate   Length of Encounter 15 minutes   Routine   Type Initial   Assessment Approachable   Intervention Nurtured hope   Outcome Expressed gratitude

## 2019-04-10 NOTE — CARE COORDINATION
Hien liaison from [facility] met with patient at bedside with permission. Patient was agreeable to placement at [address] [place] [address]. Pre-cert to be started on this date. SW will continue to follow and update other facility. Once pre-cert received. PD machine would need to be brought from home until equipment from her agency could be set up and transport can only occur when on-site training is scheduled with-in 30 minutes of admission for patient.          Lamont Hernandez, MSW, LSW     [facility]   [phone]

## 2019-04-10 NOTE — CARE COORDINATION
4/10/2019  [facility] Case Management Department    Initial Assessment     Patient: Pham Carter  MRN: [id number] / : 1940          Admission Documentation  Attending Provider: Kanchan Littlejohn MD  Admit date/time: 2019  1:56 PM  Status: Inpatient [101]  Diagnosis: Weakness of both legs     Readmission within last 30 days:  no     Living Situation:  Discharge Planning  Living Arrangements: Alone  Support Systems: Family Members, Children  Potential Assistance Needed: Home Care  Type of [address] PT, OT, Nursing Services  Patient expects to be discharged to[de-identified] home  Expected Discharge Date: 04/10/19    Written Assessment:   Silvina Justice a 69 y. o. female with a PMH of DVT, ESRD on peritoneal dialysis -Friday (anuric), HTN, DM presents with a fall last night. Patient states she trid to hook up for peritoneal dialysis last night but when she stood up her legs gave out under her due to weakness. SW placed phone calls and inquires to 13 different [facility] facilities in [place]. They only two facilities that were willing to accept were [facility] and [facility]. [facility] stated yes and [facility] agreed to review the referral on case by case basis. SW met with patient at bedside. Patient reported she resides at home. She was active with [facility] ([phone]). Patient stated she is aware of need for placement at discharge. She receives PD currently through [facility] PD supplies. Patient was also active with [facility] with [facility] services at home. Patient and SW dicussed barriers to placement with active PD at home. Patient reported understanding and was agreeable to referrals and meeting with liaisons in hospital to discuss. Patient has a rolling walker at home.      SW spoke with John Jiménez with [facility] and provided update. ([phone])    Service Assessment:       Values / Beliefs  Do you have any ethnic, cultural, sacramental, or spiritual Restoration needs you would like us to be aware of while you are in the hospital?: No    Advance Directives (For Healthcare)  Pre-existing DNR Comfort Care/DNR Arrest/DNI Order: No  Healthcare Directive: No, patient does not have an advance directive for healthcare treatment  Patient Requests Assistance: No  Advance Directives: Pt. not interested at this time                         Destination:  SNF ([facility])      Durable Medical Equipment:   Rolling walker      Home Health/Skilled Nursing Prior to Admission:  Home care at home?  Yes   Provider: [facility]    Provider Phone: [address]:  Skilled Nursing, PT, OT     Therapy Consults  PT evaluation needed?: Yes (Comment)  OT Evalulation Needed?: Yes (Comment)  SLP evaluation needed?: No      Pharmacy: no medications issues reported    Potential Assistance Purchasing Medications:  No  Does patient want to participate in local refill/meds to beds program?: Yes    Goals of Care:  Patient expects to be discharged to[de-identified] home  Patient plans for SNF/Placement: Referrals Pending          Mode of transport from hospital: Ambulance      Barriers to discharge: PD set up at facility, facility placement, pre-[facility]  Case Management Department  Ph: [phone]

## 2019-04-10 NOTE — FLOWSHEET NOTE
04/10/19 0650   Vitals   BP 99/67   Temp 98.8 °F (37.1 °C)   Temp Source Oral   Pulse 74   Resp 16   SpO2 93 %   Weight 132 lb 0.9 oz (59.9 kg)   Cycler   Verification of Prescription CCPD   Ultrafiltration (UF) (mL) 857 mL  (clear yellow)   Ccpd completed. 2 alarms noted. Pt d/c'd from cycler via aseptic technique. No c/o's voiced.  Vss.

## 2019-04-10 NOTE — PLAN OF CARE
Problem: Falls - Risk of:  Goal: Will remain free from falls  Description  Will remain free from falls  Note:   Patient oriented to ability and appropriately calls for needs. Bed locked in lowest position, alarm set. Patient worked with PT/OT this afternoon and is a pivot assist to bedside commode. Fall precautions in place.

## 2019-04-10 NOTE — PROGRESS NOTES
Occupational Therapy   Occupational Therapy Initial Assessment and Treatment Note   Date: 4/10/2019   Patient Name: Tanja Alonzo  MRN: [id number]     : 1940    Date of Service: 4/10/2019    Discharge Recommendations:  Tanja Alonzo scored a 17/24 on the AM-PAC ADL Inpatient form. Current research shows that an AM-PAC score of 17 or less is typically not associated with a discharge to the patient's home setting. Based on the patients AM-PAC score and their current ADL deficits, it is recommended that the patient have 3-5 sessions per week of Occupational Therapy at d/c to increase the patients independence. OT Equipment Recommendations  Equipment Needed: No  Other: defer to [facility]     Assessment   Performance deficits / Impairments: Decreased functional mobility ; Decreased ADL status; Decreased endurance  Assessment: Pt from home - demo needing increased assist for functional mobility and LE ADLs. Pt with RLE weakness/ hyperextension. Pt needing increased time / effort  to complete simple activity. Pt demo poor endurance/ fatigue during session Pt would benefit from ongoing OT at d/c. Pt may need increased assist long term. Will follow as inpt. Treatment Diagnosis: impaired ADLs /functional transfers 2/2 Fall and RLE weakness   Prognosis: Fair  Decision Making: Medium Complexity  Patient Education: Role of OT / activity promotion - verb understanding / Elmo Nestor as needed   REQUIRES OT FOLLOW UP: Yes  Activity Tolerance  Activity Tolerance: Patient Tolerated treatment well;Patient limited by fatigue  Activity Tolerance: Poor endurance BPs lying 119/72, sitting 123/81, standing 129/77--  Safety Devices  Safety Devices in place: Yes  Type of devices: Left in chair;Nurse notified; Chair alarm in place;Call light within reach           Patient Diagnosis(es): The primary encounter diagnosis was Fall, initial encounter. A diagnosis of Weakness was also pertinent to this visit.      has a past medical history of DVT (deep venous thrombosis) ([facility] [address] End stage renal disease ([facility] [address] Hyperlipidemia, Hypertension, Osteoarthritis, Pancreatitis chronic, Peritoneal dialysis status ([facility] [address] Type II or unspecified type diabetes mellitus without mention of complication, not stated as uncontrolled, and Vitamin D deficiency. has a past surgical history that includes Thyroidectomy, partial; Coronary angioplasty; joint replacement (5/4/2011); Cataract removal (Left, 5/29/13); and other surgical history (Left, 05/19/2017). Treatment Diagnosis: impaired ADLs /functional transfers 2/2 Fall and RLE weakness       Restrictions  Position Activity Restriction  Other position/activity restrictions: up with assistance     Subjective   General  Chart Reviewed: Yes  Additional Pertinent Hx: Admit 4/8 with fall / BLE weakness   CT head -Age-related changes in the brain with old infarcts, MRI C/T spine - pending, Neurosx consult                                 PMHX: ESRD,HTN,HLD,OA,pancreatitis, DM and peritoneal dialysis   Family / Caregiver Present: No  Diagnosis: Fall / BLE weakness   Subjective  Subjective: \" I feel really weak \" pt in bed agreeable for OOB/OT eval and tx.   Pt 4th admission since 1/27/2019 with falls /LE weakness      Social/Functional History  Social/Functional History  Lives With: Alone  Type of Home: Apartment  Home Layout: One level  Home Access: Level entry  Bathroom Shower/Tub: Tub/Shower unit  Bathroom Toilet: Handicap height  Bathroom Equipment: Grab bars in shower, Shower chair  Home Equipment: Alert Button, Rolling walker, [employer] From: Home health(1x week for cleaning.)  ADL Assistance: Independent  Homemaking Assistance: Independent  Ambulation Assistance: Independent(cane / RW)  Transfer Assistance: Independent  Active : Yes  Mode of Transportation: Car  Additional Comments: pt has had multiple falls at home recently       Objective  Treatment included functional transfer training, ADL's and pt. education. Orientation  Overall Orientation Status: Within Functional Limits  Observation/Palpation  Palpation: R patella superior as compared to L - little to no patellar movement during knee ROM, likely chronic; atrophy noted R quads - VMO  Balance  Sitting Balance: Supervision(progressed to independence )  Standing Balance: Minimal assistance(to CGA with walker )  Standing Balance  Time: 3mins x  1 and  4 mins x 1, 30 sec x 1   Activity: functional transfers, stance / functional mobility   Sit to stand: Minimal assistance  Stand to sit: Contact guard assistance  Functional Mobility  Functional - Mobility Device: Rolling Walker  Activity: Other  Functional Mobility Comments: pt needing increased time / effort for short distances   Toilet Transfers  Toilet - Technique: Ambulating  Equipment Used: Standard bedside commode  Toilet Transfer: Minimal assistance  Toilet Transfers Comments: only voids for BMs;   ADL  Feeding: Setup  Grooming: Setup  UE Dressing: Setup  LE Dressing: Maximum assistance; Moderate assistance  Toileting:  Moderate assistance  Additional Comments: Pt needing increased effort / time to complete 2/2 LE weakness   Tone RUE  RUE Tone: Normotonic  Tone LUE  LUE Tone: Normotonic     Bed mobility  Supine to Sit: Contact guard assistance  Scooting: Contact guard assistance  Transfers  Sit to stand: Minimal assistance  Stand to sit: Contact guard assistance  Transfer Comments: v.cues for hand placement  Vision - Basic Assessment  Prior Vision: No visual deficits  Cognition  Overall Cognitive Status: WFL    LUE AROM (degrees)  LUE AROM : WFL  Left Hand AROM (degrees)  Left Hand AROM: WFL  RUE AROM (degrees)  RUE AROM : WFL  Right Hand AROM (degrees)  Right Hand AROM: WFL  LUE Strength  Gross LUE Strength: WFL  L Hand Grasp: 4/5  L Hand Release: 4/5  RUE Strength  Gross RUE Strength: WFL  R Hand Grasp: 4/5  R Hand Release: 4/5     Plan This note will serve as a discharge summary if patient is discharged from hospital before next treatment session.   Plan  Times per week: 2-5x  Times per day: Daily  Current Treatment Recommendations: Functional Mobility Training, Endurance Training, Equipment Evaluation, Education, & procurement, Patient/Caregiver Education & Training, Self-Care / ADL, Safety Education & Training    AM-PAC Score  AM-PAC Inpatient Daily Activity Raw Score: 17  AM-PAC Inpatient ADL T-Scale Score : 37.26  ADL Inpatient CMS 0-100% Score: 50.11  ADL Inpatient CMS G-Code Modifier : CK    Goals  Short term goals  Time Frame for Short term goals: at /dc   Short term goal 1: Stance x 5 mins with CGA for ADLs  Short term goal 2: LE dressing with CGA and AE pen   Short term goal 3: Commode transfers with supervision       Therapy Time   Individual Concurrent Group Co-treatment   Time In 1105         Time Out 1200         Minutes 55             Timed Code Treatment Minutes:  39 mins     Total Treatment Minutes:  54 mins       Lazarus Hoit, OT

## 2019-04-11 NOTE — PROGRESS NOTES
Internal Medicine PGY-1 Resident Progress Note        PCP: Angela Alonso MD (Inactive)    Date of Admission: 4/8/2019    Chief Complaint: BLLE numbness    Hospital Course: Ansley Ruvalcaba is a 69 y.o. female with PMHx Diabetes Mellitus Type II, ESRD (2/2 HTN) on daily PD, HLD, BL knee osteoarthritis who presents with bilateral lower extremity weakness. She was recently discharged from inpatient rehab on 2/13/2019 for difficulty ambulating 2/2 knee osteoarthritis. She had another hospital admission end of March 2019 for fall secondary to R leg weakness started on gabapentin and muscle relaxants and home PT/OT with minimal improvement. She had a recurrent fall at home on Sunday evening 2/2 BLLE weakness. EMS came to get her Monday morning and brought her to hospital. They also provided her with a life-alert. She denies LOC, head trauma, dizziness or prodromal symptoms prior to fall. She describes BLLE numbness and a pins-and-needles sensation radiating down her entire R thigh, leg and foot. She has increased weakness of the RLE compared to left. She stated that the feeling she has walking on the ground is that of \"walking on needles or cold brick. \"   MRI lumbar spine (3/25) identified multilevel degenerative disc and facet arthropathy with moderate thecal sac stenosis at L3-L5. Severe R and moderate L L4 foraminal stenosis. Moderate R L5 foraminal stenosis. MRI cervical spine was without significant stenosis. MRI thoracic spine identified multilevel DDD, more pronounced in lower thoracic spine at T11-12. Mild marrow edema within Schmorl node in inferior endplate of N99. Deemed not to be a surgical candidate per neurosurgery. Seen by neurology who recommended discontinuation of gabapentin for myoclonic jerks. Subjective:    Patient complains of persistent BLLE numbness and weakness, worse on the R lateral leg and foot. She was seen by PT/OT, recommended not safe for patient to return to home.  SNF will be available to take patient on Monday when PD teaching may be set up. Denies pain. Denies perineal numbness, urinary or bowel incontinence. During interview, patient had cramping of RLE with visible fasciculations. Medications:  Reviewed    Infusion Medications    dextrose       Scheduled Medications    vitamin E  400 Units Oral Nightly    calcitRIOL  0.25 mcg Oral Daily    polyethylene glycol  17 g Oral Daily    senna  1 tablet Oral Nightly    ammonium lactate   Topical Daily    aspirin  81 mg Oral Daily    cinacalcet  60 mg Oral Daily    b complex-C-folic acid  1 mg Oral Daily with breakfast    docusate sodium  100 mg Oral BID    sevelamer  1,600 mg Oral TID WC    simvastatin  40 mg Oral Nightly    sodium chloride flush  10 mL Intravenous 2 times per day    heparin (porcine)  5,000 Units Subcutaneous 3 times per day     PRN Meds: acetaminophen, sodium chloride flush, ondansetron, glucose, dextrose, glucagon (rDNA), dextrose      Intake/Output Summary (Last 24 hours) at 4/11/2019 1717  Last data filed at 4/11/2019 0815  Gross per 24 hour   Intake 480 ml   Output 514 ml   Net -34 ml       Physical Exam Performed:    /70   Pulse 78   Temp 97.7 °F (36.5 °C) (Axillary)   Resp 16   Ht 5' 7\" (1.702 m)   Wt 131 lb 6.3 oz (59.6 kg)   LMP  (LMP Unknown)   SpO2 96%   BMI 20.58 kg/m²     General appearance:  Noapparent distress, appears stated age and cooperative. HEENT:  Normal cephalic, atraumatic without obvious deformity. Pupils equal, round, and reactive to light. Extra ocular muscles intact. Conjunctivae/corneas clear. Neck: Supple, with full range of motion. No jugular venous distention. Trachea midline. Respiratory:Normal respiratory effort. Clear to auscultation, bilaterally without Rales/Wheezes/Rhonchi. Cardiovascular:Regular rate and rhythm with normal S1/S2 without murmurs, rubs or gallops. Abdomen: Soft, non-tender,non-distended with normal bowel sounds.   Musculoskeletal: No clubbing, cyanosis. Edema 2+ in feet bilaterally. Full range of motion without deformity. Skin: Skin color, texture, turgor normal.  Cuts on the toes of right foot. Neurologic:  Strength 3/5 right LE, 3/5 LE, 5/5 bilateral UE. Right LE, decreased sharp sensation from toes to mid-dorsal foot on left LE, weak R foot plantar flexion and inversion, full sensation in bilateral UE. visible fasciculations in R thigh and calf. R thigh and calf muscles significantly more atrophied than LLE. 2+ patellar reflexes BLLE Cranial nerves: II-XII intact, grossly non-focal.  Psychiatric:  Alert and oriented, thought content appropriate, normal insight  Capillary Refill: Brisk,< 3 seconds   Peripheral Pulses: +2 palpable, equal bilaterally    Labs:   Recent Labs     04/09/19  0642   WBC 6.6   HGB 11.1*   HCT 34.7*        Recent Labs     04/09/19  0642 04/10/19  0631 04/11/19  0656    140 139   K 3.5 3.2* 3.5   CL 98* 98* 98*   CO2 23 24 24   BUN 55* 51* 49*   CREATININE 10.1* 9.4* 9.4*   CALCIUM 7.2* 6.9* 7.1*   PHOS 4.4  --   --      No results for input(s): AST, ALT, BILIDIR, BILITOT, ALKPHOS in the last 72 hours. Recent Labs     04/09/19  0641   INR 1.06     No results for input(s): Sailaja Viveros in the last 72 hours. Urinalysis:      Lab Results   Component Value Date    NITRU Negative 04/08/2019    WBCUA 20-50 04/08/2019    BACTERIA 3+ 04/08/2019    RBCUA 10-20 04/08/2019    BLOODU LARGE 04/08/2019    SPECGRAV 1.025 04/08/2019    GLUCOSEU 100 04/08/2019       Radiology:  MRI THORACIC SPINE WO CONTRAST   Final Result      MRI CERVICAL SPINE:    1. Mild multilevel cervical spondylosis without significant spinal or foraminal stenosis. MRI THORACIC SPINE:    1. Multilevel degenerative disc and facet arthropathy, most pronounced in the lower thoracic spine, particularly at T11-12 with severe facet arthropathy resulting in severe left and moderate right T11 foraminal stenosis.    2. Mild marrow edema within a Schmorl's node in the inferior endplate of F79. MRI CERVICAL SPINE WO CONTRAST   Final Result      CT Head WO Contrast   Final Result      No acute traumatic abnormality. 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 Assessment/Plan:    Bong Batista a 69 y. o. female with a PMH of DVT, ESRD on peritoneal dialysis Sunday-Friday (anuric), HTN, DM presents from home following a fall. Active Hospital Problems    Diagnosis Date Noted    Weakness of both legs [R29.898] 04/08/2019     Fall 2/2 BLLE numbness and weakness suspect 2/2 lumbar L3-L5 thecal sac stenosis with subacute myelopathy  Pt has hx of multiple falls, numbness and tingling in the right leg. Exam significant for decreased strength 3/5 right LE, 4/5 left LE, decreased sensation to sharp touch in feet bilaterally. - MRI lumbar spine (3/25) identified multilevel degenerative disc and facet arthropathy with moderate thecal sac stenosis at L3-L5. Severe R and moderate L L4 foraminal stenosis. Moderate R L5 foraminal stenosis.  Lateral thigh and full leg numbness and knee extension/foot inversion deficits consistent with L3-L5 neuropathy  - CT head negative, MRI cervical and thoracic spine unremarkable for compressive lesion   - if patient does not improve off gabapentin per neurology recommendations, will resume gabapentin upon discharge for neuropathy   - will likely require EMG outpatient, EMG/NCS can help localize the nerve root injury and provide some prognostic information  - given patient has failed ARU and outpatient PT/OT on two prior hospital visits, will admit to SNF this admission upon discharge  - PT/OT   - deemed not to be a surgical candidate per neurosurgery, who have signed off      ESRD on PD, anuric  - unrestricted potassium, low salt diet  - renal panel daily   - UA: 20-50 WBC, however many epithelial cells, not concerning for UTI, no abx indicated  - Nephrology following for PD   - on renvela and sensipar   - phos wnl at 4.4, corrected calcium 8.7   - added calcitriol 0.25 mg PO qD for secondary hyperparathyroidism and vitamin D deficiency   - hypocalcemia, f/u albumin   - Vitamin E 400U qHs for BL calf cramping, noticed worse following dialysis      Type 2 DM   Last A1C 6.5 on 3/2019  Not on any medications per home medication list  - Will monitor POC glucose check q4h     HTN  - Continue on Coreg     CAD - stable  Continue ASA, Statin, and Coreg    DVT Prophylaxis:heparin  Diet: DIET LOW SODIUM 2 GM; Low Phosphorus  Code Status: Full Code    PT/OT Eval Status: ordered    Dispo - GMF, SNF placement pending    Luís Rodriguez MD    I will discuss the patient with the senior resident and MD Luís Mota MD  Internal Medicine Resident PGY-1

## 2019-04-11 NOTE — CARE COORDINATION
Case Management Daily Note:    Current Plan of Care: DC Monday to [facility]       PT AM-PAC: 17/24    Date: 4/10    OT AM-PAC: 17/24   Date: 4/10    DME needs: Defer      Discharge Plan: Susanne Lopez (pre-cert received)     Tentative Discharge Date: 4/15 at 12:30 pm     Current Barriers to Discharge: PD required teaching and set up can only occur on Monday per [facility]). Resources/Information given: SNF choice       Case Management Notes: Patient received update that pre-cert was received. Arianna, liaison with [facility] reported that [facility] are unable to do training/teaching until Monday. She verified pre-cert will be go through Monday with insurance. Transport to be scheduled at 12:30 pm to have patient at facility for 2:00 pm teaching and set up. MICHELLE spoke with patient at bedside and provided update. MICHELLE also updated Dr. Alita Dancer regarding discharge plan.          Neyda Cummins, MSW, LSW     [facility]   [phone]

## 2019-04-11 NOTE — PROGRESS NOTES
Neurosurgery Update:    Thoracic / cervical MRI's reviewed. Unremarkable for compressive lesion. Neurology following  No further neurosurgical needs. Thanks for consult. We will sign off. No follow up needed.      Amy Loco  10:51 AM  04/11/19

## 2019-04-11 NOTE — PROGRESS NOTES
Neurology Follow Up  Note    Updates:  Seen for recurrent falls. She has no new complaints. ROS:  Constitutional: denies fatigue, malaise  Eyes: denies any vision changes,  Musculoskeletal: denies any pain or decreased ROM, no functional deficit. Neurological: denies headache, numbness or tingling, speech problems. Exam:  Blood pressure 92/60, pulse 75, temperature 97.8 °F (36.6 °C), temperature source Oral, resp. rate 16, height 5' 7\" (1.702 m), weight 131 lb 6.3 oz (59.6 kg), SpO2 96 %, not currently breastfeeding. Constitutional    Vital signs: BP, HR, and RR reviewed   General Alert, no distress, well-nourished  Eyes: fundoscopic exam not visualized. .   Cardiovascular: pulses symmetric in all 4 extremities. No peripheral edema. Psychiatric: cooperative with examination, no  psychotic behavior noted. Neurologic  Mental status:   orientation to person, place and time. General fund of knowledge grossly intact   Memory grossly intact   Attention intact as able to attend well to the exam     Language fluent in conversation   Comprehension intact; follows simple commands  Cranial nerves:   CN2: Visual Fields full w/o extinction on confrontational testing,   CN 3,4,6: extraocular muscles intact,  CN5: facial sensation symmetric   CN7:face symmetric without dysarthria,   CN8: hearing grossly intact  CN9: palate elevated symmetrically  CN11: trap full strength on shoulder shrug  CN12: tongue midline with protrusion  Strength: No prontator drift. Good strength in upper extremities no myoclonus noted. Left leg could lift against gravity for a few seconds but right leg with myoclonus could lift briefly and not as high as left leg. Abductor and adductor a bit weak. Deep tendon reflexes: hyporeflexic  Sensory: light touch intact in all 4 extremities. Cerebellar/coordination: finger nose finger normal without ataxia  Tone: normal in all 4 extremities  Gait: deferred due to safety concerns. Labs:    CBC:   Lab Results   Component Value Date    WBC 6.6 04/09/2019    RBC 3.48 04/09/2019    HGB 11.1 04/09/2019    HCT 34.7 04/09/2019    MCV 99.8 04/09/2019    MCH 32.0 04/09/2019    MCHC 32.1 04/09/2019    RDW 14.8 04/09/2019     04/09/2019    MPV 7.9 04/09/2019     BMP:    Lab Results   Component Value Date     04/11/2019    K 3.5 04/11/2019    CL 98 04/11/2019    CO2 24 04/11/2019    BUN 49 04/11/2019    LABALBU 2.3 03/27/2019    CREATININE 9.4 04/11/2019    CALCIUM 7.1 04/11/2019    GFRAA 5 04/11/2019    GFRAA 32 06/07/2013    LABGLOM 4 04/11/2019    GLUCOSE 150 04/11/2019     INR:   Recent Labs     04/09/19  0641   INR 1.06        Ref. Range 4/9/2019 20:02   Vit D, 25-Hydroxy Latest Ref Range: >=30 ng/mL 19.3 (L)     Studies:      MRI THORACIC SPINE WO CONTRAST   Final Result      MRI CERVICAL SPINE:    1. Mild multilevel cervical spondylosis without significant spinal or foraminal stenosis. MRI THORACIC SPINE:    1. Multilevel degenerative disc and facet arthropathy, most pronounced in the lower thoracic spine, particularly at T11-12 with severe facet arthropathy resulting in severe left and moderate right T11 foraminal stenosis. 2. Mild marrow edema within a Schmorl's node in the inferior endplate of T54. MRI CERVICAL SPINE WO CONTRAST   Final Result      CT Head WO Contrast   Final Result      No acute traumatic abnormality. 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                   Scheduled Meds:   calcitRIOL  0.25 mcg Oral Daily    polyethylene glycol  17 g Oral Daily    senna  1 tablet Oral Nightly    ammonium lactate   Topical Daily    aspirin  81 mg Oral Daily    cinacalcet  60 mg Oral Daily    b complex-C-folic acid  1 mg Oral Daily with breakfast    docusate sodium  100 mg Oral BID    sevelamer  1,600 mg Oral TID WC    simvastatin  40 mg Oral Nightly    sodium chloride flush  10 mL Intravenous 2 times per day    heparin (porcine)

## 2019-04-11 NOTE — PROGRESS NOTES
Renal Progress Note  Subjective:   Admit Date: 4/8/2019     HPI      Interval History:     C/o numbess in rt lower ext   Has myoclonus       DIET DIET LOW SODIUM 2 GM; Medications:   Scheduled Meds:   calcitRIOL  0.25 mcg Oral Daily    polyethylene glycol  17 g Oral Daily    senna  1 tablet Oral Nightly    ammonium lactate   Topical Daily    aspirin  81 mg Oral Daily    cinacalcet  60 mg Oral Daily    b complex-C-folic acid  1 mg Oral Daily with breakfast    docusate sodium  100 mg Oral BID    sevelamer  1,600 mg Oral TID WC    simvastatin  40 mg Oral Nightly    sodium chloride flush  10 mL Intravenous 2 times per day    heparin (porcine)  5,000 Units Subcutaneous 3 times per day     Continuous Infusions:   dextrose         Labs:  CBC:   Recent Labs     04/08/19  1617 04/09/19  0642   WBC 8.5 6.6   HGB 13.0 11.1*    192     BMP:    Recent Labs     04/09/19  0642 04/10/19  0631 04/11/19  0656    140 139   K 3.5 3.2* 3.5   CL 98* 98* 98*   CO2 23 24 24   BUN 55* 51* 49*   CREATININE 10.1* 9.4* 9.4*   GLUCOSE 195* 165* 150*     Ca/Mg/Phos:   Recent Labs     04/09/19  0642 04/10/19  0631 04/11/19  0656   CALCIUM 7.2* 6.9* 7.1*   MG 1.90 1.80 1.90   PHOS 4.4  --   --      Hepatic: No results for input(s): AST, ALT, ALB, BILITOT, ALKPHOS in the last 72 hours. Troponin:   Recent Labs     04/08/19  1617   TROPONINI 0.08*     BNP: No results for input(s): BNP in the last 72 hours. Lipids: No results for input(s): CHOL, TRIG, HDL, LDLCALC, LABVLDL in the last 72 hours. ABGs: No results for input(s): PHART, PO2ART, CDU2QLF in the last 72 hours.   INR:   Recent Labs     04/09/19  0641   INR 1.06     UA:  Recent Labs     04/08/19  1528   COLORU Yellow   CLARITYU CLOUDY*   GLUCOSEU 100*   BILIRUBINUR SMALL*   KETUA TRACE*   SPECGRAV 1.025   BLOODU LARGE*   PHUR 6.0   PROTEINU 100*   UROBILINOGEN 0.2   NITRU Negative   LEUKOCYTESUR MODERATE*   LABMICR YES   URINETYPE Not Specified      Urine Microscopic:   Recent Labs     04/08/19  1528   BACTERIA 3+*   WBCUA 20-50*   RBCUA 10-20*   EPIU      Urine Culture:   Recent Labs     04/08/19  1528   LABURIN No growth at 18-36 hours     Urine Chemistry: No results for input(s): CLUR, LABCREA, PROTEINUR, NAUR in the last 72 hours. Objective:   Vitals: BP 92/60   Pulse 75   Temp 97.8 °F (36.6 °C) (Oral)   Resp 16   Ht 5' 7\" (1.702 m)   Wt 131 lb 6.3 oz (59.6 kg)   LMP  (LMP Unknown)   SpO2 96%   BMI 20.58 kg/m²    Wt Readings from Last 3 Encounters:   04/11/19 131 lb 6.3 oz (59.6 kg)   03/27/19 133 lb 9.6 oz (60.6 kg)   02/18/19 135 lb (61.2 kg)      24HR INTAKE/OUTPUT:      Intake/Output Summary (Last 24 hours) at 4/11/2019 1106  Last data filed at 4/11/2019 0325  Gross per 24 hour   Intake 480 ml   Output 558 ml   Net -78 ml     Constitutional:  Alert, awake, no apparent distress  NECK: supple, no JVD  Cardiovascular:  S1, S2 without m/r/g  Respiratory:  CTA B without w/r/r  Abdomen: +bs, soft, nt  Ext: no LE edema    Assessment and Plan:       IMAGING:  MRI THORACIC SPINE WO CONTRAST   Final Result      MRI CERVICAL SPINE:    1. Mild multilevel cervical spondylosis without significant spinal or foraminal stenosis. MRI THORACIC SPINE:    1. Multilevel degenerative disc and facet arthropathy, most pronounced in the lower thoracic spine, particularly at T11-12 with severe facet arthropathy resulting in severe left and moderate right T11 foraminal stenosis. 2. Mild marrow edema within a Schmorl's node in the inferior endplate of D98. MRI CERVICAL SPINE WO CONTRAST   Final Result      CT Head WO Contrast   Final Result      No acute traumatic abnormality. 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                 Assessment/Plan     Numbness of rt lower ext   Myoclonus   HTN   ESRD   Debility   Anemia     Plan   - neurology following - not clear about the diagnosis of numbness that she has in rt lower ext    - Myoclonus -

## 2019-04-11 NOTE — PLAN OF CARE
Problem: Falls - Risk of:  Goal: Will remain free from falls  Description  Will remain free from falls  Outcome: Ongoing  Note:   Patient is a fall risk. See Fall Risk assessment for details. Bed is in low, lock position; call light/belongings within reach. No attempts to get out of bed have been made, calls appropriately when assistance is needed. Bed alarm and hourly rounds in place for safety. Will continue to monitor and reassess throughout shift. Problem: Pain:  Goal: Pain level will decrease  Description  Pain level will decrease  Outcome: Ongoing  Note:   Pt has had no complaints of pain this shift, Will continue to monitor. Problem: Risk for Impaired Skin Integrity  Goal: Tissue integrity - skin and mucous membranes  Description  Structural intactness and normal physiological function of skin and  mucous membranes. Outcome: Ongoing  Note:   Pt at risk for skin breakdown. Skin assessment complete. No signs of skin breakdown. Pts skin cleansed with inc cleanser as needed. Pt encouraged to sit up in the chair and turning every two hours. Pt repositioned in bed with pillow support. Will continue to monitor.

## 2019-04-12 NOTE — CARE COORDINATION
Case Management Daily Note:    Current Plan of Care: DC Monday to [facility]       PT AM-PAC: 17/24    Date: 4/12    OT AM-PAC: 12/24   Date: 4/12    DME needs: Defer      Discharge Plan:   Shyla Lockett   [facility], Kristi Barfield  Report: [phone]  Fax: [phone]    Tentative Discharge Date: 4/15 at 12:30 pm (SW to scheduled transport today. Current Barriers to Discharge: PD required teaching and set up can only occur on Monday per [facility]).      Resources/Information given: SNF choice       Case Management Notes: Patient has been accepted at [address] [place] [address]. Patient's brother, Venus Morris ([phone]). He was seeking updates on what patient needs at facility on Monday. SW placed call this AM to [place] [phone] at [place] [phone]) and M asking her to reach out to [facility] regarding needs at the facility for Monday. SW to set up Ambulance transport today.       Vignesh Crain, MSW, LSW     [facility]   [phone]

## 2019-04-12 NOTE — CONSULTS
RD received consult for poor po intake. Dietitians following with nutrition assessment and recommendations in RD progress notes. Will continue to monitor per nutrition standards of care.      Chaya Gaspar RD, LD  Pager:  [phone]  Office:  [phone]

## 2019-04-12 NOTE — FLOWSHEET NOTE
04/12/19 1703   Vitals   /70   Temp 99.3 °F (37.4 °C)   Temp Source Oral   Pulse 82   Resp 16   SpO2 96 %   Weight 137 lb 9.1 oz (62.4 kg)   Cycler   Verification of Prescription CCPD   Total Volume Programmed 62211 mL   Therapy Time (Hours:Minutes) 10:00   Fill Volume 2000 mL   Last Fill Volume 0 mL   Dextrose Setting Different (Extraneal)   Number of Cycles 5   Bag Volume 5000 mL   Number of Bags Used 2   Dianeal Solution   (Delflex dextrose 1.5% 5L x 1 and 2.5% 5L x 1)   Average Dwell Time (Hours:Minutes) 01:30   Ccpd initiated per protocol via aseptic technique. Pt alert/awake. No c/o's voiced.

## 2019-04-12 NOTE — PROGRESS NOTES
weakness, worse on the R lateral leg and foot. SNF will be available to take patient on Monday when PD teaching may be set up. Denies pain. Denies perineal numbness, urinary or bowel incontinence. Agreeable to working with PT/OT today. Last BM per patient was 1.5 weeks ago despite senna and colace, will obtain KUB and administer enema. Medications:  Reviewed    Infusion Medications    dextrose       Scheduled Medications    darbepoetin sandeep-polysorbate  60 mcg Subcutaneous Weekly    vitamin E  400 Units Oral Nightly    calcitRIOL  0.25 mcg Oral Daily    polyethylene glycol  17 g Oral Daily    senna  1 tablet Oral Nightly    ammonium lactate   Topical Daily    aspirin  81 mg Oral Daily    cinacalcet  60 mg Oral Daily    b complex-C-folic acid  1 mg Oral Daily with breakfast    docusate sodium  100 mg Oral BID    sevelamer  1,600 mg Oral TID WC    simvastatin  40 mg Oral Nightly    sodium chloride flush  10 mL Intravenous 2 times per day    heparin (porcine)  5,000 Units Subcutaneous 3 times per day     PRN Meds: acetaminophen, sodium chloride flush, ondansetron, glucose, dextrose, glucagon (rDNA), dextrose      Intake/Output Summary (Last 24 hours) at 4/12/2019 1025  Last data filed at 4/12/2019 0726  Gross per 24 hour   Intake 120 ml   Output 474 ml   Net -354 ml       Physical Exam Performed:    BP (!) 98/57   Pulse 78   Temp 97.3 °F (36.3 °C) (Oral)   Resp 16   Ht 5' 7\" (1.702 m)   Wt 131 lb 9.8 oz (59.7 kg)   LMP  (LMP Unknown)   SpO2 98%   BMI 20.61 kg/m²     General appearance:  Noapparent distress, appears stated age and cooperative. HEENT:  Normal cephalic, atraumatic without obvious deformity. Pupils equal, round, and reactive to light. Extra ocular muscles intact. Conjunctivae/corneas clear. Neck: Supple, with full range of motion. No jugular venous distention. Trachea midline. Respiratory:Normal respiratory effort.  Clear to auscultation, bilaterally without the lower thoracic spine, particularly at T11-12 with severe facet arthropathy resulting in severe left and moderate right T11 foraminal stenosis. 2. Mild marrow edema within a Schmorl's node in the inferior endplate of F64. MRI CERVICAL SPINE WO CONTRAST   Final Result      CT Head WO Contrast   Final Result      No acute traumatic abnormality. 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 XR ABDOMEN (KUB) (SINGLE AP VIEW)    (Results Pending)       Assessment/Plan:    Mariama Granados a 69 y. o. female with a PMH of DVT, ESRD on peritoneal dialysis Sunday-Friday (anuric), HTN, DM presents from home following a fall. Active Hospital Problems    Diagnosis Date Noted    Weakness of both legs [R29.898] 04/08/2019     Fall 2/2 BLLE numbness and weakness suspect 2/2 lumbar L3-L5 thecal sac stenosis with subacute myelopathy  Pt has hx of multiple falls, numbness and tingling in the right leg. Exam significant for decreased strength 3/5 right LE, 4/5 left LE, decreased sensation to sharp touch in feet bilaterally. - MRI lumbar spine (3/25) identified multilevel degenerative disc and facet arthropathy with moderate thecal sac stenosis at L3-L5. Severe R and moderate L L4 foraminal stenosis. Moderate R L5 foraminal stenosis.  Lateral thigh and full leg numbness and knee extension/foot inversion deficits consistent with L3-L5 neuropathy  - CT head negative, MRI cervical and thoracic spine unremarkable for compressive lesion   - patient reports subjective improvement and decreased jerks off gabapentin, will discontinue   - will likely require EMG outpatient, EMG/NCS can help localize the nerve root injury and provide some prognostic information  - given patient has failed ARU and outpatient PT/OT on two prior hospital visits, will admit to SNF this admission upon discharge  - PT/OT   - deemed not to be a surgical candidate per neurosurgery, who have signed off      ESRD on PD, anuric  - unrestricted potassium, low salt diet  - renal panel daily   - UA: 20-50 WBC, however many epithelial cells, not concerning for UTI, no abx indicated  - Nephrology following for PD   - on renvela and sensipar   - phos wnl at 4.4, corrected calcium 8.7   - added calcitriol 0.25 mg PO qD for secondary hyperparathyroidism and vitamin D deficiency   - hypocalcemia, f/u albumin   - Vitamin E 400U qHs for BL calf cramping, noticed worse following dialysis      Type 2 DM   Last A1C 6.5 on 3/2019  Not on any medications per home medication list  - Will monitor POC glucose check q4h     HTN  - Continue on Coreg     CAD - stable  Continue ASA, Statin, and Coreg    DVT Prophylaxis:heparin  Diet: DIET LOW SODIUM 2 GM; Low Phosphorus  Dietary Nutrition Supplements: Standard High Calorie Oral Supplement, Renal Oral Supplement  Code Status: Full Code    PT/OT Eval Status: ordered    Dispo - GMF, SNF placement pending    Da Herrera MD    I will discuss the patient with the senior resident and MD Da Willson MD  Internal Medicine Resident PGY-1

## 2019-04-12 NOTE — FLOWSHEET NOTE
04/12/19 0543   Vitals   BP (!) 91/57   Temp 98.3 °F (36.8 °C)   Temp Source Oral   Pulse 78   Resp 15   SpO2 95 %   Weight 131 lb 9.8 oz (59.7 kg)   Cycler   I Drain Alarm 2 mL   Ultrafiltration (UF) (mL) 474 mL       Disconnected from CCPD per protocol. Effluent: clear yellow, no fibrin noted. Total time: 10 hr 23 min   Total UF:  474 ml. Total Volume:  9995 ml. Dwell time gained:  0 hr 22 min. Pt Tolerated procedure without difficulty. Report given to: Tova Sterling RN  Last BM: unknown by pt, states approx 3 weeks ago. BSx4, abdomen soft and non distended.

## 2019-04-12 NOTE — PROGRESS NOTES
extremities  Gait: deferred due to safety concerns. Labs:    CBC:   Lab Results   Component Value Date    WBC 6.6 04/09/2019    RBC 3.48 04/09/2019    HGB 11.1 04/09/2019    HCT 34.7 04/09/2019    MCV 99.8 04/09/2019    MCH 32.0 04/09/2019    MCHC 32.1 04/09/2019    RDW 14.8 04/09/2019     04/09/2019    MPV 7.9 04/09/2019     BMP:    Lab Results   Component Value Date     04/12/2019    K 3.6 04/12/2019     04/12/2019    CO2 24 04/12/2019    BUN 49 04/12/2019    LABALBU 2.0 04/11/2019    CREATININE 9.0 04/12/2019    CALCIUM 7.1 04/12/2019    GFRAA 5 04/12/2019    GFRAA 32 06/07/2013    LABGLOM 4 04/12/2019    GLUCOSE 183 04/12/2019        Ref. Range 4/9/2019 20:02   Vit D, 25-Hydroxy Latest Ref Range: >=30 ng/mL 19.3 (L)     Studies:      XR ABDOMEN (KUB) (SINGLE AP VIEW)   Final Result   Impression: Nonspecific bowel gas pattern. Large volume fecal material scattered throughout the colon. MRI THORACIC SPINE WO CONTRAST   Final Result      MRI CERVICAL SPINE:    1. Mild multilevel cervical spondylosis without significant spinal or foraminal stenosis. MRI THORACIC SPINE:    1. Multilevel degenerative disc and facet arthropathy, most pronounced in the lower thoracic spine, particularly at T11-12 with severe facet arthropathy resulting in severe left and moderate right T11 foraminal stenosis. 2. Mild marrow edema within a Schmorl's node in the inferior endplate of F60. MRI CERVICAL SPINE WO CONTRAST   Final Result      CT Head WO Contrast   Final Result      No acute traumatic abnormality. 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                   Scheduled Meds:   darbepoetin sandeep-polysorbate  60 mcg Subcutaneous Weekly    psyllium  1 packet Oral Daily    vitamin E  400 Units Oral Nightly    calcitRIOL  0.25 mcg Oral Daily    polyethylene glycol  17 g Oral Daily    senna  1 tablet Oral Nightly    ammonium lactate   Topical Daily    aspirin  81 mg Oral Daily    cinacalcet  60 mg Oral Daily    b complex-C-folic acid  1 mg Oral Daily with breakfast    docusate sodium  100 mg Oral BID    sevelamer  1,600 mg Oral TID WC    simvastatin  40 mg Oral Nightly    sodium chloride flush  10 mL Intravenous 2 times per day    heparin (porcine)  5,000 Units Subcutaneous 3 times per day        Impression:  Mele Chavarria is a 69 y.o. female who presented with recurrent falls that are very consistent with myoclonus. Suspect her falls/gait are multifactorial but the description of acute legs giving out with the current exam highly suggestive that those falls are related to myoclonus which has improved over the last couple of days.      1. Falls   2. Myoclonus  3. ESRD  4. HTN  5. DM  6. HLD    Recommendations:  -Would not restart Gabapentin.   -No further neurological workup.   -She may be cleared for d/c from neurological standpoint.        A copy of this note was provided for MD Barak Joe, [facility]  [phone]  Evenings, weekends, and off weeks please discuss with the covering neurologist.

## 2019-04-12 NOTE — DISCHARGE SUMMARY
Hospital Medicine Discharge Summary    Patient ID: Tanja Alonzo   Gender: female  : 1940   Age: 69 y.o. MRN: [id number]  Code Status: Full Code    Patient's PCP: Starla Morgan MD (Inactive)    Admit Date: 2019     Discharge Date:   4/15/2019    Admitting Physician: Patricia Sierra MD     Discharge Physician: Luigi Camarena MD     Discharge Diagnoses: Active Hospital Problems    Diagnosis Date Noted    Weakness of both legs [R29.898] 2019       The patient was seen and examined on day of discharge and this discharge summary is in conjunction with any daily progress note from day of discharge. Hospital Course: Sweetie Martin a 69 y. o. female with PMHx Diabetes Mellitus Type II, ESRD (2/2 HTN) on daily PD, HLD, BL knee osteoarthritis who presented with bilateral lower extremity weakness. She was recently discharged from inpatient rehab on 2019 for difficulty ambulating 2/2 knee osteoarthritis. She had another hospital admission end of 2019 for fall secondary to BLLE weakness started on gabapentin and muscle relaxants and home PT/OT with minimal improvement. She had a recurrent fall at home on  evening 2/2 BLLE weakness. EMS came to get her Monday morning and brought her to hospital. They also provided her with a life-alert. She denies LOC, head trauma, dizziness or prodromal symptoms prior to fall. She described R-sided lower extremity numbness and a pins-and-needles sensation radiating down her entire R thigh, leg and foot. She has increased weakness of the RLE compared to left. She stated that the feeling she has walking on the ground is that of \"walking on needles or cold brick. \" She was also experiencing intermittent myoclonic jerking of the R-lower extremity which was identified to be secondary to gabapentin-induced myoclonus.    MRI lumbar spine (3/25) identified multilevel degenerative disc and facet arthropathy with moderate thecal sac stenosis at cooperative. HEENT:  Normal cephalic, atraumatic without obvious deformity. Pupils equal, round, and reactive to light.  Extra ocular muscles intact. Conjunctivae/corneas clear. Neck: Supple, with full range of motion. No jugular venous distention. Trachea midline. Respiratory:Normal respiratory effort. Clear to auscultation, bilaterally without Rales/Wheezes/Rhonchi. Cardiovascular:Regular rate and rhythm with normal S1/S2 without murmurs, rubs or gallops. Abdomen: Soft, non-tender,non-distended with normal bowel sounds. Musculoskeletal:  No clubbing, cyanosis. Edema 2+ in feet bilaterally.  Full range of motion without deformity. Skin: Skin color, texture, turgor normal.  Cuts on the toes of right foot. Neurologic:  Strength 3/5 right LE, 3/5 LE, 5/5 bilateral UE. Right LE, decreased sharp sensation from toes to mid-dorsal foot on left LE, weak R foot plantar flexion and inversion, full sensation in bilateral UE. R thigh and calf muscles significantly more atrophied than LLE. 2+ patellar reflexes BLLE Cranial nerves: II-XII intact, grossly non-focal.  Psychiatric:  Alert and oriented, thought content appropriate, normal insight  Capillary Refill: Brisk,< 3 seconds   Peripheral Pulses: +2 palpable, equal bilaterally    Labs: For convenience and continuity at follow-up the following most recent labs are provided:      CBC:    Lab Results   Component Value Date    WBC 6.6 04/09/2019    HGB 11.1 04/09/2019    HCT 34.7 04/09/2019     04/09/2019       Renal:    Lab Results   Component Value Date     04/15/2019    K 3.6 04/15/2019    CL 98 04/15/2019    CO2 25 04/15/2019    BUN 48 04/15/2019    CREATININE 8.3 04/15/2019    CALCIUM 7.5 04/15/2019    PHOS 4.4 04/09/2019         Significant Diagnostic Studies    Radiology:   XR ABDOMEN (KUB) (SINGLE AP VIEW)   Final Result   Impression: Nonspecific bowel gas pattern. Large volume fecal material scattered throughout the colon.       MRI THORACIC once a week  Qty: 4.2 mL, Refills: 0      psyllium (HYDROCIL) 95 % PACK packet Take 1 packet by mouth daily  Qty: 56 each, Refills: 0      senna (SENOKOT) 8.6 MG tablet Take 1 tablet by mouth nightly as needed for Constipation  Qty: 30 tablet, Refills: 0      vitamin E 400 UNIT capsule Take 1 capsule by mouth nightly  Qty: 30 capsule, Refills: 3      Capsaicin-Lidocaine-Menthol 0.05-5-3 % CREA Apply 1 applicator topically 2 times daily as needed (BLLE numbness, neuropathy, pain)  Qty: 1 Tube, Refills: 0      calcitRIOL (ROCALTROL) 0.25 MCG capsule Take 1 capsule by mouth daily  Qty: 30 capsule, Refills: 0              Details   cinacalcet (SENSIPAR) 60 MG tablet Take 1 tablet by mouth daily  Qty: 30 tablet, Refills: 0      sevelamer (RENVELA) 800 MG tablet Take 2 tablets by mouth 3 times daily (with meals)  Qty: 90 tablet, Refills: 0      acetaminophen (TYLENOL) 325 MG tablet Take 2 tablets by mouth every 6 hours as needed for Pain  Qty: 60 tablet, Refills: 0      aspirin 81 MG EC tablet Take 1 tablet by mouth daily  Qty: 30 tablet, Refills: 3      docusate sodium (COLACE) 100 MG capsule Take 1 capsule by mouth 2 times daily  Qty: 30 capsule, Refills: 0      ondansetron (ZOFRAN) 4 MG tablet Take 1 tablet by mouth every 8 hours as needed for Nausea  Qty: 20 tablet, Refills: 0      simvastatin (ZOCOR) 40 MG tablet Take 1 tablet by mouth nightly  Qty: 30 tablet, Refills: 2    Associated Diagnoses: Essential hypertension; Systolic heart failure, chronic (HCC)      B complex-vitamin C-folic acid (NEPHRO-ZINA) 1 MG tablet Take 1 tablet by mouth daily (with breakfast)      Elastic Bandages & Supports (MEDICAL COMPRESSION STOCKINGS) MISC 1 each by Does not apply route daily  Qty: 1 each, Refills: 0             Time Spent on discharge is more than 30 minutes in the examination, evaluation, counseling and review of medications and discharge plan.       Signed:    Kaley Dean MD   4/18/0237      Thank you Cesar Hagan MD (Inactive) for the opportunity to be involved in this patient's care. If you have any questions or concerns please feel free to contact me at [phone].

## 2019-04-12 NOTE — DISCHARGE INSTR - COC
Continuity of Care Form    Patient Name: Mele Chavarria   :  1940  MRN:  [id number]    Admit date:  2019  Discharge date:  4/15/2019    Code Status Order: Full Code   Advance Directives:   [facility] Documentation     Date/Time Healthcare Directive Type of Healthcare Directive Copy in [facility] [address] Agent's Name Healthcare Agent's Phone Number    19 1922  No, patient does not have an advance directive for healthcare treatment -- -- -- -- --          Admitting Physician:  Raisa Agee MD  PCP: Duong Euceda MD (Inactive)    Discharging Nurse:   [address] Unit/Room#: 7529/9766-23  Discharging Unit Phone Number: ***    Emergency Contact:   Extended Emergency Contact Information  Primary Emergency Contact: Meri Scottgloria, [address] [place] [address] Phone: [phone]  Work Phone: [phone]  Mobile Phone: [phone]  Relation: Brother/Sister    Past Surgical History:  Past Surgical History:   Procedure Laterality Date    CATARACT REMOVAL Left 13    had elevated BP post op    CORONARY ANGIOPLASTY      JOINT REPLACEMENT  2011    R WILDER    OTHER SURGICAL HISTORY Left 2017    INSERTION OF LAPAROSCOPIC PERITONEAL DIALYSIS CATHETER;    THYROIDECTOMY, PARTIAL         Immunization History:   Immunization History   Administered Date(s) Administered    Hepatitis B Adult (Engerix-B) 2019    Pneumococcal Polysaccharide (Arzoikwjk12) 2013       Active Problems:  Patient Active Problem List   Diagnosis Code    HTN (hypertension) F08    Systolic heart failure ([facility] [facility] I50.20    DM (diabetes mellitus) ([facility] [facility] E11.9    History of macrocytic anemia Z86.2    Smoking F17.200    Osteoarthritis M19.90    Hypertensive urgency I16.0    Hyperkalemia E87.5    Renal artery stenosis (HCC) I70.1    Acute renal insufficiency N28.9    Hyperlipidemia E78.5    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) E11.22, N18.4    Unexplained weight loss R63.4    ESRD (end stage renal disease) ([facility] [address] N18.6    ESRD (end stage renal disease) on dialysis ([facility]) N18.6, Z99.2    Knee pain, left M25.562    Debility R53.81    Failure to thrive in adult R62.7    Numbness of right foot R20.0    Encounter for palliative care Z51.5    Advance directive discussed with patient Z71.89    Weakness of both legs R29.898       Isolation/Infection:   Isolation          No Isolation            Nurse Assessment:  Last Vital Signs: BP (!) 98/57   Pulse 78   Temp 97.3 °F (36.3 °C) (Oral)   Resp 16   Ht 5' 7\" (1.702 m)   Wt 131 lb 9.8 oz (59.7 kg)   LMP  (LMP Unknown)   SpO2 98%   BMI 20.61 kg/m²     Last documented pain score (0-10 scale): Pain Level: 0  Last Weight:   Wt Readings from Last 1 Encounters:   04/12/19 131 lb 9.8 oz (59.7 kg)     Mental Status:  oriented and alert    IV Access:  - None    Nursing Mobility/ADLs:  Walking   Assisted  Transfer  Assisted  Bathing  Assisted  Dressing  Dependent  Toileting  Assisted  Feeding  Assisted  Med Admin  Assisted  Med Delivery   whole    Wound Care Documentation and Therapy:        Elimination:  Continence:   · Bowel: Yes  · Bladder: Anuric  Urinary Catheter: None   Colostomy/Ileostomy/Ileal Conduit: {YES / [id number]}       Date of Last BM: 4/13/19    Intake/Output Summary (Last 24 hours) at 4/12/2019 0748  Last data filed at 4/12/2019 0726  Gross per 24 hour   Intake 360 ml   Output 474 ml   Net -114 ml     I/O last 3 completed shifts: In: 360 [P.O.:360]  Out: 474     Safety Concerns: At Risk for Falls    Impairments/Disabilities:      None    Nutrition Therapy:  Current Nutrition Therapy:   - Oral Diet:  Renal    Routes of Feeding: Oral  Liquids: Thin  Daily Fluid Restriction: no  Last Modified Barium Swallow with Video (Video Swallowing Test): not done    Treatments at the Time of Hospital Discharge:   Respiratory Treatments: ***  Oxygen Therapy:  is not on home oxygen therapy.   Ventilator: - No ventilator support    Rehab Therapies:   Weight Bearing Status/Restrictions: No weight bearing restirctions  Other Medical Equipment (for information only, NOT a DME order):  {EQUIPMENT:[id number]}  Other Treatments: ***    Patient's personal belongings (please select all that are sent with patient):  None    RN SIGNATURE:  Electronically signed by Cony Cruz RN on 4/15/19 at 10:51 AM    CASE MANAGEMENT/SOCIAL WORK SECTION    Inpatient Status Date: 4/12/2019    Readmission Risk Assessment Score:  Readmission Risk              Risk of Unplanned Readmission:        31           [facility] Facility/ Agency   [facility] Kristi [facility]hon  Report: [phone]  Fax: [phone]        / signature: Electronically signed by JOSEFA Boykin on 4/12/19 at 7:48 AM    PHYSICIAN SECTION    Prognosis: Fair    Condition at Discharge: Stable    Rehab Potential (if transferring to Rehab):  Fair    Recommended Labs or Other Treatments After Discharge:   -f/u outpatient with memo mays for outpatient EMG  -continue with vitamin E nightly for BLLE cramping during dialysis  -recommended to take fiber packet daily for chronic constipation  -if no bowel movement in 3-4 days, would recommend soap-suds enema  -capsaicin-lidocaine-menthol cream for BLLE pain/numbness BID PRN  -continue PT/OT at SNF:   Per PT/OT notes, Plan:  Times per week: 2-5x  Times per day: Daily  Current Treatment Recommendations: Functional Mobility Training, Endurance Training, Equipment Evaluation, Education, & procurement, Patient/Caregiver Education & Training, Self-Care / ADL, Safety Education & Training  -continue with nightly peritoneal dialysis  -f/u with Dr. Abel Pratt as needed    Physician Certification: I certify the above information and transfer of Lisseth Randle  is necessary for the continuing treatment of the diagnosis listed and that she requires Skilled Nursing Facility for greater 30 days.      Update Admission H&P: No change in H&P    PHYSICIAN SIGNATURE:  Electronically signed by Atul Hein MD on 6/78/57 at 9:22 AM

## 2019-04-13 NOTE — PLAN OF CARE
Problem: Falls - Risk of:  Goal: Will remain free from falls  Description  Will remain free from falls  Outcome: Ongoing  Patient at risk for falls. Patient resting quietly in bed. Side rails up x3. Bed locked in lowest position. Bed alarm on. Bedside table and call light within reach. Patient instructed to call for assistance. Patient verbalized understanding. Will continue to monitor. Problem: Risk for Impaired Skin Integrity  Goal: Tissue integrity - skin and mucous membranes  Description  Structural intactness and normal physiological function of skin and  mucous membranes.   Outcome: Ongoing

## 2019-04-13 NOTE — PROGRESS NOTES
Panda Meigs, UROBILINOGEN, NITRU, LEUKOCYTESUR, Shantal Cathie in the last 72 hours. Urine Microscopic:   No results for input(s): LABCAST, BACTERIA, COMU, HYALCAST, WBCUA, RBCUA, EPIU in the last 72 hours. Urine Culture:   No results for input(s): LABURIN in the last 72 hours. Urine Chemistry: No results for input(s): Steven Kuster, PROTEINUR, NAUR in the last 72 hours. Objective:   Vitals: /72   Pulse 97   Temp 96.9 °F (36.1 °C) (Axillary)   Resp 16   Ht 5' 7\" (1.702 m)   Wt 137 lb 9.1 oz (62.4 kg)   LMP  (LMP Unknown)   SpO2 95%   BMI 21.55 kg/m²    Wt Readings from Last 3 Encounters:   04/12/19 137 lb 9.1 oz (62.4 kg)   03/27/19 133 lb 9.6 oz (60.6 kg)   02/18/19 135 lb (61.2 kg)      24HR INTAKE/OUTPUT:      Intake/Output Summary (Last 24 hours) at 4/13/2019 0229  Last data filed at 4/12/2019 2020  Gross per 24 hour   Intake 360 ml   Output 474 ml   Net -114 ml     Constitutional:  Alert, awake, no apparent distress  NECK: supple, no JVD  Cardiovascular:  S1, S2 without m/r/g  Respiratory:  CTA B without w/r/r  Abdomen: +bs, soft, nt  Ext: no LE edema    Assessment and Plan:       IMAGING:  XR ABDOMEN (KUB) (SINGLE AP VIEW)   Final Result   Impression: Nonspecific bowel gas pattern. Large volume fecal material scattered throughout the colon. MRI THORACIC SPINE WO CONTRAST   Final Result      MRI CERVICAL SPINE:    1. Mild multilevel cervical spondylosis without significant spinal or foraminal stenosis. MRI THORACIC SPINE:    1. Multilevel degenerative disc and facet arthropathy, most pronounced in the lower thoracic spine, particularly at T11-12 with severe facet arthropathy resulting in severe left and moderate right T11 foraminal stenosis. 2. Mild marrow edema within a Schmorl's node in the inferior endplate of R08. MRI CERVICAL SPINE WO CONTRAST   Final Result      CT Head WO Contrast   Final Result      No acute traumatic abnormality.       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                 Assessment/Plan     Numbness of rt lower ext   Myoclonus   HTN   ESRD   Debility   Anemia     Plan   - neurology following - not clear about the diagnosis of numbness that she has in rt lower ext    - Myoclonus - monitor off Neurontin   - on PD clifton well   - lytes stable   - PT to eval and t/t   - Aranesp with PD     - pt is very concerned about the numbness in her rt lower ext   - will go to [address] [place] [address] G Monday         Mary Lau MD  [facility] [address] [place], [place] [phone]  [phone]

## 2019-04-13 NOTE — PROGRESS NOTES
numbness, urinary or bowel incontinence. Endorses poor PO intake because she does not like the food here. Refused ensures. Diet was changed from low salt, low phosphorus to general diet with approval from nephrology. Last BM yesterday following enema. Denies any abdominal discomfort. Medications:  Reviewed    Infusion Medications    dextrose       Scheduled Medications    darbepoetin sandeep-polysorbate  60 mcg Subcutaneous Weekly    psyllium  1 packet Oral Daily    vitamin E  400 Units Oral Nightly    calcitRIOL  0.25 mcg Oral Daily    polyethylene glycol  17 g Oral Daily    senna  1 tablet Oral Nightly    ammonium lactate   Topical Daily    aspirin  81 mg Oral Daily    cinacalcet  60 mg Oral Daily    b complex-C-folic acid  1 mg Oral Daily with breakfast    docusate sodium  100 mg Oral BID    sevelamer  1,600 mg Oral TID WC    simvastatin  40 mg Oral Nightly    sodium chloride flush  10 mL Intravenous 2 times per day    heparin (porcine)  5,000 Units Subcutaneous 3 times per day     PRN Meds: acetaminophen, sodium chloride flush, ondansetron, glucose, dextrose, glucagon (rDNA), dextrose      Intake/Output Summary (Last 24 hours) at 4/13/2019 1100  Last data filed at 4/13/2019 0934  Gross per 24 hour   Intake 220 ml   Output 515 ml   Net -295 ml       Physical Exam Performed:    /70   Pulse 81   Temp 98.9 °F (37.2 °C) (Oral)   Resp 18   Ht 5' 7\" (1.702 m)   Wt 136 lb 11 oz (62 kg)   LMP  (LMP Unknown)   SpO2 96%   BMI 21.41 kg/m²     General appearance:  Noapparent distress, appears stated age and cooperative. HEENT:  Normal cephalic, atraumatic without obvious deformity. Pupils equal, round, and reactive to light. Extra ocular muscles intact. Conjunctivae/corneas clear. Neck: Supple, with full range of motion. No jugular venous distention. Trachea midline. Respiratory:Normal respiratory effort.  Clear to auscultation, bilaterally without Rales/Wheezes/Rhonchi. Cardiovascular:Regular rate and rhythm with normal S1/S2 without murmurs, rubs or gallops. Abdomen: Soft, non-tender,non-distended with normal bowel sounds. Musculoskeletal:  No clubbing, cyanosis. Edema 2+ in feet bilaterally. Full range of motion without deformity. Skin: Skin color, texture, turgor normal.  Cuts on the toes of right foot. Neurologic:  Strength 3/5 right LE, 3/5 LE, 5/5 bilateral UE. Right LE, decreased sharp sensation from toes to mid-dorsal foot on left LE, weak R foot plantar flexion and inversion, full sensation in bilateral UE. R thigh and calf muscles significantly more atrophied than LLE. 2+ patellar reflexes BLLE Cranial nerves: II-XII intact, grossly non-focal.  Psychiatric:  Alert and oriented, thought content appropriate, normal insight  Capillary Refill: Brisk,< 3 seconds   Peripheral Pulses: +2 palpable, equal bilaterally    Labs:   No results for input(s): WBC, HGB, HCT, PLT in the last 72 hours. Recent Labs     04/11/19  0656 04/12/19  0644 04/13/19  0629    141 138   K 3.5 3.6 3.7   CL 98* 102 99   CO2 24 24 25   BUN 49* 49* 49*   CREATININE 9.4* 9.0* 8.8*   CALCIUM 7.1* 7.1* 7.3*     No results for input(s): AST, ALT, BILIDIR, BILITOT, ALKPHOS in the last 72 hours. No results for input(s): INR in the last 72 hours. No results for input(s): Brandi Chavez in the last 72 hours. Urinalysis:      Lab Results   Component Value Date    NITRU Negative 04/08/2019    WBCUA 20-50 04/08/2019    BACTERIA 3+ 04/08/2019    RBCUA 10-20 04/08/2019    BLOODU LARGE 04/08/2019    SPECGRAV 1.025 04/08/2019    GLUCOSEU 100 04/08/2019       Radiology:  XR ABDOMEN (KUB) (SINGLE AP VIEW)   Final Result   Impression: Nonspecific bowel gas pattern. Large volume fecal material scattered throughout the colon.       MRI THORACIC SPINE WO CONTRAST   Final Result      MRI CERVICAL SPINE:    1. Mild multilevel cervical spondylosis without significant spinal or foraminal stenosis. MRI THORACIC SPINE:    1. Multilevel degenerative disc and facet arthropathy, most pronounced in the lower thoracic spine, particularly at T11-12 with severe facet arthropathy resulting in severe left and moderate right T11 foraminal stenosis. 2. Mild marrow edema within a Schmorl's node in the inferior endplate of M62. MRI CERVICAL SPINE WO CONTRAST   Final Result      CT Head WO Contrast   Final Result      No acute traumatic abnormality. 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 Assessment/Plan:    Sinan Day a 69 y. o. female with a PMH of DVT, ESRD on peritoneal dialysis Sunday-Friday (anuric), HTN, DM presents from home following a fall. Active Hospital Problems    Diagnosis Date Noted    Weakness of both legs [R29.898] 04/08/2019     Fall 2/2 BLLE numbness and weakness suspect 2/2 lumbar L3-L5 thecal sac stenosis with subacute myelopathy  Pt has hx of multiple falls, numbness and tingling in the right leg. Exam significant for decreased strength 3/5 right LE, 4/5 left LE, decreased sensation to sharp touch in feet bilaterally. - MRI lumbar spine (3/25) identified multilevel degenerative disc and facet arthropathy with moderate thecal sac stenosis at L3-L5. Severe R and moderate L L4 foraminal stenosis. Moderate R L5 foraminal stenosis.  Lateral thigh and full leg numbness and knee extension/foot inversion deficits consistent with L3-L5 neuropathy  - CT head negative, MRI cervical and thoracic spine unremarkable for compressive lesion   - patient reports subjective improvement and decreased jerks off gabapentin, will not restart gabapentin  - will likely require EMG outpatient, EMG/NCS can help localize the nerve root injury and provide some prognostic information  - given patient has failed ARU and outpatient PT/OT on two prior hospital visits, will admit to SNF this admission upon discharge  - PT/OT   - deemed not to be a surgical

## 2019-04-14 NOTE — PLAN OF CARE
Problem: Falls - Risk of:  Goal: Will remain free from falls  Description  Will remain free from falls  4/14/2019 1703 by Francisco Anlgin RN  Outcome: Ongoing  Note:   Pt uses call light for needs and makes no attempts out of bed without calling for assistance. Bedside table, call light and needs within reach. Will continue to round on pt for safety/needs. 4/14/2019 0836 by Mahin Fairchild RN  Outcome: Ongoing  Goal: Absence of physical injury  Description  Absence of physical injury  4/14/2019 0836 by Mahin Fairchild RN  Outcome: Ongoing     Problem: Pain:  Goal: Pain level will decrease  Description  Pain level will decrease  Outcome: Ongoing  Note:   Pt reports \"pins and needles\" in her left foot. Legs elevated while up in chair. Tylenol administered for pain with relief. Will continue to assess pt for discomfort and intervene to avoid alterations in comfort. Problem: Risk for Impaired Skin Integrity  Goal: Tissue integrity - skin and mucous membranes  Description  Structural intactness and normal physiological function of skin and  mucous membranes. Outcome: Ongoing  Note:   Pt skin intact, but dry. Barrier cream applied to buttocks and skin cleansed this shift. Pt repositioned frequently for comfort and to preserve skin integrity. Will continue to assess pt skin and intervene to avoid breakdown.

## 2019-04-14 NOTE — PROGRESS NOTES
Renal Progress Note  Subjective:   Admit Date: 4/8/2019     HPI      Interval History:     No new change   Off Neurontin   On PD - clifton well         DIET Dietary Nutrition Supplements: Standard High Calorie Oral Supplement, Renal Oral Supplement  DIET GENERAL;  Medications:   Scheduled Meds:   darbepoetin sandeep-polysorbate  60 mcg Subcutaneous Weekly    psyllium  1 packet Oral Daily    vitamin E  400 Units Oral Nightly    calcitRIOL  0.25 mcg Oral Daily    polyethylene glycol  17 g Oral Daily    senna  1 tablet Oral Nightly    ammonium lactate   Topical Daily    aspirin  81 mg Oral Daily    cinacalcet  60 mg Oral Daily    b complex-C-folic acid  1 mg Oral Daily with breakfast    docusate sodium  100 mg Oral BID    sevelamer  1,600 mg Oral TID WC    simvastatin  40 mg Oral Nightly    sodium chloride flush  10 mL Intravenous 2 times per day    heparin (porcine)  5,000 Units Subcutaneous 3 times per day     Continuous Infusions:   dextrose         Labs:  CBC:   No results for input(s): WBC, HGB, PLT in the last 72 hours. BMP:    Recent Labs     04/12/19  0644 04/13/19  0629 04/14/19  0610    138 136   K 3.6 3.7 3.7    99 97*   CO2 24 25 25   BUN 49* 49* 46*   CREATININE 9.0* 8.8* 8.7*   GLUCOSE 183* 157* 181*     Ca/Mg/Phos:   Recent Labs     04/12/19  0644 04/13/19  0629 04/14/19  0610   CALCIUM 7.1* 7.3* 7.7*     Hepatic: No results for input(s): AST, ALT, ALB, BILITOT, ALKPHOS in the last 72 hours. Troponin:   No results for input(s): TROPONINI in the last 72 hours. BNP: No results for input(s): BNP in the last 72 hours. Lipids: No results for input(s): CHOL, TRIG, HDL, LDLCALC, LABVLDL in the last 72 hours. ABGs: No results for input(s): PHART, PO2ART, DUE8CSC in the last 72 hours. INR:   No results for input(s): INR in the last 72 hours.   UA:  No results for input(s): Fay Reardon, GLUCOSEU, [facility], Vanessa ANDERSON 27, BLOODU, [facility], [address] [facility], [address], Nallely Herrera in the last 72 hours. Urine Microscopic:   No results for input(s): LABCAST, BACTERIA, COMU, HYALCAST, WBCUA, RBCUA, EPIU in the last 72 hours. Urine Culture:   No results for input(s): LABURIN in the last 72 hours. Urine Chemistry: No results for input(s): [place] , PROTEINUR, NAUR in the last 72 hours. Objective:   Vitals: /77   Pulse 86   Temp 98.9 °F (37.2 °C)   Resp 20   Ht 5' 7\" (1.702 m)   Wt 136 lb 3.9 oz (61.8 kg)   LMP  (LMP Unknown)   SpO2 94%   BMI 21.34 kg/m²    Wt Readings from Last 3 Encounters:   04/14/19 136 lb 3.9 oz (61.8 kg)   03/27/19 133 lb 9.6 oz (60.6 kg)   02/18/19 135 lb (61.2 kg)      24HR INTAKE/OUTPUT:      Intake/Output Summary (Last 24 hours) at 4/14/2019 0901  Last data filed at 4/14/2019 0720  Gross per 24 hour   Intake 860 ml   Output 302 ml   Net 558 ml     Constitutional:  Alert, awake, no apparent distress  NECK: supple, no JVD  Cardiovascular:  S1, S2 without m/r/g  Respiratory:  CTA B without w/r/r  Abdomen: +bs, soft, nt  Ext: no LE edema    Assessment and Plan:       IMAGING:  XR ABDOMEN (KUB) (SINGLE AP VIEW)   Final Result   Impression: Nonspecific bowel gas pattern. Large volume fecal material scattered throughout the colon. MRI THORACIC SPINE WO CONTRAST   Final Result      MRI CERVICAL SPINE:    1. Mild multilevel cervical spondylosis without significant spinal or foraminal stenosis. MRI THORACIC SPINE:    1. Multilevel degenerative disc and facet arthropathy, most pronounced in the lower thoracic spine, particularly at T11-12 with severe facet arthropathy resulting in severe left and moderate right T11 foraminal stenosis. 2. Mild marrow edema within a Schmorl's node in the inferior endplate of M42. MRI CERVICAL SPINE WO CONTRAST   Final Result      CT Head WO Contrast   Final Result      No acute traumatic abnormality. 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 Assessment/Plan     Numbness of rt lower ext   Myoclonus   HTN   ESRD   Debility   Anemia     Plan   - neurology following - not clear about the diagnosis of numbness that she has in rt lower ext    - Myoclonus - monitor off Neurontin   - on PD clifton well   - lytes stable   - PT to eval and t/t   - Aranesp with PD     - pt is very concerned about the numbness in her rt lower ext   - will go to [facility] Monday         Bennie Cardona MD  [facility] of [address] [place], [place] [phone]  [phone]

## 2019-04-14 NOTE — CARE COORDINATION
HENS submitted (ID #[id number]) to [facility] for [facility] admission for tomorrow, 4/15 after facility had PD training. [facility] arranged for 12:30pm, 4/15.      JOSEFA Smith/JADEN    [phone]

## 2019-04-14 NOTE — PROGRESS NOTES
Reviewed    Infusion Medications    dextrose       Scheduled Medications    darbepoetin sandeep-polysorbate  60 mcg Subcutaneous Weekly    psyllium  1 packet Oral Daily    vitamin E  400 Units Oral Nightly    calcitRIOL  0.25 mcg Oral Daily    polyethylene glycol  17 g Oral Daily    senna  1 tablet Oral Nightly    ammonium lactate   Topical Daily    aspirin  81 mg Oral Daily    cinacalcet  60 mg Oral Daily    b complex-C-folic acid  1 mg Oral Daily with breakfast    docusate sodium  100 mg Oral BID    sevelamer  1,600 mg Oral TID WC    simvastatin  40 mg Oral Nightly    sodium chloride flush  10 mL Intravenous 2 times per day    heparin (porcine)  5,000 Units Subcutaneous 3 times per day     PRN Meds: acetaminophen, sodium chloride flush, ondansetron, glucose, dextrose, glucagon (rDNA), dextrose      Intake/Output Summary (Last 24 hours) at 4/14/2019 1808  Last data filed at 4/14/2019 0720  Gross per 24 hour   Intake 520 ml   Output 302 ml   Net 218 ml       Physical Exam Performed:    /79   Pulse 84   Temp 97.9 °F (36.6 °C) (Oral)   Resp 18   Ht 5' 7\" (1.702 m)   Wt 136 lb 3.9 oz (61.8 kg)   LMP  (LMP Unknown)   SpO2 94%   BMI 21.34 kg/m²     General appearance:  Noapparent distress, appears stated age and cooperative. HEENT:  Normal cephalic, atraumatic without obvious deformity. Pupils equal, round, and reactive to light. Extra ocular muscles intact. Conjunctivae/corneas clear. Neck: Supple, with full range of motion. No jugular venous distention. Trachea midline. Respiratory:Normal respiratory effort. Clear to auscultation, bilaterally without Rales/Wheezes/Rhonchi. Cardiovascular:Regular rate and rhythm with normal S1/S2 without murmurs, rubs or gallops. Abdomen: Soft, non-tender,non-distended with normal bowel sounds. Musculoskeletal:  No clubbing, cyanosis. Edema 2+ in feet bilaterally. Full range of motion without deformity.   Skin: Skin color, texture, turgor normal.  Cuts T10.      MRI CERVICAL SPINE WO CONTRAST   Final Result      CT Head WO Contrast   Final Result      No acute traumatic abnormality. 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 Assessment/Plan:    Donovan Mabry a 69 y. o. female with a PMH of DVT, ESRD on peritoneal dialysis Sunday-Friday (anuric), HTN, DM presents from home following a fall. Active Hospital Problems    Diagnosis Date Noted    Weakness of both legs [R29.898] 04/08/2019     Fall 2/2 BLLE numbness and weakness suspect 2/2 lumbar L3-L5 thecal sac stenosis with subacute myelopathy  Pt has hx of multiple falls, numbness and tingling in the right leg. Exam significant for decreased strength 3/5 right LE, 4/5 left LE, decreased sensation to sharp touch in feet bilaterally. - MRI lumbar spine (3/25) identified multilevel degenerative disc and facet arthropathy with moderate thecal sac stenosis at L3-L5. Severe R and moderate L L4 foraminal stenosis. Moderate R L5 foraminal stenosis.  Lateral thigh and full leg numbness and knee extension/foot inversion deficits consistent with L3-L5 neuropathy  - CT head negative, MRI cervical and thoracic spine unremarkable for compressive lesion   - patient reports subjective improvement and decreased jerks off gabapentin, will not restart gabapentin  - will likely require EMG outpatient, EMG/NCS can help localize the nerve root injury and provide some prognostic information  - given patient has failed ARU and outpatient PT/OT on two prior hospital visits, will admit to SNF this admission upon discharge  - PT/OT   - deemed not to be a surgical candidate per neurosurgery, who have signed off      ESRD on PD, anuric  - unrestricted potassium, low salt diet  - renal panel daily   - UA: 20-50 WBC, however many epithelial cells, not concerning for UTI, no abx indicated  - Nephrology following for PD   - on renvela and sensipar   - phos wnl at 4.4, corrected calcium 8.9  - calcitriol 0.25 mg

## 2019-04-14 NOTE — PROGRESS NOTES
Renal Progress Note  Subjective:   Admit Date: 4/8/2019     HPI      Interval History:     C/o numbess and tingling - unable to put feet down   On PD - clifton well         DIET Dietary Nutrition Supplements: Standard High Calorie Oral Supplement, Renal Oral Supplement  DIET GENERAL;  Medications:   Scheduled Meds:   darbepoetin sandeep-polysorbate  60 mcg Subcutaneous Weekly    psyllium  1 packet Oral Daily    vitamin E  400 Units Oral Nightly    calcitRIOL  0.25 mcg Oral Daily    polyethylene glycol  17 g Oral Daily    senna  1 tablet Oral Nightly    ammonium lactate   Topical Daily    aspirin  81 mg Oral Daily    cinacalcet  60 mg Oral Daily    b complex-C-folic acid  1 mg Oral Daily with breakfast    docusate sodium  100 mg Oral BID    sevelamer  1,600 mg Oral TID WC    simvastatin  40 mg Oral Nightly    sodium chloride flush  10 mL Intravenous 2 times per day    heparin (porcine)  5,000 Units Subcutaneous 3 times per day     Continuous Infusions:   dextrose         Labs:  CBC:   No results for input(s): WBC, HGB, PLT in the last 72 hours. BMP:    Recent Labs     04/12/19  0644 04/13/19  0629 04/14/19  0610    138 136   K 3.6 3.7 3.7    99 97*   CO2 24 25 25   BUN 49* 49* 46*   CREATININE 9.0* 8.8* 8.7*   GLUCOSE 183* 157* 181*     Ca/Mg/Phos:   Recent Labs     04/12/19  0644 04/13/19  0629 04/14/19  0610   CALCIUM 7.1* 7.3* 7.7*     Hepatic: No results for input(s): AST, ALT, ALB, BILITOT, ALKPHOS in the last 72 hours. Troponin:   No results for input(s): TROPONINI in the last 72 hours. BNP: No results for input(s): BNP in the last 72 hours. Lipids: No results for input(s): CHOL, TRIG, HDL, LDLCALC, LABVLDL in the last 72 hours. ABGs: No results for input(s): PHART, PO2ART, [id number] in the last 72 hours. INR:   No results for input(s): INR in the last 72 hours.   UA:  No results for input(s): ALEXIS DanielsU, [address] [place] [address], [place] [address], [facility]., [facility], [address] [facility], UROBILINOGEN, NITRU, LEUKOCYTESUR, Shirlean Confer in the last 72 hours. Urine Microscopic:   No results for input(s): LABCAST, BACTERIA, COMU, HYALCAST, WBCUA, RBCUA, EPIU in the last 72 hours. Urine Culture:   No results for input(s): LABURIN in the last 72 hours. Urine Chemistry: No results for input(s): Abigail Shed, PROTEINUR, NAUR in the last 72 hours. Objective:   Vitals: /70   Pulse 87   Temp 100.6 °F (38.1 °C) (Oral)   Resp 18   Ht 5' 7\" (1.702 m)   Wt 136 lb 3.9 oz (61.8 kg)   LMP  (LMP Unknown)   SpO2 94%   BMI 21.34 kg/m²    Wt Readings from Last 3 Encounters:   04/14/19 136 lb 3.9 oz (61.8 kg)   03/27/19 133 lb 9.6 oz (60.6 kg)   02/18/19 135 lb (61.2 kg)      24HR INTAKE/OUTPUT:      Intake/Output Summary (Last 24 hours) at 4/14/2019 1226  Last data filed at 4/14/2019 0720  Gross per 24 hour   Intake 760 ml   Output 302 ml   Net 458 ml     Constitutional:  Alert, awake, no apparent distress  NECK: supple, no JVD  Cardiovascular:  S1, S2 without m/r/g  Respiratory:  CTA B without w/r/r  Abdomen: +bs, soft, nt  Ext: no LE edema    Assessment and Plan:       IMAGING:  XR ABDOMEN (KUB) (SINGLE AP VIEW)   Final Result   Impression: Nonspecific bowel gas pattern. Large volume fecal material scattered throughout the colon. MRI THORACIC SPINE WO CONTRAST   Final Result      MRI CERVICAL SPINE:    1. Mild multilevel cervical spondylosis without significant spinal or foraminal stenosis. MRI THORACIC SPINE:    1. Multilevel degenerative disc and facet arthropathy, most pronounced in the lower thoracic spine, particularly at T11-12 with severe facet arthropathy resulting in severe left and moderate right T11 foraminal stenosis. 2. Mild marrow edema within a Schmorl's node in the inferior endplate of I36. MRI CERVICAL SPINE WO CONTRAST   Final Result      CT Head WO Contrast   Final Result      No acute traumatic abnormality.       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                 Assessment/Plan     Numbness of rt lower ext   Myoclonus   HTN   ESRD   Debility   Anemia     Plan   - bloody PD fluid - monitor   - neurology following - not clear about the diagnosis of numbness that she has in rt lower ext    - Myoclonus - monitor off Neurontin   - on PD clifton well   - lytes stable   - PT to eval and t/t   - Aranesp with PD     - pt is very concerned about the numbness in her rt lower ext   - will go to [address] [place] [address] G Monday         Mellisa Gonzalez MD  [facility] of [address] [facility] -[phone]  [phone]

## 2019-04-14 NOTE — PLAN OF CARE
Data:  No falls last pm or overnight. Call light in reach and oriented on how to use, verbalized understanding. Bed alarm, low light, & non skid socks on for safety. HOB elevated 30 degrees. No c/o abd pain overnight and tolerated PD. Action:  Cont.  To monitor during hourly rounds  Shakir Arreaga

## 2019-04-15 NOTE — PROGRESS NOTES
Neurology Consult Note    Updates:  Myoclonus markedly improved. Right leg is still too weak to walk. This has occurred over the past 3-4 weeks. Prior to that walking relatively well. She reports numbness in the right foot. Exam:  Blood pressure 117/72, pulse 74, temperature 99.7 °F (37.6 °C), temperature source Oral, resp. rate 16, height 5' 7\" (1.702 m), weight 139 lb 5.3 oz (63.2 kg), SpO2 98 %, not currently breastfeeding. Constitutional    Vital signs: BP, HR, and RR reviewed   General Alert, no distress, well-nourished  Cardiovascular: pulses symmetric in all 4 extremities. + left peripheral edema. Psychiatric: cooperative with examination, no  psychotic behavior noted. Neurologic  Mental status:   orientation to person and place   General fund of knowledge grossly intact   Attention intact as able to attend well to the exam     Language fluent in conversation   Comprehension intact; follows simple commands  Cranial nerves:   CN2: Visual Fields full w/o extinction on confrontational testing,   CN 3,4,6: extraocular muscles intact,  CN7:face symmetric without dysarthria,   CN8: hearing grossly intact  Strength: Good strength in upper extremities. Some weakness in both HFs but right more than left. Left DF and PF with good strength. left lower limb. Mild weakness in HF, good hip extension. Abduction with mild weakness but symmetric adduction strength. Knee extension very weak (3-/5) but relatively preserved hamstring. DF with mild to moderate weakness but more weakness in PF. Deep tendon reflexes: decreased in uppers and absent in both lower extremities  Sensory: light touch intact in all 4 extremities. Pin was decreased in all dermatomes of the foot on the right but retained on the left. She reports no decreased sensation on leg, thigh to correlate with a dermatome or named nerve.   Cerebellar/coordination: finger nose finger without ataxia  Tone: normal to decreased in all 4

## 2019-04-15 NOTE — PROGRESS NOTES
Pt refused saline flush and requested IV be removed because it was painful. Pt refuses new IV placement as she is supposed to discharge tomorrow and doesn't get meds through it. MD resident notified, will continue to monitor.

## 2019-04-15 NOTE — DISCHARGE SUMMARY
Patient discharged to [facility]. Report called. Patient belongings packed with patient. Paperwork given to transport.

## 2019-04-15 NOTE — PROGRESS NOTES
Taty Marmolejo in the last 72 hours. Urine Microscopic:   No results for input(s): LABCAST, BACTERIA, COMU, HYALCAST, WBCUA, RBCUA, EPIU in the last 72 hours. Urine Culture:   No results for input(s): LABURIN in the last 72 hours. Urine Chemistry: No results for input(s): Zabrina Dears, PROTEINUR, NAUR in the last 72 hours. Objective:   Vitals: /72   Pulse 74   Temp 99.7 °F (37.6 °C) (Oral)   Resp 20   Ht 5' 7\" (1.702 m)   Wt 139 lb 5.3 oz (63.2 kg)   LMP  (LMP Unknown)   SpO2 98%   BMI 21.82 kg/m²    Wt Readings from Last 3 Encounters:   04/15/19 139 lb 5.3 oz (63.2 kg)   03/27/19 133 lb 9.6 oz (60.6 kg)   02/18/19 135 lb (61.2 kg)      24HR INTAKE/OUTPUT:      Intake/Output Summary (Last 24 hours) at 4/15/2019 1130  Last data filed at 4/15/2019 1049  Gross per 24 hour   Intake 385 ml   Output 125 ml   Net 260 ml     Constitutional:  Alert, awake, no apparent distress  NECK: supple, no JVD  Cardiovascular:  S1, S2 without m/r/g  Respiratory:  CTA B without w/r/r  Abdomen: +bs, soft, nt  Ext: no LE edema    Assessment and Plan:       IMAGING:  XR ABDOMEN (KUB) (SINGLE AP VIEW)   Final Result   Impression: Nonspecific bowel gas pattern. Large volume fecal material scattered throughout the colon. MRI THORACIC SPINE WO CONTRAST   Final Result      MRI CERVICAL SPINE:    1. Mild multilevel cervical spondylosis without significant spinal or foraminal stenosis. MRI THORACIC SPINE:    1. Multilevel degenerative disc and facet arthropathy, most pronounced in the lower thoracic spine, particularly at T11-12 with severe facet arthropathy resulting in severe left and moderate right T11 foraminal stenosis. 2. Mild marrow edema within a Schmorl's node in the inferior endplate of W98. MRI CERVICAL SPINE WO CONTRAST   Final Result      CT Head WO Contrast   Final Result      No acute traumatic abnormality. 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 Assessment/Plan     Numbness of rt lower ext   Myoclonus   HTN   ESRD   Debility   Anemia     Plan   - going to SNF today   - neurology following - not clear about the diagnosis of numbness that she has in rt lower ext    - Myoclonus - monitor off Neurontin   - on PD clifton well   - lytes stable   - PT to eval and t/t   - Aranesp with PD     - pt is very concerned about the numbness in her rt lower ext   - will go to [address] [place] [address] G Monday         Artem Mcmahon MD  [facility] of [phone]  [phone]

## 2019-04-15 NOTE — CARE COORDINATION
Case Management Daily Note:    Current Plan of Care: DC today to [facility]      PT AM-PAC: 17/24    Date: 4/12     OT AM-PAC: 12/24   Date: 4/12     DME needs: Defer      Discharge Plan:   [facility], [place], Kristi Mcmahon  Report: [phone]  Fax: [phone]     Tentative Discharge Date: 4/15 at 12:30 pm (SW to scheduled transport today.      Current Barriers to Discharge: PD required teaching and set up can only occur on Monday per [facility]).       Resources/Information given: SNF choice HENS COMPLETED. Case Management Notes: SW scheduled transport with [facility] at 12:30 pm ([phone]). SW to complete IMM and follow for discharge.          Lamont Hernandez, MSW, LSW     [facility]   [phone]

## 2019-04-15 NOTE — FLOWSHEET NOTE
Deaccessed from PD cycler using aseptic technique and tx ended. Tx summary:    Total time 10 hours 22 minutes  Total   Total volume 7523  Gained 28 minutes    Effluent clear with small amount blood noted in one bag only. Report rendered and ACES charting placed into chart for future scanning to the EMR.       04/15/19 0520   Vitals   BP (!) 102/52   Temp 98 °F (36.7 °C)   Temp Source Oral   Pulse 74   Resp 20   SpO2 94 %   Weight 139 lb 5.3 oz (63.2 kg)   Peritoneal Dialysis Catheter Left lower abdomen   Placement Date: (c)   Catheter Location: Left lower abdomen   Status Deaccessed   Site Condition No Complications   Dressing Status Clean;Dry; Intact   Drainage Description   (clear with minute blood tinge in one bag)

## 2019-04-15 NOTE — CARE COORDINATION
Discharging to [address] [place] [address] at 12:30 p by [facility].   [facility],  Norma Shwetamario  Report: [phone]  Fax: [phone]  Electronically signed by Rene Rose RN on 4/15/2019 at 9:40 AM

## 2019-04-15 NOTE — CARE COORDINATION
Case Management Discharge Assessment    4/15/2019  [facility] Case Management Department    Patient: Esther Jesus  MRN: [id number] / : 1940  ACCT: [de-identified]          Admission Documentation  Attending Provider: Marguerite Kumar MD  Admit date/time: 2019  1:56 PM  Status: Inpatient [101]  Diagnosis: Weakness of both legs     Readmission within last 30 days:  yes     Living Situation  Discharge Planning  Living Arrangements: Alone  Support Systems: Family Members, Children  Potential Assistance Needed: Home Care  Type of Home Care Services: PT, OT, Nursing Services  Patient expects to be discharged to[de-identified] home  Expected Discharge Date: 04/10/19    Service Assessment:     Sinan Day a 69 y. o. female with PMH of OA, DM type 2 (HbA1c 5.6 on 2019), ESRD on PD (M-F), CAD and gout who [facility] due to experiencing a fall secondary to right leg weakness. During admission, an MRI of the lumbosacral spine was done and was significant for L4-L5 stenosis. The etiology of the fall is felt to be lumbar radiculopathy. The patient was started on gabapentin and muscle relaxants with improvement in symptoms. She does not wish to have any neurosurgical intervention done at this time.  She is advised to follow-up with neurosurgery outpatient if symptoms do not improve with medical management.       Values / Beliefs  Do you have any ethnic, cultural, sacramental, or spiritual Zoroastrian needs you would like us to be aware of while you are in the hospital?: No    Advance Directives (For Healthcare)  Pre-existing DNR Comfort Care/DNR Arrest/DNI Order: No  Healthcare Directive: No, patient does not have an advance directive for healthcare treatment  Patient Requests Assistance: No  Advance Directives: Pt. not interested at this time                        Pharmacy: snf   Potential Assistance Purchasing Medications:  No  Does patient want to participate in local refill/meds to beds program?: Yes    Notification completed in HENS/PAS? Yes     IMM  Yes       Discharge disposition:     Sarah Fay   [facility], [place], Kristi Mcmahon  Report: [phone]  Fax: [phone]    Transport [facility] at 12:30 on 4/15/19       Samantha Warren and her family were provided with choice of provider; she and her family are in agreement with the discharge plan.       Care Transitions patient: No    Lilian Rea RN  [facility]  Case Management Department  Ph: [phone]

## 2019-05-24 PROBLEM — M79.89 LEFT LEG SWELLING: Status: ACTIVE | Noted: 2019-01-01

## 2019-05-25 PROBLEM — G62.9 PERIPHERAL NEUROPATHY: Status: ACTIVE | Noted: 2019-01-01

## 2019-05-25 NOTE — H&P
History and Physical  PGY1    Patient's PCP: Shanon Topete MD (Inactive)    CC: leg swelling     HISTORY OF PRESENT ILLNESS:    Patient is a 69 y.o. female with a history of ESRD on PD, Diabetes Mellitus II, HLD, BL knee osteoarthritis, recent fall and admission on 4/2019 presenting with 1.5 weeks of leg swelling, more so in the left leg versus the right, however started noticing swelling in the right leg in the last few days. Started having \"pins and needles\" in her right leg. 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 Had set up outpatient appointment for ultrasound, however her leg continued to swell and so was brought into ED for further evaluation. No recent headaches, no recent long trips, no recent surgeries per pt, +smoking hx (smokes now 1 pack every 2 weeks, has been smoking since teenager years), no hemoptysis, no SOB, no chest pain, no nausea, no emesis, no trauma to legs. In ED: no leukocytosis noted, mild low grade fever on initial presentation. Left leg with warmth, some erythema, pitting edema. Started on heparin gtt.      Past Medical / Surgical History:    Past Medical History:   Diagnosis Date    DVT (deep venous thrombosis) (HCC)     End stage renal disease (HCC)     Hyperlipidemia     Hypertension     Osteoarthritis     Pancreatitis chronic     Peritoneal dialysis status ([facility] [address]     Type II or unspecified type diabetes mellitus without mention of complication, not stated as uncontrolled     Vitamin D deficiency      Past Surgical History:   Procedure Laterality Date    CATARACT REMOVAL Left 5/29/13    had elevated BP post op    CORONARY ANGIOPLASTY      JOINT REPLACEMENT  5/4/2011    R WILDER    OTHER SURGICAL HISTORY Left 05/19/2017    INSERTION OF LAPAROSCOPIC PERITONEAL DIALYSIS CATHETER;    THYROIDECTOMY, PARTIAL         Medications Prior to Admission: No current facility-administered medications on file prior to encounter. Current Outpatient Medications on File Prior to Encounter   Medication Sig Dispense Refill    darbepoetin sandeep-polysorbate (ARANESP) 60 MCG/0.3ML SOSY injection Inject 0.3 mLs into the skin once a week 4.2 mL 0    psyllium (HYDROCIL) 95 % PACK packet Take 1 packet by mouth daily 56 each 0    vitamin E 400 UNIT capsule Take 1 capsule by mouth nightly 30 capsule 3    Capsaicin-Lidocaine-Menthol 0.05-5-3 % CREA Apply 1 applicator topically 2 times daily as needed (BLLE numbness, neuropathy, pain) 1 Tube 0    calcitRIOL (ROCALTROL) 0.25 MCG capsule Take 1 capsule by mouth daily 30 capsule 0    cinacalcet (SENSIPAR) 60 MG tablet Take 1 tablet by mouth daily 30 tablet 0    sevelamer (RENVELA) 800 MG tablet Take 2 tablets by mouth 3 times daily (with meals) 90 tablet 0    acetaminophen (TYLENOL) 325 MG tablet Take 2 tablets by mouth every 6 hours as needed for Pain 60 tablet 0    aspirin 81 MG EC tablet Take 1 tablet by mouth daily 30 tablet 3    docusate sodium (COLACE) 100 MG capsule Take 1 capsule by mouth 2 times daily 30 capsule 0    ondansetron (ZOFRAN) 4 MG tablet Take 1 tablet by mouth every 8 hours as needed for Nausea 20 tablet 0    simvastatin (ZOCOR) 40 MG tablet Take 1 tablet by mouth nightly 30 tablet 2    Elastic Bandages & Supports (MEDICAL COMPRESSION STOCKINGS) MISC 1 each by Does not apply route daily 1 each 0    B complex-vitamin C-folic acid (NEPHRO-ZINA) 1 MG tablet Take 1 tablet by mouth daily (with breakfast)         Allergies:  Patient has no known allergies. Social History:   TOBACCO:   reports that she has been smoking cigarettes. She has a 12.00 pack-year smoking history. She has never used smokeless tobacco.     ETOH:   reports that she does not drink alcohol. Family History:   History reviewed. No pertinent family history. ROS: Review of Systems - as per HPI .    All other systems reviewed and are negative. PHYSICAL EXAM:  /70   Pulse 99   Temp 99.9 °F (37.7 °C) (Oral)   Resp 18   LMP  (LMP Unknown)   SpO2 (!) 9%   No results for input(s): POCGLU in the last 72 hours. Physical Exam   Constitutional:   Thin older female in no acute distress. HENT:   Head: Normocephalic and atraumatic. Eyes: EOM are normal.   Neck: Neck supple. Cardiovascular: Normal rate and regular rhythm. No murmur heard. Pulmonary/Chest: Effort normal and breath sounds normal.   Abdominal: Soft. Bowel sounds are normal. She exhibits no distension. Musculoskeletal: She exhibits edema. Left leg swelling is larger than right leg, 2+ pitting edema bilaterally. Neurological: She is alert. Skin: Skin is warm. There is erythema. Psychiatric: She has a normal mood and affect. LABS:  Recent Labs     05/24/19 2207   WBC 10.3   HGB 10.3*   HCT 32.7*                                                                     Recent Labs     05/24/19 2207      K 4.1      CO2 26   BUN 53*   CREATININE 9.5*   GLUCOSE 153*     No results for input(s): AST, ALT, ALB, BILITOT, ALKPHOS in the last 72 hours. No results for input(s): TROPONINI in the last 72 hours. No results for input(s): BNP in the last 72 hours. No results found for: PHART, TIY9WTT, PO2ART  No results for input(s): INR in the last 72 hours. No results for input(s): NITRITE, COLORU, PHUR, LABCAST, WBCUA, RBCUA, MUCUS, TRICHOMONAS, YEAST, BACTERIA, CLARITYU, SPECGRAV, LEUKOCYTESUR, UROBILINOGEN, BILIRUBINUR, BLOODU, GLUCOSEU, AMORPHOUS in the last 72 hours. Invalid input(s): Harman Garcia     IMAGING:  No orders to display       Assessment & Plan:    Fawn Raphael is a 69 y.o. female with PMH of ESRD on PD, HLD, DM2, osteoarthritis, who presented with left leg swelling. Left Leg Swelling  Most likely DVT. Less likely cellulitis as pt has no signs of infection.  Pt did meet 1/4 SIRS criteria for elevated HR, however has no source for infection.   -lower extremity dopplers  -heparin gtt   -fahad hose  -BMP, CBC  -aPTT  -PT/INR  -heparin XA      Chronic Medical Problems:  ESRD on PD  -Dr. Devin Hagen consulted  -PD to be done nightly    -sevelamer  -calcitriol  -cincacalcet  -vitamin E  -vitamin B/C/folic acid    DM2  -low dose SSI    -accuchecks  -hypoglycemia protocol      FEN: renal, carb control diet  PPx: heparin gtt  CODE: FULL  Dispo: [facility]  PT/OT: consulted     The patient and / or the family were informed of the results of any tests, a time was given to answer questions, a plan was proposed and they agreed with plan. Patient discussed with attending Dr. Myrna Maloney and plan was agreed upon.      Dilip Albrecht, PGY-1  [employer]   5/24/2019  11:28 PM

## 2019-05-25 NOTE — PROGRESS NOTES
Internal Medicine PGY-1 Resident Progress Note        PCP: Urvashi Russo MD (Inactive)    Date of Admission: 5/24/2019    Chief Complaint: Left leg pain and swelling     Hospital Course (per HPI): Patient is a 69 y.o. female with a history of ESRD on PD, Diabetes Mellitus II, HLD, BL knee osteoarthritis, recent fall and admission on 4/2019 presenting with 1.5 weeks of leg swelling, more so in the left leg versus the right, however started noticing swelling in the right leg in the last few days. Started having \"pins and needles\" in her right leg. 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 Had set up outpatient appointment for ultrasound, however her leg continued to swell and so was brought into ED for further evaluation. No recent headaches, no recent long trips, no recent surgeries per pt, +smoking hx (smokes now 1 pack every 2 weeks, has been smoking since teenager years), no hemoptysis, no SOB, no chest pain, no nausea, no emesis, no trauma to legs.      In ED: no leukocytosis noted, mild low grade fever on initial presentation. Left leg with warmth, some erythema, pitting edema. Started on heparin gtt. Subjective: Patient was seen and examined this AM. Continues to complain of lower extremity pain and swelling (left worse than right). She denies nausea/vomiting, fever/chills, shortness of breath or chest pain. Awaiting lower extremity dopplers. Will continue heparin infusion at this time.      Medications:  Reviewed    Infusion Medications    dextrose      heparin (porcine) 15 Units/kg/hr (05/25/19 0809)     Scheduled Medications    aspirin  81 mg Oral Daily    b complex-C-folic acid  1 mg Oral Daily with breakfast    calcitRIOL  0.25 mcg Oral Daily    cinacalcet  60 mg Oral Daily    docusate sodium  100 mg Oral BID    psyllium  1 packet Oral Daily    sevelamer  1,600 mg Oral TID WC    simvastatin  40 mg Oral Nightly    vitamin E  400 Units Oral Nightly    sodium chloride flush  10 mL Intravenous 2 times per day    insulin lispro  0-6 Units Subcutaneous TID WC    insulin lispro  0-3 Units Subcutaneous Nightly     PRN Meds: acetaminophen, capsaicin-menthol-methyl sal, ondansetron, sodium chloride flush, polyethylene glycol, glucose, dextrose, glucagon (rDNA), dextrose, heparin (porcine), heparin (porcine)    No intake or output data in the 24 hours ending 05/25/19 1040    Physical Exam Performed:    BP 90/69   Pulse 83   Temp 98.3 °F (36.8 °C) (Oral)   Resp 16   Ht 5' 2\" (1.575 m)   Wt 147 lb 4.3 oz (66.8 kg)   LMP  (LMP Unknown)   SpO2 100%   BMI 26.94 kg/m²     General appearance: No apparent distress, appears stated age and cooperative. HEENT: Pupils equal, round, and reactive to light. Conjunctivae/corneas clear. Neck: Supple, with full range of motion. No jugular venous distention. Trachea midline. Respiratory:  Normal respiratory effort. Clear to auscultation, bilaterally without Rales/Wheezes/Rhonchi. Cardiovascular: Regular rate and rhythm with normal S1/S2 without murmurs, rubs or gallops. Abdomen: Soft, non-tender, non-distended with normal bowel sounds. Musculoskeletal: Bilateral lower extremity swelling (left greater than right). 2+ pitting edema present bilaterally. Skin: Skin color, texture, turgor normal.  No rashes or lesions. Neurologic:  Neurovascularly intact without any focal sensory/motor deficits.  Cranial nerves: II-XII intact, grossly non-focal.  Psychiatric: Alert and oriented, thought content appropriate, normal insight  Capillary Refill: Brisk,< 3 seconds   Peripheral Pulses: +2 palpable, equal bilaterally       Labs:   Recent Labs     05/25/19  0217 05/25/19  0611 05/25/19  0943   WBC 11.7* 10.5 10.7   HGB 10.1* 9.1* 9.8*   HCT 32.4* 28.3* 31.3*    170 163     Recent Labs     05/24/19  2207 05/25/19  0611    146*   K 4.1 4.0  104   CO2 26 26   BUN 53* 57*   CREATININE 9.5* 9.9*   CALCIUM 8.2* 7.7*     Recent Labs     05/25/19  0611   AST 12*   ALT 12   BILIDIR <0.2   BILITOT <0.2   ALKPHOS 68     Recent Labs     05/25/19  0611   INR 1.03     No results for input(s): [place] Daily in the last 72 hours. Urinalysis:      Lab Results   Component Value Date    NITRU Negative 04/08/2019    WBCUA 20-50 04/08/2019    BACTERIA 3+ 04/08/2019    RBCUA 10-20 04/08/2019    BLOODU LARGE 04/08/2019    SPECGRAV 1.025 04/08/2019    GLUCOSEU 100 04/08/2019       Radiology:  VL Extremity Venous Bilateral    (Results Pending)     Assessment/Plan:  Itz Augustin is a 69 y.o. female with PMH of ESRD on PD, HLD, DM2, osteoarthritis, who presented with left leg swelling. Left Leg Swelling  Most likely DVT. Less likely cellulitis as pt has no signs of infection.  Pt did meet 1/4 SIRS criteria for elevated HR, however has no source for infection.   -Lower extremity dopplers ordered; will f/u   -Continue heparin infusion   -Daily CBC  -Daily BMP   -Continue anti-Xa measurements      Chronic Medical Problems:  ESRD on PD  -Dr. Kennon Blizzard consulted  -Continue nightly PD   -sevelamer  -calcitriol  -cincacalcet  -vitamin E  -vitamin B/C/folic acid     DM2  -low dose SSI    -accuchecks  -hypoglycemia protocol      DVT Prophylaxis: Heparin infusion   Diet: DIET RENAL;  Code Status: Full Code    PT/OT Eval Status: Consulted     Dispo - General medicine/surgery floors     I will discuss the patient with the senior resident and Dr. Latoya Campbell MD.     Jair Epstein MD.   Internal Medicine Resident PGY-1  [place]

## 2019-05-25 NOTE — ED PROVIDER NOTES
chronic, Peritoneal dialysis status ([facility] [address] Type II or unspecified type diabetes mellitus without mention of complication, not stated as uncontrolled, and Vitamin D deficiency. She has a past surgical history that includes Thyroidectomy, partial; Coronary angioplasty; joint replacement (5/4/2011); Cataract removal (Left, 5/29/13); and other surgical history (Left, 05/19/2017). Her family history is not on file. She reports that she has been smoking cigarettes. She has a 12.00 pack-year smoking history. She has never used smokeless tobacco. She reports that she does not drink alcohol or use drugs.     Medications     Previous Medications    ACETAMINOPHEN (TYLENOL) 325 MG TABLET    Take 2 tablets by mouth every 6 hours as needed for Pain    ASPIRIN 81 MG EC TABLET    Take 1 tablet by mouth daily    B COMPLEX-VITAMIN C-FOLIC ACID (NEPHRO-ZINA) 1 MG TABLET    Take 1 tablet by mouth daily (with breakfast)    CALCITRIOL (ROCALTROL) 0.25 MCG CAPSULE    Take 1 capsule by mouth daily    CAPSAICIN-LIDOCAINE-MENTHOL 0.05-5-3 % CREA    Apply 1 applicator topically 2 times daily as needed (BLLE numbness, neuropathy, pain)    CINACALCET (SENSIPAR) 60 MG TABLET    Take 1 tablet by mouth daily    DARBEPOETIN MOHAN-POLYSORBATE (ARANESP) 60 MCG/0.3ML SOSY INJECTION    Inject 0.3 mLs into the skin once a week    DOCUSATE SODIUM (COLACE) 100 MG CAPSULE    Take 1 capsule by mouth 2 times daily    ELASTIC BANDAGES & SUPPORTS (MEDICAL COMPRESSION STOCKINGS) MISC    1 each by Does not apply route daily    ONDANSETRON (ZOFRAN) 4 MG TABLET    Take 1 tablet by mouth every 8 hours as needed for Nausea    PSYLLIUM (HYDROCIL) 95 % PACK PACKET    Take 1 packet by mouth daily    SEVELAMER (RENVELA) 800 MG TABLET    Take 2 tablets by mouth 3 times daily (with meals)    SIMVASTATIN (ZOCOR) 40 MG TABLET    Take 1 tablet by mouth nightly    VITAMIN E 400 UNIT CAPSULE    Take 1 capsule by mouth nightly       Allergies     She has No Known - 7.7 K/uL    Lymphocytes # 1.1 1.0 - 5.1 K/uL    Monocytes # 1.0 0.0 - 1.3 K/uL    Eosinophils # 0.3 0.0 - 0.6 K/uL    Basophils # 0.1 0.0 - 0.2 K/uL   Basic Metabolic Panel (EP - 1)   Result Value Ref Range    Sodium 144 136 - 145 mmol/L    Potassium 4.1 3.5 - 5.1 mmol/L    Chloride 101 99 - 110 mmol/L    CO2 26 21 - 32 mmol/L    Anion Gap 17 (H) 3 - 16    Glucose 153 (H) 70 - 99 mg/dL    BUN 53 (H) 7 - 20 mg/dL    CREATININE 9.5 (HH) 0.6 - 1.2 mg/dL    GFR Non-African American 4 (A) >60    GFR  5 (A) >60    Calcium 8.2 (L) 8.3 - 10.6 mg/dL   Lactate, plasma   Result Value Ref Range    Lactic Acid 1.2 0.4 - 2.0 mmol/L       ED BEDSIDE ULTRASOUND:      RECENT VITALS:  BP: 114/70, Temp: 99.9 °F (37.7 °C), Pulse: 99, Resp: 18, SpO2: (!) 9 %     Procedures         ED Course     Nursing Notes, Past Medical Hx,Past Surgical Hx, Social Hx, Allergies, and Family Hx were reviewed. The patient was given the following medications:  No orders of the defined types were placed in this encounter. CONSULTS:  Dorian Huffman 761 TO HOSPITALIST    MEDICAL DECISION MAKING / ASSESSMENT / Arianna Lakhwinder is a 69 y.o. female who presents emergency Department with complaint of left lower extremity swelling. She does have pitting edema with minimal warmth noted to the left lower extremity compared to the right. She denies tenderness with palpation, however, states that she has pain in the leg when ambulating. There is no erythema or significant warmth that concern me for a cellulitis. There is does not appear to be any source for cellulitis. She was noted to have a low-grade temperature and was tachycardic so we did obtain labs for better evaluation and there was no leukocytosis and she had a normal lactate which is also reassuring that this is not infectious.     With patient's peritoneal dialysis she would not be a candidate for Lovenox to bridge her to an outpatient ultrasound to evaluate for DVT. I spoke with nephrology and oral anticoagulation such as Eliquis or Xarelto are ineffective for DVT in dialysis patients and they recommended heparin with bridge to warfarin if this is a DVT. I discussed the case with the hospitalist, she is a resident clinic patient and was admitted by the AOD. This patient was also evaluated by the attending physician. All care plans were discussed and agreed upon. Clinical Impression     1. Pain and swelling of left lower extremity        Disposition     PATIENT REFERRED TO:  No follow-up provider specified.     DISCHARGE MEDICATIONS:  New Prescriptions    No medications on file       DISPOSITION Decision To Admit 05/24/2019 10:56:55 PM        Humphrey [place]  05/24/19 7874

## 2019-05-25 NOTE — PROGRESS NOTES
4 Eyes Admission Assessment     I agree as the admission nurse that 2 RN's have performed a thorough Head to Toe Skin Assessment on the patient. ALL assessment sites listed below have been assessed on admission. Areas assessed by both nurses:   [x]   Head, Face, and Ears   [x]   Shoulders, Back, and Chest  [x]   Arms, Elbows, and Hands   [x]   Coccyx, Sacrum, and Ischum  [x]   Legs, Feet, and Heels        Does the Patient have Skin Breakdown?   No         Khurram Prevention initiated:  No   Wound Care Orders initiated:  No      [facility] nurse consulted for Pressure Injury (Stage 3,4, Unstageable, DTI, NWPT, and Complex wounds):  No      Nurse 1 eSignature: Electronically signed by Ambrocio Gracia RN on 5/25/19 at 2:22 AM    **SHARE this note so that the co-signing nurse is able to place an eSignature**    Nurse 2 eSignature: Electronically signed by Gennaro Martinez RN on 5/25/19 at 2:24 AM

## 2019-05-25 NOTE — FLOWSHEET NOTE
REPORT RECEIVED. [facility]CP[facility] INITIATED USING ASEPTIC TECHNIQUE WITH DIFFICULTY. DRESSING CHANGED TO [facility] PD SITE.  EFFLUIENT C/Y     05/25/19 1727   Vitals   /76   Temp 99 °F (37.2 °C)   Temp Source Oral   Pulse 76   Resp 16   SpO2 98 %   Weight 149 lb 0.5 oz (67.6 kg)   Peritoneal Dialysis (CAPD manual)   Exchange Number 5   Dianeal Solution Other (Comment)  (DELFLEX 4.25/2.5%)   Cycler   Therapy Time (Hours:Minutes) 10   Fill Volume 2000 mL   Last Fill Volume 0 mL

## 2019-05-25 NOTE — ED PROVIDER NOTES
ED Attending Attestation Note     Date of evaluation: 5/24/2019    This patient was seen by the advance practice provider. I have seen and examined the patient, agree with the workup, evaluation, management and diagnosis. The care plan has been discussed. My assessment reveals somewhat chronically ill-appearing patient, overall well-appearing, in no acute distress. She presents with what she feels is worsening of bilateral lower extremity edema, worse in the left lower extremity than the right lower extremity. The left lower extremity has vague, mild, diffuse erythema as compared to the right, with mild diffuse warmth, but no clear source of infection or effusion of warmth and erythema as would be expected with a cellulitis. She has a lower extremity duplex scheduled for next week, ordered by her nephrologist, for possible DVT, but her  brings her in tonight because he feels that the swelling has gotten worse. She denies any dyspnea or chest pain, dizziness or lightheadedness. It is not clear how acute this problem is, as she does appear to have been seen by her nephrologist is far back as September of last year for left lower extremity pain and swelling.          Ning Dial MD  05/24/19 7409

## 2019-05-26 PROBLEM — E11.65 UNCONTROLLED TYPE 2 DIABETES MELLITUS WITH HYPERGLYCEMIA (HCC): Status: ACTIVE | Noted: 2019-01-01

## 2019-05-26 NOTE — FLOWSHEET NOTE
CCPD order: [facility] Alternate 4.25/2.5 % Delflex 2000ml volume x5 exchange over 10 hours  UF: 1832 ML  Total Time: 11 hours and 53 minutes  Alarm: 3 times over night  CCPD removed fluid: color light yellow ; without fibrin noticed  Pt tolerated well over night with CCPD  Plan to put pt on CCPD this evening if pt's still at hospital  Placed CCPD flow sheets in the chart for EMR scan       05/26/19 0800   Vitals   /64   Temp 97.8 °F (36.6 °C)   Temp Source Oral   Pulse 80   Resp 16   Weight 145 lb 15.1 oz (66.2 kg)  (1 pillow, 1 sheet and 1 a pad)   Cycler   Verification of Prescription [facility]CP[facility]   Total Volume Programmed 98124 mL   Therapy Time (Hours:Minutes) 10:00   Fill Volume 2000 mL   Last Fill Volume 0 mL   Dextrose Setting Different (Extraneal)   Number of Cycles 5   Bag Volume 5000 mL   Number of Bags Used 2   Dianeal Solution Other (Comment)  (2.5% Delflex 5000 ml/bag; 4.25% Delflex 5000 ml/bag)   Ultrafiltration (UF) (mL) 1832 mL   Average Dwell Time (Hours:Minutes) 87 minutes            05/26/19 0800   Vitals   /64   Temp 97.8 °F (36.6 °C)   Temp Source Oral   Pulse 80   Resp 16   Weight 145 lb 15.1 oz (66.2 kg)  (1 pillow, 1 sheet and 1 a pad)   Cycler   Verification of Prescription [facility]   Total Volume Programmed 29399 mL   Therapy Time (Hours:Minutes) 10:00   Fill Volume 2000 mL   Last Fill Volume 0 mL   Dextrose Setting Different (Extraneal)   Number of Cycles 5   Bag Volume 5000 mL   Number of Bags Used 2   Dianeal Solution Other (Comment)  (2.5% Delflex 5000 ml/bag; 4.25% Delflex 5000 ml/bag)   Ultrafiltration (UF) (mL) 1832 mL   Average Dwell Time (Hours:Minutes) 87 minutes

## 2019-05-26 NOTE — PROGRESS NOTES
Internal Medicine Resident  Hospitalist Progress Note      PCP: Angela Alonso MD (Inactive)    Date of Admission: 5/24/2019    Chief Complaint:   Chief Complaint   Patient presents with    Foot Swelling     bilat feet swelling         Subjective: No cp/sob. No LH/Dizziness. No abd pain, N/V. No vaginal bleeding. Medications:  Reviewed  InfusionMedications    dextrose      heparin (porcine) 12 Units/kg/hr (05/26/19 0127)     Scheduled Medications    aspirin  81 mg Oral Daily    b complex-C-folic acid  1 mg Oral Daily with breakfast    calcitRIOL  0.25 mcg Oral Daily    cinacalcet  60 mg Oral Daily    docusate sodium  100 mg Oral BID    psyllium  1 packet Oral Daily    sevelamer  1,600 mg Oral TID WC    simvastatin  40 mg Oral Nightly    vitamin E  400 Units Oral Nightly    sodium chloride flush  10 mL Intravenous 2 times per day    insulin lispro  0-6 Units Subcutaneous TID WC    insulin lispro  0-3 Units Subcutaneous Nightly     PRN Meds: acetaminophen, capsaicin-menthol-methyl sal, ondansetron, sodium chloride flush, polyethylene glycol, glucose, dextrose, glucagon (rDNA), dextrose, heparin (porcine), heparin (porcine)      Intake/Output Summary (Last 24 hours) at 5/26/2019 1038  Last data filed at 5/26/2019 0935  Gross per 24 hour   Intake 140 ml   Output 1832 ml   Net -1692 ml       Physical Exam Performed:  /68   Pulse 82   Temp 97.6 °F (36.4 °C) (Oral)   Resp 16   Ht 5' 2\" (1.575 m)   Wt 145 lb 15.1 oz (66.2 kg) Comment: 1 pillow, 1 sheet and 1 a pad  LMP  (LMP Unknown)   SpO2 98%   BMI 26.69 kg/m²   Physical Exam   Constitutional: She appears well-developed. No distress. HENT:   Head: Normocephalic and atraumatic. Mouth/Throat: Oropharynx is clear and moist.   Eyes: No scleral icterus. Neck: Neck supple. No tracheal deviation present. Cardiovascular: Normal rate and regular rhythm. No murmur heard.   Pulmonary/Chest: Effort normal and breath sounds normal. Abdominal: Soft. She exhibits no distension and no mass. There is no tenderness. Musculoskeletal: She exhibits edema (1+ RLE edema. 2+ LLE edema). Neurological: She is alert. A sensory deficit (Left foot sensation decreased to light touch. proximal sensation intact) is present. CN grossly intact on limited exam   Skin: Skin is warm. She is not diaphoretic. There is erythema. Psychiatric: She has a normal mood and affect. Nursing note and vitals reviewed. Labs:  Reviewed  Recent Labs     05/25/19  0611 05/25/19  0943 05/26/19  0426   WBC 10.5 10.7 8.2   HGB 9.1* 9.8* 10.2*   HCT 28.3* 31.3* 31.9*    163 174     Recent Labs     05/24/19  2207 05/25/19  0611 05/26/19  0426    146* 144   K 4.1 4.0 3.8    104 100   CO2 26 26 26   BUN 53* 57* 57*   CREATININE 9.5* 9.9* 9.5*   CALCIUM 8.2* 7.7* 7.4*     Recent Labs     05/25/19  0611   AST 12*   ALT 12   BILIDIR <0.2   BILITOT <0.2   ALKPHOS 68     Recent Labs     05/25/19  0611 05/26/19  0426   INR 1.03 0.93     No results for input(s): CKTOTAL, TROPONINI in the last 72 hours. Urinalysis:   Lab Results   Component Value Date    NITRU Negative 04/08/2019    WBCUA 20-50 04/08/2019    BACTERIA 3+ 04/08/2019    RBCUA 10-20 04/08/2019    BLOODU LARGE 04/08/2019    SPECGRAV 1.025 04/08/2019    GLUCOSEU 100 04/08/2019       Radiology:  Reviewed  VL Extremity Venous Bilateral    (Results Pending)           Assessment & Plan  69 y. o. female with a history of ESRD on PD, Diabetes Mellitus II, HLD, BL knee osteoarthritis, recent fall and admission on 4/2019 presenting with 1.5 weeks of leg swelling, more so in the left leg versus the right, however started noticing swelling in the right leg in the last few days. Patient Active Hospital Problem List:  1 Left leg swelling    Assessment: likely DVT. No s/s infection. Plan: Heparin gtt for likely DVT. LE venous doppler studies ordered.     2 ESRD on peritoneal dialysis    Assessment: stable    Plan: PD per Dr. Violeta Ardon    3 Uncontrolled type 2 diabetes mellitus with hyperglycemia    Assessment: controlled this morning    Plan: LDSSI    4 Peripheral neuropathy    Assessment: stocking pattern, prolonged onset. likely related to DM &/or ESRD. Plan: DM control      DVT Prophylaxis: on therapeutic heparin gtt  Diet: DIET RENAL;  Code Status: Full Code  PT/OT Eval Status: hold  Dispo - med/surg, pending w/u.     Will discuss with MD Nick Drake MD

## 2019-05-26 NOTE — PROGRESS NOTES
Pt last Antixa was . 59, two consecutive therapeutic levels obtained, Antixa is now daily per protocol, next level due 5-27, @ 0600.

## 2019-05-27 NOTE — PROGRESS NOTES
Internal Medicine PGY-1 Resident Progress Note        PCP: Nohemy Laboy MD (Inactive)    Date of Admission: 5/24/2019    Chief Complaint: Left leg pain and swelling     Hospital Course (per HPI): Patient is a 69 y.o. female with a history of ESRD on PD, Diabetes Mellitus II, HLD, BL knee osteoarthritis, recent fall and admission on 4/2019 presenting with 1.5 weeks of leg swelling, more so in the left leg versus the right, however started noticing swelling in the right leg in the last few days. Started having \"pins and needles\" in her right leg. 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 Had set up outpatient appointment for ultrasound, however her leg continued to swell and so was brought into ED for further evaluation. No recent headaches, no recent long trips, no recent surgeries per pt, +smoking hx (smokes now 1 pack every 2 weeks, has been smoking since teenager years), no hemoptysis, no SOB, no chest pain, no nausea, no emesis, no trauma to legs.      In ED: no leukocytosis noted, mild low grade fever on initial presentation. Left leg with warmth, some erythema, pitting edema. Started on heparin gtt. Subjective: Patient was seen and examined this AM. No acute events overnight. She reports feeling much better and is without complaint. Denies fever/chills, nausea/vomiting, shortness of breath or chest pain. She states that the pain in her left-leg has subsided. Awaiting lower extremity dopplers.      Medications:  Reviewed    Infusion Medications    dextrose      heparin (porcine) 12 Units/kg/hr (05/27/19 0813)     Scheduled Medications    aspirin  81 mg Oral Daily    b complex-C-folic acid  1 mg Oral Daily with breakfast    calcitRIOL  0.25 mcg Oral Daily    cinacalcet  60 mg Oral Daily    docusate sodium  100 mg Oral BID    psyllium  1 packet Oral Daily    sevelamer  1,600 mg Oral TID     simvastatin  40 mg Oral Nightly    vitamin E  400 Units Oral Nightly    sodium chloride flush  10 mL Intravenous 2 times per day    insulin lispro  0-6 Units Subcutaneous TID     insulin lispro  0-3 Units Subcutaneous Nightly     PRN Meds: acetaminophen, capsaicin-menthol-methyl sal, ondansetron, sodium chloride flush, polyethylene glycol, glucose, dextrose, glucagon (rDNA), dextrose, heparin (porcine), heparin (porcine)      Intake/Output Summary (Last 24 hours) at 5/27/2019 0922  Last data filed at 5/27/2019 0415  Gross per 24 hour   Intake 275 ml   Output 2817 ml   Net -2542 ml       Physical Exam Performed:    BP 93/62   Pulse 75   Temp 97.8 °F (36.6 °C) (Oral)   Resp 17   Ht 5' 2\" (1.575 m)   Wt 141 lb 12.1 oz (64.3 kg)   LMP  (LMP Unknown)   SpO2 97%   BMI 25.93 kg/m²     General appearance: No apparent distress, appears stated age and cooperative. HEENT: Pupils equal, round, and reactive to light. Conjunctivae/corneas clear. Neck: Supple, with full range of motion. No jugular venous distention. Trachea midline. Respiratory:  Normal respiratory effort. Clear to auscultation, bilaterally without Rales/Wheezes/Rhonchi. Cardiovascular: Regular rate and rhythm with normal S1/S2 without murmurs, rubs or gallops. Abdomen: Soft, non-tender, non-distended with normal bowel sounds. Musculoskeletal: Lower extremity swelling improved. Skin: Skin color, texture, turgor normal.  No rashes or lesions. Neurologic:  Neurovascularly intact without any focal sensory/motor deficits.  Cranial nerves: II-XII intact, grossly non-focal.  Psychiatric: Alert and oriented, thought content appropriate, normal insight  Capillary Refill: Brisk,< 3 seconds   Peripheral Pulses: +2 palpable, equal bilaterally       Labs:   Recent Labs     05/25/19  0943 05/26/19  0426 05/27/19  0741   WBC 10.7 8.2 6.4   HGB 9.8* 10.2* 9.6*   HCT 31.3* 31.9* 29.1*    174 155     Recent Labs     05/25/19  0611 05/26/19  0426 05/27/19  0741   * 144 141   K 4.0 3.8 3.8    100 100   CO2 26 26 23   BUN 57* 57* 54*   CREATININE 9.9* 9.5* 10.1*   CALCIUM 7.7* 7.4* 7.5*     Recent Labs     05/25/19  0611   AST 12*   ALT 12   BILIDIR <0.2   BILITOT <0.2   ALKPHOS 68     Recent Labs     05/25/19  0611 05/26/19  0426 05/27/19  0741   INR 1.03 0.93 1.03     No results for input(s): Connor Pheasant in the last 72 hours. Urinalysis:      Lab Results   Component Value Date    NITRU Negative 04/08/2019    WBCUA 20-50 04/08/2019    BACTERIA 3+ 04/08/2019    RBCUA 10-20 04/08/2019    BLOODU LARGE 04/08/2019    SPECGRAV 1.025 04/08/2019    GLUCOSEU 100 04/08/2019       Radiology:  VL Extremity Venous Bilateral    (Results Pending)     Assessment/Plan:  Deflin Farley is a 69 y.o. female with PMH of ESRD on PD, HLD, DM2, osteoarthritis, who presented with left leg swelling. Left Leg Swelling  Most likely DVT. Less likely cellulitis as pt has no signs of infection.  Pt did meet 1/4 SIRS criteria for elevated HR, however has no source for infection.   -Lower extremity dopplers ordered; will f/u   -Continue heparin infusion   -Continue anti-Xa measurements      Chronic Medical Problems:  ESRD on PD  -Dr. Gilbert Upton consulted  -Continue nightly PD   -sevelamer  -calcitriol  -cincacalcet  -vitamin E  -vitamin B/C/folic acid     DM2  -medium-dose SSI   -accuchecks  -hypoglycemia protocol      DVT Prophylaxis: Heparin infusion   Diet: DIET RENAL;  Code Status: Full Code    PT/OT Eval Status: Consulted     Dispo - General medicine/surgery floors     I will discuss the patient with the senior resident and Dr. Yogesh Blancas MD.     Robert Rose MD.   Internal Medicine Resident PGY-1  [employer]

## 2019-05-27 NOTE — PROGRESS NOTES
Physical Therapy  HOLD    PT referral received, chart reviewed. Pt with pending B LE dopplers to r/o DVT. Will return when results present.     [address] [facility] [address]

## 2019-05-27 NOTE — FLOWSHEET NOTE
05/27/19 0415   Vitals   /81   Temp 97 °F (36.1 °C)   Temp Source Oral   Pulse 97   Resp 16   SpO2 95 %   Weight 141 lb 12.1 oz (64.3 kg)     Deaccessed from cycler using aseptic technique and TX ended. TX SUMMARY      Total time 12 hours 24 mintues  Dwell time gained 2 hours 19 minutes  Total UF 2817  Total volume 9995      Effluent clear, no fibrin noted. Dressing C/D/I. Report rendered and ACES charting placed into chart for future scanning to the EMR.

## 2019-05-27 NOTE — CONSULTS
Nephrology Consult Note  Patient's Name: Ansley Ruvalcaba  8:34 AM  5/27/2019    Reason for Consult:  ESRD  Requesting Physician:  Angela Alonso MD (Inactive)      Chief Complaint:  Edema     History of Present Ilness:    Patient is a 69 y.o. female with a history of ESRD on PD, Diabetes Mellitus II, HLD, BL knee osteoarthritis, recent fall and admission on 4/2019 presenting with 1.5 weeks of leg swelling, more so in the left leg versus the right, however started noticing swelling in the right leg in the last few days. Started having \"pins and needles\" in her right leg. 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 Had set up outpatient appointment for ultrasound, however her leg continued to swell and so was brought for further evaluation. Past Medical History:   Diagnosis Date    DVT (deep venous thrombosis) (HCC)     End stage renal disease (HCC)     Hyperlipidemia     Hypertension     Osteoarthritis     Pancreatitis chronic     Peritoneal dialysis status ([facility] [address]     Type II or unspecified type diabetes mellitus without mention of complication, not stated as uncontrolled     Vitamin D deficiency        Past Surgical History:   Procedure Laterality Date    CATARACT REMOVAL Left 5/29/13    had elevated BP post op    CORONARY ANGIOPLASTY      JOINT REPLACEMENT  5/4/2011    R WILDER    OTHER SURGICAL HISTORY Left 05/19/2017    INSERTION OF LAPAROSCOPIC PERITONEAL DIALYSIS CATHETER;    THYROIDECTOMY, PARTIAL         History reviewed. No pertinent family history. reports that she has been smoking cigarettes. She has a 12.00 pack-year smoking history. She has never used smokeless tobacco. She reports that she does not drink alcohol or use drugs. Allergies:  Patient has no known allergies.     Current Medications:      acetaminophen (TYLENOL) tablet 650 mg Q6H PRN   aspirin EC tablet 81 mg Daily   b complex-C-folic acid (NEPHROCAPS) capsule 1 mg Daily with breakfast   calcitRIOL (ROCALTROL) capsule 0.25 mcg Daily   capsaicin-menthol-methyl sal BID PRN   cinacalcet (SENSIPAR) tablet 60 mg Daily   docusate sodium (COLACE) capsule 100 mg BID   ondansetron (ZOFRAN) tablet 4 mg Q8H PRN   psyllium (HYDROCIL) 95 % packet 1 packet Daily   sevelamer (RENVELA) tablet 1,600 mg TID WC   simvastatin (ZOCOR) tablet 40 mg Nightly   vitamin E capsule 400 Units Nightly   sodium chloride flush 0.9 % injection 10 mL 2 times per day   sodium chloride flush 0.9 % injection 10 mL PRN   polyethylene glycol (GLYCOLAX) packet 17 g Daily PRN   insulin lispro (HUMALOG) injection pen 0-6 Units TID WC   insulin lispro (HUMALOG) injection pen 0-3 Units Nightly   glucose (GLUTOSE) 40 % oral gel 15 g PRN   dextrose 50 % solution 12.5 g PRN   glucagon (rDNA) injection 1 mg PRN   dextrose 5 % solution PRN   heparin (porcine) injection 5,350 Units PRN   heparin (porcine) injection 2,650 Units PRN   heparin 25,000 units in dextrose 5% 250 mL infusion Continuous       Review of Systems:   14 point ROS obtained but were negative except mentioned in HPI      Physical exam:     Vitals:  BP 93/62   Pulse 75   Temp 97.8 °F (36.6 °C) (Oral)   Resp 17   Ht 5' 2\" (1.575 m)   Wt 141 lb 12.1 oz (64.3 kg)   LMP  (LMP Unknown)   SpO2 97%   BMI 25.93 kg/m²   Constitutional:  OAA X3 NAD  Skin: no rash, turgor wnl  Heent:  eomi, mmm  Neck: no bruits or jvd noted  Cardiovascular:  S1, S2 without m/r/g  Respiratory: CTA B without w/r/r  Abdomen:  +bs, soft, nt, nd  Ext: ++ lower extremity edema  Psychiatric: mood and affect appropriate  Musculoskeletal:  Rom, muscular strength intact    Data:   Labs:  CBC:   Recent Labs     05/25/19  0943 05/26/19  0426 05/27/19  0741   WBC 10.7 8.2 6.4   HGB 9.8* 10.2* 9.6*    174 155     BMP:  Recent Labs     05/25/19  0611 05/26/19  0426 05/27/19  0741   * 144 141   K 4.0 3.8 3.8   CL 104 100 100   CO2 26 26 23   BUN 57* 57* 54*   CREATININE 9.9* 9.5* 10.1*   GLUCOSE 132* 211* 175*     Ca/Mg/Phos: Recent Labs     05/25/19  0611 05/26/19 0426 05/27/19  0741   CALCIUM 7.7* 7.4* 7.5*     Hepatic: Recent Labs     05/25/19  0611   AST 12*   ALT 12   BILITOT <0.2   ALKPHOS 68     Troponin: No results for input(s): TROPONINI in the last 72 hours. BNP: No results for input(s): BNP in the last 72 hours. Lipids: No results for input(s): CHOL, TRIG, HDL, LDLCALC, LABVLDL in the last 72 hours. ABGs: No results for input(s): PHART, PO2ART, RTX2AWK in the last 72 hours. INR:   Recent Labs     05/25/19  0611 05/26/19 0426   INR 1.03 0.93     UA:No results for input(s): Sandra Solo, GLUCOSEU, BILIRUBINUR, Coralee Koyanagi, BLOODU, PHUR, PROTEINU, UROBILINOGEN, NITRU, LEUKOCYTESUR, Timothy New in the last 72 hours. Urine Microscopic: No results for input(s): LABCAST, BACTERIA, COMU, HYALCAST, WBCUA, RBCUA, EPIU in the last 72 hours. Urine Culture: No results for input(s): LABURIN in the last 72 hours. Urine Chemistry: No results for input(s): Nubia Binet, PROTEINUR, NAUR in the last 72 hours. IMAGING:  VL Extremity Venous Bilateral    (Results Pending)       Assessment/Plan   1. ESRD     2. Lower ext edema     3.  Anemia    4. Acid- base/ Electrolyte imbalance     Plan   - pt seen on PD   - UF as clifton   - Will need lower ext doppler   - will challenge dry wt   - Cont phos binders  - Heparin gtt unitl DVT ruled out   - monitor lytes                 Thank you for allowing us to participate in care of [address] free to contact me   [facility] [address]  Office : [phone]  Fax :[phone]

## 2019-05-28 NOTE — PROGRESS NOTES
Internal Medicine PGY-1 Resident Progress Note        PCP: Seun Mcnamara MD (Inactive)    Date of Admission: 5/24/2019    Chief Complaint: Left leg pain and swelling     Subjective: No acute events overnight. This morning pt doing well, no acute complaints. Denies pain or tenderness in either leg. Does endorse pins and needles feeling in right leg, but states this has been chronic. Medications:  Reviewed    Infusion Medications    dextrose      heparin (porcine) 12 Units/kg/hr (05/27/19 1935)     Scheduled Medications    insulin lispro  0-12 Units Subcutaneous TID WC    insulin lispro  0-6 Units Subcutaneous Nightly    aspirin  81 mg Oral Daily    b complex-C-folic acid  1 mg Oral Daily with breakfast    calcitRIOL  0.25 mcg Oral Daily    cinacalcet  60 mg Oral Daily    docusate sodium  100 mg Oral BID    psyllium  1 packet Oral Daily    sevelamer  1,600 mg Oral TID WC    simvastatin  40 mg Oral Nightly    vitamin E  400 Units Oral Nightly    sodium chloride flush  10 mL Intravenous 2 times per day     PRN Meds: acetaminophen, capsaicin-menthol-methyl sal, ondansetron, sodium chloride flush, polyethylene glycol, glucose, dextrose, glucagon (rDNA), dextrose, heparin (porcine), heparin (porcine)      Intake/Output Summary (Last 24 hours) at 5/28/2019 0302  Last data filed at 5/28/2019 0200  Gross per 24 hour   Intake 608 ml   Output 2845 ml   Net -2237 ml       Physical Exam Performed:    /66   Pulse 78   Temp 99.3 °F (37.4 °C) (Oral)   Resp 18   Ht 5' 2\" (1.575 m)   Wt 146 lb 9.7 oz (66.5 kg)   LMP  (LMP Unknown)   SpO2 100%   BMI 26.81 kg/m²     General appearance: No apparent distress, appears stated age and cooperative. HEENT: Pupils equal, round, and reactive to light. Conjunctivae/corneas clear. Neck: Supple, with full range of motion. No jugular venous distention. Trachea midline. Respiratory:  Normal respiratory effort.  Clear to auscultation, bilaterally without Rales/Wheezes/Rhonchi. Cardiovascular: Regular rate and rhythm with normal S1/S2 without murmurs, rubs or gallops. Abdomen: Soft, non-tender, non-distended with normal bowel sounds. Musculoskeletal: Left lower extremity swelling, no tenderness to palpation  Skin: Skin color, texture, turgor normal.  No rashes or lesions. Neurologic:  Neurovascularly intact without any focal sensory/motor deficits. Cranial nerves: II-XII intact, grossly non-focal.  Psychiatric: Alert and oriented, thought content appropriate, normal insight  Capillary Refill: Brisk,< 3 seconds   Peripheral Pulses: +2 palpable, equal bilaterally       Labs:   Recent Labs     05/25/19  0943 05/26/19  0426 05/27/19  0741 05/27/19  2330   WBC 10.7 8.2 6.4  --    HGB 9.8* 10.2* 9.6*  --    HCT 31.3* 31.9* 29.1*  --     174 155 161     Recent Labs     05/25/19  0611 05/26/19  0426 05/27/19  0741   * 144 141   K 4.0 3.8 3.8    100 100   CO2 26 26 23   BUN 57* 57* 54*   CREATININE 9.9* 9.5* 10.1*   CALCIUM 7.7* 7.4* 7.5*     Recent Labs     05/25/19  0611   AST 12*   ALT 12   BILIDIR <0.2   BILITOT <0.2   ALKPHOS 68     Recent Labs     05/25/19  0611 05/26/19  0426 05/27/19  0741   INR 1.03 0.93 1.03     No results for input(s): Mendez Daily in the last 72 hours. Urinalysis:      Lab Results   Component Value Date    NITRU Negative 04/08/2019    WBCUA 20-50 04/08/2019    BACTERIA 3+ 04/08/2019    RBCUA 10-20 04/08/2019    BLOODU LARGE 04/08/2019    SPECGRAV 1.025 04/08/2019    GLUCOSEU 100 04/08/2019     Assessment/Plan:  Itz Augustin is a 69 y.o. female with PMH of ESRD on PD, HLD, DM2, osteoarthritis, who presented with left leg swelling. 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 Must watch for calciphylaxis as this is a PD patient.  Patient informed she will be inpatient for several more days as we bridge therapy.   - Continue heparin infusion with anti-Xa measurements      Chronic Medical Problems:  ESRD on PD  - Dr. Caren Benito consulted  - Continue nightly PD   - sevelamer  - calcitriol, cincacalcet  - vitamin E, B, C, folic acid     DM2  - medium-dose SSI   - accuchecks  - hypoglycemia protocol    DVT Prophylaxis: Heparin infusion   Diet: DIET RENAL;  Code Status: Full Code    PT/OT Eval Status: Consulted   Dispo - General medicine/surgery floors     I will discuss the patient with the senior resident and Dr. Moises Verdugo MD.     John Ramires MD.   Internal Medicine Resident PGY-1  [facility]

## 2019-05-28 NOTE — FLOWSHEET NOTE
Deaccessed from PD cycler using aseptic technique and tx ended. Effluent clear with very faint pink tinge, no fibrin noted.        05/28/19 0410   Vitals   BP (!) 102/52   Temp 98.2 °F (36.8 °C)   Temp Source Axillary   Pulse 78   Resp 18   SpO2 100 %   Weight 143 lb 4.8 oz (65 kg)       TX SUMMARY    Total time 11 hours and 53 minutes  Dwell time gained 1 hour 57 minutes  Total UF 1302  Total volume 9995    Average dwell time 108.4 minutes

## 2019-05-28 NOTE — CONSULTS
Clinical Pharmacy Progress Note    Admit date: 5/24/2019     Subjective/Objective:  Pt is a 67yof with PMHx that includes ESRD on PD, HTN, HLD, DM, OA, and vitamin D deficiency who is admitted with LLE swelling and suspected DVT. Interval update:  Dopplers done this AM - confirmed LLE DVT. Starting Warfarin today. Remains on Heparin drip. Pharmacy is consulted to dose Warfarin per Dr. Ever Lorenzo  Target INR = 2.0-3.0 for acute DVT  Patient was not on Warfarin prior to this admission    Current anticoagulation regimen:  Heparin infusion  Warfarin - Pharmacy to dose    Date INR Warfarin   5/28 1.03                            Recent Labs     05/27/19  0741 05/28/19  0608    141   K 3.8 3.9    99   CO2 23 25   BUN 54* 56*   CREATININE 10.1* 9.6*   GLUCOSE 175* 142*       Estimated Creatinine Clearance: 4 mL/min (A) (based on SCr of 9.6 mg/dL [facility])). Lab Results   Component Value Date    WBC 7.3 05/28/2019    HGB 10.3 (L) 05/28/2019    HCT 31.6 (L) 05/28/2019    .2 (H) 05/28/2019     05/28/2019       Lab Results   Component Value Date    PROTIME 11.7 05/28/2019    INR 1.03 05/28/2019       Height:  5' 2\" (157.5 cm)  Weight:  143 lb 4.8 oz (65 kg)    Prophylaxis:  VTE:  Heparin gtt + Warfarin  GI:  Not indicated    Vaccination screening:  Pneumonia:  Previously received Pneumovax-23 > 1yr ago. KKQMGAA-12 ordered to be given 5/29. Influenza:  Out of season    Assessment/Plan:  1)  Acute LLE DVT, on Warfarin:  Pharmacy to dose, target INR = 2.0-3.0  · INR today = 1.03  · Will start Warfarin 7.5mg po x1 today, then 5mg po daily tomorrow. · Recommend continuing Heparin drip until INR is > 2.0 and stable. · Enoxaparin is not an option for bridge therapy as patient has ESRD. · Daily INR will be monitored closely and dose adjustments will be made as appropriate.     Please call with questions--  Thanks--  Radha Cat, PharmD, [address], [address] [place] [address] [phone] (wireless)  5/28/2019 1:15 PM

## 2019-05-28 NOTE — PROGRESS NOTES
Occupational Therapy   Occupational Therapy Initial Assessment/Treatment   Date: 2019   Patient Name: Bebo Gonzales  MRN: [id number]     : 1940    Date of Service: 2019    Discharge Recommendations:    Bebo Gonzales scored a 21/24 on the AM-PAC ADL Inpatient form. Current research shows that an AM-PAC score of 18 or greater is typically associated with a discharge to the patient's home setting. Based on the patients AM-PAC score and their current ADL deficits, it is recommended that the patient have 2-3 sessions per week of Occupational Therapy at d/c to increase the patients independence. OT Equipment Recommendations  Equipment Needed: No    Assessment   Performance deficits / Impairments: Decreased functional mobility ; Decreased ADL status; Decreased endurance  Assessment: Pt presents with a decline in functional independence. Pt is from home alone. Pt appears fatigued and mobility is hampered by IV line and a small room. Pt plans to go home at discharge.,  Treatment Diagnosis: Impaired functional mobility and functional activity tolerance  Prognosis: Good  Patient Education: Role of OT:  Pt verbalized understanding  REQUIRES OT FOLLOW UP: Yes  Activity Tolerance  Activity Tolerance: Patient limited by fatigue  Safety Devices  Safety Devices in place: Yes  Type of devices: Left in bed;Bed alarm in place;Nurse notified;Call light within reach         Treatment Diagnosis: Impaired functional mobility and functional activity tolerance      Restrictions  Position Activity Restriction  Other position/activity restrictions: up with assist    Subjective   General  Chart Reviewed: Yes  Additional Pertinent Hx: Pt admitted 20 with left leg swelling. 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   Rt (-)           PMH includes: DVT, ESRD, HTN, OA, chronic pancreatitis, DM, Rt THR  Family / Caregiver Present: No  Referring Practitioner: DR. Raul Geiger  Diagnosis: Left leg swelling  Subjective  Subjective: Pt in bed upon entry. Pt agreeable to OOB activity.   Pain Assessment  Pain Level: 0  Social/Functional History  Social/Functional History  Lives With: Alone  Type of Home: Apartment(senior building)  Home Layout: One level  Home Access: Level entry, Elevator  Bathroom Shower/Tub: Tub/Shower unit  Bathroom Toilet: Handicap height  Bathroom Equipment: Grab bars in shower, Hand-held shower, Grab bars around toilet, Shower chair  Home Equipment: 4 wheeled walker, Lift chair, Alert Button, Reacher(using 4 wheel walker PTA)  Receives Help From: Family, Home health  ADL Assistance: Independent  Homemaking Assistance: Needs assistance(Pt cooks; laundry not in apt; has aide 1x per week for homemaking)  Ambulation Assistance: Independent  Transfer Assistance: Independent  Active : Yes  Occupation: Retired       Objective   Vision: Within Functional Limits  Hearing: Within functional limits    Orientation  Overall Orientation Status: Within Functional Limits     Balance  Sitting Balance: Independent  Standing Balance: Stand by assistance  Standing Balance  Time: ~2 min  Activity: bathroom mobility/activity  Functional Mobility  Functional - Mobility Device: Rolling Walker  Activity: To/from bathroom  Assist Level: Contact guard assistance  Toilet Transfers  Toilet - Technique: Ambulating  Equipment Used: Standard toilet(grab bar)  Toilet Transfer: Contact guard assistance(to SBA)  ADL  Grooming: Stand by assistance  LE Dressing: Stand by assistance  Toileting: (denied need)  Tone RUE  RUE Tone: Normotonic  Tone LUE  LUE Tone: Normotonic  Coordination  Movements Are Fluid And Coordinated: Yes     Bed mobility  Supine to Sit: Supervision(HOB up)  Sit to Supine: Stand by assistance  Transfers  Stand Step Transfers: Contact guard assistance(to SBA)  Sit to stand: Stand by assistance  Stand to sit: Contact guard assistance(to SBA) Cognition  Overall Cognitive Status: WFL(flat affect)                 LUE AROM (degrees)  LUE AROM : WFL  RUE AROM (degrees)  RUE AROM : WFL  LUE Strength  Gross LUE Strength: WFL  RUE Strength  Gross RUE Strength: WFL     Hand Dominance  Hand Dominance: Right         Treatment included ADL and transfer training. Plan   Plan  Times per week: 2-5  Times per day: Daily  Current Treatment Recommendations: Functional Mobility Training, Endurance Training, Self-Care / ADL     If patient is discharged prior to next treatment, this note will serve as the discharge. AM-PAC Score        AM-[facility] Inpatient Daily Activity Raw Score: 21  AM-PAC Inpatient ADL T-Scale Score : 44.27  ADL Inpatient CMS 0-100% Score: 32.79  ADL Inpatient CMS G-Code Modifier : CJ    Goals                             No goals met  Short term goals  Time Frame for Short term goals: Discharge  Short term goal 1: Transfer to/from toilet with supervision  Short term goal 2: Stance with supervision x5 min while engaging in ADL/functional mobility  Patient Goals   Patient goals : Go home.   \"Be normal\"       Therapy Time   Individual Concurrent Group Co-treatment   Time In 2825         Time Out 1353         Minutes 24         Timed Code Treatment Minutes: 14 Minutes    Total Treatment 20 [facility], OTR/L [id number]

## 2019-05-28 NOTE — PROGRESS NOTES
Physical Therapy/Occupational Therapy    Hold note    Referral received and chart reviewed. Pt currently off floor for tests. Will f/u later pm as time allows or 5/29/19. RN aware.        Nat Bence, PT

## 2019-05-29 NOTE — FLOWSHEET NOTE
05/29/19 1659   Vitals   /76   Temp 98 °F (36.7 °C)   Temp Source Oral   Pulse 77   Resp 16   Weight 143 lb 4.8 oz (65 kg)   Cycler   Verification of Prescription CCPD   Total Volume Programmed 30845 mL   Therapy Time (Hours:Minutes) 12:00   Fill Volume 2000 mL   Last Fill Volume 0 mL   Dextrose Setting Same (Nonextraneal)   I Drain Alarm 0 mL   Number of Cycles 5   Bag Volume 5000 mL   Number of Bags Used 2   Dianeal Solution Other (Comment)  (1 dextrose 1.5% in 5000ml and 1 dextrose 2.5% in 5000ml)   I Drain (mL) 74 mL     Orders verified, pt connected using aseptic technique, effluent fluid clear

## 2019-05-29 NOTE — CARE COORDINATION
Case Management Daily Note:    Current Plan of Care: ESRD on peritoneal dialysis [facility])  Has DVT left femoral vein  Heparin gtt, coumadin bridge. PD per Nephrology  INR checks , goal INR 2-3    PT AM-PAC: 17 /24  Per last eval on: 05/29/2019    OT AM-PAC: 21 /24 Per last eval on:05/28/2019    DME needs:  No new needs anticipated for d/c    Safety concerns for pt returning home alone upon d/c, especially being able to manage her PD at home. Recommend continued PT.      Discharge Plan: Home  With [place] ( from home  W/ Spouse  Magan )    Tentative Discharge Date: 1-2 days    Current Barriers to Discharge: therapeutic  INR  . Coumadin bridge .      Resources/Information given: NA       Case Management Notes: CM  Following for d/c planning and  needs :    Home  W/  HHC  Vs  SNF  :  Would  Need  Jose L osullivan ,  Was  Recently at  [address], [address] [facility]  Case Management Department  Ph: [phone]

## 2019-05-29 NOTE — FLOWSHEET NOTE
Deaccessed from PD cycler using aseptic technique and tx ended. Effluent clear with very faint pink tinge, no fibrin noted.          05/29/19 0527   Vitals   /63   Temp 98.1 °F (36.7 °C)   Temp Source Axillary   Pulse 78   Resp 18   SpO2 95 %   Weight 138 lb 14.2 oz (63 kg)   Cycler   Ultrafiltration (UF) (mL) 985 mL  (More pink tinged than yesterday, clear, no fibrin)   Average Dwell Time (Hours:Minutes) 113.2       TX SUMMARY    Total time 11 hours 45 minutes  Dwell time gained 2 hours 21 minutes  Total   Total volume 9995    Patient sleeping but woke easily and was alert and oriented. Report rendered and ACES charting placed into chart for future scanning the the EMR.

## 2019-05-29 NOTE — PLAN OF CARE
Problem: Falls - Risk of:  Goal: Will remain free from falls  Description  Will remain free from falls  Outcome: Ongoing  Note:   . Patient at risk for falls. Patient resting quietly in bed. Side rails up x 3/4. Bed locked in lowest position. Bed alarm on. Bedside table and call light within reach. Patient instructed to call for assistance. Patient verbalized understanding. Will continue to monitor. Carlos Falcon          Problem: Pain:  Goal: Control of acute pain  Description  Control of acute pain  Outcome: Ongoing  Note:   . Pt resting at this time. No complaints of pain. Encouraged patient to call out with any needs. Will continue to monitor. Carlos Falcon

## 2019-05-29 NOTE — PROGRESS NOTES
Renal Progress Note  Subjective:   Admit Date: 5/24/2019     HPI      Interval History:   Pt tolerating PD   Doppler - DVT   On heparin gtt       DIET DIET RENAL;  Medications:   Scheduled Meds:   [START ON 5/29/2019] warfarin  5 mg Oral Daily    [START ON 5/29/2019] pneumococcal 13-valent conjugate  0.5 mL Intramuscular Once    polyethylene glycol  17 g Oral BID    insulin lispro  0-12 Units Subcutaneous TID WC    insulin lispro  0-6 Units Subcutaneous Nightly    aspirin  81 mg Oral Daily    b complex-C-folic acid  1 mg Oral Daily with breakfast    calcitRIOL  0.25 mcg Oral Daily    cinacalcet  60 mg Oral Daily    docusate sodium  100 mg Oral BID    psyllium  1 packet Oral Daily    sevelamer  1,600 mg Oral TID WC    simvastatin  40 mg Oral Nightly    vitamin E  400 Units Oral Nightly    sodium chloride flush  10 mL Intravenous 2 times per day     Continuous Infusions:   dextrose      heparin (porcine) 12 Units/kg/hr (05/28/19 1934)       Labs:  CBC:   Recent Labs     05/27/19  0741 05/27/19  2330 05/28/19  0608 05/28/19  0915   WBC 6.4  --  6.8 7.3   HGB 9.6*  --  9.4* 10.3*    161 163 160     BMP:    Recent Labs     05/26/19  0426 05/27/19  0741 05/28/19  0608    141 141   K 3.8 3.8 3.9    100 99   CO2 26 23 25   BUN 57* 54* 56*   CREATININE 9.5* 10.1* 9.6*   GLUCOSE 211* 175* 142*     Ca/Mg/Phos:   Recent Labs     05/26/19  0426 05/27/19  0741 05/28/19  0608   CALCIUM 7.4* 7.5* 7.6*     Hepatic: No results for input(s): AST, ALT, ALB, BILITOT, ALKPHOS in the last 72 hours. Troponin: No results for input(s): TROPONINI in the last 72 hours. BNP: No results for input(s): BNP in the last 72 hours. Lipids: No results for input(s): CHOL, TRIG, HDL, LDLCALC, LABVLDL in the last 72 hours. ABGs: No results for input(s): PHART, PO2ART, MHY5TRW in the last 72 hours.   INR:   Recent Labs     05/26/19  0426 05/27/19  0741 05/28/19  0608   INR 0.93 1.03 1.03     UA:No results for input(s): Monserrat Ronald, GLUCOSEU, BILIRUBINUR, [place], BLOODU, PHUR, PROTEINU, UROBILINOGEN, NITRU, LEUKOCYTESUR, [employer] in the last 72 hours. Urine Microscopic: No results for input(s): LABCAST, BACTERIA, COMU, HYALCAST, WBCUA, RBCUA, EPIU in the last 72 hours. Urine Culture: No results for input(s): LABURIN in the last 72 hours. Urine Chemistry: No results for input(s): Zabrina Dears, PROTEINUR, NAUR in the last 72 hours.     Objective:   Vitals: /70   Pulse 88   Temp 98.5 °F (36.9 °C) (Oral)   Resp 18   Ht 5' 2\" (1.575 m)   Wt 140 lb 14 oz (63.9 kg)   LMP  (LMP Unknown)   SpO2 97%   BMI 25.77 kg/m²    Wt Readings from Last 3 Encounters:   05/28/19 140 lb 14 oz (63.9 kg)   04/15/19 139 lb 5.3 oz (63.2 kg)   03/27/19 133 lb 9.6 oz (60.6 kg)      24HR INTAKE/OUTPUT:      Intake/Output Summary (Last 24 hours) at 5/28/2019 2218  Last data filed at 5/28/2019 1706  Gross per 24 hour   Intake 1216 ml   Output 1319 ml   Net -103 ml     Constitutional:  Alert, awake, no apparent distress  NECK: supple, no JVD  Cardiovascular:  S1, S2 without m/r/g  Respiratory:  CTA B without w/r/r  Abdomen: +bs, soft, nt  Ext: + LE edema    Assessment and Plan:       IMAGING:  VL Extremity Venous Bilateral             Assessment/Plan     ESRD - ON PD   HTN - controlled   DVT - on Heparin +comadin   BMD - On phos binders   Anemia - On EPO           Martin Monreal MD  [facility] [address] [facility] -[phone]  [phone]

## 2019-05-29 NOTE — PROGRESS NOTES
Clinical Pharmacy Progress Note    Admit date: 5/24/2019     Subjective/Objective:  Pt is a 67yof with PMHx that includes ESRD on PD, HTN, HLD, DM, OA, and vitamin D deficiency who is admitted with LLE swelling - Dopplers confirmed LLE DVT. Interval update:  Warfarin started yesterday. Remains on Heparin drip. Pharmacy is consulted to dose Warfarin per Dr. Kay Sage  Target INR = 2.0-3.0 for acute DVT  Patient was not on Warfarin prior to this admission    Current anticoagulation regimen:  Heparin infusion  Warfarin - Pharmacy to dose    Date INR Warfarin   5/28 1.03 7.5mg   5/29 1.08                       Recent Labs     05/28/19  0608 05/29/19  0603    138   K 3.9 3.7   CL 99 95*   CO2 25 25   BUN 56* 55*   CREATININE 9.6* 9.8*   GLUCOSE 142* 164*       Estimated Creatinine Clearance: 4 mL/min (A) (based on SCr of 9.8 mg/dL [facility])). Lab Results   Component Value Date    WBC 6.7 05/29/2019    HGB 9.4 (L) 05/29/2019    HCT 29.2 (L) 05/29/2019    .0 (H) 05/29/2019     05/29/2019       Lab Results   Component Value Date    PROTIME 12.3 05/29/2019    INR 1.08 05/29/2019       Height:  5' 2\" (157.5 cm)  Weight:  138 lb 14.2 oz (63 kg)    Prophylaxis:  VTE:  Heparin gtt + Warfarin  GI:  Not indicated    Vaccination screening:  Pneumonia:  Previously received Pneumovax-23 > 1yr ago. UACLSBI-08 ordered to be given 5/29. Influenza:  Out of season    Assessment/Plan:  1)  Acute LLE DVT, on Warfarin:  Pharmacy to dose, target INR = 2.0-3.0  · INR today = 1.08  · Received Warfarin 7.5mg po x1 yesterday. Will continue with 5mg po daily. · Recommend continuing Heparin drip until INR is > 2.0 and stable. · Enoxaparin is not an option for bridge therapy as patient has ESRD. · Daily INR will be monitored closely and dose adjustments will be made as appropriate.     Please call with questions--  Thanks--  Seleta Code, PharmD, [address], [facility] or [phone] (wireless)  5/29/2019 10:10 AM

## 2019-05-29 NOTE — PROGRESS NOTES
Internal Medicine PGY-1 Resident Progress Note        PCP: Lazaro Gilman MD (Inactive)    Date of Admission: 5/24/2019    Chief Complaint: Left leg pain and swelling     Subjective: No acute events overnight. This morning patient doing well, has no complaints. No complaints of fever, chills, headache, lightheadedness, dizziness, dyspnea, chest pain, abdominal pain, N/V/D/C. Medications:  Reviewed    Infusion Medications    dextrose      heparin (porcine) 12 Units/kg/hr (05/29/19 0400)     Scheduled Medications    warfarin  5 mg Oral Daily    pneumococcal 13-valent conjugate  0.5 mL Intramuscular Once    polyethylene glycol  17 g Oral BID    insulin lispro  0-12 Units Subcutaneous TID WC    insulin lispro  0-6 Units Subcutaneous Nightly    aspirin  81 mg Oral Daily    b complex-C-folic acid  1 mg Oral Daily with breakfast    calcitRIOL  0.25 mcg Oral Daily    cinacalcet  60 mg Oral Daily    docusate sodium  100 mg Oral BID    psyllium  1 packet Oral Daily    sevelamer  1,600 mg Oral TID WC    simvastatin  40 mg Oral Nightly    vitamin E  400 Units Oral Nightly    sodium chloride flush  10 mL Intravenous 2 times per day     PRN Meds: acetaminophen, capsaicin-menthol-methyl sal, ondansetron, sodium chloride flush, glucose, dextrose, glucagon (rDNA), dextrose, heparin (porcine), heparin (porcine)      Intake/Output Summary (Last 24 hours) at 5/29/2019 8683  Last data filed at 5/29/2019 0527  Gross per 24 hour   Intake 857.29 ml   Output 1002 ml   Net -144.71 ml       Physical Exam Performed:    /69   Pulse 84   Temp 97.6 °F (36.4 °C) (Axillary)   Resp 16   Ht 5' 2\" (1.575 m)   Wt 138 lb 14.2 oz (63 kg)   LMP  (LMP Unknown)   SpO2 97%   BMI 25.40 kg/m²     General appearance: No apparent distress, appears stated age and cooperative. HEENT: Pupils equal, round, and reactive to light. Conjunctivae/corneas clear. Neck: Supple, with full range of motion.  No jugular venous distention. Trachea midline. Respiratory:  Normal respiratory effort. Clear to auscultation, bilaterally without Rales/Wheezes/Rhonchi. Cardiovascular: Regular rate and rhythm with normal S1/S2 without murmurs, rubs or gallops. Abdomen: Soft, non-tender, non-distended with normal bowel sounds. Musculoskeletal: Left lower extremity swelling, no tenderness to palpation  Skin: Skin color, texture, turgor normal.  No rashes or lesions. Neurologic:  Neurovascularly intact without any focal sensory/motor deficits. Cranial nerves: II-XII intact, grossly non-focal.  Psychiatric: Alert and oriented, thought content appropriate, normal insight  Capillary Refill: Brisk,< 3 seconds   Peripheral Pulses: +2 palpable, equal bilaterally     Labs:   Recent Labs     05/28/19  0608 05/28/19  0915 05/29/19  0603   WBC 6.8 7.3 6.7   HGB 9.4* 10.3* 9.4*   HCT 29.1* 31.6* 29.2*    160 161     Recent Labs     05/27/19  0741 05/28/19  0608 05/29/19  0603    141 138   K 3.8 3.9 3.7    99 95*   CO2 23 25 25   BUN 54* 56* 55*   CREATININE 10.1* 9.6* 9.8*   CALCIUM 7.5* 7.6* 7.6*   PHOS  --   --  5.8*     No results for input(s): AST, ALT, BILIDIR, BILITOT, ALKPHOS in the last 72 hours. Recent Labs     05/27/19  0741 05/28/19  0608 05/29/19  0603   INR 1.03 1.03 1.08     No results for input(s): CKTOTAL, TROPONINI in the last 72 hours. Urinalysis:      Lab Results   Component Value Date    NITRU Negative 04/08/2019    WBCUA 20-50 04/08/2019    BACTERIA 3+ 04/08/2019    RBCUA 10-20 04/08/2019    BLOODU LARGE 04/08/2019    SPECGRAV 1.025 04/08/2019    GLUCOSEU 100 04/08/2019     Assessment/Plan:  Sejal Sanches is a 69 y.o. female with PMH of ESRD on PD, HLD, DM2, osteoarthritis, who presented with left leg swelling. Left lower extremity edema 2/2 DVT - chronic partially occluding deep vein thrombosis involving the left femoral vein  - Pharmacy to dose warfarin, being bridged. Goal INR 2-3. Day 2 of bridge.   - Continue heparin infusion with anti-Xa measurements     Hyperphosphatemia in setting of ESRD  - Phos 5.8 this morning. Patient already on a phos binder.  Will discuss with nephrology     Chronic Medical Problems:  ESRD on PD  - Dr. Leo Faith consulted  - Continue nightly PD   - sevelamer  - calcitriol, cincacalcet  - vitamin E, B, C, folic acid     DM2 with peripheral neuropathy   - medium-dose SSI   - accuchecks  - hypoglycemia protocol    DVT Prophylaxis: Heparin infusion   Diet: DIET RENAL;  Code Status: Full Code    PT/OT Eval Status: Consulted   Dispo - General medicine/surgery floors     I will discuss the patient with the senior resident and Dr. David Jolley MD.     Susana Bowie MD.   Internal Medicine Resident PGY-1  [place]

## 2019-05-30 PROBLEM — I82.412 DVT OF DEEP FEMORAL VEIN, LEFT (HCC): Status: ACTIVE | Noted: 2019-01-01

## 2019-05-30 NOTE — PROGRESS NOTES
Renal Progress Note  Subjective:   Admit Date: 5/24/2019     HPI      Interval History:   Pt tolerating PD   Doppler - DVT   On heparin gtt       DIET DIET GENERAL; Low Phosphorus  Medications:   Scheduled Meds:   sevelamer  2,400 mg Oral TID WC    warfarin  5 mg Oral Daily    polyethylene glycol  17 g Oral BID    insulin lispro  0-12 Units Subcutaneous TID WC    insulin lispro  0-6 Units Subcutaneous Nightly    aspirin  81 mg Oral Daily    b complex-C-folic acid  1 mg Oral Daily with breakfast    calcitRIOL  0.25 mcg Oral Daily    cinacalcet  60 mg Oral Daily    docusate sodium  100 mg Oral BID    psyllium  1 packet Oral Daily    simvastatin  40 mg Oral Nightly    vitamin E  400 Units Oral Nightly    sodium chloride flush  10 mL Intravenous 2 times per day     Continuous Infusions:   dextrose      heparin (porcine) 10 Units/kg/hr (05/29/19 2036)       Labs:  CBC:   Recent Labs     05/28/19  0608 05/28/19  0915 05/29/19  0603   WBC 6.8 7.3 6.7   HGB 9.4* 10.3* 9.4*    160 161     BMP:    Recent Labs     05/27/19  0741 05/28/19  0608 05/29/19  0603    141 138   K 3.8 3.9 3.7    99 95*   CO2 23 25 25   BUN 54* 56* 55*   CREATININE 10.1* 9.6* 9.8*   GLUCOSE 175* 142* 164*     Ca/Mg/Phos:   Recent Labs     05/27/19  0741 05/28/19  0608 05/29/19  0603   CALCIUM 7.5* 7.6* 7.6*   PHOS  --   --  5.8*     Hepatic: No results for input(s): AST, ALT, ALB, BILITOT, ALKPHOS in the last 72 hours. Troponin: No results for input(s): TROPONINI in the last 72 hours. BNP: No results for input(s): BNP in the last 72 hours. Lipids: No results for input(s): CHOL, TRIG, HDL, LDLCALC, LABVLDL in the last 72 hours. ABGs: No results for input(s): PHART, PO2ART, DPI5BOF in the last 72 hours.   INR:   Recent Labs     05/27/19  0741 05/28/19  0608 05/29/19  0603   INR 1.03 1.03 1.08     UA:No results for input(s): Willian Church, GLUCOSEU, JUSTIN, Mary Bridges, [facility], BLOODU, [facility], PROTEINU,

## 2019-05-30 NOTE — FLOWSHEET NOTE
Treatment time: 11 H 29 M  Net UF: 1847  Alarms: 2     Pre weight: 65 kg  Post weight:63.4kg    Treatment completed without complications or complaints from patient. Effluent yellow/clear and lines taped to patient per protocol. 05/30/19 0654   Vitals   BP 94/64   Temp 98.1 °F (36.7 °C)   Temp Source Oral   Pulse 82   Resp 16   SpO2 97 %   Height 5' 7\" (1.702 m)   Weight 139 lb 12.4 oz (63.4 kg)   Cycler   Ultrafiltration (UF) (mL) 1847 mL   Average Dwell Time (Hours:Minutes) 108.6     Copy of dialysis treatment record placed in chart, to be scanned into EMR.

## 2019-05-30 NOTE — PROGRESS NOTES
Internal Medicine PGY-1 Resident Progress Note        PCP: Cesar Hagan MD (Inactive)    Date of Admission: 5/24/2019    Chief Complaint: Left leg pain and swelling     Subjective: No acute events overnight. This morning patient doing well, has no complaints. Medications:  Reviewed    Infusion Medications    dextrose      heparin (porcine) 10 Units/kg/hr (05/30/19 0103)     Scheduled Medications    sevelamer  2,400 mg Oral TID WC    warfarin  5 mg Oral Daily    polyethylene glycol  17 g Oral BID    insulin lispro  0-12 Units Subcutaneous TID WC    insulin lispro  0-6 Units Subcutaneous Nightly    aspirin  81 mg Oral Daily    b complex-C-folic acid  1 mg Oral Daily with breakfast    calcitRIOL  0.25 mcg Oral Daily    cinacalcet  60 mg Oral Daily    docusate sodium  100 mg Oral BID    psyllium  1 packet Oral Daily    simvastatin  40 mg Oral Nightly    vitamin E  400 Units Oral Nightly    sodium chloride flush  10 mL Intravenous 2 times per day     PRN Meds: heparin (porcine), acetaminophen, capsaicin-menthol-methyl sal, ondansetron, sodium chloride flush, glucose, dextrose, glucagon (rDNA), dextrose, heparin (porcine), heparin (porcine)      Intake/Output Summary (Last 24 hours) at 5/30/2019 1020  Last data filed at 5/30/2019 0911  Gross per 24 hour   Intake 1075.67 ml   Output 74 ml   Net 1001.67 ml       Physical Exam Performed:    BP 95/62   Pulse 80   Temp 98.3 °F (36.8 °C) (Oral)   Resp 16   Ht 5' 2\" (1.575 m)   Wt 143 lb 4.8 oz (65 kg)   LMP  (LMP Unknown)   SpO2 97%   BMI 26.21 kg/m²     General appearance: No apparent distress, appears stated age and cooperative. HEENT: Pupils equal, round, and reactive to light. Conjunctivae/corneas clear. Neck: Supple, with full range of motion. No jugular venous distention. Trachea midline. Respiratory:  Normal respiratory effort. Clear to auscultation, bilaterally without Rales/Wheezes/Rhonchi.   Cardiovascular: Regular rate and rhythm with normal S1/S2 without murmurs, rubs or gallops. Abdomen: Soft, non-tender, non-distended with normal bowel sounds. Musculoskeletal: Left lower extremity swelling, no tenderness to palpation  Skin: Skin color, texture, turgor normal.  No rashes or lesions. Neurologic:  Neurovascularly intact without any focal sensory/motor deficits. Cranial nerves: II-XII intact, grossly non-focal.  Psychiatric: Alert and oriented, thought content appropriate, normal insight  Capillary Refill: Brisk,< 3 seconds   Peripheral Pulses: +2 palpable, equal bilaterally     Labs:   Recent Labs     05/28/19  0915 05/29/19  0603 05/30/19  0014 05/30/19  0257   WBC 7.3 6.7  --  6.9   HGB 10.3* 9.4*  --  10.2*   HCT 31.6* 29.2*  --  32.0*    161 184 189     Recent Labs     05/28/19  0608 05/29/19  0603 05/30/19  0258    138 138   K 3.9 3.7 4.0   CL 99 95* 96*   CO2 25 25 24   BUN 56* 55* 56*   CREATININE 9.6* 9.8* 9.6*   CALCIUM 7.6* 7.6* 7.6*   PHOS  --  5.8*  --      No results for input(s): AST, ALT, BILIDIR, BILITOT, ALKPHOS in the last 72 hours. Recent Labs     05/28/19  0608 05/29/19  0603 05/30/19  0257   INR 1.03 1.08 1.37*     No results for input(s): CKTOTAL, TROPONINI in the last 72 hours. Urinalysis:      Lab Results   Component Value Date    NITRU Negative 04/08/2019    WBCUA 20-50 04/08/2019    BACTERIA 3+ 04/08/2019    RBCUA 10-20 04/08/2019    BLOODU LARGE 04/08/2019    SPECGRAV 1.025 04/08/2019    GLUCOSEU 100 04/08/2019     Assessment/Plan:  Itz Augustin is a 69 y.o. female with PMH of ESRD on PD, HLD, DM2, osteoarthritis, who presented with left leg swelling. Left lower extremity edema 2/2 DVT   - Pharmacy to dose warfarin, being bridged. Goal INR 2-3. Day 3 of bridge.  INR 1.37  - Continue heparin infusion with anti-Xa measurements     Hyperphosphatemia in setting of ESRD     Chronic Medical Problems:  ESRD on PD  - Dr. Kennon Blizzard consulted  - Continue nightly PD   - sevelamer  - calcitriol, cincacalcet  - vitamin E, B, C, folic acid     DM2 with peripheral neuropathy   - medium-dose SSI   - accuchecks  - hypoglycemia protocol    DVT Prophylaxis: Heparin infusion   Diet: DIET GENERAL; Low Phosphorus  Code Status: Full Code    PT/OT Eval Status: Consulted   Dispo - General medicine/surgery floors     I will discuss the patient with the senior resident and Dr. Joceline Hernandez MD.     Tracy Frankel MD.   Internal Medicine Resident PGY-1  [facility]

## 2019-05-30 NOTE — CARE COORDINATION
Case Management Daily Note:    Current Plan of Care:       PT AM-PAC: 17/24    Date: 5/30    OT AM-PAC: 21/24   Date: 5/30    DME needs: No new needs (rolling walker at home)      Discharge Plan: From Home with Christina Ibanez ([phone]). [phone] at [facility]. Safety concerns for pt returning home alone upon d/c, especially being able to manage her PD at home.  Recommend continued PT. Tentative Discharge Date: 5/31 vs. 6/1     Current Barriers to Discharge: Medical Clearance, Patient plan. Resources/Information given: N/A       Case Management Notes: Patient is known to SW from previous admission. See full assessment. Patient was unsure this AM of plan for discharge. [facility]P: [phone] [do not give out to patient], Fax: [phone]). She follows patient. She reported speaking with brother, Reyes Noel. Eric Cohen is concerned about patient returning home and wants to see her go to a skilled facility. However, they would not want [facility] again due to concerns with discharge plan. Reportedly they would want [facility] and patient's nephrologist was in agreement. SW would like to be kept up to date on discharge plan to assist.     (Of note SW followed up with admissions at [facility], they currently do not have a contract to take PD patients at this time).        JOSEFA Leon, LSW     [facility]   [phone]

## 2019-05-30 NOTE — PROGRESS NOTES
Occupational Therapy  Facility/Department: [address]  Daily Treatment Note  NAME: William Mathews  : 1940  MRN: [id number]    Date of Service: 2019    Discharge Recommendations:  William Mathews scored a 21/24 on the AM-PAC ADL Inpatient form. Current research shows that an AM-PAC score of 18 or greater is typically associated with a discharge to the patient's home setting. Based on the patients AM-PAC score and their current ADL deficits, it is recommended that the patient have 2-3 sessions per week of Occupational Therapy at d/c to increase the patients independence. Assessment   Performance deficits / Impairments: Decreased functional mobility ; Decreased ADL status; Decreased endurance  Assessment: Pt presents with a decline in functional independence. Pt is from home alone. Pt appears fatigued and mobility is hampered by IV line and a small room. Pt requires increased time and effort for toileting and functional mobility. Pt plans to go home at discharge. Has all DME at home. Pt would benefit from ongoing OT to increase functional performance. Treatment Diagnosis: Impaired functional mobility and functional activity tolerance  Patient Education: Role of OT/ activity promotion - Pt verbalized understanding  REQUIRES OT FOLLOW UP: Yes  Activity Tolerance  Activity Tolerance: Patient Tolerated treatment well  Activity Tolerance: Pt tolerated treatment well, but required increased time and effort. Slight CEJA w/ functional mobility. Safety Devices  Safety Devices in place: Yes  Type of devices: Nurse notified;Call light within reach; Left in chair;Chair alarm in place       Patient Diagnosis(es): The encounter diagnosis was Pain and swelling of left lower extremity.       has a past medical history of DVT (deep venous thrombosis) ([address] End stage renal disease ([address] Hyperlipidemia, Hypertension, Osteoarthritis, Pancreatitis chronic, Peritoneal dialysis status ([address] Type II or unspecified type diabetes mellitus without mention of complication, not stated as uncontrolled, and Vitamin D deficiency. has a past surgical history that includes Thyroidectomy, partial; Coronary angioplasty; joint replacement (5/4/2011); Cataract removal (Left, 5/29/13); and other surgical history (Left, 05/19/2017). Restrictions  Position Activity Restriction  Other position/activity restrictions: Up with assist  Subjective   General  Chart Reviewed: Yes  Additional Pertinent Hx: Pt admitted 5/24/19 with left leg swelling. 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 Rt (-)           PMH includes: DVT, ESRD, HTN, OA, chronic pancreatitis, DM, Rt THR  Family / Caregiver Present: No  Referring Practitioner: DR. Sheba Quintero  Diagnosis: Left leg swelling  Subjective  Subjective: Pt in bed upon entry. Pt agreeable to OOB activity. Vital Signs  Patient Currently in Pain: Denies   Orientation  Orientation  Overall Orientation Status: Within Functional Limits  Objective    ADL  LE Dressing: Minimal assistance(seated)  Toileting: (increased time and effort)     Balance  Sitting Balance: Independent  Standing Balance: Stand by assistance  Standing Balance  Time: 2 min x 2  Activity: functional activity in bathroom  Functional Mobility  Functional - Mobility Device: Rolling Walker  Activity: To/from bathroom  Assist Level: Stand by assistance  Toilet Transfers  Toilet - Technique: Ambulating  Equipment Used: Standard toilet  Toilet Transfer: Minimal assistance(SBA to get on toilet; Min A to stand up)- has raised seat and Bilateral bars at home.    Bed mobility  Supine to Sit: Supervision(HOB elevated)  Transfers  Sit to stand: Stand by assistance  Stand to sit: Stand by assistance    Cognition  Overall Cognitive Status: WFL(flat affect)     Plan    This note will serve as a discharge summary if patient is discharged from hospital before next treatment session. Plan  Times per week: 2-5  Times per day: Daily  Current Treatment Recommendations: Functional Mobility Training, Endurance Training, Self-Care / ADL    AM-PAC Score  AM-PAC Inpatient Daily Activity Raw Score: 21  AM-PAC Inpatient ADL T-Scale Score : 44.27  ADL Inpatient CMS 0-100% Score: 32.79  ADL Inpatient CMS G-Code Modifier : CJ    Goals  Short term goals  Time Frame for Short term goals: Discharge  Short term goal 1: Transfer to/from toilet with supervision- not met  Short term goal 2: Stance with supervision x5 min while engaging in ADL/functional mobility- not met  Patient Goals   Patient goals : Go home.   \"Be normal\"    Therapy Time   Individual Concurrent Group Co-treatment   Time In 4242         Time Out 1516         Minutes 24            Timed Code Treatment Minutes:   24 min    Total Treatment Minutes:  24 min    Marleny Cameron OT/S

## 2019-05-30 NOTE — CARE COORDINATION
concerns to return home. Patient was agreeable to SW following up again tomorrow to further discuss discharge plan. Service Assessment:       Values / Beliefs  Do you have any ethnic, cultural, sacramental, or spiritual Nondenominational needs you would like us to be aware of while you are in the hospital?: No    Advance Directives (For Healthcare)  Pre-existing DNR Comfort Care/DNR Arrest/DNI Order: No  Healthcare Directive: No, patient does not have an advance directive for healthcare treatment  Information on Healthcare Directives Requested: No  Patient Requests Assistance: No  Advance Directives: Pt. not interested at this time      Destination:  [facility]. Durable Medical Equipment:   Patterns/Skilled Nursing Prior to Admission:  Home care at home? No   Provider:    Provider Phone:    [address]:  Previously with [address]: no concerns reported by patient    Potential Assistance Purchasing Medications:  No  Does patient want to participate in local refill/meds to beds program?: Not Assessed    Goals of Care:  Patient expects to be discharged to[de-identified] home  Patient plans for SNF/Placement: Home at this time.           Mode of transport from hospital: unknown      Barriers to discharge: medical clearance, discharge plan, home care set up, transportation      JOSEFA Osuna  [facility]  Case Management Department  Ph: [phone]

## 2019-05-30 NOTE — PROGRESS NOTES
Clinical Pharmacy Progress Note    Admit date: 5/24/2019     Subjective/Objective:  Pt is a 67yof with PMHx that includes ESRD on PD, HTN, HLD, DM, OA, and vitamin D deficiency who is admitted with LLE swelling - Dopplers confirmed LLE DVT. Interval update:  Remains on Heparin drip and Warfarin - INR now starting to increase after starting Warfarin 5/28. Pharmacy is consulted to dose Warfarin per Dr. Elizabeth Gonzales  Target INR = 2.0-3.0 for acute DVT  Patient was not on Warfarin prior to this admission    Current anticoagulation regimen:  Heparin infusion  Warfarin - Pharmacy to dose    Date INR Warfarin   5/28 1.03 7.5mg   5/29 1.08 5mg   5/30 1.37                  Recent Labs     05/29/19  0603 05/30/19  0258    138   K 3.7 4.0   CL 95* 96*   CO2 25 24   BUN 55* 56*   CREATININE 9.8* 9.6*   GLUCOSE 164* 179*       Estimated Creatinine Clearance: 4 mL/min (A) (based on SCr of 9.6 mg/dL [facility])). Lab Results   Component Value Date    WBC 6.9 05/30/2019    HGB 10.2 (L) 05/30/2019    HCT 32.0 (L) 05/30/2019    .0 (H) 05/30/2019     05/30/2019       Lab Results   Component Value Date    PROTIME 15.6 (H) 05/30/2019    INR 1.37 (H) 05/30/2019       Height:  5' 2\" (157.5 cm)  Weight:  143 lb 4.8 oz (65 kg)    Prophylaxis:  VTE:  Heparin gtt + Warfarin  GI:  Not indicated    Vaccination screening:  Pneumonia:  Previously received Pneumovax-23 > 1yr ago. Prevnar-13 given 5/29. Influenza:  Out of season    Assessment/Plan:  1)  Acute LLE DVT, on Warfarin:  Pharmacy to dose, target INR = 2.0-3.0  · INR today = 1.37  · Continue Warfar 5mg po daily - INR now starting to increase after receiving 2 doses of Warfarin. · Recommend continuing Heparin drip until INR is > 2.0 and stable. · Enoxaparin is not an option for bridge therapy as patient has ESRD. · Daily INR will be monitored closely and dose adjustments will be made as appropriate.     Please call with questions--  Thanks--  Cholo Gonzales PharmD, BCPS, BCGP  [id number] or [id number] ([facility])  5/30/2019 9:39 AM

## 2019-05-30 NOTE — PLAN OF CARE
Problem: Falls - Risk of:  Goal: Will remain free from falls  Description  Will remain free from falls  5/30/2019 1515 by Ruperto Estes RN  Outcome: Ongoing  Note:   Patient is a fall risk. See Fall Risk assessment for details. Bed is in low, lock position; call light/belongings within reach. No attempts to get out of bed have been made, calls appropriately when assistance is needed. Bed alarm and hourly rounds in place for safety. Will continue to monitor and reassess throughout shift.         Problem: Pain:  Goal: Pain level will decrease  Description  Pain level will decrease  Outcome: Ongoing  Note:   Patient has had no complaints of pain this shift, will continue to reassess

## 2019-05-30 NOTE — PLAN OF CARE
Problem: Falls - Risk of:  Goal: Will remain free from falls  Description  Will remain free from falls  Note:   . Patient at risk for falls. Patient resting quietly in bed. Side rails up x 2/4. Bed locked in lowest position. Bed alarm on. Bedside table and call light within reach. Patient instructed to call for assistance. Patient verbalized understanding. Will continue to monitor. Carlos Falcon               Problem: Pain:  Goal: Control of acute pain  Description  Control of acute pain  Note:   . Patient complains of pain at level 5/10. Patient describes pain as headache. Patient requests pain medication. Patient medicated with (see MAR). Will continue to monitor. Carlos Falcon

## 2019-05-31 NOTE — PLAN OF CARE
Problem: Nutrition  Goal: Optimal nutrition therapy  Note:   Nutrition Problem: Increased nutrient needs  Intervention: Food and/or Nutrient Delivery: Continue current diet  Nutritional Goals: pt will consume greater than 75% of meals offered consistently through admission to meet increased nutrient needs through admission

## 2019-05-31 NOTE — FLOWSHEET NOTE
05/31/19 0930   Vitals   /64   Temp 97.7 °F (36.5 °C)   Temp Source Oral   Pulse 78   Resp 16   Weight 142 lb 6.7 oz (64.6 kg)   Cycler   Ultrafiltration (UF) (mL) 544 mL   Average Dwell Time (Hours:Minutes) 106   Lost Dwell Time (Hours:Minutes) 01:59     tx tolerated well, pt disconnected using aseptic technique, flowsheets printed and put in pt chart for EMR

## 2019-05-31 NOTE — PROGRESS NOTES
Physical Therapy    Daily Treatment Note      Assessment:  Increased transfers and gait today. Mobility is slow, but overall steady using rolling walker. If home, recommend home PT. Discussed with social work. Discharge Recommendations:  Angelina Osler scored a 18/24 on the AM-PAC short mobility form. Current research shows that an AM-PAC score of 18 or greater is typically associated with a discharge to the patient's home setting. Based on the patients AM-PAC score and their current functional mobility deficits, it is recommended that the patient have 2-3 sessions per week of Physical Therapy at d/c to increase the patients independence. HOME HEALTH CARE: LEVEL 1 STANDARD  - Initial home health evaluation to occur within 24-48 hours, in patient home   - Therapy to evaluate with goal of regaining prior level of functioning   - Therapy to evaluate if patient has [address] needs for personal care    Equipment Needs: No new needs anticipated for d/c      Chart Reviewed: Yes   Other position/activity restrictions: Up with assist   Additional Pertinent Hx: 68 yo admitted 5/24/19 for L LE swelling. LE dopplers positive for partially occluding chronic L LE DVT; pt started empiracally on Heparin. Pmhx:  R THR, ESRD on peritoneal dialysis, HTN, chronic pancreatitis, DM with peripheral neuropathy. Diagnosis: L LE swelling   Treatment Diagnosis: mobility impairment due to L LE swelling      Subjective:  Pt found supine in bed. Pt agreeable for PT with encouragement. Pt with minimal conversation throughout session. \"I am going to a facility. \"    Pain:  Denies      Objective:    Bed mobility  Supine to sit:  Supervision (HOB raised and use of rail)    Transfers  Sit to stand:  SBA from EOB and chair to walker  Stand to sit:  SBA    Ambulation  Assistance Level:  SBA  Assistive device:  Rolling walker  Distance:  100ft   Quality of gait:  Slow gait, decreased step length    Neuro/balance  Standing balance:  Overall SBA with rolling walker for support    Patient Education  Role of PT. Pt verbalized understanding. Safety Devices  Pt left with needs in reach, seated in chair with alarm in place and RN aware. Assessment:  Increased transfers and gait today. Mobility is slow, but overall steady using rolling walker. If home, recommend home PT. Discussed with social work. AM-PAC score  AM-PAC Inpatient Mobility Raw Score : 18  AM-PAC Inpatient T-Scale Score : 43.63  Mobility Inpatient CMS 0-100% Score: 46.58  Mobility Inpatient CMS G-Code Modifier : CK    Goals:  Goals not met this treatment, continue with current goals. Time Frame for Short term goals: discharge  Short term goal 1: sit to/from stand supervision   Short term goal 2: ambulate 40 ft with rolling walker supervision     Plan:  Times per week: 2-5;    Current Treatment Recommendations: Strengthening, Transfer Training, Endurance Training, Gait Training, Balance Training, Functional Mobility Training    Therapy Time    Individual  Concurrent  Group  Co-treatment    Time In  1320            Time Out  1344            Minutes  24            Timed Code Treatment Minutes:  24  Total Treatment Minutes:  24      If patient is discharged prior to next treatment, this note will serve as the discharge summary.     Dedrick Justice PT

## 2019-05-31 NOTE — CARE COORDINATION
Elroy Deep placement completed in [facility]/PAS?: No     Discharge disposition: Home with Home Health       Ancillary: see previous case management notes for further details. Toy and her family were provided with choice of provider; she and her family are in agreement with the discharge plan.       Care Transitions patient: JOSEFA Lopez  [facility]  Case Management Department  Ph: [phone]

## 2019-05-31 NOTE — CARE COORDINATION
Critical access hospital    DC order noted, all docs needed have been faxed to [facility] for home care services.         Gerson Keen  Work mobile: [phone]  [facility] office: [phone]

## 2019-05-31 NOTE — PROGRESS NOTES
Clinical Pharmacy Progress Note    Admit date: 5/24/2019     Subjective/Objective:  Pt is a 67yof with PMHx that includes ESRD on PD, HTN, HLD, DM, OA, and vitamin D deficiency who is admitted with LLE swelling - Dopplers confirmed LLE DVT. Interval update:  INR therapeutic today after significant increase from 1.37 --> 2.9 today. Pharmacy is consulted to dose Warfarin per Dr. Schilling Area  Target INR = 2.0-3.0 for acute DVT  Patient was not on Warfarin prior to this admission    Current anticoagulation regimen:  Heparin infusion  Warfarin - Pharmacy to dose    Date INR Warfarin   5/28 1.03 7.5mg   5/29 1.08 5mg   5/30 1.37 5mg   5/31 2.90             Recent Labs     05/30/19  0258 05/31/19  0746    138   K 4.0 3.9   CL 96* 96*   CO2 24 24   BUN 56* 54*   CREATININE 9.6* 9.6*   GLUCOSE 179* 169*       CrCl is not estimated 2/2 ESRD on PD    Lab Results   Component Value Date    WBC 6.9 05/31/2019    HGB 9.6 (L) 05/31/2019    HCT 29.6 (L) 05/31/2019    .2 (H) 05/31/2019     05/31/2019       Lab Results   Component Value Date    PROTIME 33.1 (H) 05/31/2019    INR 2.90 (H) 05/31/2019       Height:  5' 7\" (170.2 cm)  Weight:  142 lb 13.7 oz (64.8 kg)    Prophylaxis:  VTE:  Heparin gtt + Warfarin  GI:  Not indicated    Vaccination screening:  Pneumonia:  Previously received Pneumovax-23 > 1yr ago. Prevnar-13 given 5/29. Influenza:  Out of season    Assessment/Plan:  1)  Acute LLE DVT, on Warfarin:  Pharmacy to dose, target INR = 2.0-3.0  · INR today = 2.9  · Given significant increase in INR from 1.37-->2.9 since yesterday and therapeutic INR after receiving just 3 total doses of Warfarin, will hold Warfarin today and will restart at lower dose of 2.5mg po daily tomorrow. · Patient has been on Heparin drip x7 days and INR now therapeutic - recommend D/C'ing heparin drip. · Daily INR will be monitored closely and dose adjustments will be made as appropriate.     · If discharged today, recommend not giving any warfarin today, and having patient start Warfarin 2.5mg po daily tomorrow 6/1. Recommend INR check via Home health no later than Monday 6/3.     Please call with questions--  Thanks--  Soraida Carney, PharmD, [address] or [phone] (wireless)  5/31/2019 9:34 AM

## 2019-05-31 NOTE — PROGRESS NOTES
Pt is awake in bed. Bed is locked and in lowest position with bed alarm on. Call light and bedside table are within reach. Pt is A&O. VSS. Assessment is complete and documented. Pt has no concerns at this time. Will continue to monitor.

## 2019-05-31 NOTE — PROGRESS NOTES
Internal Medicine PGY-1 Resident Progress Note        PCP: Kim Troy MD (Inactive)    Date of Admission: 5/24/2019    Chief Complaint: Left leg pain and swelling     Subjective: No acute events overnight. This morning patient doing well, has no complaints. Asks about going to a facility on discharge. Medications:  Reviewed    Infusion Medications    dextrose      heparin (porcine) 10 Units/kg/hr (05/31/19 0649)     Scheduled Medications    [START ON 6/1/2019] calcitRIOL  0.5 mcg Oral Daily    [START ON 6/1/2019] warfarin  2.5 mg Oral Daily    sevelamer  2,400 mg Oral TID WC    polyethylene glycol  17 g Oral BID    insulin lispro  0-12 Units Subcutaneous TID WC    insulin lispro  0-6 Units Subcutaneous Nightly    aspirin  81 mg Oral Daily    b complex-C-folic acid  1 mg Oral Daily with breakfast    cinacalcet  60 mg Oral Daily    docusate sodium  100 mg Oral BID    psyllium  1 packet Oral Daily    simvastatin  40 mg Oral Nightly    vitamin E  400 Units Oral Nightly    sodium chloride flush  10 mL Intravenous 2 times per day     PRN Meds: heparin (porcine), acetaminophen, capsaicin-menthol-methyl sal, ondansetron, sodium chloride flush, glucose, dextrose, glucagon (rDNA), dextrose, heparin (porcine), heparin (porcine)      Intake/Output Summary (Last 24 hours) at 5/31/2019 1033  Last data filed at 5/31/2019 0929  Gross per 24 hour   Intake 1197.71 ml   Output 10 ml   Net 1187.71 ml       Physical Exam Performed:    /69   Pulse 76   Temp 98.3 °F (36.8 °C) (Oral)   Resp 16   Ht 5' 7\" (1.702 m)   Wt 142 lb 13.7 oz (64.8 kg)   LMP  (LMP Unknown)   SpO2 95%   BMI 22.37 kg/m²     General appearance: No apparent distress, appears stated age and cooperative. HEENT: Pupils equal, round, and reactive to light. Conjunctivae/corneas clear. Neck: Supple, with full range of motion. No jugular venous distention. Trachea midline. Respiratory:  Normal respiratory effort.  Clear to auscultation, bilaterally without Rales/Wheezes/Rhonchi. Cardiovascular: Regular rate and rhythm with normal S1/S2 without murmurs, rubs or gallops. Abdomen: Soft, non-tender, non-distended with normal bowel sounds. Musculoskeletal: Left lower extremity swelling and warmth, no tenderness to palpation  Skin: Skin color, texture, turgor normal.  No rashes or lesions. Neurologic:  Neurovascularly intact without any focal sensory/motor deficits. Cranial nerves: II-XII intact, grossly non-focal.  Psychiatric: Alert and oriented, thought content appropriate, normal insight  Capillary Refill: Brisk,< 3 seconds   Peripheral Pulses: +2 palpable, equal bilaterally     Labs:   Recent Labs     05/29/19  0603 05/30/19  0014 05/30/19  0257 05/31/19  0746   WBC 6.7  --  6.9 6.9   HGB 9.4*  --  10.2* 9.6*   HCT 29.2*  --  32.0* 29.6*    184 189 182     Recent Labs     05/29/19  0603 05/30/19  0258 05/31/19  0746    138 138   K 3.7 4.0 3.9   CL 95* 96* 96*   CO2 25 24 24   BUN 55* 56* 54*   CREATININE 9.8* 9.6* 9.6*   CALCIUM 7.6* 7.6* 7.6*   PHOS 5.8*  --   --      No results for input(s): AST, ALT, BILIDIR, BILITOT, ALKPHOS in the last 72 hours. Recent Labs     05/29/19  0603 05/30/19  0257 05/31/19  0746   INR 1.08 1.37* 2.90*     No results for input(s): Rossy Lacrosse in the last 72 hours. Urinalysis:      Lab Results   Component Value Date    NITRU Negative 04/08/2019    WBCUA 20-50 04/08/2019    BACTERIA 3+ 04/08/2019    RBCUA 10-20 04/08/2019    BLOODU LARGE 04/08/2019    SPECGRAV 1.025 04/08/2019    GLUCOSEU 100 04/08/2019     Assessment/Plan:  Nayely Moncada is a 69 y.o. female with PMH of ESRD on PD, HLD, DM2, osteoarthritis, who presented with left leg swelling. Left lower extremity edema 2/2 DVT   - Pharmacy to dose warfarin, being bridged. Goal INR 2-3. Day 4 of bridge. INR therapeutic today at 2.9. Pharmacy recommends 2.5mg po qd on discharge with INr check no later than Monday.   - Continue heparin infusion with anti-Xa measurements     Hyperphosphatemia in setting of ESRD     Chronic Medical Problems:  ESRD on PD  - Dr. Gilbert Upton consulted  - Continue nightly PD   - sevelamer  - calcitriol, cincacalcet  - vitamin E, B, C, folic acid     DM2 with peripheral neuropathy   - medium-dose SSI   - accuchecks  - hypoglycemia protocol    DVT Prophylaxis: Heparin infusion   Diet: DIET GENERAL; Low Phosphorus  Code Status: Full Code    PT/OT Eval Status: Consulted   Dispo - General medicine/surgery floors     I will discuss the patient with the senior resident and Dr. Yogesh Blancas MD.     Carly Jerome MD.   Internal Medicine Resident PGY-1  [facility]

## 2019-05-31 NOTE — FLOWSHEET NOTE
05/30/19 2058   Vitals   /61   Temp 98.3 °F (36.8 °C)   Temp Source Oral   Pulse 93   Resp 18   SpO2 95 %   Weight 138 lb 3.7 oz (62.7 kg)   Cycler   Verification of Prescription CCPD   Total Volume Programmed 96332 mL   Therapy Time (Hours:Minutes) 12:00   Fill Volume 2000 mL   Last Fill Volume 0 mL   Dextrose Setting Different (Extraneal)   Number of Cycles 5   Bag Volume 5000 mL   Number of Bags Used 2   Dianeal Solution Other (Comment)  (Delflex 1.5% in 5000 ml and Delflex 2.5% in 5000 ml)   I Drain (mL) 10 mL       Orders verified. Supplies taken to pt's room. Report received. Cycler set up, primed and pre tested. [facility] Pt connected to cycler. CCPD initiated without problem.  Initial effluent clear

## 2019-05-31 NOTE — PROGRESS NOTES
Nutrition Assessment    Type and Reason for Visit: Initial    Nutrition Recommendations:   1. Continue low phos diet. 2. RD recommending use of ONS w/pt varied po intake, however pt refused at this time. 3. Low Phos diet edu provided w/handouts. Nutrition Assessment: LOS nutr eval. Pt nutritionally compromised w/elevated nutrition needs r/t PD and po itnake less than 50% and varied PO intake through admission. Pt provided w/verbal review of low phosphorus diet and encouraged use of ONS to promote additional nutrition needs. RD declined ONS at this time. Edu handouts provided. Malnutrition Assessment:  · Malnutrition Status: At risk for malnutrition  · Context: Chronic illness  · Findings of the 6 clinical characteristics of malnutrition (Minimum of 2 out of 6 clinical characteristics is required to make the diagnosis of moderate or severe Protein Calorie Malnutrition based on AND/ASPEN Guidelines):  1. Energy Intake-Unable to assess(varied po through admit), Greater than or equal to 7 days    2. Weight Loss-No significant weight loss,    3. Fat Loss-Unable to assess,    4. Muscle Loss-Unable to assess,    5. Fluid Accumulation-No significant fluid accumulation,    6.  Strength-Not measured    Nutrition Risk Level:  Moderate    Nutrient Needs:  · Estimated Daily Total Kcal: 9156-6424(90-57)  · Estimated Daily Protein (g):  g(1.5-1.8)  · Estimated Daily Total Fluid (ml/day): 1976-6479     Nutrition Diagnosis:   · Problem: Increased nutrient needs  · Etiology: related to Increased demand for energy/nutrients     Signs and symptoms:  as evidenced by Known losses from dialysis    Objective Information:  · Nutrition-Focused Physical Findings: wears upper/lower dentures;   · Wound Type: None  · Current Nutrition Therapies:  · Oral Diet Orders: (low phos diet)   · Oral Diet intake: 1-25%, 26-50%, 51-75%  · Oral Nutrition Supplement (ONS) Orders: None  · Anthropometric Measures:  · Ht: 5' 7\" (170.2 cm)   · Current Body Wt: 142 lb 6.7 oz (64.6 kg)  · Ideal Body Wt: 135 lb (61.2 kg),  · BMI Classification: BMI 18.5 - 24.9 Normal Weight    Nutrition Interventions:   Continue current diet  Education Initiated, Continued Inpatient Monitoring    Nutrition Evaluation:   · Evaluation: Goals set   · Goals: pt will consume greater than 75% of meals offered consistently through admission to meet increased nutrient needs through admission    · Monitoring: Meal Intake, Pertinent Labs      Electronically signed by Rubén Joe RD, LD on 5/31/19 at 3:37 PM    Contact Number: [phone]

## 2019-05-31 NOTE — CARE COORDINATION
SW spoke with Therapy who reported patient scored better on therapy today 18/24 AM-PAC. While safety concerns noted. Patient is at or near functional baseline. MICHELLE received call from Dr. Gisela Tenorio, nephrology. MICHELLE dicussed discharge plan. Dr. Gisela Tenorio stated he does not feel patient needs short-term rehab, but that he is recommending long-term care placement as she is unable to maintain with her PD and Coumadin. He provide some options that have contracts and reportedly accept PD patients. Patient is medically stable to leave the hospital per team. Patient was agreeable to home care until today. SW met with patient at bedside. Patient was not aware that team wanted her to explore long-term placement. Patient reported her brother wants her to go to a facility. SW explain the process for locating long-term care and that they would have to find a facility that could accommodate her PD hor she may have to switch to Hemodialysis. SW noted that she can work with her brother, [employer]  and team at home to locate appropriate long-term care option if she would like to pursue. SW spent 40 minutes talking to patient at bedside and discussing plans, options, risk of remaining in the hospital past medical discharge. Patient stated she would prefer to go home and expressed concerns regarding her credit card bills, rent payment, car insurance, etc. If she were to go with to a facility. SW stated she would have to work with her brother on making those payments. Patient stated she would prefer to go home and was upset and \"felt rushed\" with this new information that Dr. Gisela Tenorio wanted her to explore long-term care. Patient stated she is not opposed to long-term care. SW dicussed her working with her brother and her [employer]  on locating long-term care options and visiting them to find the right placement/fit. Patient agreeable to this plan.      Patient then stated her brother may not be able to pick her up today. SW noted he can come anytime between now and 11:50 pm to pick her up and that would be okay. Patient to call him for transport. SW received perfect serve from team. SW noted to team patient's current evals insurance would not approve short-term rehab. Patient to discharge home with home care. Patient to pursue LTC from community. MICHELLE placed call to Violeta Jenkins ([phone]) and provided update. She also understanding that brother is helping with equipment and PD at night and switch to HD could be helpful. She can follow with brother and patient on locating possible long-term care options as patient is now open to this. Violeta Jenkins is working on approval of home wavier program services at [facility] that could increase care. SHE WAS APPROVED FOR [facility] [facility] per [address] [place] [address]  and would receive increase of services at discharge. [place]LE to [place] [facility]. [facility] can provide services over the weekend.      Lamont Hernandez, JOSEFA, LSW     [facility]   [phone]

## 2019-05-31 NOTE — CARE COORDINATION
Case Management Daily Note:    Current Plan of Care: SNF referrals placed per patient request.       PT AM-PAC: 17/24    Date: 5/29    OT AM-PAC: 21/24   Date: 5/30    DME needs: None       Discharge Plan: Patient and Team are requesting SNF. Would need INR checks at discharge. Tentative Discharge Date: 6/4    Current Barriers to Discharge: Placement pending, pre-cert pending, PD set up at facility pending     Resources/Information given: SNF list/choices. Case Management Notes: SW received consult form Dr. Mike Preciado and Dr. Keli Washington. Patient reportedly wants placement and they would like SW to explore options. Dr. Vinny Tello also came by and spoke with SW and would like to explore options for patient and mentioned [address] as possible options. SW noted that patient would then remain through the weekend for pre-cert and also need PD teaching at facility arranged prior to discharge. Team reported understanding.     (of note patient does not want to return to [facility] at this time) [facility] and Home at [facility] are other options). SW met with patient at bedside. Patient stated she does not feel comfortable going home and would like referrals to placement. She asked about [facility] and SW noted that [facility] is not currently accepting PD patients. SW noted that Dr. Rosaline Mahajan mentioned [facility] and [facility] she was agreeable to referrals there. Patient was also agreeable to referrals to [address] [place] [address] Rd., [address]. SW made referrals in Epic to all but [facility]. SW placed call to Admissions at [phone] was out of office [phone] [facility]). [facility] will review and follow up with DON and call SW back. SW awaiting responses. Patient may continue to improve with therapy and thus no longer be SNF eligible. [facility] Can accept patient for home care at any time as co-current plan.      UPDATE: 1:35 pm. MICHELLE spoke with PT who will provide updated assessment as last assessment is two days prior. [facility] unable to accept patient due to level of acuity then do not have staffing at this time.        Hallie Woody, MSW, LSW     [facility]    [phone]

## 2019-05-31 NOTE — PROGRESS NOTES
Wilburt Rodes, BLOODU, PHUR, PROTEINU, UROBILINOGEN, NITRU, LEUKOCYTESUR, Josr Area in the last 72 hours. Urine Microscopic: No results for input(s): LABCAST, BACTERIA, COMU, HYALCAST, WBCUA, RBCUA, EPIU in the last 72 hours. Urine Culture: No results for input(s): LABURIN in the last 72 hours. Urine Chemistry: No results for input(s): Marcio Fetch, PROTEINUR, NAUR in the last 72 hours.     Objective:   Vitals: /69   Pulse 76   Temp 98.3 °F (36.8 °C) (Oral)   Resp 16   Ht 5' 7\" (1.702 m)   Wt 142 lb 13.7 oz (64.8 kg)   LMP  (LMP Unknown)   SpO2 95%   BMI 22.37 kg/m²    Wt Readings from Last 3 Encounters:   05/31/19 142 lb 13.7 oz (64.8 kg)   04/15/19 139 lb 5.3 oz (63.2 kg)   03/27/19 133 lb 9.6 oz (60.6 kg)      24HR INTAKE/OUTPUT:      Intake/Output Summary (Last 24 hours) at 5/31/2019 0853  Last data filed at 5/31/2019 1915  Gross per 24 hour   Intake 1387.71 ml   Output 10 ml   Net 1377.71 ml     Constitutional:  Alert, awake, no apparent distress  NECK: supple, no JVD  Cardiovascular:  S1, S2 without m/r/g  Respiratory:  CTA B without w/r/r  Abdomen: +bs, soft, nt  Ext: + LE edema    Assessment and Plan:       IMAGING:  VL Extremity Venous Bilateral   Final Result          Assessment/Plan     ESRD - ON PD   HTN - controlled   DVT - on Heparin +comadin   BMD - On phos binders   Anemia - On EPO   Check PTH in am           Arsenio Ramirez MD  [facility] -[phone]  [phone]

## 2019-06-01 NOTE — PROGRESS NOTES
Discharge order was received. Discharge paperwork was reviewed with pt and family. Pt and family verbalized understanding and had no questions. Per families request, [facility] [place]. Pt was discharged with all personal belongings in a wheel chair to a private vehicle for home.

## 2019-06-01 NOTE — PROGRESS NOTES
Renal Progress Note  Subjective:   Admit Date: 5/24/2019     HPI      Interval History:   Pt tolerating PD   Doppler - DVT   Placement is an issue       DIET No diet orders on file  Medications:   Scheduled Meds:    Continuous Infusions:      Labs:  CBC:   Recent Labs     05/29/19  0603 05/30/19  0014 05/30/19  0257 05/31/19  0746   WBC 6.7  --  6.9 6.9   HGB 9.4*  --  10.2* 9.6*    184 189 182     BMP:    Recent Labs     05/29/19  0603 05/30/19  0258 05/31/19  0746    138 138   K 3.7 4.0 3.9   CL 95* 96* 96*   CO2 25 24 24   BUN 55* 56* 54*   CREATININE 9.8* 9.6* 9.6*   GLUCOSE 164* 179* 169*     Ca/Mg/Phos:   Recent Labs     05/29/19  0603 05/30/19  0258 05/31/19  0746   CALCIUM 7.6* 7.6* 7.6*   PHOS 5.8*  --   --      Hepatic: No results for input(s): AST, ALT, ALB, BILITOT, ALKPHOS in the last 72 hours. Troponin: No results for input(s): TROPONINI in the last 72 hours. BNP: No results for input(s): BNP in the last 72 hours. Lipids: No results for input(s): CHOL, TRIG, HDL, LDLCALC, LABVLDL in the last 72 hours. ABGs: No results for input(s): PHART, PO2ART, API5YQU in the last 72 hours. INR:   Recent Labs     05/29/19  0603 05/30/19  0257 05/31/19  0746   INR 1.08 1.37* 2.90*     UA:No results for input(s): Ginger Stack, GLUCOSEU, BILIRUBINUR, Larnell Hoots, BLOODU, PHUR, PROTEINU, UROBILINOGEN, NITRU, LEUKOCYTESUR, Avie Ee in the last 72 hours. Urine Microscopic: No results for input(s): LABCAST, BACTERIA, COMU, HYALCAST, WBCUA, RBCUA, EPIU in the last 72 hours. Urine Culture: No results for input(s): LABURIN in the last 72 hours. Urine Chemistry: No results for input(s): Coralyn Allen, PROTEINUR, NAUR in the last 72 hours.     Objective:   Vitals: /82   Pulse 95   Temp 97.7 °F (36.5 °C) (Oral)   Resp 18   Ht 5' 7\" (1.702 m)   Wt 142 lb 6.7 oz (64.6 kg)   LMP  (LMP Unknown)   SpO2 96%   BMI 22.31 kg/m²    Wt Readings from Last 3 Encounters:   05/31/19 142 lb

## 2019-06-03 NOTE — DISCHARGE SUMMARY
motion. No jugular venous distention. Trachea midline. Respiratory:  Normal respiratory effort. Clear to auscultation, bilaterally without Rales/Wheezes/Rhonchi. Cardiovascular: Regular rate and rhythm with normal S1/S2 without murmurs, rubs or gallops. Abdomen: Soft, non-tender, non-distended with normal bowel sounds. Musculoskeletal: Left lower extremity swelling and warmth, no tenderness to palpation  Skin: Skin color, texture, turgor normal.  No rashes or lesions. Neurologic:  Neurovascularly intact without any focal sensory/motor deficits.  Cranial nerves: II-XII intact, grossly non-focal.  Psychiatric: Alert and oriented, thought content appropriate, normal insight  Capillary Refill: Brisk,< 3 seconds   Peripheral Pulses: +2 palpable, equal bilaterally     Consults: nephrology  Disposition: home  Discharged Condition: Stable  Follow Up: Primary Care Physician in one week    DISCHARGE MEDICATION:     Medication List      START taking these medications    warfarin 2.5 MG tablet  Commonly known as:  COUMADIN  Take 1 tablet by mouth daily        CONTINUE taking these medications    acetaminophen 325 MG tablet  Commonly known as:  TYLENOL  Take 2 tablets by mouth every 6 hours as needed for Pain     aspirin 81 MG EC tablet  Take 1 tablet by mouth daily     B complex-vitamin C-folic acid 1 MG tablet     calcitRIOL 0.25 MCG capsule  Commonly known as:  ROCALTROL  Take 1 capsule by mouth daily     Capsaicin-Lidocaine-Menthol 0.05-5-3 % Crea  Apply 1 applicator topically 2 times daily as needed (BLLE numbness, neuropathy, pain)     cinacalcet 60 MG tablet  Commonly known as:  SENSIPAR  Take 1 tablet by mouth daily     darbepoetin sandeep-polysorbate 60 MCG/0.3ML Sosy injection  Commonly known as:  ARANESP  Inject 0.3 mLs into the skin once a week     docusate sodium 100 MG capsule  Commonly known as:  COLACE  Take 1 capsule by mouth 2 times daily     Medical Compression Stockings Misc  1 each by Does not apply route daily     ondansetron 4 MG tablet  Commonly known as:  ZOFRAN  Take 1 tablet by mouth every 8 hours as needed for Nausea     psyllium 95 % Pack packet  Commonly known as:  HYDROCIL  Take 1 packet by mouth daily     sevelamer 800 MG tablet  Commonly known as:  RENVELA  Take 2 tablets by mouth 3 times daily (with meals)     simvastatin 40 MG tablet  Commonly known as:  ZOCOR  Take 1 tablet by mouth nightly     vitamin E 400 UNIT capsule  Take 1 capsule by mouth nightly           Where to Get Your Medications      You can get these medications from any pharmacy    Bring a paper prescription for each of these medications  · warfarin 2.5 MG tablet       Activity: activity as tolerated  Diet: diabetic diet  Wound Care: none needed    Time Spent on discharge is more than 30 minutes    Signed:  Saman Baumann MD   Internal Medicine, PGY1  6/3/2019

## 2019-06-11 NOTE — PATIENT INSTRUCTIONS
Please STOP TAKING COUMADIN. Start Eliquis 2.5mg BID for anticoagulation. Please see podiatry for R foot pain as soon as you can. Follow-up with resident clinic in 3 months. [employer] notified of the above changes.

## 2019-06-17 PROBLEM — B35.1 ONYCHOMYCOSIS: Status: ACTIVE | Noted: 2019-01-01

## 2019-06-17 NOTE — PROGRESS NOTES
Department of Podiatry  Resident Progress Note    Lissett Russo  Allergies: Patient has no known allergies. SUBJECTIVE  The patient is a 69 y.o. female who presents to clinic for diabetic foot care. Patient complains of painful toenails 1-5 b/l and states they are painful due to their length and thickness, especially when ambulating in closed toe shoe gear. Patient describes the pain as achy in nature, rating it as a 3/10 on the pain scale, and states pain is alleviated with regular professional debridement. Patient admits to recent hospitalization for a blood clot in the left LE and states she is now taking medication for it. Patient admits to being a type 2 diabetic, but denies checking her blood sugar. Patient states she is no longer on any diabetic medications and is \"diet controlled\". Patient denies any burning, numbness, or tingling b/l. Patient denies any f/n/v/c/sob/cp and has no other pedal complaints at this time. Past Medical History:        Diagnosis Date    DVT (deep venous thrombosis) ([facility])     End stage renal disease ([facility])     Hyperlipidemia     Hypertension     Osteoarthritis     Pancreatitis chronic     Peritoneal dialysis status ([facility] [address]     Type II or unspecified type diabetes mellitus without mention of complication, not stated as uncontrolled     Vitamin D deficiency        REVIEW OF SYSTEMS:  CONSTITUTIONAL:  negative  MUSCULOSKELETAL:  negative  NEUROLOGICAL:  positive for numbness, tingling of the RLE only     OBJECTIVE  VASCULAR: DP and PT pulses are palpable 1/4 b/l. CFT is brisk to the digits of the foot b/l. Skin temperature is warm to cool from proximal to distal with no focal calor noted. 1+ pitting edema noted b/l. NEUROLOGIC: Gross and epicritic sensation is intact b/l. Protective sensation is appreciated at all pedal sites on the left, but is absent on the right. DERMATOLOGIC: Nails 1-5 b/l are thickened, elongated, and discolored.  Webspaces 1-4 b/l are clean, dry, and intact. Hyperkeratosis noted to dorsal PIPJ 2 b/l. No open wounds noted. No subcutaneous nodules, rashes, or other skin lesions noted. MUSCULOSKELETAL: Muscle strength is 5/5 for all pedal groups tested. HAV deformity noted to the b/l LE. Rigid HT deformity digit 2 noted b/l. Biomechanics:   Gait Analysis:  Angle of Gait    R: 10  L:  10 deg abducted     Base of Gait (centimeters) 3.5 btw malleoli     Stance Phase (any abnormal findings): Pes planus foot type noted b/l, HAV deformity noted b/l, rigid hammertoe deformity noted 2nd digit, b/l      Swing Phase (any abnormal findings): N/A     Postural Considerations (limb length/assymetry): N/A   Ankle Joint: Dorsiflexion (KE):  R: 0   L:  0     Dorsiflexion (KF):  R: 5 L:  5     Subtalar: Inversion:   R:  10  L:  10     Eversion:   R:  5  L:  5     Neutral Position ((inv+ev)/3): R:  5 L:  5     Midtarsal: 1-5 Position:   R: 0 Varus/Valgus  L: 0 Varus/Valgus     First Ray: Dorsiflexion:   R:5mm L: 5mm     Plantarflexion:   R:5mm L: 5mm     First MPJ:  Dorsiflexion (unloaded): R: 40 L: 40     Dorsiflexion (loaded):  R: 40 L: 40     Static Stance: RCSP (calc bisection): R: valgus  L:  valgus     NCSP: (STJNP + TI)  R: 0 L:  0     Tibial Influence (Leg to grnd): R: 0 L:  0       ASSESSMENT  1. Onychomycosis, toenails x10   2. HAV deformity, b/l LE  3. Diabetes mellitus, type II     PLAN  -Evaluation and management x 15 minutes and greater than 50% of the time spent explaining the etiology and treatment with the patient.   -Nails 1-5 b/l debrided in length and thickness with sterile nail nippers without incident   -Sharp excisional debridement of hyperkeratotic tissue noted above performed down to and including dermis using a #15 blade.   -Biomechanical performed.  Impressions stated above.  -Educated the patient on the importance of diabetic foot care consisting of, but not limited to, the importance of daily foot inspections, wearing diabetic shoes with

## 2019-08-07 PROBLEM — I48.91 ATRIAL FIBRILLATION (HCC): Status: ACTIVE | Noted: 2019-01-01

## 2019-08-07 NOTE — ED NOTES
Md Michael aware of Hr [address] to 90s Pt a [facility] [place], [facility]  08/07/19 [id number]

## 2019-08-07 NOTE — ED PROVIDER NOTES
[address] [facility] [address] ENCOUNTER          PHYSICIAN ASSISTANT NOTE       Date of evaluation: 8/7/2019    Chief Complaint     Fatigue and Emesis      History of Present Illness     Teo Ortiz is a 69 y.o. female with PMH of Diabetes, hypertension, hyperlipidemia, CHF, ESRD on PD, DVT on Eliquis who presents with N/V. Patient reports generally not feeling well for the past couple of days. Son at bedside reports this is actually been going on for about a week. Patient reports feeling nauseated and vomiting daily. She is unable to further describe her symptoms. She specifically denies any fevers or chills, chest pain or shortness of breath, cough, abdominal pain, change in bowel habits. She states that she gave herself a full treatment of peritoneal dialysis last night. She does not make urine. Review of Systems     Review of Systems   Constitutional: Positive for fatigue. Negative for chills and fever. HENT: Negative for congestion. Respiratory: Negative for shortness of breath. Cardiovascular: Negative for chest pain. Gastrointestinal: Positive for nausea and vomiting. Negative for abdominal pain, constipation and diarrhea. Musculoskeletal: Negative for gait problem. Skin: Negative for wound. Neurological: Negative for dizziness and headaches. Psychiatric/Behavioral: The patient is not nervous/anxious. Past Medical, Surgical, Family, and Social History     She has a past medical history of DVT (deep venous thrombosis) ([address] End stage renal disease ([address] Hyperlipidemia, Hypertension, Osteoarthritis, Pancreatitis chronic, Peritoneal dialysis status ([address] Type II or unspecified type diabetes mellitus without mention of complication, not stated as uncontrolled, and Vitamin D deficiency. She has a past surgical history that includes Thyroidectomy, partial; Coronary angioplasty; joint replacement (5/4/2011);  Cataract removal (Left, 5/29/13); and other surgical No distress. HENT:   Head: Normocephalic and atraumatic. Eyes: Conjunctivae and EOM are normal.   Neck: Neck supple. Cardiovascular: Normal rate, regular rhythm and normal heart sounds. Pulmonary/Chest: Effort normal and breath sounds normal. No stridor. No respiratory distress. She has no wheezes. She has no rales. Diminished breath sounds throughout   Abdominal: Soft. Bowel sounds are normal. She exhibits no distension. There is tenderness in the right upper quadrant. There is no rebound and no guarding. Musculoskeletal: She exhibits no deformity. Neurological: She is alert and oriented to person, place, and time. Patient alert and oriented x3. Cranial nerves II through XII are intact. No extremity drift. Skin: Skin is warm and dry. She is not diaphoretic. Psychiatric: She has a normal mood and affect. Her behavior is normal.   Nursing note and vitals reviewed. Diagnostic Results     EKG   Interpreted in conjunction with emergencydepartment physician Bassem Puentes, *  Rhythm: sinus arrhythmia  Rate: tachycardia and 100-110  Axis: left  Ectopy: none  Conduction: left anterior fascicular block  ST Segments: nonspecific changes  T Waves:no acute change  Q Waves: none  Clinical Impression: non-specific EKG  Comparison:  04/08/2019    RADIOLOGY:  XR CHEST STANDARD (2 VW)   Final Result   :   1. No acute abnormality. 2. Tortuous ectatic thoracic aorta.           LABS:   Results for orders placed or performed during the hospital encounter of 08/07/19   CBC Auto Differential   Result Value Ref Range    WBC 8.7 4.0 - 11.0 K/uL    RBC 4.53 4.00 - 5.20 M/uL    Hemoglobin 14.7 12.0 - 16.0 g/dL    Hematocrit 46.5 36.0 - 48.0 %    .5 (H) 80.0 - 100.0 fL    MCH 32.5 26.0 - 34.0 pg    MCHC 31.7 31.0 - 36.0 g/dL    RDW 13.4 12.4 - 15.4 %    Platelets 962 279 - 187 K/uL    MPV 7.7 5.0 - 10.5 fL    Neutrophils % 76.6 %    Lymphocytes % 12.7 %    Monocytes % 9.8 %    Eosinophils % 0.4 % Basophils % 0.5 %    Neutrophils # 6.7 1.7 - 7.7 K/uL    Lymphocytes # 1.1 1.0 - 5.1 K/uL    Monocytes # 0.9 0.0 - 1.3 K/uL    Eosinophils # 0.0 0.0 - 0.6 K/uL    Basophils # 0.0 0.0 - 0.2 K/uL   Basic Metabolic Panel w/ Reflex to MG   Result Value Ref Range    Sodium 143 136 - 145 mmol/L    Potassium reflex Magnesium 2.7 (LL) 3.5 - 5.1 mmol/L    Chloride 94 (L) 99 - 110 mmol/L    CO2 29 21 - 32 mmol/L    Anion Gap 20 (H) 3 - 16    Glucose 125 (H) 70 - 99 mg/dL    BUN 38 (H) 7 - 20 mg/dL    CREATININE 9.8 (HH) 0.6 - 1.2 mg/dL    GFR Non-African American 4 (A) >60    GFR  5 (A) >60    Calcium 7.3 (L) 8.3 - 10.6 mg/dL   Hepatic Function Panel   Result Value Ref Range    Total Protein 6.4 6.4 - 8.2 g/dL    Alb 2.5 (L) 3.4 - 5.0 g/dL    Alkaline Phosphatase 86 40 - 129 U/L    ALT 14 10 - 40 U/L    AST 11 (L) 15 - 37 U/L    Total Bilirubin <0.2 0.0 - 1.0 mg/dL    Bilirubin, Direct <0.2 0.0 - 0.3 mg/dL    Bilirubin, Indirect see below 0.0 - 1.0 mg/dL   Lipase   Result Value Ref Range    Lipase 22.0 13.0 - 60.0 U/L   Troponin   Result Value Ref Range    Troponin 0.16 (H) <0.01 ng/mL   Magnesium   Result Value Ref Range    Magnesium 1.50 (L) 1.80 - 2.40 mg/dL   POCT Venous   Result Value Ref Range    pH, Holden 7.408 7.350 - 7.450    pCO2, Holden 52.5 (H) 40.0 - 50.0 mm Hg    pO2, Holden 18 Not Established mm Hg    HCO3, Venous 33.2 (H) 23.0 - 29.0 mmol/L    Base Excess, Holden 8 (H) -3 - 3    O2 Sat, Holden 27 Not Established %    TC02 (Calc), Holden 35 Not Established mmol/L    Lactate 2.34 (H) 0.40 - 2.00 mmol/L    Sample Type HOLDEN     Performed on SEE BELOW    EKG 12 Lead   Result Value Ref Range    Ventricular Rate 104 BPM    Atrial Rate 127 BPM    QRS Duration 100 ms    Q-T Interval 358 ms    QTc Calculation (Bazett) 470 ms    R Axis -65 degrees    T Axis 86 degrees    Diagnosis       EKG performed in ER and to be interpreted by ER physician. Confirmed by MD, ER ([id number]),  Sona Jeronimo ([id number]) on 8/7/2019 5:46:26 PM EKG 12 Lead   Result Value Ref Range    Ventricular Rate 103 BPM    Atrial Rate 127 BPM    QRS Duration 102 ms    Q-T Interval 364 ms    QTc Calculation (Bazett) 476 ms    R Axis -72 degrees    T Axis 94 degrees    Diagnosis       EKG performed in ER and to be interpreted by ER physician. Confirmed by MD, ER ([id number]),  Justin Alvarado ([id number]) on 8/7/2019 7:19:27 PM         RECENT VITALS:  BP: 98/64, Temp: 98.8 °F (37.1 °C), Pulse: 97,Resp: 18, SpO2: 95 %       ED Course     Nursing Notes, Past Medical Hx, Past Surgical Hx, Social Hx, Allergies, and Family Hx were reviewed. The patient was given the followingmedications:  Orders Placed This Encounter   Medications    0.9 % sodium chloride bolus    potassium chloride (KLOR-CON) packet 40 mEq    FOLLOWED BY Linked Order Group     potassium chloride 10 mEq/100 mL IVPB (Peripheral Line)     potassium chloride 10 mEq/100 mL IVPB (Peripheral Line)       CONSULTS:  Samuel Wang / HARDEEP / Gabriela Walters is a 69 y.o. female with PMH of Diabetes, hypertension, hyperlipidemia, CHF, ESRD on PD, DVT on Eliquis who presents with N/V. Patient reports feeling nauseated and vomiting for the past several days. She reports additionally reports feeling generally unwell. She denies any other specific complaints. She reports that she completed a full session of peritoneal dialysis last night. Physical exam reveals 29-year-old female no acute distress. She is alert and oriented x3. Nonfocal neuro exam.  Heart rhythm is regular with normal rate. Lung sounds diminished throughout. Abdomen is soft with mild tenderness in the right upper quadrant without rebound or guarding. EKG obtained showed likely sinus arrhythmia, no acute ST wave changes. Repeat EKG revealed atrial fibrillation, . Chest x-ray revealed no acute cardiopulmonary abnormality. IV access was established.  Labs including CBC, BMP, hepatic

## 2019-08-07 NOTE — ED TRIAGE NOTES
Pt brought in by squad for vomiting for the past couple days and also weakness. Pt denies abdominal pain.

## 2019-08-07 NOTE — ED NOTES
Unable to Obtain Iv assess, Md aware, Mary Ann Barbosa have another Nurse try to obtain Iv assess.       Gregorio Clements, RN  08/07/19 [id number]

## 2019-08-08 PROBLEM — I47.1 MULTIFOCAL ATRIAL TACHYCARDIA (HCC): Status: ACTIVE | Noted: 2019-01-01

## 2019-08-08 PROBLEM — R77.8 ELEVATED TROPONIN: Status: ACTIVE | Noted: 2019-01-01

## 2019-08-08 NOTE — PROGRESS NOTES
Occupational Therapy   Occupational Therapy Initial Assessment/Tx  Date: 2019   Patient Name: Winnie Julien  MRN: [id number]     : 1940    Date of Service: 2019    Discharge Recommendations:    Winnie Julien scored a 17/24 on the AM-PAC ADL Inpatient form. Current research shows that an AM-PAC score of 17 or less is typically not associated with a discharge to the patient's home setting. Based on the patients AM-PAC score and their current ADL deficits, it is recommended that the patient have 3-5 sessions per week of Occupational Therapy at d/c to increase the patients independence. OT Equipment Recommendations  Equipment Needed: No    Assessment   Performance deficits / Impairments: Decreased functional mobility ; Decreased cognition;Decreased endurance;Decreased ADL status; Decreased balance;Decreased strength  Assessment: Pt is limited by extreme weakness and required encouragement to participate and engage in session. Pt required min + mod assist for sit/stand transfer, min assist of 2 for mobility, and min assist for bed mobility. Pt presents below baseline function and would benefit from continued inpt therapy following d/c- pt open to possible SNF stay at d/c. Will continue as inpt  Treatment Diagnosis: Decreased mobility, endurance, strength, ADLs, balance  Prognosis: Good  Decision Making: Low Complexity  Patient Education: OT role, activity promotion, d/c planning- verb understanding  REQUIRES OT FOLLOW UP: Yes  Activity Tolerance  Activity Tolerance: Treatment limited secondary to medical complications (free text)  Activity Tolerance: weakness, fatigue  Safety Devices  Safety Devices in place: Yes  Type of devices: Left in chair;Call light within reach; Chair alarm in place;Nurse notified        Patient Diagnosis(es): The primary encounter diagnosis was Hypokalemia. Diagnoses of Atrial fibrillation, unspecified type ([address] and Elevated troponin were also pertinent to this visit. has a past medical history of DVT (deep venous thrombosis) ([facility] [address] End stage renal disease ([facility] [address] Hyperlipidemia, Hypertension, Osteoarthritis, Pancreatitis chronic, Peritoneal dialysis status ([facility] [address] Type II or unspecified type diabetes mellitus without mention of complication, not stated as uncontrolled, and Vitamin D deficiency. has a past surgical history that includes Thyroidectomy, partial; Coronary angioplasty; joint replacement (5/4/2011); Cataract removal (Left, 5/29/13); and other surgical history (Left, 05/19/2017). Treatment Diagnosis: Decreased mobility, endurance, strength, ADLs, balance      Restrictions  Position Activity Restriction  Other position/activity restrictions: up with assistance    Subjective   General  Chart Reviewed: Yes  Patient assessed for rehabilitation services?: Yes  Additional Pertinent Hx: Pt presented to ER 8/7 with N/V persisting for about 1 weeks time. Abd, chest XR: (-) acute findings. PMH:DVT, end-stage renal disease, HLD, HTN, OA, DM, R WILDER  Family / Caregiver Present: No  Diagnosis: Atrial fibrillation  Subjective  Subjective: Pt supine in bed upon arrival. Pt agreeable to therapy eval. Pt reported extreme weakness and denied pain    Social/Functional History  Social/Functional History  Lives With: Son  Type of Home: Apartment  Home Layout: One level  Home Access: Elevator  Bathroom Shower/Tub: Tub/Shower unit  Bathroom Toilet: Handicap height  Bathroom Equipment: Shower chair, Grab bars in shower, Hand-held shower, Grab bars around toilet  Home Equipment: 4 wheeled walker, Alert Button  ADL Assistance: [address]: Needs assistance(Has a  2x/wk)  Ambulation Assistance: Independent(With 4-wheeled walker)  Transfer Assistance: Independent  Active : No  Additional Comments: Pt reportrs no recent falls.  Pt son currently does not work and able to provide 24hr assist     Objective   Vision: Impaired  Vision Exceptions: Wears glasses for reading  Hearing: Within functional limits    Orientation  Overall Orientation Status: Within Functional Limits(x4 with increased time for answers)     Balance  Sitting Balance: Contact guard assistance(Sitting unsupported EOB)  Standing Balance: Moderate assistance(Static stance at 4-wheeled walker- mod assist during orthostatic check)    Functional Mobility  Functional - Mobility Device: 4-Wheeled Walker  Activity: Other(EOB-recliner)  Assist Level: Dependent/Total(min assist of 2)  Functional Mobility Comments: Pt reported extreme weakness but able to use walker- no LOB. Pt has been hypotensive so orthostatic vitals were checked. Supine: 85/61, sitting EOB: 106/80, static stance at walker: 120/70- RN in room and documented    ADL  Feeding: Setup;Modified independent (Seated in recliner- able to open containers and eat food)  Grooming: Minimal assistance;Setup(Comb hair with assist for back of hair and styling, washing face seated EOB)  LE Dressing: Contact guard assistance(Doff/don socks- increased time and effort)        Bed mobility  Supine to Sit: Minimal assistance  Scooting: Minimal assistance(to EOB)  Transfers  Sit to stand: Dependent/Total(min + mod assist)  Stand to sit: Dependent/Total(min assist of 2)     Cognition  Overall Cognitive Status: Exceptions  Arousal/Alertness: Inconsistent responses to stimuli;Delayed responses to stimuli;Unresponsive to stimuli(Pt began having delayed response, progressed to inconsistent responses, then progressed to being unresponsive)  Following Commands: Follows one step commands with increased time; Follows one step commands with repetition  Attention Span: Attends with cues to redirect  Initiation: Requires cues for all  Sequencing: Requires cues for some  Cognition Comment: Pt eyes were closed throughout session despite washing face, verbal and tactile cues       LUE AROM (degrees)  LUE AROM : WFL  RUE AROM (degrees)  RUE AROM : WFL  LUE Strength  Gross LUE Strength:

## 2019-08-08 NOTE — PROGRESS NOTES
Patient admitted into room 4307. Patient without IV access at this time. Patient placed on tele. Surgical resident at bedside to attempt IV. IV in left arm placed at 04:40 by surgical resident.        Electronically signed by Zac Linn RN on 8/8/19 at 7:04 AM

## 2019-08-08 NOTE — CONSULTS
History of Present Illness:  Isidoro Gupta is a 69 y.o. patient whom we were asked by the hospitalists to see for arrhythmia. Possible afib. No hx of arrhythmia. No cardiac hx. Had poor intake and N/V for past week. When attempting to eat would become nauseated and vomit. She came to ER. EKG showed what was felt to be afib. She denies hx of arrhythmia. Having no sob or chest pain. No syncope. Denies pnd or orthopnea. No palps. She does chronic PD. Past Medical History:   has a past medical history of DVT (deep venous thrombosis) ([facility] [address] End stage renal disease ([facility] [address] Hyperlipidemia, Hypertension, Osteoarthritis, Pancreatitis chronic, Peritoneal dialysis status ([facility] [address] Type II or unspecified type diabetes mellitus without mention of complication, not stated as uncontrolled, and Vitamin D deficiency. Surgical History:   has a past surgical history that includes Thyroidectomy, partial; Coronary angioplasty; joint replacement (5/4/2011); Cataract removal (Left, 5/29/13); and other surgical history (Left, 05/19/2017). Social History:   reports that she has been smoking cigarettes. She has a 12.00 pack-year smoking history. She has never used smokeless tobacco. She reports that she does not drink alcohol or use drugs. Family History:  No evidence for sudden cardiac death or premature CAD    Home Medications:  Were reviewed and are listed in nursing record. and/or listed below  Prior to Admission medications    Medication Sig Start Date End Date Taking?  Authorizing Provider   cinacalcet (SENSIPAR) 60 MG tablet TAKE 1 TABLET BY MOUTH ONE TIME A DAY 8/2/19  Yes Jenniffer Escoto MD   sevelamer (RENVELA) 800 MG tablet Take 2 tablets by mouth 3 times daily (with meals) 8/2/19  Yes Jenniffer Escoto MD   apixaban Hector Last) 2.5 MG TABS tablet Take 1 tablet by mouth 2 times daily 8/2/19  Yes Jenniffer Escoto MD   psyllium (HYDROCIL) 95 % PACK packet Take 1 packet by mouth daily 8/2/19  Yes Tao Beverly hematemesis. · Gastrointestinal: No abdominal pain, Positive appetite loss, No blood in stools. No change in bowel or bladder habits. N/V as above  · Genitourinary: No dysuria, trouble voiding, or hematuria. · Musculoskeletal:  No gait disturbance, weakness or joint complaints. · Integumentary: No rash or pruritis. · All other systems reviewed negative as done.      Physical Examination:    Vitals:    08/08/19 1039   BP: 108/60   Pulse: 82   Resp: 18   Temp: 97.5 °F (36.4 °C)   SpO2: 100%    Weight: 141 lb 1.5 oz (64 kg)         General Appearance:  Blunted affect, not talkative, cooperative, no distress, appears stated age   Head:  Normocephalic, without obvious abnormality, atraumatic   Eyes:  PERRL, conjunctiva/corneas clear       Nose: Nares normal, no drainage or sinus tenderness   Throat: Lips, mucosa, and tongue normal   Neck: Supple, symmetrical, trachea midline       Lungs:   Decreased BS, respirations unlabored   Chest Wall:  No tenderness or deformity   Heart:  Fairly regular rate and rhythm, S1, S2 normal, no rub or gallop, 1/6 syst m   Abdomen:   Soft, non-tender, bowel sounds active all four quadrants,             Extremities: Extremities normal, atraumatic, no cyanosis or edema   Pulses: 2+ and symmetric   Skin: Skin color, texture, turgor normal, no rashes or lesions   Pysch: Blunt affect   Neurologic: Normal gross motor and sensory exam.         Labs  CBC:   Lab Results   Component Value Date    WBC 9.3 08/08/2019    RBC 4.55 08/08/2019    HGB 14.8 08/08/2019    HCT 46.1 08/08/2019    .3 08/08/2019    RDW 13.4 08/08/2019     08/08/2019     CMP:    Lab Results   Component Value Date     08/08/2019    K 3.0 08/08/2019    K 2.7 08/07/2019     08/08/2019    CO2 22 08/08/2019    BUN 38 08/08/2019    CREATININE 10.2 08/08/2019    GFRAA 4 08/08/2019    GFRAA 32 06/07/2013    AGRATIO 0.7 03/24/2019    LABGLOM 4 08/08/2019    GLUCOSE 87 08/08/2019    PROT 6.4 08/07/2019    PROT 7.3 12/10/2012    CALCIUM 6.9 08/08/2019    BILITOT <0.2 08/07/2019    ALKPHOS 86 08/07/2019    AST 11 08/07/2019    ALT 14 08/07/2019     PT/INR:  No results found for: PTINR  Lab Results   Component Value Date    TROPONINI 0.13 (H) 08/08/2019       EKG:  I have reviewed EKG with the following interpretation:  Impression:  NSR, appears to have runs of multifocal atrial tachycardia, IVCD, LAFB, Poor R wave progression. 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)    Unexplained weight loss    ESRD on peritoneal dialysis ([address]    ESRD (end stage renal disease) on dialysis ([facility] [address]    Knee pain, left    Debility    Failure to thrive in adult    Numbness of right foot    Encounter for palliative care    Advance directive discussed with patient    Weakness of both legs    Left leg swelling    Peripheral neuropathy    Uncontrolled type 2 diabetes mellitus with hyperglycemia ([facility] [address]    DVT of deep femoral vein, left (HCC)    Onychomycosis    Atrial fibrillation (HCC)    Multifocal atrial tachycardia (HCC)       Imp: MAT/Trop/N/V\\  Plan:    EKG shows sinus with runs of what appears to be multifocal atrial tachycardia. She is as and it has been nonsustained. BP is marginal for agents. Calcium channel blocker would help control but bp is low/marginal.  Again fortunately she is asx and it is not sustaining. She is on eliquis for hx of DVT nonetheless. Echo ordered. Monitor. Trop likely due to RF. She has no chest pain.

## 2019-08-08 NOTE — PROGRESS NOTES
Progress Note    Admit Date: 8/7/2019  Day: 2  Diet: DIET CLEAR LIQUID; Carb Control: 5 carb choices (75 gms)/meal    CC: nausea, vomiting, fatigue    Interval history: Pt was admitted overnight, examined this AM at bedside. Said she had been feeling weak for about a week, attributed this to poor oral intake and vomiting. Says that she has a good appetite, but that whenever she tries to eat, she vomits after 1-2 bites. Also has had similar symptoms while ingesting fluids. No difficulty swallowing, sore throat. Patient says that she has lost 10 pounds over the last couple of weeks. Does not know what is causing this. Says she gets her peritoneal dialysis every night and takes her meds regularly. Notes that her L leg will intermittently swell, but does not note shortness of breath, cough, chest pain, palpitations. No diarrhea noted. Patient says she has cold chills frequently and had a recent episode of hair loss. Pt had difficult vascular access last night. General surgery placed an IV line that has since infiltrated. PIV, EJ, IJ, and central line were all attempted. HPI: Lori Stiles is a 69 y.o. female with a PMH as below who presents with severe fatigue after about one week of nausea, poor PO intake, decreased appetite, and when she attempts to eat has frequently vomited again shortly thereafter. She denies fever chest pain, palpitations, SOB, cough, or abdominal pain. She had had frequent problems with constipation according to her family. They report she has been taking laxatives recently though. Patient states last BM was yesterday. Also denies sick contacts. Patient's brother states that she is forgetting to take her medications and he is concerned she is not taking them as prescribed. She has a son that lives her but her brother is concerned she is not getting enough help at home. Patient states she has had no problems with her PD at home.      Medications:     Scheduled Meds:   102.5* 101.3*     Renal:    Recent Labs     08/07/19 1831 08/08/19 0450    147*   K 2.7* 3.0*   CL 94* 103   CO2 29 22   BUN 38* 38*   CREATININE 9.8* 10.2*   GLUCOSE 125* 87   CALCIUM 7.3* 6.9*   MG 1.50*  --    PHOS  --  3.9   ANIONGAP 20* 22*     Hepatic:   Recent Labs     08/07/19 1831 08/08/19 0450   AST 11*  --    ALT 14  --    BILITOT <0.2  --    BILIDIR <0.2  --    PROT 6.4  --    LABALBU 2.5* 2.1*   ALKPHOS 86  --      Troponin:   Recent Labs     08/07/19 1831 08/08/19 0450   TROPONINI 0.16* 0.14*     BNP: No results for input(s): BNP in the last 72 hours. Lipids: No results for input(s): CHOL, HDL in the last 72 hours. Invalid input(s): LDLCALCU, TRIGLYCERIDE  ABGs:  No results for input(s): PHART, GLW2TQS, PO2ART, WNF8IKD, BEART, THGBART, P1JTTHIJ, WIY9DJF in the last 72 hours. INR: No results for input(s): INR in the last 72 hours. Lactate:   Recent Labs     08/07/19  1828   LACTATE 2.34*     Cultures:  -----------------------------------------------------------------  RAD:   XR ABDOMEN (KUB) (SINGLE AP VIEW)   Final Result     No acute findings. XR CHEST PORTABLE   Final Result     No evidence of acute cardiopulmonary disease. XR CHEST STANDARD (2 VW)   Final Result   :   1. No acute abnormality. 2. Tortuous ectatic thoracic aorta. Assessment/Plan:     Ms. Charlie Garcia is an unstable 68 y/o AAF w/ a PMHx of ESRD on PD, DM2, h/o DVT, FTT, systolic HF presented to the ED w/ 1 week h/o nausea, vomiting, fatigue. Patient's potassium and mag low in ED, replaced with electrolyte supplementation. BP's also soft in ED, attempts to get access so far have been unsuccessful. Will either get PICC or central line placed. Following up on infectious workup to rule out sepsis. Patient has not been febrile or had elevated white count, suspicion low, but starting Zosyn d/t overall clinical picture.   Will receive PD while inpt, will continue to follow BMP for lyte

## 2019-08-08 NOTE — PLAN OF CARE
Problem: Falls - Risk of:  Goal: Will remain free from falls  Description  Will remain free from falls  8/8/2019 1943 by Dia Gates RN  Outcome: Ongoing    Pt free from falls during this shift. Fall precautions in place. Will monitor. Electronically signed by Dia Gates RN on 8/8/2019 at 7:44 PM    Problem: Nutrition  Goal: Optimal nutrition therapy  8/8/2019 1943 by Dia Gates RN  Outcome: Ongoing   Pt on clear liquid diet per MD order. PO intake has been minimal today. No complaints of nausea, no vomiting. Will continue to monitor. Electronically signed by Dia Gates RN on 8/8/2019 at 7:45 PM    Problem: Pain:  Goal: Control of chronic pain  Description  Control of chronic pain  Outcome: Ongoing    Tylenol administered for control of chronic foot pain. Pt stated satisfaction with pain relief. Will monitor and administer pain medication as needed.  Electronically signed by Dia Gates RN on 8/8/2019 at 7:46 PM

## 2019-08-08 NOTE — PROGRESS NOTES
Physical Therapy    Facility/Department: [place] [address] [facility]  Initial Assessment and treatment    NAME: Delmi Parker  : 1940  MRN: [id number]    Date of Service: 2019    Discharge Recommendations:    Delmi Parker scored a 13/24 on the AM-PAC short mobility form. Current research shows that an AM-PAC score of 17 or less is typically not associated with a discharge to the patient's home setting. Based on the patients AM-PAC score and their current functional mobility deficits, it is recommended that the patient have 3-5 sessions per week of Physical Therapy at d/c to increase the patients independence. PT Equipment Recommendations  Equipment Needed: No(defer)    Assessment   Body structures, Functions, Activity limitations: Decreased functional mobility ; Decreased strength;Decreased endurance;Decreased safe awareness;Decreased balance  Assessment: Patient tolerated session fair, transferring and ambulating over to bedside chair with assist x 2 and use of rollator as above. Patient demonstrates generalized weakness, requiring increased time to complete all tasks showing overall decreased activity tolerance. Patient with minimal verbalization throughout treatment session. Unsure of full level of assistance available from son upon discharge. Patient appears to be functioning below baseline level. Recommend continued skilled PT upon discharge. Will continue to follow while in acute care setting to maximize independence with mobility. Treatment Diagnosis: impaired mobility associated with a-fib  Decision Making: Medium Complexity  Patient Education: Educated on role of PT, safety with mobility, use of call light, d/c recommendations; patient would benefit from continued education.    REQUIRES PT FOLLOW UP: Yes  Activity Tolerance  Activity Tolerance: Patient limited by fatigue;Patient limited by endurance  Activity Tolerance: orthostatic BP measured and recorded by RN during treatment       Patient Diagnosis(es): The primary encounter diagnosis was Hypokalemia. Diagnoses of Atrial fibrillation, unspecified type ([facility] [address] and Elevated troponin were also pertinent to this visit. has a past medical history of DVT (deep venous thrombosis) ([facility] [address] End stage renal disease ([facility] [address] Hyperlipidemia, Hypertension, Osteoarthritis, Pancreatitis chronic, Peritoneal dialysis status ([facility] [address] Type II or unspecified type diabetes mellitus without mention of complication, not stated as uncontrolled, and Vitamin D deficiency. has a past surgical history that includes Thyroidectomy, partial; Coronary angioplasty; joint replacement (5/4/2011); Cataract removal (Left, 5/29/13); and other surgical history (Left, 05/19/2017). Restrictions  Position Activity Restriction  Other position/activity restrictions: up with assistance  Vision/Hearing  Vision: Impaired  Vision Exceptions: Wears glasses for reading  Hearing: Within functional limits     Subjective  General  Chart Reviewed: Yes  Patient assessed for rehabilitation services?: Yes  Additional Pertinent Hx: Patient is a 70 y/o female admitted 8/7 with fatigue and emesis. chest x-ray and abdominal x-ray (-) for acute findings. Patient with new onset of a-fib. PMH significant for DVT, end stage renal disease, HLD, HTN, pancreatitis, peritoneal dialysis, diabetes, R WILDER (2011). Response To Previous Treatment: Not applicable  Family / Caregiver Present: No  Referring Practitioner: Helena Longoria DO  Referral Date : 08/08/19  Diagnosis: atrial fibrillation  Follows Commands: Within Functional Limits  Other (Comment): needs increased time in addition to verbal and tactile cues  General Comment  Comments: Patient supine in bed upon arrival.  Subjective  Subjective: Patient agreeable to PT evaluation with encouragement.     Pain Screening  Patient Currently in Pain: Denies  Vital Signs  Patient Currently in Pain: Denies       Orientation  Orientation  Overall Orientation Status: Within Functional Limits  Social/Functional History  Social/Functional History  Lives With: Son  Type of Home: Apartment  Home Layout: One level  Home Access: Elevator  Bathroom Shower/Tub: Tub/Shower unit  Bathroom Toilet: Handicap height  Bathroom Equipment: Shower chair, Grab bars in shower, Hand-held shower, Grab bars around toilet  Home Equipment: 4 wheeled walker, Alert Button  ADL Assistance: Independent  Homemaking Assistance: Needs assistance(Has a  2x/wk)  Ambulation Assistance: Independent(With 4-wheeled walker)  Transfer Assistance: Independent  Active : No  Additional Comments: Pt reportrs no recent falls. Pt son currently does not work and able to provide 24hr assist - per chart, patient's other son concerned regarding patient's ability to adequately care for herself  Cognition   Cognition  Overall Cognitive Status: Exceptions  Arousal/Alertness: Delayed responses to stimuli;Inconsistent responses to stimuli  Following Commands: Follows one step commands with increased time; Follows one step commands with repetition  Attention Span: Attends with cues to redirect  Initiation: Requires cues for all  Sequencing: Requires cues for some  Cognition Comment: Pt eyes were closed throughout session despite washing face, verbal and tactile cues    Objective          AROM RLE (degrees)  RLE AROM: WFL  AROM LLE (degrees)  LLE AROM : WFL  Strength RLE  Strength RLE: Exception  Comment: appears grossly weakened with functional mobility  Strength LLE  Strength LLE: Exception  Comment: appears grossly weakened with functional mobility        Bed mobility  Supine to Sit: Minimal assistance(assist to bring trunk to upright sitting position, increased time to complete tasks)  Scooting: Minimal assistance(to scoot toward edge of bed)  Comment: patient sitting up in bedside chair at end of session  Transfers  Sit to Stand: Dependent/Total(min x 1 and mod x 1 from edge of bed)  Stand to sit: Minimal Assistance(to

## 2019-08-08 NOTE — PROGRESS NOTES
4 Eyes Admission Assessment     I agree as the admission nurse that 2 RN's have performed a thorough Head to Toe Skin Assessment on the patient. ALL assessment sites listed below have been assessed on admission. Areas assessed by both nurses: Yes  [x]   Head, Face, and Ears   [x]   Shoulders, Back, and Chest  [x]   Arms, Elbows, and Hands   [x]   Coccyx, Sacrum, and Ischum  [x]   Legs, Feet, and Heels        Does the Patient have Skin Breakdown?   Stage 1 pressure injury to coccyx         Khurram Prevention initiated:  Yes   Wound Care Orders initiated:  NA      [facility] nurse consulted for Pressure Injury (Stage 3,4, Unstageable, DTI, NWPT, and Complex wounds):  NA      Nurse 1 eSignature: Electronically signed by Jesika Nunez RN on 8/8/19 at 6:39 AM    **SHARE this note so that the co-signing nurse is able to place an eSignature**    Nurse 2 eSignature: Electronically signed by Doren Gosselin, RN on 8/8/19 at 7:43 AM

## 2019-08-08 NOTE — PROGRESS NOTES
fluid accumulation,    6.  Strength-Not measured    Nutrition Risk Level:  Moderate    Nutrient Needs:  · Estimated Daily Total Kcal: 0983-4069 (30-35)  · Estimated Daily Protein (g):  (1.5-1.8)  · Estimated Daily Total Fluid (ml/day): 3848-6032 (1 ml/kcal) or per Renal    Nutrition Diagnosis:   · Problem: Inadequate oral intake  · Etiology: related to Insufficient energy/nutrient consumption     Signs and symptoms:  as evidenced by Nausea, Vomiting, Diet history of poor intake, Patient report of    Objective Information:  · Nutrition-Focused Physical Findings: fatigue; no edema; no BM recorded since admit  · Wound Type: Pressure Ulcer, Stage I  · Current Nutrition Therapies:  · Oral Diet Orders: Clear Liquid, Carb Control 5 Carbs/Meal   · Oral Diet intake: %(x 1 meal)  · Oral Nutrition Supplement (ONS) Orders: None  · Anthropometric Measures:  · Ht: 5' 6\" (167.6 cm)   · Current Body Wt: 141 lb (64 kg)  · Admission Body Wt: 143 lb (64.9 kg)  · Usual Body Wt: (133-143 lb per EMR)  · Ideal Body Wt: 130 lb (59 kg),   · BMI Classification: BMI 18.5 - 24.9 Normal Weight    Nutrition Interventions:   Modify current diet, Start ONS  Continued Inpatient Monitoring    Nutrition Evaluation:   · Evaluation: Goals set   · Goals: Pt will consume and tolerate at least 50% of meals/supplements offered    · Monitoring: Meal Intake, Supplement Intake, I&O, Diet Tolerance, Weight, Pertinent Labs, Nausea or Vomiting, Constipation      Electronically signed by Casa Barry RD, LD on 8/8/19 at 2:01 PM    Contact Number: [phone]

## 2019-08-08 NOTE — PLAN OF CARE
Problem: Falls - Risk of:  Goal: Will remain free from falls  Outcome: Ongoing  Note:   Patient remains free of falls and accidental injury. Patient assessed as a high fall risk. Bed in lowest position, bed alarm on, non-skid footwear in place, call light in reach, instructed patients to call for needs or assistance out of bed. Will continue to monitor.

## 2019-08-08 NOTE — PROGRESS NOTES
Progress Note    Admit Date: 8/7/2019  Day: 8/8/19  Diet: DIET CLEAR LIQUID; Carb Control: 5 carb choices (75 gms)/meal    CC: Weakness    Interval history: This patient is a unstable 70 yo female who was admitted last night after being evaluated for fatigue, nausea, and vomiting for about one week. She was examined this morning at bedside and stated that she had been feeling weak. She also endorsed nausea and vomiting with eating or drinking but stated that she is hungry but is unable to keep food down. She denies problems with swallowing, cough, SOB, or sore throat. She notes an unexplained 10lb weight loss within the past few weeks. The pt has CKD and receives dialysis everyday and takes her perscribed medications as directed. Pt endorsed LE swelling particularly in the Left leg but denies CP, palpitations, diarrhea, fever, or headache. IV access was performed last night by the surgical team, but this morning that access was infiltrated.        Medications:     Scheduled Meds:   apixaban  2.5 mg Oral BID    aspirin  81 mg Oral Daily    b complex-C-folic acid  1 mg Oral Daily with breakfast    calcitRIOL  0.25 mcg Oral Daily    vitamin D  2,000 Units Oral Daily    cinacalcet  60 mg Oral Daily    docusate sodium  100 mg Oral BID    sevelamer  1,600 mg Oral TID WC    psyllium  1 packet Oral Daily    simvastatin  40 mg Oral Nightly    sodium chloride flush  10 mL Intravenous 2 times per day    potassium chloride  40 mEq Oral Once    magnesium sulfate  2 g Intravenous Q2H     Continuous Infusions:   IV infusion builder 50 mL/hr at 08/08/19 0453    dextrose       PRN Meds:capsaicin-menthol-methyl sal, sodium chloride flush, ondansetron, polyethylene glycol, acetaminophen, perflutren lipid microspheres, glucose, dextrose, glucagon (rDNA), dextrose    Objective:   Vitals:   T-max:  Patient Vitals for the past 8 hrs:   BP Temp Temp src Pulse Resp SpO2 Height Weight   08/08/19 0828 85/60 98 °F (36.7 °C) Oral 82 20 93 % -- --   08/08/19 0351 -- -- -- -- -- -- 5' 6\" (1.676 m) 141 lb 1.5 oz (64 kg)   08/08/19 0349 89/69 97.7 °F (36.5 °C) Oral 98 18 93 % -- --   08/08/19 0245 -- -- -- 90 -- -- -- --   08/08/19 0230 86/65 -- -- 106 -- -- -- --   08/08/19 0215 -- -- -- 127 -- -- -- --   08/08/19 0200 96/67 -- -- 85 -- -- -- --       Intake/Output Summary (Last 24 hours) at 8/8/2019 0943  Last data filed at 8/8/2019 7415  Gross per 24 hour   Intake 310 ml   Output --   Net 310 ml       Physical Exam      LABS:    CBC:   Recent Labs     08/07/19 1831 08/08/19 0450   WBC 8.7 9.3   HGB 14.7 14.8   HCT 46.5 46.1    176   .5* 101.3*     Renal:    Recent Labs     08/07/19 1831 08/08/19 0450    147*   K 2.7* 3.0*   CL 94* 103   CO2 29 22   BUN 38* 38*   CREATININE 9.8* 10.2*   GLUCOSE 125* 87   CALCIUM 7.3* 6.9*   MG 1.50*  --    PHOS  --  3.9   ANIONGAP 20* 22*     Hepatic:   Recent Labs     08/07/19 1831 08/08/19 0450   AST 11*  --    ALT 14  --    BILITOT <0.2  --    BILIDIR <0.2  --    PROT 6.4  --    LABALBU 2.5* 2.1*   ALKPHOS 86  --      Troponin:   Recent Labs     08/07/19 1831 08/08/19 0450   TROPONINI 0.16* 0.14*     BNP: No results for input(s): BNP in the last 72 hours. Lipids: No results for input(s): CHOL, HDL in the last 72 hours. Invalid input(s): LDLCALCU, TRIGLYCERIDE  ABGs:  No results for input(s): PHART, XYB8KMB, PO2ART, KWR2BOF, BEART, THGBART, F6HIXCZW, KOT3VIS in the last 72 hours. INR: No results for input(s): INR in the last 72 hours. Lactate:   Recent Labs     08/07/19  1828   LACTATE 2.34*     Cultures:  -----------------------------------------------------------------  RAD:   XR ABDOMEN (KUB) (SINGLE AP VIEW)   Final Result     No acute findings. XR CHEST PORTABLE   Final Result     No evidence of acute cardiopulmonary disease. XR CHEST STANDARD (2 VW)   Final Result   :   1. No acute abnormality. 2. Tortuous ectatic thoracic aorta.

## 2019-08-08 NOTE — CONSULTS
Patient's Name: Delmi Parker  9:52 AM  8/8/2019    Reason for Consult:  ESRD on CCPD  Requesting Physician:  Jennifer Anna DO    Chief Complaint:  Fatigue and emesis    History of Present Ilness:    Delmi Parker is a 69 y.o. female with prior history of ESRD on PD, Diabetes, hypertension, hyperlipidemia, CHF, DVT on Eliquis who presents with nausea and vomiting, and feeling unwell for about a week. Denies any fevers or chills, chest pain or shortness of breath, cough, abdominal pain, change in bowel habits. Reports daily PD at home for a little over a year. Last PD was the night before admission. Past Medical History:   Diagnosis Date    DVT (deep venous thrombosis) (HCC)     End stage renal disease (HCC)     Hyperlipidemia     Hypertension     Osteoarthritis     Pancreatitis chronic     Peritoneal dialysis status ([facility] [address]     Type II or unspecified type diabetes mellitus without mention of complication, not stated as uncontrolled     Vitamin D deficiency        Past Surgical History:   Procedure Laterality Date    CATARACT REMOVAL Left 5/29/13    had elevated BP post op    CORONARY ANGIOPLASTY      JOINT REPLACEMENT  5/4/2011    R WILDER    OTHER SURGICAL HISTORY Left 05/19/2017    INSERTION OF LAPAROSCOPIC PERITONEAL DIALYSIS CATHETER;    THYROIDECTOMY, PARTIAL         History reviewed. No pertinent family history. reports that she has been smoking cigarettes. She has a 12.00 pack-year smoking history. She has never used smokeless tobacco. She reports that she does not drink alcohol or use drugs. Allergies:  Patient has no known allergies.     Current Medications:      apixaban (ELIQUIS) tablet 2.5 mg BID   aspirin EC tablet 81 mg Daily   b complex-C-folic acid (NEPHROCAPS) capsule 1 mg Daily with breakfast   calcitRIOL (ROCALTROL) capsule 0.25 mcg Daily   capsaicin-menthol-methyl sal 1 applicator BID PRN   vitamin D (CHOLECALCIFEROL) tablet 2,000 Units Daily   cinacalcet (SENSIPAR) tablet 60 mg Daily   docusate sodium (COLACE) capsule 100 mg BID   sevelamer (RENVELA) tablet 1,600 mg TID WC   psyllium (HYDROCIL) 95 % packet 1 packet Daily   simvastatin (ZOCOR) tablet 40 mg Nightly   sodium chloride flush 0.9 % injection 10 mL 2 times per day   sodium chloride flush 0.9 % injection 10 mL PRN   ondansetron (ZOFRAN) injection 4 mg Q6H PRN   polyethylene glycol (GLYCOLAX) packet 17 g Daily PRN   acetaminophen (TYLENOL) tablet 650 mg Q6H PRN   perflutren lipid microspheres (DEFINITY) injection 1.65 mg ONCE PRN   potassium chloride 20 mEq in dextrose 5 % and 0.45 % NaCl 1,000 mL infusion Continuous   glucose (GLUTOSE) 40 % oral gel 15 g PRN   dextrose 50 % IV solution PRN   glucagon (rDNA) injection 1 mg PRN   dextrose 5 % solution PRN   potassium chloride (KLOR-CON M) extended release tablet 40 mEq Once   magnesium sulfate 2 g in 50 mL IVPB premix Q2H       Review of Systems:   14 point ROS obtained but were negative except mentioned in HPI    Physical exam:     Vitals:  BP 85/60   Pulse 82   Temp 98 °F (36.7 °C) (Oral)   Resp 20   Ht 5' 6\" (1.676 m)   Wt 141 lb 1.5 oz (64 kg)   LMP  (LMP Unknown)   SpO2 93%   BMI 22.77 kg/m²      Constitutional:  Drowsy; in no acute distress  Skin: no rash  Heent:  EOMI, PERRLA  Neck: no bruits or jvd noted  Cardiovascular:  Regular rate; irregular rhythm; no murmurs rubs or gallops  Respiratory: CTA bilaterally; no wheezing or crackles  Abdomen: Soft, non-tender, non-distended; +BS; catheter site clean and intact with no erythema or swelling   Ext: no lower extremity edema    Data:   Labs:  CBC:   Recent Labs     08/07/19  1831 08/08/19  0450   WBC 8.7 9.3   HGB 14.7 14.8    176     BMP:  Recent Labs     08/07/19  1831 08/08/19  0450    147*   K 2.7* 3.0*   CL 94* 103   CO2 29 22   BUN 38* 38*   CREATININE 9.8* 10.2*   GLUCOSE 125* 87     Ca/Mg/Phos: Recent Labs     08/07/19  1831 08/08/19  0450   CALCIUM 7.3* 6.9*   MG 1.50*  --    PHOS  -- 3.9     Hepatic: Recent Labs     08/07/19  1831   AST 11*   ALT 14   BILITOT <0.2   ALKPHOS 86     Troponin:   Recent Labs     08/07/19  1831 08/08/19  0450   TROPONINI 0.16* 0.14*       IMAGING:  XR ABDOMEN (KUB) (SINGLE AP VIEW)   Final Result     No acute findings. XR CHEST PORTABLE   Final Result     No evidence of acute cardiopulmonary disease. XR CHEST STANDARD (2 VW)   Final Result   :   1. No acute abnormality. 2. Tortuous ectatic thoracic aorta. EKG 12 Lead [[id number]] Collected: 08/08/19 0724   Updated: 08/08/19 0813     Ventricular Rate 89 BPM    Atrial Rate 89 BPM    P-R Interval 144 ms    QRS Duration 104 ms    Q-T Interval 422 ms    QTc Calculation (Bazett) 513 ms    P Axis 66 degrees    R Axis -70 degrees    T Axis 73 degrees    Diagnosis Sinus rhythm with Premature atrial complexes with Aberrant conductionLeft anterior fascicular blockAnterolateral infarct , age undeterminedProlonged QTAbnormal ECGConfirmed by Eloisa Lopez ([id number]) on 8/8/2019 8:12:59 AM       Assessment/Plan   Layman Alexandra is a 69 y.o. female with prior history of ESRD on PD, Diabetes, hypertension, hyperlipidemia, CHF, and DVT on Eliquis who presents with nausea and vomiting, and feeling unwell for about a week. We are consulted for ESRD on PD.    1. ESRD on CCPD  - Continue daily while admitted    2. Severe hypokalemia  2.7 upon admission. New EKG changes possibly secondary to hypokalemia.   - 3.0 this morning  - Continue IV and PO Potassium chloride as needed until corrected  - Monitor q4hrs  - Monitor EKG daily    3.  Hypomagnesemia  1.5 upon admission  - Received magnesium sulfate IV   - Recheck Magnesium level    Jerel Key, MS4  Nephrology      Pt seen and examied     Lytes replaced   Nausea better   Cont CCPD   UF as clifton   D/c IVF

## 2019-08-08 NOTE — PLAN OF CARE
Nutrition Problem: Inadequate oral intake  Intervention: Food and/or Nutrient Delivery: Modify current diet, Start ONS  Nutritional Goals: Pt will consume and tolerate at least 50% of meals/supplements offered

## 2019-08-08 NOTE — ED PROVIDER NOTES
ED Attending Attestation Note     Date of evaluation: 8/7/2019    This patient was seen by the advance practice provider. I have seen and examined the patient, agree with the workup, evaluation, management and diagnosis. The care plan has been discussed. I have reviewed the ECG and concur with the EDDIE's interpretation. My assessment reveals 20-year-old female on peritoneal dialysis with complaints of fatigue and decreased appetite. On my exam she is awake, drowsy but responsive voice and follows commands. Her abdomen is soft without rebound or guarding.         Alyssa Romo MD  08/07/19 5822

## 2019-08-08 NOTE — PROGRESS NOTES
Patient without IV access. Every time she gets one, it infiltrates. MD Black messaged. MD aware and will attempt later in the shift.  IV fluids discontinued by MD.       Electronically signed by Zac Linn RN on 8/8/2019 at 7:44 PM

## 2019-08-08 NOTE — FLOWSHEET NOTE
08/08/19 1627   Encounter Summary   Services provided to: Patient   Referral/Consult From: Rounding   Continue Visiting   (8/8, vu )   Complexity of Encounter Low   Length of Encounter 15 minutes   Routine   Type Initial   Assessment Amisha Nunez

## 2019-08-09 NOTE — PROGRESS NOTES
Progress Note    Admit Date: 8/7/2019  Day: 3  Diet: Dietary Nutrition Supplements: Clear Liquid Oral Supplement  DIET GENERAL;    CC: nausea, vomiting, fatigue     Interval history: Pt had no acute events overnight. Both peripheral IV's infiltrated, currently has no vascular access. Received PD overnight. Patient reports this AM that she is tired and has a lot of R foot pain. Says that her foot has had neuropathy for months-years and that her pain is currently a 10/10. Otherwise, no complaints besides fatigue. Denies any fevers, chills, chest pain, palpitations, leg swelling, cough, shortness of breath, difficulty breathing, wheezing, abdominal pain, nausea, vomiting, diarrhea. Patient was able to eat a turkey sandwich last night and endorses being hungry this AM.  Endorses good oral intake of fluids. HPI: Neal Cerrato a 69 y. o. female with a PMH as below who presents with severe fatigue after about one week of nausea, poor PO intake, decreased appetite, and when she attempts to eat has frequently vomited again shortly thereafter. She denies fever chest pain, palpitations, SOB, cough, or abdominal pain. She had had frequent problems with constipation according to her family. They report she has been taking laxatives recently though. Patient states last BM was yesterday. Also denies sick contacts. Patient's brother states that she is forgetting to take her medications and he is concerned she is not taking them as prescribed. She has a son that lives her but her brother is concerned she is not getting enough help at home.  Patient states she has had no problems with her PD at home.     Medications:     Scheduled Meds:   potassium chloride  40 mEq Oral Once    potassium chloride  20 mEq Oral BID WC    lidocaine  1 patch Transdermal Daily    apixaban  2.5 mg Oral BID    aspirin  81 mg Oral Daily    b complex-C-folic acid  1 mg Oral Daily with breakfast    calcitRIOL  0.25 mcg Oral Daily    vitamin D  2,000 Units Oral Daily    docusate sodium  100 mg Oral BID    sevelamer  1,600 mg Oral TID     psyllium  1 packet Oral Daily    simvastatin  40 mg Oral Nightly    sodium chloride flush  10 mL Intravenous 2 times per day     Continuous Infusions:   dextrose       PRN Meds:diclofenac sodium, capsaicin-menthol-methyl sal, sodium chloride flush, ondansetron, polyethylene glycol, acetaminophen, perflutren lipid microspheres, glucose, dextrose, glucagon (rDNA), dextrose    Objective:   Vitals:   T-max:  Patient Vitals for the past 8 hrs:   BP Temp Temp src Pulse Resp SpO2 Weight   08/09/19 0815 (!) 100/53 97.5 °F (36.4 °C) Oral 80 16 98 % --   08/09/19 0603 -- -- -- -- -- -- 143 lb 1.3 oz (64.9 kg)   08/09/19 0403 (!) 103/59 97.6 °F (36.4 °C) Oral 86 16 96 % --       Intake/Output Summary (Last 24 hours) at 8/9/2019 1007  Last data filed at 8/9/2019 0815  Gross per 24 hour   Intake 780 ml   Output 0 ml   Net 780 ml       Review of Systems   Constitutional: Positive for chills, fatigue and unexpected weight change. Negative for fever. HENT: Negative for sore throat and trouble swallowing. Eyes: Negative for visual disturbance. Respiratory: Negative for cough, chest tightness, shortness of breath and wheezing. Cardiovascular: Negative for chest pain, palpitations and leg swelling. Gastrointestinal: Negative for abdominal distention, abdominal pain, diarrhea, nausea and vomiting. Genitourinary: Negative for dysuria, frequency and urgency. Musculoskeletal: Positive for arthralgias. Negative for myalgias. Skin: Negative for color change, rash and wound. Neurological: Positive for numbness. Negative for dizziness, weakness, light-headedness and headaches. Hematological: Does not bruise/bleed easily. Psychiatric/Behavioral: Negative for agitation, behavioral problems and confusion. The patient is not nervous/anxious.         Physical Exam   Constitutional: She is oriented to person, place, and time. She appears well-developed. Appears malnourished. No distress. HENT:   Head: Normocephalic and atraumatic. Nose: Nose normal.   Mouth/Throat: Oropharynx is clear and moist. No oropharyngeal exudate. Eyes: Pupils are equal, round, and reactive to light. Conjunctivae and EOM are normal. No scleral icterus. Neck: Normal range of motion. Neck supple. No JVD present. Cardiovascular: Regular rate and rhythm and intact distal pulses. Exam reveals no gallop and no friction rub. No murmur heard. Heart sounds distant   Pulmonary/Chest: Effort normal and breath sounds normal. No stridor. No respiratory distress. She has no wheezes. She exhibits no tenderness. Abdominal: Soft. Bowel sounds are normal. She exhibits no distension. There is no tenderness. There is no guarding. Musculoskeletal: Normal range of motion. She exhibits edema. She exhibits no tenderness or deformity. 1+ edema of L lower extremity; Swelling around PIV sites  Neurological: She is alert and oriented to person, place, and time. No cranial nerve deficit or sensory deficit. Coordination normal.   Skin: Skin is dry. Capillary refill takes less than 2 seconds. No rash noted. She is not diaphoretic. No erythema. Skin cool to touch, very dry over feet and toes   Psychiatric: She has a normal mood and affect.  Her behavior is normal.     LABS:    CBC:   Recent Labs     08/07/19  1831 08/08/19  0450 08/09/19  0532   WBC 8.7 9.3 7.7   HGB 14.7 14.8 12.9   HCT 46.5 46.1 40.6    176 191   .5* 101.3* 102.3*     Renal:    Recent Labs     08/07/19  1831 08/08/19  0450 08/08/19  1721 08/09/19  0532 08/09/19  0533    147* 138  --  138   K 2.7* 3.0* 3.8  --  3.2*   CL 94* 103 100  --  99   CO2 29 22 19*  --  22   BUN 38* 38* 46*  --  44*   CREATININE 9.8* 10.2* 9.7*  --  9.0*   GLUCOSE 125* 87 79  --  139*   CALCIUM 7.3* 6.9* 6.5*  --  6.7*   MG 1.50*  --   --  3.00*  --    PHOS  --  3.9 3.5  --  3.6   ANIONGAP 20* 22* 19*  --  17*     Hepatic:   Recent Labs     08/07/19  1831 08/08/19  0450 08/08/19  1721 08/09/19  0533   AST 11*  --   --   --    ALT 14  --   --   --    BILITOT <0.2  --   --   --    BILIDIR <0.2  --   --   --    PROT 6.4  --   --   --    LABALBU 2.5* 2.1* 1.9* 2.0*   ALKPHOS 86  --   --   --      Troponin:   Recent Labs     08/08/19  1721 08/08/19  2322 08/09/19  0533   TROPONINI 0.13* 0.12* 0.14*     BNP: No results for input(s): BNP in the last 72 hours. Lipids: No results for input(s): CHOL, HDL in the last 72 hours. Invalid input(s): LDLCALCU, TRIGLYCERIDE  ABGs:  No results for input(s): PHART, VTY7SFW, PO2ART, ODY8MEN, BEART, THGBART, Z0NRBKGJ, YBF6FEL in the last 72 hours. INR:   Recent Labs     08/08/19  1010   INR 1.17*     Lactate:   Recent Labs     08/07/19  1828   LACTATE 2.34*     Cultures:  -----------------------------------------------------------------  RAD:   XR ABDOMEN (KUB) (SINGLE AP VIEW)   Final Result     No acute findings. XR CHEST PORTABLE   Final Result     No evidence of acute cardiopulmonary disease. XR CHEST STANDARD (2 VW)   Final Result   :   1. No acute abnormality. 2. Tortuous ectatic thoracic aorta. Assessment/Plan:     Ms. Eron Sharpe is a stable 70 y/o AAF w/ a PMHx of ESRD on PD, DM2, h/o DVT, FTT, systolic HF presented to the ED w/ 1 week h/o nausea, vomiting, fatigue. Patient's potassium and mag low in ED, replaced with electrolyte supplementation. BP's also soft in ED, attempts to get access so far have been mostly unsuccessful. All peripheral IV's placed so far have infiltrated. Will either get PICC or central line placed. Following up on infectious workup to rule out sepsis. Patient has not been febrile or had elevated white count, suspicion low, but started Zosyn d/t overall clinical picture. Will receive PD while inpt, will continue to follow BMP for lyte replacement, nephrology consulted.    Cardiology consulted, said that abnormal rhythm was multifocal atrial tachycardia runs. They would typically suggest CCB's, but pt's BP is too low to start. Patient was able to eat a sandwich last night and ingest some fluids.   Will continue to monitor to see if palliative consult is needed.        Severe malnutrition - failure to thrive vs poor po intake vs sepsis, 1 week history of nausea/vomiting  - Soft BP's on admission, currently 100's/50's  - No vascular access currently, all PIV's have infiltrated following placement  - Will order central access  - Albumin down to 2.0 this AM  - PRN antiemetics  - Will follow up infectious workup: blood cultures, UA, fluid cultures   - Continue prophylactic abx (Zosyn)     Multifocal atrial tachycardia  - EKG in ED showed sinus arrhythmia, concerning for atrial fibrillation  - Telemetry showed sinus tachycardia with labeled \"atrial fibrillation\" - appears to be artifact  - Echo showed EF of 65% with some septal hypertrophy and grade II diastolic dysfunction  - Cardiology would suggest CCBs, but BP too low to initiate  - Cards will continue to follow and suggest supportive care as long as pt is asymptomatic     ESRD on PD  - On nephrocaps, calcitriol, sensipar, sevelamer  - Will continue to receive PD inpatient  - Nephrology consulted     Hypokalemia  - Potassium was 3.0 on admission, is also 3.0 AM of 8/9  - Given 40mEq K replacement  - Mg currently 3.0    Peripheral neuropathy  - Patient has been treated in past for R foot and leg pain  - Gabapentin caused myoclonus  - Will continue to treat with capsaicin-lidocaine-menthol cream bid  - Added lidocaine patch  - Will discuss WINSOME with medical team     Constipation  - Given colace, psyllium     Chronic:  H/o DVT  - Anticoagulated w/ eliquis     HLD  - Continue home statin     Code Status: Full  FEN: general w/ ONS  PPX: eliquis, currently held for line placement  DISPO: Hal Frances MD, PGY-1  08/09/19  10:07 AM    This patient has been staffed and discussed with Jennifer Dumont MD.     Patient seen and examined, labs and imaging studies reviewed, agree with assessment and plan as outlined above. Continue with current care and plan as outlined above. Discussed case with team, discussed plan, greater than 35 minutes spent on case over half face to face.       Jennifer Dumont MD [address]

## 2019-08-09 NOTE — CARE COORDINATION
Prescription(s) must be in pharmacy by 3pm to be filled same day  3. Copy of patient's insurance/ prescription drug card and patient face sheet must be sent along with the prescription(s)  4. Cost of Rx cannot be added to hospital bill. If financial assistance is needed, please contact unit  or ;  or  CANNOT provide pharmacy voucher for patients co-pays  5. Patients can then  the prescription on their way out of the hospital at discharge, or pharmacy can deliver to the bedside if staff is available. (payment due at time of pick-up or delivery - cash, check, or card accepted)     Able to afford home medications/ co-pay costs: Yes    ADLS  Support Systems: Children    PT AM-PAC: 13 /24  OT AM-PAC: 17 /24    HOUSING  Home Environment: from home with son  Steps: none    Plans to RETURN to current housing: No  Barriers to RETURNING to current housing: Needs to go to SNF prior to home      Aleah Madsen 79  Currently ACTIVE with [place] [place] [address]: No  Home Care Agency: Not Applicable    Currently ACTIVE with [place] on Aging: No  Passport/ Waiver: No  Passport/ Waiver Services: Not Applicable    :  Phone:       [id number]  [facility] Provider:   Equipment: walker    Home Oxygen and [address] prior to admission: No  [address]: Not Applicable    Informed of need to bring portable home O2 tank on day of DISCHARGE for nursing to connect prior to leaving: No  Verbalized agreement/Understanding: No  Person to bring portable tank at discharge:     Dialysis  Active with HD/PD prior to admission: Yes  Nephrologist: Dr Anamaria Pulido:   [facility]  Address: [address]  Hught[place] Quinn  Phone: [phone]    DISCHARGE PLAN:  Disposition: Skilled Facility    Transportation PLAN for discharge: EMS transportation     Factors facilitating achievement of predicted outcomes: Family support, Cooperative and Pleasant    Barriers to discharge: Decreased endurance, Lower extremity weakness, Medical complications and Medication managment    Additional Case Management Notes: CM met with pt at bedside, pt agreeable to SNF at discharge, has been to [place] in the past and does not wish to return there. CM has provided SNF list from [facility] website for pt to review with family. Will need precert and updated therapy notes on Monday. Tarah Carney and her family were provided with choice of provider; she and her family are in agreement with the discharge plan.     Care Transition patient: Katherine Ron RN  [facility] [employer]  Case Management Department  Ph: [phone]   Fax: [phone]

## 2019-08-09 NOTE — DISCHARGE SUMMARY
INTERNAL MEDICINE    RESIDENT DISCHARGE SUMMARY   Discharge Summaries      Patient ID: Cristian Skaggs   Gender: female      :  1940  AGE: 69 y.o. MRN:  [id number]  Code Status: Full Code   PCP: Airam Nguyen DO    Admit date:  2019      Discharge date:  8/15/2019 @1000    Admitting Physician:  Shantal Neri MD    Discharge Physician: Michelle Ellsworth MD     Admission Condition:  poor  Discharged Condition:  fair Stable    Discharge Diagnoses: Active Hospital Problems    Diagnosis Date Noted    Peritoneal dialysis catheter exit site infection ([facility] Alvy Anger 08/15/2019    Severe malnutrition ([facility] [E43] 2019    Hypokalemia [E87.6]     Multifocal atrial tachycardia (HCC) [I47.1] 2019    Elevated troponin [R74.8] 2019    Atrial fibrillation ([facility] [I48.91] 2019       The patient was seen and examined on day of discharge and this discharge summary is in conjunction with any daily progress note from day of discharge. Hospital Course:     Per HPI: Caitlin Vale a 69 y. o. female w/ ESRD on PD who presented with severe fatigue after about one week of nausea, poor PO intake, decreased appetite. When she attempts to eat has frequently vomited shortly thereafter. She denied fever, chest pain, palpitations, SOB, cough, and abdominal pain. She has had frequent problems with constipation according to her family. They report she has been taking laxatives prior to admission. Patient stated last BM was day prior to admission. Also denies sick contacts. Patient's brother stated that she forgets to take her medications and he was concerned she is not taking them as prescribed. She has a son that lives her but her brother is concerned she is not getting enough help at home. Patient stated she has had no problems with her PD at home.     ED course: In the ED, pt's potassium and magnesium found to be low, replaced with oral and IV supplementation.   Pt was also found to be hypotensive, very difficult to get vascular access. Infectious workup started with suspicion of infection. EKG was suspicious for atrial fibrillation in ED. Floor course: Upon admission to the floor, medical teams attempted vascular access with no luck. General surgery eventually placed a CVC in [facility], which has been effective. Infectious workup resulted positive for K. Pneumoniae infection at exit site for peritoneal dialysis catheter, started on gentamicin cream and cipro. Cardiology consulted for irregular rhythm, determined to be multifocal atrial tachycardia. Echo showed normal EF and TSH/BMP were w/in normal limits. Nephro consulted for management of PD and electrolytes while inpt, eventually started pt on midodrine, BP's improved. Suggesting discharge on salt tabs. Pt was also worked up for leg pain while inpt, WINSOME's showed some claudication, will follow up for arteriogram outpt. The morning of discharge, pt was examined at bedside. Pt was eating breakfast, was more alert and attentive than she had been the entire admission. Weakness improving, still has R foot pain. No fevers, chills, chest pain, palpitations, cough, shortness of breath, nausea, vomiting, diarrhea, leg swelling. Tolerating diet, able to ambulate with walker.     Disposition:  [facility]    Physical Exam Performed:     /74   Pulse 64   Temp 97.4 °F (36.3 °C) (Axillary)   Resp 16   Ht 5' 7\" (1.702 m)   Wt 148 lb 13 oz (67.5 kg)   LMP  (LMP Unknown)   SpO2 98%   BMI 23.31 kg/m²     Physical Exam:  Const: Appears malnourished, Well developed, not distressed, not diaphoretic, appears stated age  Eyes: PERRL, EOMI, no conjunctival injection, no discharge  HENT: Normocephalic, atraumatic, oropharynx not erythematous, no postnasal drip  Neck: Supple, no JVD, no thyromegaly, trachea midline, CVC present in R IJ  CV: Regular rate, irregular rhythm, heart sounds normal, no murmur, no gallop, no rub, capillary refill <2s, 2+ pulses in bilateral distal upper and lower extremities  RESP: Clear to auscultation bilaterally, normal breath sounds, Unlabored respiratory effort, no wheezes, no rhonchi, no stridor, no chest wall tenderness   GI: soft, non-tender, non-distended, no masses, no rebound, bowel sounds normal  MSK/Extremities: No gross deformities appreciated, no edema noted, R foot is tender to palpation  Skin: Cool and dry over lower extremities, very dry. No rashes, no erythema  Neuro: Alert, CNs II-XII grossly intact. Sensation and motor function of extremities grossly intact.  No abnormal tone.  Reflexes 2+ in distal extremities  Psych: Appropriate mood and affect. Labs: For convenience and continuity at follow-up the following most recent labs are provided:      CBC:    Lab Results   Component Value Date    WBC 8.6 08/15/2019    HGB 11.8 08/15/2019    HCT 36.2 08/15/2019     08/15/2019       Renal:    Lab Results   Component Value Date     08/15/2019    K 3.9 08/15/2019    K 2.7 08/07/2019    CL 96 08/15/2019    CO2 25 08/15/2019    BUN 46 08/15/2019    CREATININE 6.7 08/15/2019    CALCIUM 7.1 08/15/2019    PHOS 3.5 08/15/2019         Significant Diagnostic Studies    Radiology:   VL WINSOME BILATERAL LIMITED 1-2 LEVELS   Final Result      CT SOFT TISSUE NECK W CONTRAST   Final Result      No extraluminal air density along the course of the visualized esophagus to suggest transluminal esophageal perforation      Focal soft tissue fullness of the left true vocal cord-see above      Equivocal small laryngocele on the right. XR CHEST PORTABLE   Final Result      Right IJ central venous catheter tip projects over the lower superior vena cava. No pneumothorax. XR ABDOMEN (KUB) (SINGLE AP VIEW)   Final Result     No acute findings. XR CHEST PORTABLE   Final Result     No evidence of acute cardiopulmonary disease. XR CHEST STANDARD (2 VW)   Final Result   :   1. No acute abnormality. 2. Tortuous ectatic thoracic aorta. Consults:     IP CONSULT TO HOSPITALIST  IP CONSULT TO GENERAL SURGERY  IP CONSULT TO CASE MANAGEMENT  IP CONSULT TO CARDIOLOGY  IP CONSULT TO DIETITIAN  IP CONSULT TO GI  IP CONSULT TO GI  IP CONSULT TO VASCULAR SURGERY      Discharge Instructions/Follow-up:      Please follow up with Dr. Beau Hayden 1-2 weeks following discharge for follow up. Please arrange for appointment with Dr. Isatu Busby of vascular surgery to set up an arteriogram at pt's convenience. Activity: activity as tolerated    Diet: regular diet    Discharge Medications:     Discharge Medication List as of 8/15/2019  1:16 PM           Details   sucralfate (CARAFATE) 1 GM/10ML suspension Take 10 mLs by mouth 4 times daily (before meals and nightly), Disp-1200 mL, R-3DC to SNF      midodrine (PROAMATINE) 5 MG tablet Take 1 tablet by mouth 3 times daily (with meals), Disp-90 tablet, R-3DC to SNF      phenol 1.4 % LIQD mouth spray Take 1 spray by mouth every 2 hours as needed for Sore Throat, Disp-1 mL, R-0Print      sodium chloride 1 g tablet Take 1 tablet by mouth 2 times daily (with meals), Disp-90 tablet, R-3DC to SNF      melatonin 1 MG tablet Take 3 tablets by mouth nightly, Disp-3 tablet, R-0DC to SNF      polyethylene glycol (GLYCOLAX) packet Take 17 g by mouth daily as needed for Constipation, Disp-527 g, R-1DC to SNF      insulin lispro (HUMALOG) 100 UNIT/ML pen Inject 0-6 Units into the skin 3 times daily (with meals), Disp-5 pen, R-3DC to SNF      pregabalin (LYRICA) 75 MG capsule Take 1 capsule by mouth daily for 30 days. , Disp-30 capsule, R-0Print              Details   cinacalcet (SENSIPAR) 60 MG tablet TAKE 1 TABLET BY MOUTH ONE TIME A DAY, Disp-30 tablet, R-0Normal      sevelamer (RENVELA) 800 MG tablet Take 2 tablets by mouth 3 times daily (with meals), Disp-90 tablet, R-2Normal      apixaban (ELIQUIS) 2.5 MG TABS tablet Take 1 tablet by mouth 2 times daily, Disp-60 tablet, R-1Normal

## 2019-08-09 NOTE — PLAN OF CARE
Problem: Falls - Risk of:  Goal: Will remain free from falls  8/9/2019 0305 by Melvi Parrish RN  Outcome: Ongoing  Note:   Patient remains free of falls and accidental injury. Patient assessed as a high fall risk. Bed in lowest position, non-skid footwear in place, bed alarm on, call light in reach, instructed patients to call for needs or assistance out of bed. Will continue to monitor.

## 2019-08-09 NOTE — PROGRESS NOTES
[facility] Daily Progress Note      Admit Date:  8/7/2019    Subjective:  Ms. Sarah Vo was seen and examined. F/U MAT/Trop/N/V/RF. Feeling better. Nausea better. No cp/sob.      Objective:   BP (!) 100/53   Pulse 80   Temp 97.5 °F (36.4 °C) (Oral)   Resp 16   Ht 5' 6\" (1.676 m)   Wt 143 lb 1.3 oz (64.9 kg)   LMP  (LMP Unknown)   SpO2 98%   BMI 23.09 kg/m²       Intake/Output Summary (Last 24 hours) at 8/9/2019 0147  Last data filed at 8/9/2019 0815  Gross per 24 hour   Intake 780 ml   Output 0 ml   Net 780 ml       TELEMETRY: sinus    Physical Exam:  General:  Awakens, lethargic, NAD  Skin:  Warm and dry  Neck:  JVP difficult, supple  Chest:  Clear to auscultation, respiration normal  Cardiovascular:  RRR S1S2  Abdomen:  Soft ,quiet, nontender  Extremities:  Warm, no edema    Medications:    potassium chloride  40 mEq Oral Once    apixaban  2.5 mg Oral BID    aspirin  81 mg Oral Daily    b complex-C-folic acid  1 mg Oral Daily with breakfast    calcitRIOL  0.25 mcg Oral Daily    vitamin D  2,000 Units Oral Daily    docusate sodium  100 mg Oral BID    sevelamer  1,600 mg Oral TID WC    psyllium  1 packet Oral Daily    simvastatin  40 mg Oral Nightly    sodium chloride flush  10 mL Intravenous 2 times per day      dextrose       diclofenac sodium, capsaicin-menthol-methyl sal, sodium chloride flush, ondansetron, polyethylene glycol, acetaminophen, perflutren lipid microspheres, glucose, dextrose, glucagon (rDNA), dextrose    Lab Data:  CBC:   Recent Labs     08/07/19  1831 08/08/19  0450 08/09/19  0532   WBC 8.7 9.3 7.7   HGB 14.7 14.8 12.9   HCT 46.5 46.1 40.6   .5* 101.3* 102.3*    176 191     BMP:   Recent Labs     08/08/19  0450 08/08/19  1721 08/09/19  0533   * 138 138   K 3.0* 3.8 3.2*    100 99   CO2 22 19* 22   PHOS 3.9 3.5 3.6   BUN 38* 46* 44*   CREATININE 10.2* 9.7* 9.0*     LIVER PROFILE:   Recent Labs     08/07/19  1831   AST 11*   ALT 14   LIPASE 22.0

## 2019-08-09 NOTE — PROGRESS NOTES
hypokalemia  2.7 upon admission.   - 3.0 to 3.8 to 3.2 this morning  - Continue IV and PO Potassium chloride as needed until corrected  - Encouraged to increase PO intake  - Monitor q12hrs  - Monitor EKG daily    3.  Hypomagnesemia  1.5 upon admission  - 3.0 today  - Recheck Magnesium in am    [facility], [facility]  Nephrology    Pt seen and examined   On PD   Clarisa well   UF in decent range     Angle Younger Md

## 2019-08-10 NOTE — PLAN OF CARE
Problem: Falls - Risk of:  Goal: Will remain free from falls  Description  Will remain free from falls  Note:   Pt free from injury or falls at this time, fall precautions in place, bed in low position, side rail up x2, Mascorro Fall Risk: Medium (25-44), bed alarm on, reoriented to room and call light, reminded not to get up without assistance, call light in reach, will continue to monitor. Pt verbalizes understanding of fall risk procedures. Problem: Risk for Impaired Skin Integrity  Goal: Tissue integrity - skin and mucous membranes  Description  Structural intactness and normal physiological function of skin and  mucous membranes. Note:   Khurram skin assessment completed. Pt's skin remains intact. Repositioned every 2 hours. Sacral heart and barrier cream applied. Will continue to monitor for skin integrity impairment.

## 2019-08-10 NOTE — PROGRESS NOTES
[facility] [address]       In Patient  Progress note        Aide Fernandez MD,  [facility]   Daily Progress Note      Admit Date:  8/7/2019  Primary Cardiologist : Nani Stevens     CC:  here with MAT/Trop/N/V/RF    Subjective: resting in bed, quiet night   Pt with no acute overnight events. Denies chest pain, palpitations, and dyspnea. Reviewed vitals  / b/p soft  / HR 80s MAT   Afebrile SV02 95 % room air / net+ 2.3 liters      HPI:  Dr. Nani Stevens consulted 8/8/19   Gladis Lorenzo is a 69 y.o. patient whom we were asked by the hospitalists to see for arrhythmia. Possible afib. No hx of arrhythmia. No cardiac hx. Had poor intake and N/V for past week. When attempting to eat would become nauseated and vomit. She came to ER. EKG showed what was felt to be afib. She denies hx of arrhythmia. Having no sob or chest pain. No syncope. Denies pnd or orthopnea. No palps. She does chronic PD. Reviewed most recent test with pt     Review of Systems:   · Constitutional: no unanticipated weight loss. There's been no change in energy level, sleep pattern, or activity level. No fevers, chills. · Eyes: No visual changes or diplopia. No scleral icterus. · ENT: No Headaches, hearing loss or vertigo. No mouth sores or sore throat. · Cardiovascular: as reviewed in HPI  · Respiratory: No cough or wheezing, no sputum production. No hematemesis. · Gastrointestinal: No abdominal pain, appetite loss, blood in stools. No change in bowel or bladder habits. · Genitourinary: No dysuria, trouble voiding, or hematuria. · Musculoskeletal:  No gait disturbance, no joint complaints. · Integumentary: No rash or pruritis. · Neurological: No headache, diplopia, change in muscle strength, numbness or tingling. · Psychiatric: No anxiety or depression. · Endocrine: No temperature intolerance. No excessive thirst, fluid intake, or urination. No tremor. · Hematologic/Lymphatic: No abnormal bruising or bleeding, blood clots or swollen lymph nodes. · Allergic/Immunologic: No nasal congestion or hives. Objective:   /71   Pulse 87   Temp 98 °F (36.7 °C) (Oral)   Resp 18   Ht 5' 6\" (1.676 m)   Wt 129 lb 3 oz (58.6 kg)   LMP  (LMP Unknown)   SpO2 95%   BMI 20.85 kg/m²       Intake/Output Summary (Last 24 hours) at 8/10/2019 1027  Last data filed at 8/10/2019 0944  Gross per 24 hour   Intake 435 ml   Output 63 ml   Net 372 ml     Wt Readings from Last 3 Encounters:   08/09/19 129 lb 3 oz (58.6 kg)   06/11/19 143 lb 3.2 oz (65 kg)   05/31/19 142 lb 6.7 oz (64.6 kg)       Physical Exam:  /71   Pulse 87   Temp 98 °F (36.7 °C) (Oral)   Resp 18   Ht 5' 6\" (1.676 m)   Wt 129 lb 3 oz (58.6 kg)   LMP  (LMP Unknown)   SpO2 95%   BMI 20.85 kg/m²   BP Readings from Last 3 Encounters:   08/10/19 111/71   06/11/19 119/75   05/31/19 132/82     Pulse Readings from Last 3 Encounters:   08/10/19 87   06/11/19 85   05/31/19 95       Intake/Output Summary (Last 24 hours) at 8/10/2019 1027  Last data filed at 8/10/2019 0944  Gross per 24 hour   Intake 435 ml   Output 63 ml   Net 372 ml     Wt Readings from Last 2 Encounters:   08/09/19 129 lb 3 oz (58.6 kg)   06/11/19 143 lb 3.2 oz (65 kg)     Constitutional: She is oriented to person, place, and time. She appears well-developed and well-nourished. In no acute distress. Head: Normocephalic and atraumatic. Eyes: PEERL   Neck: Neck supple. No JVD present. Carotid bruit is not present. No mass and no thyromegaly present. No lymphadenopathy present. Cardiovascular: Normal rate, regular rhythm, normal heart sounds and intact distal pulses. Exam reveals no gallop and no friction rub. No murmur heard. Pulmonary/Chest: Effort normal and breath sounds normal. No respiratory distress. She has no wheezes, rhonchi or rales. Abdominal: Soft, non-tender.  Bowel sounds and aorta are normal. She exhibits no organomegaly, mass or bruit. Extremities: No edema, cyanosis, or clubbing. Pulses are 2+ radial/carotid/dorsalis pedis and posterior tibial bilaterally. Neurological: She is alert and oriented to person, place, and time. She has normal reflexes. No cranial nerve deficit. Coordination normal.   Skin: Skin is warm and dry. There is no rash or diaphoresis. Psychiatric: She has a normal mood and affect. Her speech is normal and behavior is normal.     Medications:    melatonin  3 mg Oral Once    potassium chloride  20 mEq Oral BID WC    lidocaine  1 patch Transdermal Daily    heparin (porcine)  1,000 Units Intravenous Nightly    pregabalin  75 mg Oral Daily    scopolamine  1 patch Transdermal Q72H    gentamicin   Topical Daily    [Held by provider] apixaban  2.5 mg Oral BID    aspirin  81 mg Oral Daily    b complex-C-folic acid  1 mg Oral Daily with breakfast    calcitRIOL  0.25 mcg Oral Daily    vitamin D  2,000 Units Oral Daily    docusate sodium  100 mg Oral BID    sevelamer  1,600 mg Oral TID WC    psyllium  1 packet Oral Daily    simvastatin  40 mg Oral Nightly    sodium chloride flush  10 mL Intravenous 2 times per day      dextrose       diclofenac sodium, ondansetron, capsaicin-menthol-methyl sal, sodium chloride flush, ondansetron, polyethylene glycol, acetaminophen, perflutren lipid microspheres, glucose, dextrose, glucagon (rDNA), dextrose    Recent Labs     08/08/19  0450 08/09/19  0532 08/10/19  0430   WBC 9.3 7.7 11.4*   HGB 14.8 12.9 14.1    191 217     BMP:    Recent Labs     08/08/19  1721 08/09/19  0533 08/10/19  0430    138 139   K 3.8 3.2* 3.8   CO2 19* 22 26   BUN 46* 44* 43*   CREATININE 9.7* 9.0* 8.7*     LIVR:   Recent Labs     08/07/19  1831   AST 11*   ALT 14     INR:    Recent Labs     08/08/19  1010   INR 1.17*     APTT: No results for input(s): APTT in the last 72 hours. BNP:  No results for input(s): BNP in the last 72 hours.     Telemetry: NSR Reviewed  available lab work,  EKGs, images, [facility]     Approximately 25-30 minutes spent with patient and > 50% of time spent on pt education, answering questions and coordinating care. Assessment/Plan:    MAT    EKG shows sinus with runs of what appears to be multifocal atrial tachycardia. Magnesium TSH wnl     Echo: 8/7/19   Left ventricular cavity size is normal. There is mild concentric hypertrophy   with more pronounced septal hypertrophy. Overall left ventricular systolic   function appears hyperdynamic with an estimated ejection fraction of >65%.   No regional wall motion abnormalities are noted. Diastolic filling   parameters suggests grade II diastolic dysfunction.     ESRD  PD     Trop elevation insetting of RI  / sCr  10.2 >8.7   neph following     DVT  On eliquis      HTN  B/p soft    HLD   On statin     Plan:   b/p soft  / HR 80s MAT  / echo unremarkable / consider CCB/BB when b/p improves    Cont eliquis asa zocor klor con   Will follow     Magy ROSENTHAL, CVNP

## 2019-08-10 NOTE — PROGRESS NOTES
Internal Medicine  Progress Note  PGY-2    Hospital Day: 4                                                      Admit Date: 8/7/2019                                     PCP: Jamie Marcus DO      CC: nausea, vomiting, fatigue                      Interval Hx: Lost IV access again overnight, now has PIV in leg. SBP in 80s again overnight, asymptomatic. Now in 110s after 250 cc bolus. Daily Plan:  8/10/2019    Subjective: Patient seen and examined at bedside. States she is doing better than yesterday. No further nausea, vomiting. Still very tired. Denies any abdominal pain, fevers, chills, chest pain, shortness of breath. Does have pain in lower extremities. Per nurse patient sat up this morning and ate breakfast well. Medications:    Scheduled Meds:   melatonin  3 mg Oral Once    potassium chloride  20 mEq Oral BID WC    lidocaine  1 patch Transdermal Daily    heparin (porcine)  1,000 Units Intravenous Nightly    pregabalin  75 mg Oral Daily    scopolamine  1 patch Transdermal Q72H    gentamicin   Topical Daily    [Held by provider] apixaban  2.5 mg Oral BID    aspirin  81 mg Oral Daily    b complex-C-folic acid  1 mg Oral Daily with breakfast    calcitRIOL  0.25 mcg Oral Daily    vitamin D  2,000 Units Oral Daily    docusate sodium  100 mg Oral BID    sevelamer  1,600 mg Oral TID WC    psyllium  1 packet Oral Daily    simvastatin  40 mg Oral Nightly    sodium chloride flush  10 mL Intravenous 2 times per day      Continuous Infusions:   dextrose       PRN Meds:diclofenac sodium, ondansetron, capsaicin-menthol-methyl sal, sodium chloride flush, ondansetron, polyethylene glycol, acetaminophen, perflutren lipid microspheres, glucose, dextrose, glucagon (rDNA), dextrose    Allergies:    Allergies   Allergen Reactions    Gabapentin Other (See Comments)     Gabapentin-induced myoclonus         Physical Exam:     Vitals: /71   Pulse 87   Temp 98 °F (36.7 °C) (Oral)   Resp 18   Ht 5' 6\" Labs     08/08/19  1010   INR 1.17*     Urinalysis:  Lab Results   Component Value Date    NITRU Negative 04/08/2019    WBCUA 20-50 04/08/2019    BACTERIA 3+ 04/08/2019    RBCUA 10-20 04/08/2019    BLOODU LARGE 04/08/2019    SPECGRAV 1.025 04/08/2019    GLUCOSEU 100 04/08/2019       Radiology:  XR ABDOMEN (KUB) (SINGLE AP VIEW)   Final Result     No acute findings. XR CHEST PORTABLE   Final Result     No evidence of acute cardiopulmonary disease. XR CHEST STANDARD (2 VW)   Final Result   :   1. No acute abnormality. 2. Tortuous ectatic thoracic aorta. ASSESSMENT AND PLAN:   Ms. Demetri Thomas is a stable 68 y/o AAF w/ a PMHx of ESRD on PD, DM2, h/o DVT, FTT, systolic HF presented to the ED w/ 1 week h/o nausea, vomiting, fatigue. Nausea/Vomiting - resolved. Tolerating PO. Blood cx NGTD. PD fluid No growth or organisms seen. - PRN antiemetics   - off IVF    Hypotension - SBP 80s again this morning, improved with 250 cc bolus. Possibly due to volume depletion in setting of n/v and poor PO intake. Lactate 2.5 this morning.   - continue to monitor  - repeat lactate     Severe malnutrition - failure to thrive vs poor po intake vs sepsis, 1 week history of nausea/vomiting.   - Albumin 2.2  - continue nutritional supplements    Multifocal atrial tachycardia - EKG in ED showed sinus arrhythmia, concerning for atrial fibrillation. Currently NSR on tele. Echo showed EF of 65% with some septal hypertrophy and grade II diastolic dysfunction  - Cardiology recommending CCBs, but BP too low to initiate     ESRD on PD  - nephrology consulted   - On nephrocaps, calcitriol, sensipar, sevelamer  - Will continue to receive PD inpatient     Hypokalemia - resolved.    - KCL 20 mEq BID per nephrology     Peripheral neuropathy - Gabapentin caused myoclonus  - Will continue to treat with capsaicin-lidocaine-menthol cream bid  - lidocaine patch  - may need to consider WINSOME     Constipation  - colace,

## 2019-08-10 NOTE — PROGRESS NOTES
Dr. Christian Flores notified of pts BP 93/54, HR 85, asymptomatic at this time. Will continue to monitor.

## 2019-08-10 NOTE — PROGRESS NOTES
NEPHROLOGY   Progress Note    Subjective:   Admit Date: 8/7/2019      Interval History:    Pt is sleeping   Hard to awake says she didn't have good sleep last night     DIET:  Dietary Nutrition Supplements: Clear Liquid Oral Supplement  DIET GENERAL;    Medications:     Scheduled Meds:   melatonin  3 mg Oral Once    vancomycin  1,250 mg Intravenous Once    potassium chloride  20 mEq Oral BID WC    lidocaine  1 patch Transdermal Daily    heparin (porcine)  1,000 Units Intravenous Nightly    pregabalin  75 mg Oral Daily    scopolamine  1 patch Transdermal Q72H    gentamicin   Topical Daily    [Held by provider] apixaban  2.5 mg Oral BID    aspirin  81 mg Oral Daily    b complex-C-folic acid  1 mg Oral Daily with breakfast    calcitRIOL  0.25 mcg Oral Daily    vitamin D  2,000 Units Oral Daily    docusate sodium  100 mg Oral BID    sevelamer  1,600 mg Oral TID WC    psyllium  1 packet Oral Daily    simvastatin  40 mg Oral Nightly    sodium chloride flush  10 mL Intravenous 2 times per day       Continuous Infusions:   dextrose         Labs:  CBC:   Recent Labs     08/08/19  0450 08/09/19  0532 08/10/19  0430   WBC 9.3 7.7 11.4*   HGB 14.8 12.9 14.1    191 217     BMP:    Recent Labs     08/08/19 1721 08/09/19  0533 08/10/19  0430    138 139   K 3.8 3.2* 3.8    99 97*   CO2 19* 22 26   BUN 46* 44* 43*   CREATININE 9.7* 9.0* 8.7*   GLUCOSE 79 139* 155*       Ca/Mg/Phos:   Recent Labs     08/07/19  1831  08/08/19  1721 08/09/19  0532 08/09/19  0533 08/10/19  0430   CALCIUM 7.3*   < > 6.5*  --  6.7* 7.0*   MG 1.50*  --   --  3.00*  --  2.60*   PHOS  --    < > 3.5  --  3.6 2.8    < > = values in this interval not displayed.      Hepatic:   Recent Labs     08/07/19 1831   AST 11*   ALT 14   BILITOT <0.2   ALKPHOS 86     Troponin:   Recent Labs     08/08/19  1721 08/08/19  2322 08/09/19  0533   TROPONINI 0.13* 0.12* 0.14*     INR:   Recent Labs     08/08/19  1010   INR 1.17* Objective:     Vitals:   BP (!) 93/54   Pulse 85   Temp 98.2 °F (36.8 °C) (Oral)   Resp 18   Ht 5' 6\" (1.676 m)   Wt 129 lb 3 oz (58.6 kg)   LMP  (LMP Unknown)   SpO2 95%   BMI 20.85 kg/m²      Weight:  Wt Readings from Last 3 Encounters:   08/09/19 129 lb 3 oz (58.6 kg)   06/11/19 143 lb 3.2 oz (65 kg)   05/31/19 142 lb 6.7 oz (64.6 kg)        24hr I/Os:     Intake/Output Summary (Last 24 hours) at 8/10/2019 1554  Last data filed at 8/10/2019 0944  Gross per 24 hour   Intake 195 ml   Output 63 ml   Net 132 ml       Physical Exam:  Constitutional:  no acute distress, AOX3  NECK: supple, no JVD  Cardiovascular:  RRR; no murmurs rubs or gallops  Respiratory:  CTA bilaterally; diffuse wheezing bilaterally; no crackles  Abdomen: soft, non-tender, non-distended, +BS, no hepatospleenomegaly  Ext: no LE edema  Skin: decreased sensation in R foot w/ skin breaks on the R big toe alongside other chronic changes    IMAGING:  XR ABDOMEN (KUB) (SINGLE AP VIEW)   Final Result     No acute findings. XR CHEST PORTABLE   Final Result     No evidence of acute cardiopulmonary disease. XR CHEST STANDARD (2 VW)   Final Result   :   1. No acute abnormality. 2. Tortuous ectatic thoracic aorta. Assessment and Plan:     Smiley Alberto is a 69 y.o. female with prior history of ESRD on PD, Diabetes, hypertension, hyperlipidemia, CHF, and DVT on Eliquis who presents with nausea and vomiting, and feeling unwell for about a week. We are consulted for ESRD on PD.    1. ESRD on CCPD  BUN 44, Cr 9.0 and GFR 5. Does not produce urine.  - Continue CCPD   - has exit side drainage - add gent cream   - vanc x 1     2. Severe hypokalemia  - replace   - Monitor daily       3.  Hypomagnesemia  - replaced     Casa Whitaker MD

## 2019-08-11 NOTE — PROGRESS NOTES
Bolus completed, BP (!) 78/56   Pulse 84   Temp 97.1 °F (36.2 °C) (Axillary)   Resp 16   Ht 5' 7\" (1.702 m)   Wt 134 lb 14.7 oz (61.2 kg)   LMP  (LMP Unknown)   SpO2 97%   BMI 21.13 kg/m²  on room air. Pt continues to be a/ox4 but tired. On call resident notified via perfect serve.

## 2019-08-11 NOTE — PROGRESS NOTES
BP (!) 70/42 Comment: manual  Pulse 72   Temp 97.1 °F (36.2 °C) (Axillary)   Resp 18   Ht 5' 7\" (1.702 m)   Wt 134 lb 14.7 oz (61.2 kg)   LMP  (LMP Unknown)   SpO2 97%   BMI 21.13 kg/m²  on room air with manual BP. Pt able to be woken up to have conversation with RN, denies lightheadedness, dizziness, nausea. Keeps falling asleep but when questioned about having trouble staying awake, pt stated \"you would too if you were kept up all night. \" On call resident notified via perfect serve. . Will continue to monitor.

## 2019-08-11 NOTE — PROGRESS NOTES
[facility] [facility]       In Patient  Progress note        Abran Cevallos MD,  [address]   Daily Progress Note      Admit Date:  8/7/2019  Primary Cardiologist : Kallie Gupta       CC:  here with MAT/Trop/N/V/RF    Subjective: resting in bed, quiet night    c/o occas nausea / abd xray with no acute changes / has bowel sounds / IM following   Pt with no acute overnight events. Denies chest pain, palpitations, and dyspnea. Reviewed vitals  / b/p soft HR 90s PAC PVCs short run SVT   ESRD / PD       HPI: Dr. Kallie Gupta consulted 8/8/19   Emmaevelyne Felipemario a 69 y. o. patient whom we were asked by the hospitalists to see for arrhythmia. Possible afib. No hx of arrhythmia.  No cardiac hx.  Had poor intake and N/V for past week.  When attempting to eat would become nauseated and vomit.  She came to ER.  EKG showed what was felt to be afib. She denies hx of arrhythmia.  Having no sob or chest pain.  No syncope.  Denies pnd or orthopnea. No palps. She does chronic PD    Reviewed most recent test with pt     Review of Systems:   · Constitutional: no unanticipated weight loss. There's been no change in energy level, sleep pattern, or activity level. No fevers, chills. · Eyes: No visual changes or diplopia. No scleral icterus. · ENT: No Headaches, hearing loss or vertigo. No mouth sores or sore throat. · Cardiovascular: as reviewed in HPI  · Respiratory: No cough or wheezing, no sputum production. No hematemesis. · Gastrointestinal: No abdominal pain, appetite loss, blood in stools. No change in bowel or bladder habits. · Genitourinary: No dysuria, trouble voiding, or hematuria. · Musculoskeletal:  No gait disturbance, no joint complaints. · Integumentary: No rash or pruritis. · Neurological: No headache, diplopia, change in muscle strength, numbness or tingling. · Psychiatric: No anxiety or depression.   · Endocrine: No temperature intolerance. No excessive thirst, fluid intake, or urination. No tremor. · Hematologic/Lymphatic: No abnormal bruising or bleeding, blood clots or swollen lymph nodes. · Allergic/Immunologic: No nasal congestion or hives. Objective:   BP (!) 98/52   Pulse 93   Temp 98.2 °F (36.8 °C) (Oral)   Resp 16   Ht 5' 7\" (1.702 m)   Wt 134 lb 14.7 oz (61.2 kg)   LMP  (LMP Unknown)   SpO2 98%   BMI 21.13 kg/m²       Intake/Output Summary (Last 24 hours) at 8/11/2019 0851  Last data filed at 8/11/2019 0458  Gross per 24 hour   Intake 480 ml   Output -223 ml   Net 703 ml     Wt Readings from Last 3 Encounters:   08/11/19 134 lb 14.7 oz (61.2 kg)   06/11/19 143 lb 3.2 oz (65 kg)   05/31/19 142 lb 6.7 oz (64.6 kg)       Physical Exam:  BP (!) 98/52   Pulse 93   Temp 98.2 °F (36.8 °C) (Oral)   Resp 16   Ht 5' 7\" (1.702 m)   Wt 134 lb 14.7 oz (61.2 kg)   LMP  (LMP Unknown)   SpO2 98%   BMI 21.13 kg/m²   BP Readings from Last 3 Encounters:   08/11/19 (!) 98/52   06/11/19 119/75   05/31/19 132/82     Pulse Readings from Last 3 Encounters:   08/11/19 93   06/11/19 85   05/31/19 95       Intake/Output Summary (Last 24 hours) at 8/11/2019 0851  Last data filed at 8/11/2019 0458  Gross per 24 hour   Intake 480 ml   Output -223 ml   Net 703 ml     Wt Readings from Last 2 Encounters:   08/11/19 134 lb 14.7 oz (61.2 kg)   06/11/19 143 lb 3.2 oz (65 kg)     Constitutional: She is oriented to person, place, and time. She appears well-developed and well-nourished. In no acute distress. Head: Normocephalic and atraumatic. Eyes: PEERL   Neck: Neck supple. No JVD present. Carotid bruit is not present. No mass and no thyromegaly present. No lymphadenopathy present. Cardiovascular: Normal rate, regular rhythm, normal heart sounds and intact distal pulses. Exam reveals no gallop and no friction rub. No murmur heard. Pulmonary/Chest: Effort normal and breath sounds normal. No respiratory distress.  She has no results for input(s): APTT in the last 72 hours. BNP:  No results for input(s): BNP in the last 72 hours. Telemetry: sinus with PAC PVC MAT     Reviewed  available lab work,  EKGs, images, LHC     Approximately 25-30 minutes spent with patient and > 50% of time spent on pt education, answering questions and coordinating care. Assessment/Plan:    MAT  / sinus PAC PVC short run SVT   EKG shows sinus with runs of what appears to be multifocal atrial tachycardia. Magnesium TSH wnl      Echo: 8/7/19   Left ventricular cavity size is normal. There is mild concentric hypertrophy   with more pronounced septal hypertrophy. Overall left ventricular systolic   function appears hyperdynamic with an estimated ejection fraction of >65%.   No regional wall motion abnormalities are noted.  Diastolic filling   parameters suggests grade II diastolic dysfunction.     ESRD  PD      Trop elevation insetting of RI  / sCr  10.2 >8.7   neph following      DVT  On eliquis       HTN  B/p soft     HLD   On statin      Plan:   ESRD PD   b/p soft  / HR 80s MAT  / echo unremarkable / consider CCB/BB when b/p improves    Cont eliquis asa zocor klor con   Will follow      Tesfaye Salinas APRN, CVNP

## 2019-08-11 NOTE — PLAN OF CARE
Problem: Falls - Risk of:  Goal: Absence of physical injury  Description  Absence of physical injury  Outcome: Ongoing   Pt remains without falls during this shift. Pt does not attempt to get OOB alone. Pt able to call out appropriately, call light within reach. Safety precautions in place include fall armband, fall towel, chair & bed alarms, non slip foot wear. Signs posted on door, and door remains open for observation. The bed is in lowest position, wheels are locked, and rails are up 2/4. Continue with current care. \

## 2019-08-11 NOTE — PROGRESS NOTES
PD fluids have been prepared by pharmacy and are ready for  in main pharmacy.     Josefa FerroD, BCPS  Main pharmacy: [id number]  8/11/2019 3:42 PM

## 2019-08-11 NOTE — PROGRESS NOTES
Progress Note    Admit Date: 8/7/2019  Day: 5  Diet: Dietary Nutrition Supplements: Clear Liquid Oral Supplement  DIET GENERAL;    CC: fatigue, nausea, vomiting    Interval history: Pt continues to have low BP's. Vascular access in PIV in L leg, but systolics have been as low as the 60's this AM.  Received multiple 250cc boluses NS. Pt endorses that she is fatigued and not sleeping well. Says her R foot hurts from neuropathy and that nothing has helped so far. Endorses an episode of emesis overnight around 0300, has not ingested any fluids or food since then. Denies fever, chills, abdominal pain, diarrhea. Also denies chest pain, cough, shortness of breath, leg swelling, wheezing. Says she has had BM. HPI: Tawnya Reyes a 69 y. o. female with a PMH as below who presents with severe fatigue after about one week of nausea, poor PO intake, decreased appetite, and when she attempts to eat has frequently vomited again shortly thereafter. She denies fever chest pain, palpitations, SOB, cough, or abdominal pain. She had had frequent problems with constipation according to her family. They report she has been taking laxatives recently though. Patient states last BM was yesterday. Also denies sick contacts. Patient's brother states that she is forgetting to take her medications and he is concerned she is not taking them as prescribed. She has a son that lives her but her brother is concerned she is not getting enough help at home.  Patient states she has had no problems with her PD at home.        Medications:     Scheduled Meds:   dexamethasone  4 mg Intravenous Q6H    sodium chloride  250 mL Intravenous Once    melatonin  3 mg Oral Once    custom dialysis builder   Intraperitoneal Nightly    potassium chloride  20 mEq Oral BID WC    lidocaine  1 patch Transdermal Daily    heparin (porcine)  1,000 Units Intravenous Nightly    pregabalin  75 mg Oral Daily    scopolamine  1 patch Transdermal Q72H    [Held by provider] apixaban  2.5 mg Oral BID    aspirin  81 mg Oral Daily    b complex-C-folic acid  1 mg Oral Daily with breakfast    calcitRIOL  0.25 mcg Oral Daily    vitamin D  2,000 Units Oral Daily    docusate sodium  100 mg Oral BID    sevelamer  1,600 mg Oral TID WC    psyllium  1 packet Oral Daily    simvastatin  40 mg Oral Nightly    sodium chloride flush  10 mL Intravenous 2 times per day     Continuous Infusions:   dextrose       PRN Meds:gentamicin, diclofenac sodium, ondansetron, capsaicin-menthol-methyl sal, sodium chloride flush, ondansetron, polyethylene glycol, acetaminophen, perflutren lipid microspheres, glucose, dextrose, glucagon (rDNA), dextrose    Objective:   Vitals:   T-max:  Patient Vitals for the past 8 hrs:   BP Temp Temp src Pulse Resp SpO2 Weight   08/11/19 1043 (!) 80/45 -- -- -- -- -- --   08/11/19 1034 (!) 66/55 -- -- -- -- -- --   08/11/19 1028 (!) 66/48 -- -- -- -- -- --   08/11/19 0931 (!) 70/42 97.1 °F (36.2 °C) Axillary 72 18 97 % --   08/11/19 0518 -- -- -- -- -- -- 134 lb 14.7 oz (61.2 kg)   08/11/19 0458 (!) 98/52 98.2 °F (36.8 °C) Oral 93 16 98 % 135 lb 5.8 oz (61.4 kg)   08/11/19 0359 93/61 98.4 °F (36.9 °C) Oral 95 16 96 % --       Intake/Output Summary (Last 24 hours) at 8/11/2019 1051  Last data filed at 8/11/2019 0458  Gross per 24 hour   Intake 360 ml   Output -223 ml   Net 583 ml       Review of Systems   Constitutional: Positive for fatigue. Negative for chills and fever. HENT: Negative for sore throat and trouble swallowing. Eyes: Negative for visual disturbance. Respiratory: Negative for cough, shortness of breath and wheezing. Cardiovascular: Negative for chest pain, palpitations and leg swelling. Gastrointestinal: Negative for abdominal distention, abdominal pain, constipation, diarrhea, nausea and vomiting. Genitourinary: Negative for dysuria, frequency and urgency. Musculoskeletal: Positive for arthralgias. Negative for myalgias. Skin: Negative for color change and wound. Neurological: Positive for weakness, light-headedness and numbness. Negative for dizziness. Psychiatric/Behavioral: Negative for agitation, behavioral problems and confusion. The patient is not nervous/anxious. Physical Exam   Constitutional: She is oriented to person, place, and time. She appears well-developed. Appears malnourished. No distress. HENT:   Head: Normocephalic and atraumatic. Nose: Nose normal.   Mouth/Throat: Oropharynx is clear and moist. No oropharyngeal exudate. Eyes: Pupils are equal, round, and reactive to light. Conjunctivae and EOM are normal. No scleral icterus. Neck: Normal range of motion. Neck supple. No JVD present. Cardiovascular: Regular rate and rhythm and intact distal pulses. Exam reveals no gallop and no friction rub. No murmur heard. Heart sounds distant   Pulmonary/Chest: Effort normal and breath sounds normal. No stridor. No respiratory distress. She has no wheezes. She exhibits no tenderness. Abdominal: Soft. Bowel sounds are normal. She exhibits no distension. There is no tenderness. There is no guarding. Musculoskeletal: Normal range of motion. She exhibits no edema or deformity.  She exhibits tenderness. R foot is tender to palpation, patient attributes to nerve pain  Neurological: She is alert and oriented to person, place, and time. No cranial nerve deficit or sensory deficit. Coordination normal.   Skin: Skin is dry. Capillary refill takes less than 2 seconds. No rash noted. She is not diaphoretic. No erythema.   Skin cool to touch, very dry over feet and toes   Psychiatric: She has a normal mood and affect.  Her behavior is normal.     LABS:    CBC:   Recent Labs     08/09/19  0532 08/10/19  0430 08/11/19  0615   WBC 7.7 11.4* 10.7   HGB 12.9 14.1 13.0   HCT 40.6 44.2 40.1    217 228   .3* 101.6* 100.7*     Renal:    Recent Labs     08/09/19  0532 08/09/19  0533 08/10/19  0430

## 2019-08-11 NOTE — PROGRESS NOTES
Spoke with [email or web address] [phone]. She stated that she would like PD fluids prepared by pharmacy by 1700.     Alondra Solano, PharmD, BCPS  Main pharmacy: [id number]  8/11/2019 2:06 PM

## 2019-08-11 NOTE — PROGRESS NOTES
Dr. Lori Hawley in room to assess patient. Order placed by Dr. Henry Chirinos for bolus and iv decadron. Will start bolus and reassess BP q 15 mins.

## 2019-08-11 NOTE — SIGNIFICANT EVENT
Patient's BP 66/48, HR 72, RR 18, Afebrile. She remains Asx. Mentation at baseline, but appears tired. Patient has palpable radial pulses which makes me think SBP is better than 60s. IVF bolus started and IV decadron administered. Of note, patient only has access in lower extremity as all other PIVs have infiltrated. Multiple attempts at placing CVA or EJ have failed; general surgery consulted earlier this week for possible CVA placement, will reach out to them for assistance in obtaining access as she is a very difficult stick. Will cont to monitor.

## 2019-08-11 NOTE — PROCEDURES
Anastacia Savage is a 69 y.o. female patient. 1. Hypokalemia    2. Atrial fibrillation, unspecified type (HCC)    3. Elevated troponin      Past Medical History:   Diagnosis Date    DVT (deep venous thrombosis) (HCC)     End stage renal disease (HCC)     Hyperlipidemia     Hypertension     Osteoarthritis     Pancreatitis chronic     Peritoneal dialysis status ([facility] [address]     Type II or unspecified type diabetes mellitus without mention of complication, not stated as uncontrolled     Vitamin D deficiency      Blood pressure (!) 80/55, pulse 76, temperature 97.1 °F (36.2 °C), temperature source Axillary, resp. rate 18, height 5' 7\" (1.702 m), weight 134 lb 14.7 oz (61.2 kg), SpO2 97 %, not currently breastfeeding. Central Line  Date/Time: 8/11/2019 12:08 PM  Performed by: Ernesta Brittle, MD  Authorized by: Ernesta Brittle, MD   Consent: Verbal consent obtained. Written consent obtained. Risks and benefits: risks, benefits and alternatives were discussed  Consent given by: patient  Patient understanding: patient states understanding of the procedure being performed  Patient consent: the patient's understanding of the procedure matches consent given  Procedure consent: procedure consent matches procedure scheduled  Relevant documents: relevant documents present and verified  Test results: test results available and properly labeled  Site marked: the operative site was marked  Required items: required blood products, implants, devices, and special equipment available  Patient identity confirmed: verbally with patient, arm band, provided demographic data and hospital-assigned identification number  Time out: Immediately prior to procedure a \"time out\" was called to verify the correct patient, procedure, equipment, support staff and site/side marked as required.   Indications: vascular access  Anesthesia: local infiltration    Anesthesia:  Local Anesthetic: lidocaine 1% without epinephrine  Anesthetic total: 4 mL    Sedation:  Patient sedated: no    Preparation: skin prepped with ChloraPrep  Skin prep agent dried: skin prep agent completely dried prior to procedure  Sterile barriers: all five maximum sterile barriers used - cap, mask, sterile gown, sterile gloves, and large sterile sheet  Hand hygiene: hand hygiene performed prior to central venous catheter insertion  Location details: right internal jugular  Patient position: Trendelenburg  Catheter type: triple lumen  Catheter size: 7 Fr  Pre-procedure: landmarks identified  Ultrasound guidance: yes  Sterile ultrasound techniques: sterile gel and sterile probe covers were used  Number of attempts: 1  Successful placement: yes  Post-procedure: line sutured and dressing applied  Assessment: blood return through all ports,  free fluid flow and placement verified by x-ray  Patient tolerance: Patient tolerated the procedure well with no immediate complications  Comments: Awaiting CXR confirmation           Roseanna Sutton MD  8/11/2019

## 2019-08-12 PROBLEM — E43 SEVERE MALNUTRITION (HCC): Status: ACTIVE | Noted: 2019-01-01

## 2019-08-12 NOTE — PROGRESS NOTES
Patient ordered Zosyn 2.25 gm IV Q 8 hrs. Per policy will interchange to extended infusion therapy. Will start Zosyn 3.375 gm IV Q 12 hrs each dose infused over 4 hours. If extended infusion dosing is not wanted, please discontinue and restart traditional dosing with this preference in the admin instructions. Thanks! Estimated Creatinine Clearance: 6 mL/min (A) (based on SCr of 7.3 mg/dL [facility] AT [facility])). Please call pharmacy with any questions! Toya Richardson, Pharm. D., [facility]  [phone]  8/12/2019 2:58 PM

## 2019-08-12 NOTE — PROGRESS NOTES
PD fluids are prepared and ready for  in pharmacy.     Renny Martines PharmD, BCPS  Main pharmacy: [id number]  8/12/2019 4:05 PM

## 2019-08-12 NOTE — FLOWSHEET NOTE
Orders verified and patient connected aseptically to cycler per protocol. Initial effluent clear. 08/11/19 1923   Vitals   BP (!) 104/55   Temp 98 °F (36.7 °C)   Pulse 82   Resp 16   SpO2 95 %   Weight 138 lb 10.7 oz (62.9 kg)   Peritoneal Dialysis Catheter Left lower abdomen   Placement Date: (c)   Catheter Location: Left lower abdomen   Status Accessed   Site Condition No Complications   Dressing Status Clean;Dry; Intact; Changed  (Gentamycin cream applied)   Dressing Occlusive  (border guaze)   Drainage Description Brown  (small amt brown dressing. applied gent cream)   Cycler   Verification of Prescription CCPD   Total Volume Programmed 30333 mL   Therapy Time (Hours:Minutes) 10:00   Fill Volume 2000 mL   Last Fill Volume 0 mL   Dextrose Setting Same (Nonextraneal)   Number of Cycles 5   Bag Volume 5000 mL   Number of Bags Used 2   Dianeal Solution Other (Comment)  (1.5% with 5000 units heparin added per pharmacy)   I Drain (mL) 40 mL  (initial effluent clear)       Cell count sent.

## 2019-08-12 NOTE — PROGRESS NOTES
NEPHROLOGY   Progress Note    Subjective:   Admit Date: 8/7/2019      Interval History:      Doing better   Awaiting placement     DIET:  Dietary Nutrition Supplements: Clear Liquid Oral Supplement  DIET GENERAL;    Medications:     Scheduled Meds:   insulin lispro  0-6 Units Subcutaneous TID WC    insulin lispro  0-3 Units Subcutaneous Nightly    sodium chloride  250 mL Intravenous Once    sodium chloride  500 mL Intravenous Once    dexamethasone  4 mg Intravenous Q6H    sodium chloride  250 mL Intravenous Once    sevelamer  400 mg Oral TID WC    potassium chloride  10 mEq Oral BID WC    melatonin  3 mg Oral Once    custom dialysis builder   Intraperitoneal Nightly    lidocaine  1 patch Transdermal Daily    heparin (porcine)  1,000 Units Intravenous Nightly    pregabalin  75 mg Oral Daily    scopolamine  1 patch Transdermal Q72H    [Held by provider] apixaban  2.5 mg Oral BID    aspirin  81 mg Oral Daily    b complex-C-folic acid  1 mg Oral Daily with breakfast    calcitRIOL  0.25 mcg Oral Daily    vitamin D  2,000 Units Oral Daily    docusate sodium  100 mg Oral BID    psyllium  1 packet Oral Daily    simvastatin  40 mg Oral Nightly    sodium chloride flush  10 mL Intravenous 2 times per day       Continuous Infusions:   dextrose         Labs:  CBC:   Recent Labs     08/10/19  0430 08/11/19  0615 08/12/19  0559   WBC 11.4* 10.7 12.4*   HGB 14.1 13.0 12.3    228 215     BMP:    Recent Labs     08/10/19  1908 08/11/19  0615 08/12/19  0559    138 136   K 3.9 4.2 4.6   CL 97* 96* 95*   CO2 26 28 26   BUN 44* 42* 43*   CREATININE 8.8* 8.1* 7.3*   GLUCOSE 106* 144* 347*       Ca/Mg/Phos:   Recent Labs     08/10/19  0430 08/10/19  1908 08/11/19  0615 08/12/19  0559   CALCIUM 7.0* 6.8* 6.9* 6.6*   MG 2.60*  --   --   --    PHOS 2.8 2.5 2.3* 2.4*     Hepatic:   No results for input(s): AST, ALT, ALB, BILITOT, ALKPHOS in the last 72 hours.   Troponin:   No results for input(s): TROPONINI in the last 72 hours. INR:   No results for input(s): INR in the last 72 hours. Objective:     Vitals:   BP 95/62   Pulse 83   Temp 98 °F (36.7 °C) (Oral)   Resp 16   Ht 5' 7\" (1.702 m)   Wt 142 lb 3.2 oz (64.5 kg)   LMP  (LMP Unknown)   SpO2 98%   BMI 22.27 kg/m²      Weight:  Wt Readings from Last 3 Encounters:   08/12/19 142 lb 3.2 oz (64.5 kg)   06/11/19 143 lb 3.2 oz (65 kg)   05/31/19 142 lb 6.7 oz (64.6 kg)        24hr I/Os:     Intake/Output Summary (Last 24 hours) at 8/12/2019 1017  Last data filed at 8/12/2019 0541  Gross per 24 hour   Intake 160 ml   Output 114 ml   Net 46 ml       Physical Exam:  Constitutional:  no acute distress, AOX3  NECK: supple, no JVD  Cardiovascular:  RRR; no murmurs rubs or gallops  Respiratory:  CTA bilaterally; diffuse wheezing bilaterally; no crackles  Abdomen: soft, non-tender, non-distended, +BS, no hepatospleenomegaly  Ext: no LE edema  Skin: decreased sensation in R foot w/ skin breaks on the R big toe alongside other chronic changes    IMAGING:  XR CHEST PORTABLE   Final Result      Right IJ central venous catheter tip projects over the lower superior vena cava. No pneumothorax. XR ABDOMEN (KUB) (SINGLE AP VIEW)   Final Result     No acute findings. XR CHEST PORTABLE   Final Result     No evidence of acute cardiopulmonary disease. XR CHEST STANDARD (2 VW)   Final Result   :   1. No acute abnormality. 2. Tortuous ectatic thoracic aorta. Assessment and Plan:     Hortensia Farias is a 69 y.o. female with prior history of ESRD on PD, Diabetes, hypertension, hyperlipidemia, CHF, and DVT on Eliquis who presents with nausea and vomiting, and feeling unwell for about a week. We are consulted for ESRD on PD.    1. ESRD on CCPD  BUN 44, Cr 9.0 and GFR 5. Does not produce urine.  - Continue CCPD   - has exit side drainage - added gent cream   - vanc x 1 given     2. hypokalemia  - replaced   - Monitor daily       3. Hypomagnesemia  - replaced     4.  Hypotension   - saline bolus and see       Micah Maciel MD

## 2019-08-12 NOTE — PLAN OF CARE
Nutrition Problem: Inadequate oral intake  Intervention: Food and/or Nutrient Delivery: Continue current diet, Modify current ONS  Nutritional Goals: Pt will consume and tolerate at least 50% of meals/supplements offered

## 2019-08-12 NOTE — PROGRESS NOTES
Physical Therapy    Daily Treatment Note      Discharge Recommendations:  Blanco Carson scored a 10/24 on the AM-PAC short mobility form. Current research shows that an AM-PAC score of 17 or less is typically not associated with a discharge to the patient's home setting. Based on the patients AM-PAC score and their current functional mobility deficits, it is recommended that the patient have 3-5 sessions per week of Physical Therapy at d/c to increase the patients independence. Assessment:  Decreased assist required for sit to stand today. Continues to need 2 person assist for safe transfers. Pt is at risk for falls. Plan is for ongoing PT at SNF upon d/c. Equipment Needs:     Chart Reviewed: Yes   Other position/activity restrictions: up with assistance   Additional Pertinent Hx: Patient is a 70 y/o female admitted 8/7 with fatigue and emesis. chest x-ray and abdominal x-ray (-) for acute findings. Patient with new onset of a-fib. PMH significant for DVT, end stage renal disease, HLD, HTN, pancreatitis, peritoneal dialysis, diabetes, R WILDER (2011). Diagnosis: atrial fibrillation   Treatment Diagnosis: impaired mobility associated with a-fib      Subjective:  Pt found sitting up in chair on entry. \"It feels like I am walking on bricks. \"    Pain:  Denies      Objective:    Bed mobility  Sit to Supine: Mod A (bed flat, assist to lift LEs up into bed)    Transfers  Sit to stand: Mod A (x1 assist from chair with arms; x2 assist from EOB to walker)  Stand to sit:  Mod A (decreased control to sit)  Bed <> chair:  Min A x2 (stand step pivot with 4 wheeled walker)    Ambulation  Assistance Level:  Min A x2  Assistive device:  4 wheeled walker  Distance:  4-5 steps x1 and 3 side steps x1  Quality of gait:  Effortful gait, decreased kirk, decreased bilateral step height and step length, flexed posture, relying heavily on rolling walker for support. Other:  BP 95/57 upon returning to bed.   RN made

## 2019-08-12 NOTE — CONSULTS
Nutrition Assessment    Type and Reason for Visit: Reassess, Consult    Nutrition Recommendations:   1. PO Diet: Continue Regular diet to promote intake  2. ONS: Offer Renal bid per pt preference, monitor acceptance  3. Monitor weight, labs (suggest to replace phosphorus since low, glucose with hx of DM) and clinical progress  4. Monitor, record and encourage adequate PO intake at all meals through admission especially protein as able  5. Consume small/frequent meals and snacks to build appetite  6. Continue Nephrocaps for wound healing  7. Consider appetite stimulant if clinically appropriate  8. Avoid high fat/fried foods and foods with strong odors to help control nausea    Meets ASPEN criteria for severe malnutrition. Aggressive nutrition intervention should be considered, up to and including alternative means of nutrition/hydration, if nutrition status can not improve. Albumin and Prealbumin appear to better reflect severity of the inflammatory response rather than poor nutritional status. These laboratory tests, while probable indicators of inflammation, do not specifically indicate malnutrition and do not typically respond to feeding interventions in the setting of active inflammatory response; therefore, the relevance of laboratory tests of acute-phase protein levels, as indicators of malnutrition, is limited. Nutr Clin Pract. 2010; 25(5):548-554. Enteral Nutr. 2009;33(6):710-716. [facility] Enteral Nutr. 2010;34(2):156-159. Serum proteins such as albumin and prealbumin are not included as defining characteristics of malnutrition because recent evidence analysis shows that serum levels of these proteins do not change in response to changes in nutrient intake. Serum protein markers (albumin,prealbumin, transferrin, CRP) are not validated for determining adequacy of protein provision and should not be used in the critical care setting in this manner. (JPEN J [employer] Enteral Nutr. 2012;36(2):197-204.) and (Proc  Nutr Soc. 2007;66(3):378-383.)        Nutrition Assessment: Follow-up for diet advancement, po intake/ONS acceptance and consult received for poor intake/appetite. Pt continues to be nutritionally compromised AEB variable po intake with decreased appetite since admit. Pt is tolerating diet advancement from clears to Regular with no vomiting reported, however c/o intermittent nausea. Pt remains at nutritional risk d/t severe malnutrition detected and ongoing increased nutrient needs r/t wound healing and PD. RD encouraged bland foods when feeling nauseous and adequate protein intake as able. Pt states she dislikes Ensure Clear and wants Renal ONS instead. RD also encouraged to consume ONS in between meals to build appetite. Albumin is not an accurate indicator of nutrition status. Will modify ONS per pt request, monitor ability to improve po intake and nutrition status. Malnutrition Assessment:  · Malnutrition Status: Meets the criteria for severe malnutrition  · Context: Acute illness or injury  · Findings of the 6 clinical characteristics of malnutrition (Minimum of 2 out of 6 clinical characteristics is required to make the diagnosis of moderate or severe Protein Calorie Malnutrition based on AND/ASPEN Guidelines):  1. Energy Intake-Less than or equal to 75% of estimated energy requirement, Greater than or equal to 7 days    2. Weight Loss-Unable to assess(r/t fluid fluctuations on PD),    3. Fat Loss-Moderate subcutaneous fat loss, Triceps  4. Muscle Loss-Moderate muscle mass loss, Thigh (quadriceps), Calf (gastrocnemius)  5. Fluid Accumulation-No significant fluid accumulation,    6.   Strength-Not measured    Nutrition Risk Level: High    Nutrient Needs:  · Estimated Daily Total Kcal: 7847-5561 (30-35)  · Estimated Daily Protein (g):  (1.5-1.8)  · Estimated Daily Total Fluid (ml/day): 2977-7386 (1 ml/kcal) or per Renal    Nutrition Diagnosis:   · Problem: Inadequate

## 2019-08-12 NOTE — CONSULTS
Gastroenterology Consult Note      Patient: Carlene Lorenzo  : 1940     Date:  2019      History of Present Illness  Patient is a 69 y.o. female with a past medical history of ESRD, DVT. Chronic pancreatitis, peritoneal dialysis that is presents with odynophagia after placement of Central venous catheter. Catheter was placed yesterday in the afternoon. Since then she has been complaining of pain on swallowing with difficulty passing solid food. She has less trouble with fluids. No difficulty with speech, no hoarseness of voice. No fever or chills overnight. She originally presented with about one week of nausea, poor PO intake, decreased appetite. Diagnosed with malnutrition, Atrial tachycardia. She denies any symptoms of GERD. No recent instrumentation otherwise. No history of odynophagia. Past Medical History:   Diagnosis Date    DVT (deep venous thrombosis) (HCC)     End stage renal disease (HCC)     Hyperlipidemia     Hypertension     Osteoarthritis     Pancreatitis chronic     Peritoneal dialysis status ([facility] [address]     Type II or unspecified type diabetes mellitus without mention of complication, not stated as uncontrolled     Vitamin D deficiency       Past Surgical History:   Procedure Laterality Date    CATARACT REMOVAL Left 13    had elevated BP post op    CORONARY ANGIOPLASTY      JOINT REPLACEMENT  2011    R WILDER    OTHER SURGICAL HISTORY Left 2017    INSERTION OF LAPAROSCOPIC PERITONEAL DIALYSIS CATHETER;    THYROIDECTOMY, PARTIAL          Admission Meds  No current facility-administered medications on file prior to encounter.       Current Outpatient Medications on File Prior to Encounter   Medication Sig Dispense Refill    cinacalcet (SENSIPAR) 60 MG tablet TAKE 1 TABLET BY MOUTH ONE TIME A DAY 30 tablet 0    sevelamer (RENVELA) 800 MG tablet Take 2 tablets by mouth 3 times daily (with meals) 90 tablet 2    apixaban (ELIQUIS) 2.5 MG TABS tablet Take 1 tablet by mouth 2 times daily 60 tablet 1    psyllium (HYDROCIL) 95 % PACK packet Take 1 packet by mouth daily 56 each 0    Capsaicin-Lidocaine-Menthol 0.05-5-3 % CREA Apply 1 applicator topically 2 times daily as needed (BLLE numbness, neuropathy, pain) 1 Tube 0    calcitRIOL (ROCALTROL) 0.25 MCG capsule Take 1 capsule by mouth daily 30 capsule 0    acetaminophen (TYLENOL) 325 MG tablet Take 2 tablets by mouth every 6 hours as needed for Pain 60 tablet 0    aspirin 81 MG EC tablet Take 1 tablet by mouth daily 30 tablet 3    docusate sodium (COLACE) 100 MG capsule Take 1 capsule by mouth 2 times daily 30 capsule 0    simvastatin (ZOCOR) 40 MG tablet Take 1 tablet by mouth nightly 30 tablet 2    Cholecalciferol (VITAMIN D3) 1000 units TABS Take 2 tablets by mouth daily 60 tablet 5    vitamin E 400 UNIT capsule Take 1 capsule by mouth nightly 30 capsule 3    ondansetron (ZOFRAN) 4 MG tablet Take 1 tablet by mouth every 8 hours as needed for Nausea 20 tablet 0    Elastic Bandages & Supports (MEDICAL COMPRESSION STOCKINGS) MISC 1 each by Does not apply route daily 1 each 0    B complex-vitamin C-folic acid (NEPHRO-ZINA) 1 MG tablet Take 1 tablet by mouth daily (with breakfast) 30 tablet 3         Allergies  Allergies   Allergen Reactions    Gabapentin Other (See Comments)     Gabapentin-induced myoclonus        Social history:  Social History     Tobacco Use    Smoking status: Current Some Day Smoker     Packs/day: 0.30     Years: 40.00     Pack years: 12.00     Types: Cigarettes    Smokeless tobacco: Never Used    Tobacco comment: trying to stop   Substance Use Topics    Alcohol use: No     Alcohol/week: 0.8 standard drinks     Types: 1 Standard drinks or equivalent per week        Family History:   History reviewed. No pertinent family history. Review of Systems  Pertinent items are noted in HPI.        Physical Exam:  Blood pressure 110/61, pulse 77, temperature 98 °F (36.7 °C), temperature source Oral, resp. rate 16, height 5' 7\" (1.702 m), weight 135 lb 2.3 oz (61.3 kg), SpO2 98 %, not currently breastfeeding. General appearance: alert, cooperative, minimal distress   Eyes: Anicteric  Head: Normocephalic, without obvious abnormality  Neck: Abnormal range of motion, no crepitus   Lungs: clear to auscultation bilaterally, Normal Effort  Heart: irregular irregular, normal S1 and S2, no murmurs or rubs  Abdomen: soft, non-tender. Bowel sounds normal.   Extremities: atraumatic, no cyanosis or edema  Skin: warm and dry, no jaundice      Data Review:    Recent Labs     08/10/19  0430 08/11/19  0615 08/12/19  0559   WBC 11.4* 10.7 12.4*   HGB 14.1 13.0 12.3   HCT 44.2 40.1 38.9   .6* 100.7* 102.7*    228 215     Recent Labs     08/10/19  1908 08/11/19  0615 08/12/19  0559    138 136   K 3.9 4.2 4.6   CL 97* 96* 95*   CO2 26 28 26   PHOS 2.5 2.3* 2.4*   BUN 44* 42* 43*   CREATININE 8.8* 8.1* 7.3*     No results for input(s): AST, ALT, ALB, BILIDIR, BILITOT, ALKPHOS in the last 72 hours. No results for input(s): LIPASE, AMYLASE in the last 72 hours. No results for input(s): PROTIME, INR in the last 72 hours. No results for input(s): PTT in the last 72 hours. No results for input(s): OCCULTBLD in the last 72 hours. Assessment and Recommendations      1) Odynophagia   - History suggests she started experience symptoms after placement of CVC   - Would like to rule out any structural causes for pain with swallowing  - Pending CT neck, soft tissue with contrast   - No signs of crepitus on physical examination   - may need a swallow evaluation if doesn't improve   - Possible EGD if CT scan is normal  - NPO until esophageal tear ruled out     Thank you for allowing me to participate in the care of your patient. Please feel free to contact me with any questions or concerns.      Isis Agee MD  Will staff with Dr. Anjum Bruno, [address] and [address]

## 2019-08-12 NOTE — PROGRESS NOTES
Occupational Therapy  Facility/Department: [place] [address] [facility]  Daily Treatment Note  NAME: Odilia Mauricio  : 1940  MRN: [id number]    Date of Service: 2019    Discharge Recommendations:  Odilia Mauricio scored a 14/24 on the AM-PAC ADL Inpatient form. Current research shows that an AM-PAC score of 17 or less is typically not associated with a discharge to the patient's home setting. Based on the patients AM-PAC score and their current ADL deficits, it is recommended that the patient have 3-5 sessions per week of Occupational Therapy at d/c to increase the patients independence. OT Equipment Recommendations  Equipment Needed: No  Other: defer    Assessment   Performance deficits / Impairments: Decreased functional mobility ; Decreased cognition;Decreased endurance;Decreased ADL status; Decreased balance;Decreased strength  Assessment: Pt requires assist of 2 for transfers and short distance mobility. Pt demo decreased activity tolerance and fatigues quickly during seated therex and transfers. Pt would benefit from ongoing inpt OT tx upon d/c to maximize return of function. Treatment Diagnosis: Decreased mobility, endurance, strength, ADLs, balance  Prognosis: Fair  Patient Education: OT role, activity promotion, d/c planning- verb understanding  REQUIRES OT FOLLOW UP: Yes  Activity Tolerance  Activity Tolerance: Patient limited by fatigue  Safety Devices  Safety Devices in place: Yes  Type of devices: Nurse notified;Call light within reach; Bed alarm in place; Left in bed         Patient Diagnosis(es): The primary encounter diagnosis was Hypokalemia. Diagnoses of Atrial fibrillation, unspecified type ([address] Elevated troponin, and Acute renal insufficiency were also pertinent to this visit.       has a past medical history of DVT (deep venous thrombosis) ([address] End stage renal disease ([address] Hyperlipidemia, Hypertension, Osteoarthritis, Pancreatitis chronic, Peritoneal dialysis status ([address] Type II or unspecified type diabetes mellitus without mention of complication, not stated as uncontrolled, and Vitamin D deficiency. has a past surgical history that includes Thyroidectomy, partial; Coronary angioplasty; joint replacement (5/4/2011); Cataract removal (Left, 5/29/13); and other surgical history (Left, 05/19/2017). Restrictions  Position Activity Restriction  Other position/activity restrictions: up with assistance  Subjective   General  Chart Reviewed: Yes  Patient assessed for rehabilitation services?: Yes  Additional Pertinent Hx: Pt presented to ER 8/7 with N/V persisting for about 1 weeks time. Abd, chest XR: (-) acute findings. PMH:DVT, end-stage renal disease, HLD, HTN, OA, DM, R WILDER  Response to previous treatment: Patient with no complaints from previous session  Family / Caregiver Present: No  Diagnosis: Atrial fibrillation  Subjective  Subjective: Pt seated in recliner upon entry, finishing eating lunch. Pt reports difficulty eating due to sore throat. RN aware. Pt minimally conversant during session  Vital Signs  Patient Currently in Pain: Denies     Orientation  Orientation  Overall Orientation Status: Within Functional Limits(oriented to conversation)     Objective    ADL  Additional Comments: Pt declined ADLs despite encouragement        Balance  Sitting Balance: Stand by assistance(seated back unsupported in chair)  Standing Balance: Dependent/Total(min A of 2 at 4ww)    Functional Mobility  Functional - Mobility Device: 4-Wheeled Walker  Activity: Other(3' recliner>bed, 3 lateral steps towards [facility])  Assist Level: Dependent/Total(min A of 2)  Functional Mobility Comments: Mobility is slow and effortful, difficulty lifting feet. Pt reports her feet \"feel like bricks\" Offered to assist pt don shoes prior to transfers but pt declined     Bed mobility  Sit to Supine:  Moderate assistance(assist LEs)     Transfers  Sit to stand: Dependent/Total(mod A from recliner; mod A of 2 from eob)  Stand to sit: Moderate assistance(eob x2 reps)  Transfer Comments: using 4ww for transfers. Pt fatigued after initial transfer recliner>bed and required increased assist for second sit>Stand from eob   Note: BP seated: 106/65. BP supine after transfer: 95/57. RN aware        Cognition  Overall Cognitive Status: Exceptions  Arousal/Alertness: Delayed responses to stimuli;Inconsistent responses to stimuli  Following Commands: Follows one step commands with increased time; Follows one step commands with repetition  Initiation: Requires cues for some  Sequencing: Requires cues for some  Cognition Comment: flat affect, delayed processing         Type of ROM/Therapeutic Exercise  Type of ROM/Therapeutic Exercise: AROM  Comment: noted bilat UE jerking movements as UEs fatigued during therex (pt states this reported at times at baseline)  Exercises  Shoulder Flexion: x10 reps each UE  Elbow Flexion: x10 reps each UE  Elbow Extension: x10 reps each UE  Grasp/Release: x10 reps each UE          Plan   Plan  Times per week: 2-5x  Times per day: Daily    AM-PAC Score        [facility] Inpatient Daily Activity Raw Score: 14 (08/12/19 1442)  AM-PAC Inpatient ADL T-Scale Score : 33.39 (08/12/19 1442)  ADL Inpatient CMS 0-100% Score: 59.67 (08/12/19 1442)  ADL Inpatient CMS G-Code Modifier : CK (08/12/19 1442)    Goals  Short term goals  Time Frame for Short term goals: By d/c   Short term goal 1: Complete functional ADL transfer min assist of 1- not met  Short term goal 2:  Increase sitting balance 7 minutes during grooming/therx to increase activity tolerance to prepair for OOB activities- not met  Short term goal 3: Bed mobility SBA- not met  Short term goal 4: Demo 10 reps B UE exercises all planes- progressing       Therapy Time   Individual Concurrent Group Co-treatment   Time In 1341         Time Out 1419         Minutes 38         Timed Code Treatment Minutes:   38  Total Treatment Minutes:  45     If patient is d/c prior to next treatment

## 2019-08-12 NOTE — PROGRESS NOTES
sevelamer  400 mg Oral TID WC    potassium chloride  10 mEq Oral BID WC    melatonin  3 mg Oral Once    custom dialysis builder   Intraperitoneal Nightly    lidocaine  1 patch Transdermal Daily    heparin (porcine)  1,000 Units Intravenous Nightly    pregabalin  75 mg Oral Daily    scopolamine  1 patch Transdermal Q72H    [Held by provider] apixaban  2.5 mg Oral BID    aspirin  81 mg Oral Daily    b complex-C-folic acid  1 mg Oral Daily with breakfast    calcitRIOL  0.25 mcg Oral Daily    vitamin D  2,000 Units Oral Daily    docusate sodium  100 mg Oral BID    psyllium  1 packet Oral Daily    simvastatin  40 mg Oral Nightly    sodium chloride flush  10 mL Intravenous 2 times per day     Continuous Infusions:   dextrose       PRN Meds:gentamicin, diclofenac sodium, ondansetron, capsaicin-menthol-methyl sal, sodium chloride flush, ondansetron, polyethylene glycol, acetaminophen, perflutren lipid microspheres, glucose, dextrose, glucagon (rDNA), dextrose    Objective:   Vitals:   T-max:  Patient Vitals for the past 8 hrs:   BP Temp Temp src Pulse Resp SpO2 Weight   08/12/19 1015 (!) 102/59 -- -- 77 -- -- --   08/12/19 0906 95/62 -- -- -- -- -- --   08/12/19 0737 (!) 88/45 98 °F (36.7 °C) Oral 83 16 -- --   08/12/19 0546 -- -- -- -- -- -- 142 lb 3.2 oz (64.5 kg)   08/12/19 0541 114/73 97.5 °F (36.4 °C) Oral 77 16 98 % --       Intake/Output Summary (Last 24 hours) at 8/12/2019 1035  Last data filed at 8/12/2019 0541  Gross per 24 hour   Intake 160 ml   Output 114 ml   Net 46 ml       Review of Systems   Constitutional: Positive for fatigue. Negative for chills and fever. HENT: Negative for sore throat and trouble swallowing. Eyes: Negative for visual disturbance. Respiratory: Negative for cough, shortness of breath and wheezing. Cardiovascular: Negative for chest pain, palpitations and leg swelling.    Gastrointestinal: Negative for abdominal distention, abdominal pain, constipation, diarrhea, nausea and vomiting. Genitourinary: Negative for dysuria, frequency and urgency. Musculoskeletal: Positive for arthralgias. Negative for myalgias. Skin: Negative for color change and wound. Neurological: Positive for weakness and numbness. Negative for dizziness and lightheadedness. Psychiatric/Behavioral: Negative for agitation, behavioral problems and confusion. The patient is not nervous/anxious. Const: Appears malnourished, Well developed, not distressed, not diaphoretic, appears stated age  Eyes: PERRL, EOMI, no conjunctival injection, no discharge  HENT: Normocephalic, atraumatic, oropharynx not erythematous, no postnasal drip  Neck: Supple, no JVD, no thyromegaly, trachea midline, CVC present in R IJ  CV: Regular rate, irregular rhythm, heart sounds normal, no murmur, no gallop, no rub, capillary refill <2s, 2+ pulses in bilateral distal upper and lower extremities  RESP: Clear to auscultation bilaterally, normal breath sounds, Unlabored respiratory effort, no wheezes, no rhonchi, no stridor, no chest wall tenderness   GI: soft, non-tender, non-distended, no masses, no rebound, bowel sounds normal  MSK/Extremities: No gross deformities appreciated, no edema noted, R foot is tender to palpation  Skin: Cool and dry over lower extremities, very dry. No rashes, no erythema  Neuro: Alert, CNs II-XII grossly intact. Sensation and motor function of extremities grossly intact. No abnormal tone. Reflexes 2+ in distal extremities  Psych: Appropriate mood and affect.         LABS:    CBC:   Recent Labs     08/10/19  0430 08/11/19  0615 08/12/19  0559   WBC 11.4* 10.7 12.4*   HGB 14.1 13.0 12.3   HCT 44.2 40.1 38.9    228 215   .6* 100.7* 102.7*     Renal:    Recent Labs     08/10/19  0430 08/10/19  1908 08/11/19  0615 08/12/19  0559    138 138 136   K 3.8 3.9 4.2 4.6   CL 97* 97* 96* 95*   CO2 26 26 28 26   BUN 43* 44* 42* 43*   CREATININE 8.7* 8.8* 8.1* 7.3*   GLUCOSE 155* 106* 144* 347*   CALCIUM 7.0* 6.8* 6.9* 6.6*   MG 2.60*  --   --   --    PHOS 2.8 2.5 2.3* 2.4*   ANIONGAP 16 15 14 15     Hepatic:   Recent Labs     08/10/19  1908 08/11/19  0615 08/12/19  0559   LABALBU 1.8* 2.0* 2.0*     Troponin: No results for input(s): TROPONINI in the last 72 hours. BNP: No results for input(s): BNP in the last 72 hours. Lipids: No results for input(s): CHOL, HDL in the last 72 hours. Invalid input(s): LDLCALCU, TRIGLYCERIDE  ABGs:  No results for input(s): PHART, WFS2ORK, PO2ART, YRI5ZJE, BEART, THGBART, V7XPFLTV, JIZ4LQF in the last 72 hours. INR: No results for input(s): INR in the last 72 hours. Lactate: No results for input(s): LACTATE in the last 72 hours. Cultures:  -----------------------------------------------------------------  RAD:   XR CHEST PORTABLE   Final Result      Right IJ central venous catheter tip projects over the lower superior vena cava. No pneumothorax. XR ABDOMEN (KUB) (SINGLE AP VIEW)   Final Result     No acute findings. XR CHEST PORTABLE   Final Result     No evidence of acute cardiopulmonary disease. XR CHEST STANDARD (2 VW)   Final Result   :   1. No acute abnormality. 2. Tortuous ectatic thoracic aorta. Assessment/Plan:     Ms. Nasrin Caro is a stable 68 y/o AAF w/ a PMHx of ESRD on PD, DM2, h/o DVT, FTT, systolic HF presented to the ED w/ 1 week h/o nausea, vomiting, fatigue.  Patient's potassium and mag low in ED, replaced with electrolyte supplementation.  Patient had CVC placed yesterday for access, pressures have been solid except for this AM where a 250cc bolus was given for MAP of 64. Infectious workup has been negative, nephrology started gent cream and x1 dose of vanc for some drainage from PD site. Suggested starting midodrine tomorrow if pressures not better. Pt was started on dexamethasone IV yesterday for neuropathy in lieu of a cortisone shot, has helped pressures, but BS in 300's.   Started low dose sliding scale. Will consider WINSOME.       Hypotension 2/2 severe malnutrition - failure to thrive vs poor po intake vs sepsis, 1 week history of nausea/vomiting  - Soft BP's on admission, SBP's currently in 100's  -[address] placed 8/11 by surgery in RIJ  - 1x 250cc bolus given this AM for soft pressures  - Albumin still 2.0 this AM (8/12)  - PRN antiemetics;  Scopalamine patch scheduled  - Infectious workup negative so far  - Discontinuing dexamethasone IV     Multifocal atrial tachycardia  - EKG in ED showed sinus arrhythmia, concerning for atrial fibrillation  - Telemetry showed sinus tachycardia with labeled \"atrial fibrillation\" - appears to be artifact  - Echo showed EF of 65% with some septal hypertrophy and grade II diastolic dysfunction  - Cardiology would suggest CCBs, but BP too low to initiate  - Cards will continue to follow and suggest supportive care as long as pt is asymptomatic     ESRD on PD  - On nephrocaps, calcitriol, sensipar, sevelamer  - Will continue to receive PD inpatient  - Nephrology consulted  - Added heparin injections for PD  - Started gentamicin cream for PD drainage, 1x vanc dose     Hypokalemia  - 4.6 on 8/12  - Will continue to replace as needed     Peripheral neuropathy  - Patient has been treated in past for R foot and leg pain  - Gabapentin caused myoclonus  - Will continue to treat with capsaicin-lidocaine-menthol cream bid  - Added lidocaine patch and lyrica  - Discontinuing 4mg dexamethasone IV q6h  - WINSOME to access for arterial insufficiency     Constipation  - Given colace, psyllium    Hypokalemia (resolved)  - 4.2 on 8/11    Hyperglycemia  - From dexamethasone, started low dose sliding scale     Chronic:  H/o DVT  - Anticoagulated w/ eliquis     HLD  - Continue home statin     Code Status: Full  FEN: general w/ ONS  PPX: alex Marquez MD, PGY-1  08/12/19  10:35 AM    This patient has been staffed and discussed with Anderson Sarmiento MD.   Patient seen and examined, labs and imaging studies reviewed, agree with assessment and plan as outlined above. Continue with current care and plan await urinalysis, await sensitivity of klebsiella.       Almond Osler, MD [address]

## 2019-08-12 NOTE — CARE COORDINATION
Case Management Assessment           Daily Note                 Date/ Time of Note: 8/12/2019 12:00 PM         Note completed by: Maddy Fisher    Patient Name: Christina King  YOB: 1940    Diagnosis:Atrial fibrillation [facility]) [I48.91]  Atrial fibrillation [facility]) [I48.91]  Patient Admission Status: Inpatient    Date of Admission:8/7/2019  5:33 PM Length of Stay: 5 GLOS:        Current Plan of Care: plans for SNF at d/c, referrals pending at [facility], [address] [place] [address] and [facility]  ________________________________________________________________________________________  PT AM-PAC: 13 / 24 per last evaluation on: 8.8.2019    OT AM-PAC: 17 / 24 per last evaluation on: 8.8.2019    DME Needs for discharge: deferred to next level of care    ________________________________________________________________________________________  Discharge Plan: SNF: pending referrals at [facility], [address] [place] [address], [facility]    Tentative discharge date: once precert obtained and facility has PD supplies    Current barriers to discharge: placement, precert, and PD setup at facility    Referrals completed: SNF: [facility]    Resources/ information provided: Not indicated at this time  ________________________________________________________________________________________  Case Management Notes:CM following for discharge needs, referrals sent to SNF, [facility] and [facility], awaiting acceptance and confirmation of PD setup, pt reports her supplies come from [employer] which is from Dr [place] office. 2:51 PM  Patient accepted at [facility], precert started for Corita Code per Nessa Espino in admissions, [address] completed (NSI#[id number])    Alexey Flor and her family were provided with choice of provider; she and her family are in agreement with the discharge plan.     Care Transition Patient: Katherine Fisher RN  [facility]  Case Management Department  Ph: [phone]  Fax: [phone]

## 2019-08-12 NOTE — PLAN OF CARE
Problem: Falls - Risk of:  Goal: Will remain free from falls  Description  Will remain free from falls  Note:   Pt is absent from a fall this shift. Fall precautions in place. Patient instructed to call nursing staff when needing assistance. Pt with VU. Call light in reach. Will continue to monitor.  Mis Gallegos R.N.

## 2019-08-12 NOTE — PROGRESS NOTES
Dr. Silvestre Ped notified of morning BP 88/49. Order for 250 ml bolus over 2 hours placed and started. Will continue to monitor.

## 2019-08-13 NOTE — CONSULTS
tablet by mouth 2 times daily 60 tablet 1    psyllium (HYDROCIL) 95 % PACK packet Take 1 packet by mouth daily 56 each 0    Capsaicin-Lidocaine-Menthol 0.05-5-3 % CREA Apply 1 applicator topically 2 times daily as needed (BLLE numbness, neuropathy, pain) 1 Tube 0    calcitRIOL (ROCALTROL) 0.25 MCG capsule Take 1 capsule by mouth daily 30 capsule 0    acetaminophen (TYLENOL) 325 MG tablet Take 2 tablets by mouth every 6 hours as needed for Pain 60 tablet 0    aspirin 81 MG EC tablet Take 1 tablet by mouth daily 30 tablet 3    docusate sodium (COLACE) 100 MG capsule Take 1 capsule by mouth 2 times daily 30 capsule 0    simvastatin (ZOCOR) 40 MG tablet Take 1 tablet by mouth nightly 30 tablet 2    Cholecalciferol (VITAMIN D3) 1000 units TABS Take 2 tablets by mouth daily 60 tablet 5    vitamin E 400 UNIT capsule Take 1 capsule by mouth nightly 30 capsule 3    ondansetron (ZOFRAN) 4 MG tablet Take 1 tablet by mouth every 8 hours as needed for Nausea 20 tablet 0    Elastic Bandages & Supports (MEDICAL COMPRESSION STOCKINGS) MISC 1 each by Does not apply route daily 1 each 0    B complex-vitamin C-folic acid (NEPHRO-ZINA) 1 MG tablet Take 1 tablet by mouth daily (with breakfast) 30 tablet 3       Current Meds    ciprofloxacin (CIPRO) IVPB 400 mg Q24H   insulin lispro (HUMALOG) injection pen 0-6 Units TID WC   insulin lispro (HUMALOG) injection pen 0-3 Units Nightly   phenol 1.4 % mouth spray 1 spray Q2H PRN   0.9 % sodium chloride bolus Once   sevelamer (RENVELA) tablet 400 mg TID    potassium chloride (KLOR-CON M) extended release tablet 10 mEq BID    melatonin tablet 3 mg Once   delflex lo-prasad 1.5% 5,000 mL with heparin (porcine) 5,000 Units solution Nightly   gentamicin (GARAMYCIN) 0.1 % cream Nightly PRN   diclofenac sodium 1 % gel 4 g 4x Daily PRN   lidocaine 4 % external patch 1 patch Daily   heparin (porcine) injection 1,000 Units Nightly   pregabalin (LYRICA) capsule 75 mg Daily   scopolamine (TRANSDERM-SCOP) transdermal patch 1 patch Q72H   ondansetron (ZOFRAN-ODT) disintegrating tablet 4 mg Q8H PRN   apixaban (ELIQUIS) tablet 2.5 mg BID   aspirin EC tablet 81 mg Daily   b complex-C-folic acid (NEPHROCAPS) capsule 1 mg Daily with breakfast   calcitRIOL (ROCALTROL) capsule 0.25 mcg Daily   capsaicin-menthol-methyl sal 1 applicator BID PRN   vitamin D (CHOLECALCIFEROL) tablet 2,000 Units Daily   docusate sodium (COLACE) capsule 100 mg BID   psyllium (HYDROCIL) 95 % packet 1 packet Daily   simvastatin (ZOCOR) tablet 40 mg Nightly   sodium chloride flush 0.9 % injection 10 mL 2 times per day   sodium chloride flush 0.9 % injection 10 mL PRN   ondansetron (ZOFRAN) injection 4 mg Q6H PRN   polyethylene glycol (GLYCOLAX) packet 17 g Daily PRN   acetaminophen (TYLENOL) tablet 650 mg Q6H PRN   perflutren lipid microspheres (DEFINITY) injection 1.65 mg ONCE PRN   glucose (GLUTOSE) 40 % oral gel 15 g PRN   dextrose 50 % IV solution PRN   glucagon (rDNA) injection 1 mg PRN   dextrose 5 % solution PRN       Family History:   History reviewed. No pertinent family history. Social History:   TOBACCO:   reports that she has been smoking cigarettes. She has a 12.00 pack-year smoking history. She has never used smokeless tobacco.  ETOH:   reports that she does not drink alcohol. DRUGS:   reports that she does not use drugs. Review of Systems:     Constitutional: Negative. HENT: Negative. Eyes: Negative. Respiratory: Negative. Cardiovascular: States that she has claudication like symptoms when she walks more than a couple blocks  Gastrointestinal: Denies N/V, diarrhea, pain while she eats  Genitourinary: Negative. Musculoskeletal: Negative. Skin: Negative. Endocrine: History of DM. Allergic/Immunologic: Negative. Neurological: Negative. Hematological: Negative. Psychiatric/Behavioral: Negative.     Physical exam:    Vitals:    08/13/19 0600 08/13/19 0610 08/13/19 0936 08/13/19 1149   BP:  115/62 93/62 129/72   Pulse: 75 72 84 57   Resp:  16 16 16   Temp:  98.2 °F (36.8 °C) 97.8 °F (36.6 °C) 98 °F (36.7 °C)   TempSrc:  Oral Oral Oral   SpO2:  96% 95% 96%   Weight:  136 lb 14.5 oz (62.1 kg)     Height:           General appearance: alert, no acute distress, grooming appropriate  Eyes:  no scleral icterus  Neck: trachea midline, central line in the R side  Chest/Lungs: CTAB, no crackles/rales, wheezes/rhonchi, normal effort  Cardiovascular: RRR, no murmurs/gallops/rubs  Abdomen: soft, non-tender, non-distended, +BS, no guarding/rigidity, PD cathether in the L abdomen  Skin: warm and dry, no rashes  Extremities: slight, edema of the R leg, no cyanosis, DP/PT pulses cannot be palpated, DP/PT cannot be palpated but can be found with doppler, DP/PT on R cannot be palpated, PT on R found on doppler but not the DP, popliteal and femoralpulses can be palpated, cap refill of R foot is >4 seconds, cap refill of L foot is 3 seconds, both feet are cool to touch, the right greater than left  Neuro: A&Ox3, cannot feel anything in her foot on the R, has sensation to light touch proximal to the foot    Labs:    CBC: Recent Labs     08/11/19  0615 08/12/19  0559 08/13/19  0420   WBC 10.7 12.4* 10.2   HGB 13.0 12.3 13.1   HCT 40.1 38.9 41.0   .7* 102.7* 101.6*    215 251     BMP: Recent Labs     08/11/19  0615 08/12/19  0559 08/13/19  0420    136 134*   K 4.2 4.6 4.3   CL 96* 95* 94*   CO2 28 26 25   PHOS 2.3* 2.4* 2.7   BUN 42* 43* 43*   CREATININE 8.1* 7.3* 6.9*     PT/INR: No results for input(s): PROTIME, INR in the last 72 hours. APTT: No results for input(s): APTT in the last 72 hours.   Liver Profile: Lab Results   Component Value Date    AST 11 08/07/2019    ALT 14 08/07/2019    BILIDIR <0.2 08/07/2019    BILITOT <0.2 08/07/2019    ALKPHOS 86 08/07/2019     Lab Results   Component Value Date    CHOL 120 02/03/2015    HDL 49 02/03/2015    HDL 62 10/19/2011    TRIG 84 02/03/2015     UA: Lab Results Component Value Date    COLORU Yellow 04/08/2019    PHUR 6.0 04/08/2019    WBCUA 20-50 04/08/2019    RBCUA 10-20 04/08/2019    TRICHOMONAS Present 05/29/2013    BACTERIA 3+ 04/08/2019    CLARITYU CLOUDY 04/08/2019    SPECGRAV 1.025 04/08/2019    LEUKOCYTESUR MODERATE 04/08/2019    UROBILINOGEN 0.2 04/08/2019    BILIRUBINUR SMALL 04/08/2019    BLOODU LARGE 04/08/2019    GLUCOSEU 100 04/08/2019       Imaging:   VL WINSOME BILATERAL LIMITED 1-2 LEVELS         CT SOFT TISSUE NECK W CONTRAST   Final Result      No extraluminal air density along the course of the visualized esophagus to suggest transluminal esophageal perforation      Focal soft tissue fullness of the left true vocal cord-see above      Equivocal small laryngocele on the right. XR CHEST PORTABLE   Final Result      Right IJ central venous catheter tip projects over the lower superior vena cava. No pneumothorax. XR ABDOMEN (KUB) (SINGLE AP VIEW)   Final Result     No acute findings. XR CHEST PORTABLE   Final Result     No evidence of acute cardiopulmonary disease. XR CHEST STANDARD (2 VW)   Final Result   :   1. No acute abnormality. 2. Tortuous ectatic thoracic aorta. Tech Comments   Right   1. The right ankle/brachial index is 0.58 (PT) and 0.36 (DP). Segmental   pressures may be unreliable due to underlying vascular calcification. 2. The right great toe pressure is 0 mmHg. 3. The right common femoral artery Doppler waveform is monophasic suggesting   evidence of aorto-iliac inflow disease. 4. Abnormal PVR and Doppler waveforms suggest evidence of multi-level arterial   disease. 5. PPG waveforms of all toes on the right foot demonstrate non-pulsatility and   are essentially flat-lined, which is consistent with small vessel disease.       Left   1. The left ankle/brachial index is 1.21 (DP) and non-compressible (PT). Segmental pressures may be unreliable due to underlying vascular   calcification.    2. The left great toe pressure is 70 mmHg. 3. The left common femoral artery Doppler waveform is multiphasic suggesting   no significant evidence of aorto-iliac inflow disease. 4. Moderately abnormal PVR and Doppler waveforms are suggestive of possible   tibial vessel disease. 5. PPG waveforms of the toes indicate good pulsatility and amplitude. There are no previous studies for comparison. Assessment/Plan: This is a 69 y.o. female with Hx of  DM, CKD stage 4, HTN, CAD s/p coronary angioplasty who originally was admitted for fatigue and emesis is complaining of R foot pain and numbness.    - WINSOME normal on the L, moderate to severe PAD on the R (0.58 PT, 0.36 DP)  - CTA of R lower extremity to assess  - no acute pathology, likely a chronic problem      Ijeoma Giang DO  08/13/19  1:24 PM    Attending attestation:  Patient seen and examined with resident and agree with assessment and plan. Patient with chronic right lower extremity arterial disease. No evidence of limb threatening ischemia. I will see her as an outpatient for further discussion of options for possible intervention.

## 2019-08-13 NOTE — FLOWSHEET NOTE
Deaccessed from PD catheter using aseptic technique and treatment ended. Effluent clear/no fibrin noted. NO BM documented this admission. Reported to 4N RN. Patient voices no complaints. More alert and talkative this AM. Currently NPO. TX SUMMARY    Total time - 10 hours 26 min  Dwell time - gained 1 hour 17 min  Total UF -75  Total volume 54320           08/13/19 0610   Vitals   /62   Temp 98.2 °F (36.8 °C)   Temp Source Oral   Pulse 72   Resp 16   SpO2 96 %   Weight 136 lb 14.5 oz (62.1 kg)   Peritoneal Dialysis Catheter Left lower abdomen   Placement Date: (c)   Catheter Location: Left lower abdomen   Status Deaccessed   Site Condition No Complications   Dressing Status Clean;Dry; Intact   Dressing Occlusive   Cycler   Ultrafiltration (UF) (mL) -75 mL   Average Dwell Time (Hours:Minutes) 95.4   Lost Dwell Time (Hours:Minutes)   (gained 1 hour 7 minutes)

## 2019-08-13 NOTE — CARE COORDINATION
Case Management Assessment           Daily Note                 Date/ Time of Note: 8/13/2019 1:17 PM         Note completed by: David Ken    Patient Name: Winnie Julien  YOB: 1940    Diagnosis:Atrial fibrillation [facility]) [I48.91]  Atrial fibrillation [facility]) [I48.91]  Patient Admission Status: Inpatient    Date of Admission:8/7/2019  5:33 PM Length of Stay: 6 GLOS:        Current Plan of Care: possible d/c tomorrow, precert obtained at [facility], transportation set up via [id number] at 2pm  ________________________________________________________________________________________  PT AM-PAC: 10 / 24 per last evaluation on: 8.12    OT AM-PAC: 14 / 24 per last evaluation on: 8.12    DME Needs for discharge: deferred to next level of care    ________________________________________________________________________________________  Discharge Plan: SNF: [facility]    Tentative discharge date: 8.14.19    Current barriers to discharge: possible CT and EGD pending    Referrals completed: SNF: [facility]    Resources/ information provided: [facility]  ________________________________________________________________________________________  Case Management Notes: CM following for tentative discharge tomorrow, precert obtained, transportation set up via [facility] at 1400, CM spoke with brother Nico Strauss) who will be bringing PD cylcer and supplies to facility upon discharge      Winter Giron and her family were provided with choice of provider; she and her family are in agreement with the discharge plan.     Care Transition Patient: Katherine Ken RN  [facility]  Case Management Department  Ph: [phone]  Fax: [phone]

## 2019-08-13 NOTE — FLOWSHEET NOTE
Connected to PD cycler and treatment initiated. Dressing changed . Verified orders and had to switch out set and cycler due to mechanical difficulties. Report rendered. 08/12/19 1705   Vitals   BP (!) 108/57   Temp 98 °F (36.7 °C)   Temp Source Oral   Pulse 67   Resp 18   SpO2 97 %   Weight 136 lb 14.5 oz (62.1 kg)   Peritoneal Dialysis Catheter Left lower abdomen   Placement Date: (c)   Catheter Location: Left lower abdomen   Status Accessed   Site Condition No Complications   Dressing Status Old drainage; Changed   Dressing Occlusive  (Gentamycin cream applied)   Drainage Description   (sm amt brown)   Cycler   Verification of Prescription CCPD   Total Volume Programmed 25028 mL   Therapy Time (Hours:Minutes) 10:00   Fill Volume 2000 mL   Last Fill Volume 0 mL   Dextrose Setting Same (Nonextraneal)   Number of Cycles 5   Bag Volume 5000 mL   Number of Bags Used 3  (for treatment 2, extra bag added for machine alarm problems)   Dianeal Solution Other (Comment)  (1.5% x2  5L)

## 2019-08-13 NOTE — PROGRESS NOTES
[address] Daily Progress Note      Admit Date:  8/7/2019    Subjective:  Ms. Lio Muller was seen and examined. F/U MAT/Trop/N/V/RF. Lethargic, No cp/sob.      Objective:   BP 93/62   Pulse 84   Temp 97.8 °F (36.6 °C) (Oral)   Resp 16   Ht 5' 7\" (1.702 m)   Wt 136 lb 14.5 oz (62.1 kg)   LMP  (LMP Unknown)   SpO2 95%   BMI 21.44 kg/m²       Intake/Output Summary (Last 24 hours) at 8/13/2019 1014  Last data filed at 8/13/2019 7891  Gross per 24 hour   Intake 1690 ml   Output -75 ml   Net 1765 ml       TELEMETRY: sinus    Physical Exam:  General:  Awakens, lethargic, NAD  Skin:  Warm and dry  Neck:  JVP difficult, supple  Chest:  Clear to auscultation, respiration normal  Cardiovascular:  RRR S1S2  Abdomen:  Soft ,quiet, nontender  Extremities:  Warm, no edema    Medications:    insulin lispro  0-6 Units Subcutaneous TID WC    insulin lispro  0-3 Units Subcutaneous Nightly    piperacillin-tazobactam  3.375 g Intravenous Q12H    sodium chloride  250 mL Intravenous Once    sevelamer  400 mg Oral TID WC    potassium chloride  10 mEq Oral BID WC    melatonin  3 mg Oral Once    custom dialysis builder   Intraperitoneal Nightly    lidocaine  1 patch Transdermal Daily    heparin (porcine)  1,000 Units Intravenous Nightly    pregabalin  75 mg Oral Daily    scopolamine  1 patch Transdermal Q72H    apixaban  2.5 mg Oral BID    aspirin  81 mg Oral Daily    b complex-C-folic acid  1 mg Oral Daily with breakfast    calcitRIOL  0.25 mcg Oral Daily    vitamin D  2,000 Units Oral Daily    docusate sodium  100 mg Oral BID    psyllium  1 packet Oral Daily    simvastatin  40 mg Oral Nightly    sodium chloride flush  10 mL Intravenous 2 times per day      dextrose       phenol, gentamicin, diclofenac sodium, ondansetron, capsaicin-menthol-methyl sal, sodium chloride flush, ondansetron, polyethylene glycol, acetaminophen, perflutren lipid microspheres, glucose, dextrose, glucagon (rDNA), dextrose    Lab

## 2019-08-13 NOTE — PROGRESS NOTES
Report called to floor RN. Patient awake. Patient had copious amount of secretions following procedure. Patient was suctioned and maintained stable O2 saturations following. Transport at bedside.

## 2019-08-13 NOTE — PROGRESS NOTES
Pt's BS 60 upon return from [facility]. [place] juice given, pt consumed roughly half. Glucose rechecked post consumption of juice and found to be 59. RN was about to give D5, but patient's tray arrived and she has started eating her meal.  Will monitor amount of PO intake and recheck BS after eating. If still below 70, will give D5 per PRN order.

## 2019-08-13 NOTE — ANESTHESIA PRE PROCEDURE
each by Does not apply route daily 8/2/19   Ari Quintanilla MD   B complex-vitamin C-folic acid (NEPHRO-ZINA) 1 MG tablet Take 1 tablet by mouth daily (with breakfast) 8/2/19   Ari Quintanilla MD       Current medications:    Current Facility-Administered Medications   Medication Dose Route Frequency Provider Last Rate Last Dose    ciprofloxacin (CIPRO) IVPB 400 mg  400 mg Intravenous Q24H Gabe Redman MD   Stopped at 08/13/19 1338    insulin lispro (HUMALOG) injection pen 0-6 Units  0-6 Units Subcutaneous TID  Ashley Guzman MD   3 Units at 08/12/19 1250    insulin lispro (HUMALOG) injection pen 0-3 Units  0-3 Units Subcutaneous Nightly Ashley Guzman MD   1 Units at 08/12/19 2339    phenol 1.4 % mouth spray 1 spray  1 spray Mouth/Throat Q2H PRN Gabe Redman MD        0.9 % sodium chloride bolus  250 mL Intravenous Once Latisha MD Sherita   Stopped at 08/11/19 1305    sevelamer (RENVELA) tablet 400 mg  400 mg Oral TID  Rajeev Abad MD   Stopped at 08/12/19 1835    potassium chloride (KLOR-CON M) extended release tablet 10 mEq  10 mEq Oral BID  Rajeev Abad MD   Stopped at 08/13/19 0837    melatonin tablet 3 mg  3 mg Oral Once Abhinav Mcghee MD        delflex lo-prasad 1.5% 5,000 mL with heparin (porcine) 5,000 Units solution   Intraperitoneal Nightly Rajeev Abad MD        gentamicin (GARAMYCIN) 0.1 % cream   Topical Nightly PRN Rajeev Abad MD        diclofenac sodium 1 % gel 4 g  4 g Topical 4x Daily PRN Cece Gutierrez DO   4 g at 08/10/19 1152    lidocaine 4 % external patch 1 patch  1 patch Transdermal Daily Carlos Lyles MD   Stopped at 08/13/19 2115    heparin (porcine) injection 1,000 Units  1,000 Units Intravenous Nightly Rajeev Abad MD   1,000 Units at 08/09/19 1930    pregabalin (LYRICA) capsule 75 mg  75 mg Oral Daily Carlos Lyles MD   Stopped at 08/13/19 0837    scopolamine (TRANSDERM-SCOP) transdermal patch 1 patch  1 patch Transdermal Q72H Carlos Lyles MD   1 patch at 08/12/19 1836    ondansetron (ZOFRAN-ODT) disintegrating tablet 4 mg  4 mg Oral Q8H PRN Yolande Mcallister MD        apixaban [facility]) tablet 2.5 mg  2.5 mg Oral BID Bhavana Mercedes, DO   Stopped at 08/09/19 2335    aspirin EC tablet 81 mg  81 mg Oral Daily Cece Black, DO   Stopped at 08/13/19 0836    b complex-C-folic acid (NEPHROCAPS) capsule 1 mg  1 mg Oral Daily with breakfast Bhavana Mercedes, DO   Stopped at 08/13/19 3179    calcitRIOL (ROCALTROL) capsule 0.25 mcg  0.25 mcg Oral Daily Bhavana Mercedes, DO   Stopped at 08/13/19 6151    capsaicin-menthol-methyl sal 1 applicator  1 applicator Topical BID PRN Bhavana Mercedes, DO        vitamin D (CHOLECALCIFEROL) tablet 2,000 Units  2,000 Units Oral Daily Cece Black, DO   Stopped at 08/13/19 0890    docusate sodium (COLACE) capsule 100 mg  100 mg Oral BID Bhavana Mercedes, DO   Stopped at 08/12/19 2036    psyllium (HYDROCIL) 95 % packet 1 packet  1 packet Oral Daily Cece Black, DO   Stopped at 08/13/19 0837    simvastatin (ZOCOR) tablet 40 mg  40 mg Oral Nightly Cece Black, DO   Stopped at 08/12/19 2036    sodium chloride flush 0.9 % injection 10 mL  10 mL Intravenous 2 times per day Bhavana Mercedes, DO   10 mL at 08/13/19 1012    sodium chloride flush 0.9 % injection 10 mL  10 mL Intravenous PRN Cece Black, DO        ondansetron (ZOFRAN) injection 4 mg  4 mg Intravenous Q6H PRN Cece Black, DO   4 mg at 08/11/19 0317    polyethylene glycol (GLYCOLAX) packet 17 g  17 g Oral Daily PRN Bhavana Mercedes, DO        acetaminophen (TYLENOL) tablet 650 mg  650 mg Oral Q6H PRN Cece Black, DO   650 mg at 08/12/19 0912    perflutren lipid microspheres (DEFINITY) injection 1.65 mg  1.5 mL Intravenous ONCE PRN Cece Black, DO        glucose (GLUTOSE) 40 % oral gel 15 g  15 g Oral PRN Cece Black, DO        dextrose 50 % IV solution  12.5 g Intravenous PRN Cece Black, DO        glucagon (rDNA) injection 1 mg  1 mg Intramuscular PRN Cece Black, DO        dextrose 5 % solution  100 mL/hr Intravenous or unspecified type diabetes mellitus without mention of complication, not stated as uncontrolled     Vitamin D deficiency        Past Surgical History:        Procedure Laterality Date    CATARACT REMOVAL Left 5/29/13    had elevated BP post op    CORONARY ANGIOPLASTY      JOINT REPLACEMENT  5/4/2011    R WILDER    OTHER SURGICAL HISTORY Left 05/19/2017    INSERTION OF LAPAROSCOPIC PERITONEAL DIALYSIS CATHETER;    THYROIDECTOMY, PARTIAL         Social History:    Social History     Tobacco Use    Smoking status: Current Some Day Smoker     Packs/day: 0.30     Years: 40.00     Pack years: 12.00     Types: Cigarettes    Smokeless tobacco: Never Used    Tobacco comment: trying to stop   Substance Use Topics    Alcohol use: No     Alcohol/week: 0.8 standard drinks     Types: 1 Standard drinks or equivalent per week                                Ready to quit: Not Answered  Counseling given: Not Answered  Comment: trying to stop      Vital Signs (Current):   Vitals:    08/13/19 0600 08/13/19 0610 08/13/19 0936 08/13/19 1149   BP:  115/62 93/62 129/72   Pulse: 75 72 84 57   Resp:  16 16 16   Temp:  98.2 °F (36.8 °C) 97.8 °F (36.6 °C) 98 °F (36.7 °C)   TempSrc:  Oral Oral Oral   SpO2:  96% 95% 96%   Weight:  136 lb 14.5 oz (62.1 kg)     Height:                                                  BP Readings from Last 3 Encounters:   08/13/19 129/72   08/13/19 85/64   06/11/19 119/75       NPO Status: Time of last liquid consumption: 1305                        Time of last solid consumption: 1305                        Date of last liquid consumption: 08/12/19                        Date of last solid food consumption: 08/12/19    BMI:   Wt Readings from Last 3 Encounters:   08/13/19 136 lb 14.5 oz (62.1 kg)   06/11/19 143 lb 3.2 oz (65 kg)   05/31/19 142 lb 6.7 oz (64.6 kg)     Body mass index is 21.44 kg/m².     CBC:   Lab Results   Component Value Date    WBC 10.2 08/13/2019    RBC 4.04 08/13/2019    HGB 13.1

## 2019-08-13 NOTE — PROGRESS NOTES
Occupational Therapy/Physical Therapy  HOLD    Approached for OT/PT treatment. Pt is currently off the floor for EGD. Will follow up per POC.     Juan Shaw [address]  Marquita Li

## 2019-08-13 NOTE — H&P
Resident History and Physical      Hospital Day: 2     Admit Date: 8/7/2019  5:33 PM            PCP: Saeed Lackey DO                                  Chief Complaint: Fatigue, nausea, vomiting    History of present illness:   Matt Vu is a 69 y.o. female with a PMH as below who presents with severe fatigue after about one week of nausea, poor PO intake, decreased appetite, and when she attempts to eat has frequently vomited again shortly thereafter. She denies fever chest pain, palpitations, SOB, cough, or abdominal pain. She had had frequent problems with constipation according to her family. They report she has been taking laxatives recently though. Patient states last BM was yesterday. Also denies sick contacts. Patient's brother states that she is forgetting to take her medications and he is concerned she is not taking them as prescribed. She has a son that lives her but her brother is concerned she is not getting enough help at home. Patient states she has had no problems with her PD at home.     ============================================================================  Past medical history:  Past Medical History:   Diagnosis Date    DVT (deep venous thrombosis) (HCC)     End stage renal disease (HCC)     Hyperlipidemia     Hypertension     Osteoarthritis     Pancreatitis chronic     [facility]     Type II or unspecified type diabetes mellitus without mention of complication, not stated as uncontrolled     Vitamin D deficiency        Past Surgical History:   Procedure Laterality Date    CATARACT REMOVAL Left 5/29/13    had elevated BP post op    CORONARY ANGIOPLASTY      JOINT REPLACEMENT  5/4/2011    R WILDER    OTHER SURGICAL HISTORY Left 05/19/2017    INSERTION OF LAPAROSCOPIC PERITONEAL DIALYSIS CATHETER;    THYROIDECTOMY, PARTIAL         Social History:   TOBACCO:   reports that she has been smoking cigarettes. She has a 12.00 pack-year smoking history.  She has
organization: Not on file     Attends meetings of clubs or organizations: Not on file     Relationship status: Not on file    Intimate partner violence:     Fear of current or ex partner: Not on file     Emotionally abused: Not on file     Physically abused: Not on file     Forced sexual activity: Not on file   Other Topics Concern    Not on file   Social History Narrative    Not on file       Family Hx:   History reviewed. No pertinent family history. Physical Exam:  Vital Signs: BP (!) 76/42   Pulse 92   Temp 98 °F (36.7 °C) (Oral)   Resp 18   Ht 5' 7\" (1.702 m)   Wt 136 lb 14.5 oz (62.1 kg)   LMP  (LMP Unknown)   SpO2 100%   BMI 21.44 kg/m²    Airway: Mallampati: II (soft palate, uvula, fauces visible)  Pulmonary:Normal  Cardiac:Normal  Abdomen:Normal    Pre-Procedure Assessment / Plan:  ASA: Class 4 - A patient with an incapacitating systemic disease that is a constant threat to life  Level of Sedation Plan:Deep sedation  Post Procedure plan: Return to same level of care    I assessed the patient and find that the patient is in satisfactory condition to proceed with the planned procedure and sedation plan. I have explained the risk, benefits, and alternatives to the procedure; the patient understands and agrees to proceed.        Danni Stevenson  8/13/2019

## 2019-08-13 NOTE — PROGRESS NOTES
Progress Note    Admit Date: 8/7/2019  Day: 7  Diet: Diet NPO Effective Now    CC: fatigue, nausea, emesis    Interval history: Pt's SBP continues to be low. Hovering between 90's-100's. Patient receives PD every night. Still complaining this morning of fatigue, weakness. Pt endorses odynophagia. Says she was held from eating anything last night and this AM for a potential scope from GI. Had been complaining of neck pain yesterday following central line placement, CT neck showed no acute pathology. Also still endorsing R foot pain. Otherwise, patient denies fevers, chills, nausea, vomiting, diarrhea, cough, shortness of breath, chest pain, palpitations. Able to ambulate to restroom as necessary with walker. HPI: Ines Been a 69 y. o. female with a PMH as below who presents with severe fatigue after about one week of nausea, poor PO intake, decreased appetite, and when she attempts to eat has frequently vomited again shortly thereafter. She denies fever chest pain, palpitations, SOB, cough, or abdominal pain. She had had frequent problems with constipation according to her family. They report she has been taking laxatives recently though. Patient states last BM was yesterday. Also denies sick contacts. Patient's brother states that she is forgetting to take her medications and he is concerned she is not taking them as prescribed. She has a son that lives her but her brother is concerned she is not getting enough help at home.  Patient states she has had no problems with her PD at home.     Medications:     Scheduled Meds:   insulin lispro  0-6 Units Subcutaneous TID WC    insulin lispro  0-3 Units Subcutaneous Nightly    piperacillin-tazobactam  3.375 g Intravenous Q12H    sodium chloride  250 mL Intravenous Once    sevelamer  400 mg Oral TID WC    potassium chloride  10 mEq Oral BID WC    melatonin  3 mg Oral Once    custom dialysis builder   Intraperitoneal Nightly    lidocaine  1 patch Transdermal Daily    heparin (porcine)  1,000 Units Intravenous Nightly    pregabalin  75 mg Oral Daily    scopolamine  1 patch Transdermal Q72H    apixaban  2.5 mg Oral BID    aspirin  81 mg Oral Daily    b complex-C-folic acid  1 mg Oral Daily with breakfast    calcitRIOL  0.25 mcg Oral Daily    vitamin D  2,000 Units Oral Daily    docusate sodium  100 mg Oral BID    psyllium  1 packet Oral Daily    simvastatin  40 mg Oral Nightly    sodium chloride flush  10 mL Intravenous 2 times per day     Continuous Infusions:   dextrose       PRN Meds:phenol, gentamicin, diclofenac sodium, ondansetron, capsaicin-menthol-methyl sal, sodium chloride flush, ondansetron, polyethylene glycol, acetaminophen, perflutren lipid microspheres, glucose, dextrose, glucagon (rDNA), dextrose    Objective:   Vitals:   T-max:  Patient Vitals for the past 8 hrs:   BP Temp Temp src Pulse Resp SpO2 Weight   08/13/19 0936 93/62 97.8 °F (36.6 °C) Oral 84 16 95 % --   08/13/19 0610 115/62 98.2 °F (36.8 °C) Oral 72 16 96 % 136 lb 14.5 oz (62.1 kg)   08/13/19 0600 -- -- -- 75 -- -- --   08/13/19 0430 107/71 98.3 °F (36.8 °C) Oral 81 16 95 % --       Intake/Output Summary (Last 24 hours) at 8/13/2019 1018  Last data filed at 8/13/2019 0610  Gross per 24 hour   Intake 1690 ml   Output -75 ml   Net 1765 ml          Review of Systems   Constitutional: Positive for fatigue. Negative for chills and fever. HENT: Negative for sore throat and trouble swallowing.    Eyes: Negative for visual disturbance. Respiratory: Negative for cough, shortness of breath and wheezing.    Cardiovascular: Negative for chest pain, palpitations and leg swelling. Gastrointestinal: Negative for abdominal distention, abdominal pain, constipation, diarrhea, nausea and vomiting. Genitourinary: Negative for dysuria, frequency and urgency. Musculoskeletal: Positive for arthralgias. Negative for myalgias. Skin: Negative for color change and wound. Neurological: Positive for weakness and numbness. Negative for dizziness and lightheadedness. Psychiatric/Behavioral: Negative for agitation, behavioral problems and confusion. The patient is not nervous/anxious.       Const: Appears malnourished, Well developed, not distressed, not diaphoretic, appears stated age  Eyes: PERRL, EOMI, no conjunctival injection, no discharge  HENT: Normocephalic, atraumatic, oropharynx not erythematous, no postnasal drip  Neck: Supple, no JVD, no thyromegaly, trachea midline, CVC present in R IJ  CV: Regular rate, irregular rhythm, heart sounds normal, no murmur, no gallop, no rub, capillary refill <2s, 2+ pulses in bilateral distal upper and lower extremities  RESP: Clear to auscultation bilaterally, normal breath sounds, Unlabored respiratory effort, no wheezes, no rhonchi, no stridor, no chest wall tenderness   GI: soft, non-tender, non-distended, no masses, no rebound, bowel sounds normal  MSK/Extremities: No gross deformities appreciated, no edema noted, R foot is tender to palpation  Skin: Cool and dry over lower extremities, very dry. No rashes, no erythema  Neuro: Alert, CNs II-XII grossly intact. Sensation and motor function of extremities grossly intact. No abnormal tone. Reflexes 2+ in distal extremities  Psych: Appropriate mood and affect.     LABS:    CBC:   Recent Labs     08/11/19 0615 08/12/19 0559 08/13/19  0420   WBC 10.7 12.4* 10.2   HGB 13.0 12.3 13.1   HCT 40.1 38.9 41.0    215 251   .7* 102.7* 101.6*     Renal:    Recent Labs     08/11/19 0615 08/12/19  0559 08/13/19  0420    136 134*   K 4.2 4.6 4.3   CL 96* 95* 94*   CO2 28 26 25   BUN 42* 43* 43*   CREATININE 8.1* 7.3* 6.9*   GLUCOSE 144* 347* 186*   CALCIUM 6.9* 6.6* 7.1*   PHOS 2.3* 2.4* 2.7   ANIONGAP 14 15 15     Hepatic:   Recent Labs     08/11/19 0615 08/12/19  0559 08/13/19  0420   LABALBU 2.0* 2.0* 2.3*     Troponin: No results for input(s): TROPONINI in the last 72 DVT  - Anticoagulated w/ eliquis     HLD  - Continue home statin     Code Status: Full  FEN: NPO for procedure  PPX: alex Romero MD, PGY-1  08/13/19  10:18 AM    This patient has been staffed and discussed with Sharmaine Zaidi MD.     Patient seen and examined, labs and imaging studies reviewed, agree with assessment and plan as outlined above. Continue with current care and plan, gi eval endoscopy today she has claudication w rest, will seek vascular consult as well, continue with monitoring.       MD Delia Matthews

## 2019-08-13 NOTE — FLOWSHEET NOTE
Accessed PD cath using aseptic technique and treatment initiated after delay due to cycler malfunction ( wouldn't pass test, likely needs calibration). New setup and machine- and tx intiated at 1815. Initial effluent clear. Report rendered to [facility] RN.      08/12/19 1705   Vitals   BP (!) 108/57   Temp 98 °F (36.7 °C)   Temp Source Oral   Pulse 67   Resp 18   SpO2 97 %   Weight 136 lb 14.5 oz (62.1 kg)   Peritoneal Dialysis Catheter Left lower abdomen   Placement Date: (c)   Catheter Location: Left lower abdomen   Status Accessed   Site Condition No Complications   Dressing Status Old drainage; Changed   Dressing Occlusive  (Gentamycin cream applied)   Drainage Description   (sm amt brown)   Cycler   Verification of Prescription CCPD   Total Volume Programmed 30626 mL   Therapy Time (Hours:Minutes) 10:00   Fill Volume 2000 mL   Last Fill Volume 0 mL   Dextrose Setting Same (Nonextraneal)   Number of Cycles 5   Bag Volume 5000 mL   Number of Bags Used 3  (for treatment 2, extra bag added for machine alarm problems)   Dianeal Solution Other (Comment)  (1.5% x2  5L)

## 2019-08-13 NOTE — PROGRESS NOTES
1 tablet by mouth daily 30 tablet 3    docusate sodium (COLACE) 100 MG capsule Take 1 capsule by mouth 2 times daily 30 capsule 0    simvastatin (ZOCOR) 40 MG tablet Take 1 tablet by mouth nightly 30 tablet 2    Cholecalciferol (VITAMIN D3) 1000 units TABS Take 2 tablets by mouth daily 60 tablet 5    vitamin E 400 UNIT capsule Take 1 capsule by mouth nightly 30 capsule 3    ondansetron (ZOFRAN) 4 MG tablet Take 1 tablet by mouth every 8 hours as needed for Nausea 20 tablet 0    Elastic Bandages & Supports (MEDICAL COMPRESSION STOCKINGS) MISC 1 each by Does not apply route daily 1 each 0    B complex-vitamin C-folic acid (NEPHRO-ZINA) 1 MG tablet Take 1 tablet by mouth daily (with breakfast) 30 tablet 3         Allergies  Allergies   Allergen Reactions    Gabapentin Other (See Comments)     Gabapentin-induced myoclonus        Social history:  Social History     Tobacco Use    Smoking status: Current Some Day Smoker     Packs/day: 0.30     Years: 40.00     Pack years: 12.00     Types: Cigarettes    Smokeless tobacco: Never Used    Tobacco comment: trying to stop   Substance Use Topics    Alcohol use: No     Alcohol/week: 0.8 standard drinks     Types: 1 Standard drinks or equivalent per week        Family History:   History reviewed. No pertinent family history. Review of Systems  Pertinent items are noted in HPI. Physical Exam:  Blood pressure 129/72, pulse 57, temperature 98 °F (36.7 °C), temperature source Oral, resp. rate 16, height 5' 7\" (1.702 m), weight 136 lb 14.5 oz (62.1 kg), SpO2 96 %, not currently breastfeeding. General appearance: alert, cooperative, minimal distress   Eyes: Anicteric  Head: Normocephalic, without obvious abnormality  Neck: Abnormal range of motion, no crepitus   Lungs: clear to auscultation bilaterally, Normal Effort  Heart: irregular irregular, normal S1 and S2, no murmurs or rubs  Abdomen: soft, non-tender.  Bowel sounds normal.   Extremities: atraumatic, no cyanosis or edema  Skin: warm and dry, no jaundice      Data Review:    Recent Labs     08/11/19  0615 08/12/19  0559 08/13/19  0420   WBC 10.7 12.4* 10.2   HGB 13.0 12.3 13.1   HCT 40.1 38.9 41.0   .7* 102.7* 101.6*    215 251     Recent Labs     08/11/19  0615 08/12/19  0559 08/13/19  0420    136 134*   K 4.2 4.6 4.3   CL 96* 95* 94*   CO2 28 26 25   PHOS 2.3* 2.4* 2.7   BUN 42* 43* 43*   CREATININE 8.1* 7.3* 6.9*     No results for input(s): AST, ALT, ALB, BILIDIR, BILITOT, ALKPHOS in the last 72 hours. No results for input(s): LIPASE, AMYLASE in the last 72 hours. No results for input(s): PROTIME, INR in the last 72 hours. No results for input(s): PTT in the last 72 hours. No results for input(s): OCCULTBLD in the last 72 hours. Assessment and Recommendations      1) Odynophagia   - Would like to rule out any organic causes   - CT shows no esophogeal perforation  - No signs of crepitus on physical examination   - may need a swallow evaluation if doesn't improve   - EGD this afternoon   - Follow up on procedure note    Thank you for allowing me to participate in the care of your patient. Please feel free to contact me with any questions or concerns.      Jamilah Baker MD  Will staff with Dr. Ana Roy, [address] [facility] and [address]

## 2019-08-14 NOTE — CARE COORDINATION
Perfectserved Dr. Jaylin Wang to find out plan for when we may discharge to [facility]. CM will continue to follow patient until discharge.  Electronically signed by Carlos Otoole RN on 8/14/2019 at 10:09 AM

## 2019-08-14 NOTE — PROGRESS NOTES
glycol, acetaminophen, perflutren lipid microspheres, glucose, dextrose, glucagon (rDNA), dextrose    Objective:   Vitals:   T-max:  Patient Vitals for the past 8 hrs:   BP Temp Temp src Pulse Resp SpO2 Weight   08/14/19 0947 (!) 83/42 -- -- -- -- -- --   08/14/19 0944 (!) 91/41 -- -- -- -- -- --   08/14/19 0919 (!) 98/52 -- -- -- -- -- --   08/14/19 0840 (!) 96/56 -- -- 71 -- -- --   08/14/19 0830 (!) 85/50 -- -- -- -- -- --   08/14/19 0815 (!) 78/48 -- -- -- -- -- --   08/14/19 0803 (!) 72/44 98 °F (36.7 °C) Oral 75 16 97 % --   08/14/19 0636 (!) 95/56 98.6 °F (37 °C) Oral 78 18 95 % 146 lb 6.2 oz (66.4 kg)   08/14/19 0438 (!) 95/57 98.6 °F (37 °C) Oral 78 18 95 % 146 lb 6.2 oz (66.4 kg)       Intake/Output Summary (Last 24 hours) at 8/14/2019 1049  Last data filed at 8/14/2019 0947  Gross per 24 hour   Intake 1200 ml   Output 403 ml   Net 797 ml       Review of Systems   Constitutional: Positive for fatigue. Negative for chills and fever. HENT: Negative for trouble swallowing, positive for odynophagia.    Eyes: Negative for visual disturbance. Respiratory: Negative for cough, shortness of breath and wheezing.    Cardiovascular: Negative for chest pain, palpitations and leg swelling. Gastrointestinal: Negative for abdominal distention, abdominal pain, constipation, diarrhea, nausea and vomiting. Genitourinary: Negative for dysuria, frequency and urgency. Musculoskeletal: Positive for arthralgias. Negative for myalgias. Skin: Negative for color change and wound. Neurological: Positive for weakness and numbness. Negative for dizziness and lightheadedness. Psychiatric/Behavioral: Negative for agitation, behavioral problems and confusion. The patient is not nervous/anxious.   Positive for sleep disturbance.      Const: Appears malnourished, Well developed, not distressed, not diaphoretic, appears stated age  Eyes: PERRL, EOMI, no conjunctival injection, no discharge  HENT: Normocephalic, atraumatic, oropharynx not erythematous, no postnasal drip  Neck: Supple, no JVD, no thyromegaly, trachea midline, CVC present in R IJ  CV: Regular rate, irregular rhythm, heart sounds normal, no murmur, no gallop, no rub, capillary refill <2s, 2+ pulses in bilateral distal upper and lower extremities  RESP: Clear to auscultation bilaterally, normal breath sounds, Unlabored respiratory effort, no wheezes, no rhonchi, no stridor, no chest wall tenderness   GI: soft, non-tender, non-distended, no masses, no rebound, bowel sounds normal  MSK/Extremities: No gross deformities appreciated, no edema noted, R foot is tender to palpation  Skin: Cool and dry over lower extremities, very dry. No rashes, no erythema  Neuro: Alert, CNs II-XII grossly intact. Sensation and motor function of extremities grossly intact.  No abnormal tone.  Reflexes 2+ in distal extremities  Psych: Appropriate mood and affect. LABS:    CBC:   Recent Labs     08/12/19  0559 08/13/19  0420 08/14/19  0632   WBC 12.4* 10.2 7.9   HGB 12.3 13.1 11.5*   HCT 38.9 41.0 35.8*    251 202   .7* 101.6* 101.7*     Renal:    Recent Labs     08/12/19  0559 08/13/19  0420 08/14/19  0632    134* 133*   K 4.6 4.3 4.2   CL 95* 94* 95*   CO2 26 25 24   BUN 43* 43* 44*   CREATININE 7.3* 6.9* 6.9*   GLUCOSE 347* 186* 174*   CALCIUM 6.6* 7.1* 6.8*   PHOS 2.4* 2.7 3.2   ANIONGAP 15 15 14     Hepatic:   Recent Labs     08/12/19  0559 08/13/19  0420 08/14/19  0632   LABALBU 2.0* 2.3* 1.9*     Troponin: No results for input(s): TROPONINI in the last 72 hours. BNP: No results for input(s): BNP in the last 72 hours. Lipids: No results for input(s): CHOL, HDL in the last 72 hours. Invalid input(s): LDLCALCU, TRIGLYCERIDE  ABGs:  No results for input(s): PHART, SRV7FPS, PO2ART, TTF9YPQ, BEART, THGBART, P2VPVPYH, INP6RZR in the last 72 hours. INR: No results for input(s): INR in the last 72 hours.   Lactate: No results for input(s): LACTATE in the last 72 hours.  Cultures:  -----------------------------------------------------------------  RAD:   VL WINSOME BILATERAL LIMITED 1-2 LEVELS   Final Result      CT SOFT TISSUE NECK W CONTRAST   Final Result      No extraluminal air density along the course of the visualized esophagus to suggest transluminal esophageal perforation      Focal soft tissue fullness of the left true vocal cord-see above      Equivocal small laryngocele on the right. XR CHEST PORTABLE   Final Result      Right IJ central venous catheter tip projects over the lower superior vena cava. No pneumothorax. XR ABDOMEN (KUB) (SINGLE AP VIEW)   Final Result     No acute findings. XR CHEST PORTABLE   Final Result     No evidence of acute cardiopulmonary disease. XR CHEST STANDARD (2 VW)   Final Result   :   1. No acute abnormality. 2. Tortuous ectatic thoracic aorta. Assessment/Plan:     Ms. Peggy Corbin is a stable 68 y/o AAF w/ a PMHx of ESRD on PD, DM2, h/o DVT, FTT, systolic HF presented to the ED w/ 1 week h/o nausea, vomiting, fatigue.  Pt has had persistently low BP's over the course of admission. Has central access per CVC in [facility] currently. Nephrology started midodrine this AM due to persistently low BP's even with IV boluses, pressures still low. Will perform ACTH stim test today. Pt had EGD performed yesterday for odynophagia following CVC placement, showed inflammation of GI tract, started PPI. WINSOME's for lower extremities were unremarkable for ischemia, will continue current pain regimen for neuropathy. Continue abx for K. Pneumoniae growth in setting of low BP's and malnourishment.      Hypotension 2/2 severe malnutrition - failure to thrive vs poor po intake vs sepsis, 1 week history of nausea/vomiting  - Soft BP's on admission  -[address] placed 8/11 by surgery in [facility]  - Albumin 1.9 this AM (8/14)  - Albumin 25% solution started this AM  - PRN antiemetics;  Scopalamine patch scheduled  - Infectious workup

## 2019-08-14 NOTE — PROGRESS NOTES
a week. We are consulted for ESRD on PD.    1. ESRD on CCPD  BUN 44, Cr 9.0 and GFR 5. Does not produce urine.  - Continue CCPD   - has exit side drainage - added gent cream   - on abx for UTI   - Bp cont to be low   - add midodrine       2. hypokalemia  - replaced   - Monitor daily       3. Hypomagnesemia  - replaced     4.  Hypotension   -  Cont to be low   - d/w Dr Vicky Reyes MD

## 2019-08-14 NOTE — PROGRESS NOTES
valgus    Exercises  Pt declined to do LE exercises. Patient Education  Role of PT and d/c plans. Pt verbalized understanding. Safety Devices  Pt left with needs in reach, seated in chair with alarm in place, LEs elevated and RN aware. Assessment:  Activity/ambulation limited by R foot pain. Requires 2 person assist for sit to stand transfer. Gait is unsteady with walker. Will continue to follow for IP PT. AM-PAC score  AM-PAC Inpatient Mobility Raw Score : 12  AM-PAC Inpatient T-Scale Score : 35.33  Mobility Inpatient CMS 0-100% Score: 68.66  Mobility Inpatient CMS G-Code Modifier : CL    Goals:  Goals not met this treatment, continue with current goals. Time Frame for Short term goals: discharge  Short term goal 1: patient will perform bed mobility with SBA   Short term goal 2: patient will perform transfers sit<>stand with min assist x 1   Short term goal 3: patient will ambulate 36' with rollator and min assist x 1    Plan:  Times per week: 2-5;    Current Treatment Recommendations: Strengthening, Functional Mobility Training, Transfer Training, Gait Training, Safety Education & Training, Balance Training, Endurance Training, Patient/Caregiver Education & Training    Therapy Time    Individual  Concurrent  Group  Co-treatment    Time In  1445            Time Out  1508            Minutes  23            Timed Code Treatment Minutes:  23  Total Treatment Minutes:  23      If patient is discharged prior to next treatment, this note will serve as the discharge summary.     Nasim Aviles PT

## 2019-08-14 NOTE — PROGRESS NOTES
Gastroenterology Consult Note      Patient: Gifty Gomez  : 1940     Date:  2019    Subjective: Patient was seen this morning. Still having residual odynophagia but reports improvement. EGD showed severe esophagitis. Tolerating diet well. Does better with soft foods and liquids. No nausea, vomiting. Complaining of foot pain this morning. Past Medical History:   Diagnosis Date    DVT (deep venous thrombosis) (HCC)     End stage renal disease (HCC)     Hyperlipidemia     Hypertension     Osteoarthritis     Pancreatitis chronic     [facility]     Type II or unspecified type diabetes mellitus without mention of complication, not stated as uncontrolled     Vitamin D deficiency       Past Surgical History:   Procedure Laterality Date    CATARACT REMOVAL Left 13    had elevated BP post op    CORONARY ANGIOPLASTY      JOINT REPLACEMENT  2011    R WILDER    OTHER SURGICAL HISTORY Left 2017    INSERTION OF LAPAROSCOPIC PERITONEAL DIALYSIS CATHETER;    THYROIDECTOMY, PARTIAL      UPPER GASTROINTESTINAL ENDOSCOPY N/A 2019    EGD BIOPSY AND BRUSHING performed by Munir Santos MD at [facility] ENDOSCOPY        Admission Meds  No current facility-administered medications on file prior to encounter.       Current Outpatient Medications on File Prior to Encounter   Medication Sig Dispense Refill    cinacalcet (SENSIPAR) 60 MG tablet TAKE 1 TABLET BY MOUTH ONE TIME A DAY 30 tablet 0    sevelamer (RENVELA) 800 MG tablet Take 2 tablets by mouth 3 times daily (with meals) 90 tablet 2    apixaban (ELIQUIS) 2.5 MG TABS tablet Take 1 tablet by mouth 2 times daily 60 tablet 1    psyllium (HYDROCIL) 95 % PACK packet Take 1 packet by mouth daily 56 each 0    Capsaicin-Lidocaine-Menthol 0.05-5-3 % CREA Apply 1 applicator topically 2 times daily as needed (BLLE numbness, neuropathy, pain) 1 Tube 0    calcitRIOL (ROCALTROL) 0.25 MCG capsule Take 1 auscultation bilaterally, Normal Effort  Heart: irregular irregular, normal S1 and S2, no murmurs or rubs  Abdomen: soft, non-tender. Bowel sounds normal.   Extremities: atraumatic, no cyanosis or edema  Skin: warm and dry, no jaundice      Data Review:    Recent Labs     08/12/19  0559 08/13/19  0420 08/14/19  0632   WBC 12.4* 10.2 7.9   HGB 12.3 13.1 11.5*   HCT 38.9 41.0 35.8*   .7* 101.6* 101.7*    251 202     Recent Labs     08/12/19  0559 08/13/19  0420 08/14/19  0632    134* 133*   K 4.6 4.3 4.2   CL 95* 94* 95*   CO2 26 25 24   PHOS 2.4* 2.7 3.2   BUN 43* 43* 44*   CREATININE 7.3* 6.9* 6.9*     No results for input(s): AST, ALT, ALB, BILIDIR, BILITOT, ALKPHOS in the last 72 hours. No results for input(s): LIPASE, AMYLASE in the last 72 hours. No results for input(s): PROTIME, INR in the last 72 hours. No results for input(s): PTT in the last 72 hours. No results for input(s): OCCULTBLD in the last 72 hours. Assessment and Recommendations      1) Odynophagia   - severe erosive distal esophagitis   - no signs of esophageal perforation   - severe erosive gastritis   - Follow up on biopsy results including H. Pylori testing  - continue sucrafate every 6 hours   - Continue Protonix injection   - if need to replace potassium, prefer IV as opposed to oral   - Avoid medications that are linked to pill esophagitis      Thank you for allowing me to participate in the care of your patient. Please feel free to contact me with any questions or concerns.      Lashon Rodriguez MD  Will staff with Dr. Jefferson Agrawal, [address] and [address] [place] [address]

## 2019-08-14 NOTE — PROGRESS NOTES
also pertinent to this visit. has a past medical history of DVT (deep venous thrombosis) ([facility] End stage renal disease ([facility] Hyperlipidemia, Hypertension, Osteoarthritis, Pancreatitis chronic, Peritoneal dialysis status ([facility] Type II or unspecified type diabetes mellitus without mention of complication, not stated as uncontrolled, and Vitamin D deficiency. has a past surgical history that includes Thyroidectomy, partial; Coronary angioplasty; joint replacement (5/4/2011); Cataract removal (Left, 5/29/13); other surgical history (Left, 05/19/2017); and Upper gastrointestinal endoscopy (N/A, 8/13/2019). Restrictions  Position Activity Restriction  Other position/activity restrictions: up with assistance     Subjective   General  Chart Reviewed: Yes  Patient assessed for rehabilitation services?: Yes  Additional Pertinent Hx: Pt presented to ER 8/7 with N/V persisting for about 1 weeks time. Abd, chest XR: (-) acute findings. PMH:DVT, end-stage renal disease, HLD, HTN, OA, DM, R WILDER  Response to previous treatment: Patient with no complaints from previous session  Family / Caregiver Present: No  Diagnosis: Atrial fibrillation    Subjective  Subjective: Supine in bed on entry. Pt agreeable to get OOB and up to chair. Refusing all other options for activity including: standing, mobility, ADLs, UE exercise. Minimally conversant during treatment; minimal eye contact as well.     Pain Assessment  Patient Currently in Pain: (c/o R foot pain -not rated, nurse aware; pt reports hot pack is helping \"some\")     Orientation  Orientation  Overall Orientation Status: (oriented w/ conversation; not formally questioned)     Objective    ADL  Additional Comments: Pt declined ADLs despite encouragement; per discussion w/ nurse, pt did comb her hair today, but needed assist w/ putting hair up in hairtie           Balance  Sitting Balance: (x 5 minutes - SBA; c/o dizziness - BP (113/62))  Standing Balance: Dependent/Total(Min A of 2 w/ BUE support of 4 wh walker)    Standing Balance/Tolerance for Standing Activity  Time: 1 minute  Activity: static standing and transfer bed>chair  Comment: fatigued    Bed mobility  Supine to Sit: Minimal assistance(HOB elevated, bedrail; cues; encouragment for pt to attempt on her own - passive and putting hand out for assistance)  Scooting: Contact guard assistance(to scoot to EOB)     Transfers  Sit to stand: Dependent/Total(Mod A of 2 (second attempt); first attempt unsuccessful)  Stand to sit: Moderate assistance(assist to control descent)  Transfer Comments: using 4ww for transfers; pt with c/o fatigue after transfer to chair - refusing further options to stand/ambulate; requesting LEs to be elevated in chair                          Cognition  Overall Cognitive Status: Exceptions  Arousal/Alertness: Delayed responses to stimuli  Following Commands: Follows one step commands with increased time; Follows one step commands with repetition  Attention Span: Attends with cues to redirect  Initiation: Requires cues for some  Sequencing: Requires cues for some  Cognition Comment: flat affect, delayed processing; questionable effort during treatment                                            Plan  This note will serve as a discharge summary in the event the patient is discharged prior to next treatment session.     Plan  Times per week: 2-5x  Times per day: Daily  Current Treatment Recommendations: Strengthening, Balance Training, Functional Mobility Training, Endurance Training, Safety Education & Training, Self-Care / ADL                                               AM-PAC Score        -[facility] Inpatient Daily Activity Raw Score: 14 (08/14/19 1534)  AM-PAC Inpatient ADL T-Scale Score : 33.39 (08/14/19 1534)  ADL Inpatient CMS 0-100% Score: 59.67 (08/14/19 1534)  ADL Inpatient CMS G-Code Modifier : CK (08/14/19 1534)    Goals  Short term goals  Time Frame for Short term goals: By d/c

## 2019-08-14 NOTE — CARE COORDINATION
CM spoke with RN Geovanna Eldridge and charge RN Daisy Traore they informed us that the patient is hypotensive and she would not being going today. Sent message to Dr Jaylin Wang, updated the facility and family on situation, canceled transport for 1400 today. Will await tentative d/c for tomorrow.     Petty Linder RN, BSN,   4th Floor [facility] Care Unit  [phone]

## 2019-08-14 NOTE — FLOWSHEET NOTE
08/13/19 1951   Vitals   BP (!) 85/51   Temp 98.3 °F (36.8 °C)   Temp Source Oral   Pulse 54   Resp 18   SpO2 97 %   Weight 144 lb 13.5 oz (65.7 kg)  (bedscale, laying flat, one pillow, one sheet)   Peritoneal Dialysis Catheter Left lower abdomen   Placement Date: (c)   Catheter Location: Left lower abdomen   Status Accessed   Site Condition No Complications   Dressing Status Clean;Dry; Intact   Dressing Occlusive   Cycler   Verification of Prescription CCPD   Total Volume Programmed 63548 mL   Therapy Time (Hours:Minutes) 10:00   Fill Volume 2000 mL   Last Fill Volume 0 mL   Dextrose Setting Same (Nonextraneal)   Number of Cycles 5   Bag Volume 5000 mL   Number of Bags Used 2   Dianeal Solution Other (Comment)  (Delflex 1.5% in 5000 ml)   I Drain (mL) 46 mL     Orders verified. Supplies taken to pt's room. Report received. Cycler set up, primed and pre tested. Dressing changed on Tenckhoff Cath site, gent cream placed per orders. Pt connected to cycler. CCPD initiated without problem. Initial effluent clear. Pt pre-Bp 85/51, asymptomatic. Informed Dr. Ochoa Hansen. NS bolus 500 ml to pt and to continue PD tx as ordered. Informed Inpt MIR Mcqueen regarding the bolus ordered.

## 2019-08-14 NOTE — PLAN OF CARE
Problem: Falls - Risk of:  Goal: Will remain free from falls  Description  Will remain free from falls  Outcome: Ongoing   Fall precautions are in place. Bed alarm is on and in lowest position. Pt is using call light appropriately. Problem: Pain:  Goal: Pain level will decrease  Description  Pain level will decrease  Outcome: Ongoing   Pt has 10/10 R foot pain. Pain medication given per MD order.

## 2019-08-14 NOTE — PROGRESS NOTES
(Oral)   Resp 18   Ht 5' 7\" (1.702 m)   Wt 144 lb 13.5 oz (65.7 kg) Comment: bedscale, laying flat, one pillow, one sheet  LMP  (LMP Unknown)   SpO2 95%   BMI 22.69 kg/m²      Weight:  Wt Readings from Last 3 Encounters:   08/13/19 144 lb 13.5 oz (65.7 kg)   06/11/19 143 lb 3.2 oz (65 kg)   05/31/19 142 lb 6.7 oz (64.6 kg)        24hr I/Os:     Intake/Output Summary (Last 24 hours) at 8/14/2019 0020  Last data filed at 8/13/2019 1951  Gross per 24 hour   Intake 280 ml   Output -29 ml   Net 309 ml       Physical Exam:  Constitutional:  no acute distress, AOX3  NECK: supple, no JVD  Cardiovascular:  RRR; no murmurs rubs or gallops  Respiratory:  CTA bilaterally; diffuse wheezing bilaterally; no crackles  Abdomen: soft, non-tender, non-distended, +BS, no hepatospleenomegaly  Ext: no LE edema  Skin: decreased sensation in R foot w/ skin breaks on the R big toe alongside other chronic changes    IMAGING:  VL WINSOME BILATERAL LIMITED 1-2 LEVELS         CT SOFT TISSUE NECK W CONTRAST   Final Result      No extraluminal air density along the course of the visualized esophagus to suggest transluminal esophageal perforation      Focal soft tissue fullness of the left true vocal cord-see above      Equivocal small laryngocele on the right. XR CHEST PORTABLE   Final Result      Right IJ central venous catheter tip projects over the lower superior vena cava. No pneumothorax. XR ABDOMEN (KUB) (SINGLE AP VIEW)   Final Result     No acute findings. XR CHEST PORTABLE   Final Result     No evidence of acute cardiopulmonary disease. XR CHEST STANDARD (2 VW)   Final Result   :   1. No acute abnormality. 2. Tortuous ectatic thoracic aorta. Assessment and Plan:     Moni Varner is a 69 y.o. female with prior history of ESRD on PD, Diabetes, hypertension, hyperlipidemia, CHF, and DVT on Eliquis who presents with nausea and vomiting, and feeling unwell for about a week.  We are consulted for ESRD on PD.    1. ESRD on CCPD  BUN 44, Cr 9.0 and GFR 5. Does not produce urine.  - Continue CCPD   - has exit side drainage - added gent cream   - vanc x 1 given   - on abx for UTI   - BP better with IVF  - If BP still low then will add low dose midodrine     2. hypokalemia  - replaced   - Monitor daily       3. Hypomagnesemia  - replaced     4.  Hypotension   - saline bolus and see       Erum Love MD

## 2019-08-14 NOTE — DISCHARGE INSTR - COC
Continuity of Care Form    Patient Name: Yaneth Santo   :  1940  MRN:  [id number]    Admit date:  2019  Discharge date:  ***    Code Status Order: Full Code   Advance Directives:   Advance Care Flowsheet Documentation     Date/Time Healthcare Directive Type of Healthcare Directive Copy in [address] Agent's Name Healthcare Agent's Phone Number    19 1423  No, patient does not have an advance directive for healthcare treatment -- -- -- -- --    19 0517  No, patient does not have an advance directive for healthcare treatment -- -- -- -- --          Admitting Physician:  Reba Vernon MD  PCP: Kobe Driver DO    Discharging Nurse: [facility] Unit/Room#: 1347/9516-47  Discharging Unit Phone Number: ***    Emergency Contact:   Extended Emergency Contact Information  Primary Emergency Contact: Romina Silk, [facility] Phone: [phone]  Work Phone: [phone]  Mobile Phone: [phone]  Relation: Brother/Sister    Past Surgical History:  Past Surgical History:   Procedure Laterality Date    CATARACT REMOVAL Left 13    had elevated BP post op    CORONARY ANGIOPLASTY      JOINT REPLACEMENT  2011    R WILDER    OTHER SURGICAL HISTORY Left 2017    INSERTION OF LAPAROSCOPIC PERITONEAL DIALYSIS CATHETER;    THYROIDECTOMY, PARTIAL      UPPER GASTROINTESTINAL ENDOSCOPY N/A 2019    EGD BIOPSY AND BRUSHING performed by Vida Barragan MD at [facility] ENDOSCOPY       Immunization History:   Immunization History   Administered Date(s) Administered    Hepatitis B Adult (Engerix-B) 2019    Pneumococcal Conjugate 13-valent (Dianah Brianna) 2019    Pneumococcal Polysaccharide ([id number]) 2013       Active Problems:  Patient Active Problem List   Diagnosis Code    HTN (hypertension) A93    Systolic heart failure ([facility] I50.20    DM (diabetes mellitus) ([facility] E11.9    History of Delivery:630124828}    Wound Care Documentation and Therapy:        Elimination:  Continence:   · Bowel: {YES / DP:36672}  · Bladder: {YES / HP:75438}  Urinary Catheter: {Urinary Catheter:393153283}   Colostomy/Ileostomy/Ileal Conduit: {YES / TY:22413}       Date of Last BM: ***    Intake/Output Summary (Last 24 hours) at 2019 1027  Last data filed at 2019 0947  Gross per 24 hour   Intake 1200 ml   Output 403 ml   Net 797 ml     I/O last 3 completed shifts: In: 960 [P.O.:420;  I.V.:40; IV Piggyback:500]  Out: 403     Safety Concerns:     [employer] Safety Concerns:247226174}    Impairments/Disabilities:      [employer] Impairments/Disabilities:573380609}    Nutrition Therapy:  Current Nutrition Therapy:   [employer] Diet List:190004115}    Routes of Feeding: {CHP DME Other Feedings:864091222}  Liquids: {Slp liquid thickness:15591}  Daily Fluid Restriction: {CHP DME Yes amt example:145457747}  Last Modified Barium Swallow with Video (Video Swallowing Test): {Done Not Done XMGJ:647783228}    Treatments at the Time of Hospital Discharge:   Respiratory Treatments: ***  Oxygen Therapy:  {Therapy; copd oxygen:44757}  Ventilator:    { CC Vent GYYS:524985548}    Rehab Therapies: {THERAPEUTIC INTERVENTION:2726576233}  Weight Bearing Status/Restrictions: [address]  Weight Bearin}  Other Medical Equipment (for information only, NOT a DME order):  {EQUIPMENT:084628359}  Other Treatments: ***    Patient's personal belongings (please select all that are sent with patient):  {CHP DME Belongings:699180873}    RN SIGNATURE:  {Esignature:596172712}    CASE MANAGEMENT/SOCIAL WORK SECTION    Inpatient Status Date: 19  Readmission Risk Assessment Score:  Readmission Risk              Risk of Unplanned Readmission:        51           Discharging to Facility/ [address] [facility] [address]         Phone: [phone]       Fax: [phone]          Dialysis Facility (if applicable)

## 2019-08-15 PROBLEM — T85.71XA PERITONEAL DIALYSIS CATHETER EXIT SITE INFECTION (HCC): Status: ACTIVE | Noted: 2019-01-01

## 2019-08-15 NOTE — PLAN OF CARE
Problem: Falls - Risk of:  Goal: Absence of physical injury  Description  Absence of physical injury  Outcome: Ongoing   Fall precautions are in place. Bed alarm is on and in lowest position. Pt is using call light appropriately. Will continue to monitor. Problem: Nutrition  Goal: Optimal nutrition therapy  Outcome: Ongoing   Encouraged pt to eat. Pt ate 50% of meal and drank 240ml for dinner.

## 2019-08-15 NOTE — PROGRESS NOTES
Biopsies negative for Candida and H pylori. Will sign off, please call with questions.     Ab Venegas

## 2019-08-15 NOTE — PROGRESS NOTES
Right IJ removed without complications. Minimal drainage noted. Non-occlusive dressing applied with Tegaderm. Patient tolerated well. Denies any pain or discomfort to site.  Loyd Iyer R.N.

## 2019-08-16 NOTE — PROGRESS NOTES
CONTRAST   Final Result      No extraluminal air density along the course of the visualized esophagus to suggest transluminal esophageal perforation      Focal soft tissue fullness of the left true vocal cord-see above      Equivocal small laryngocele on the right. XR CHEST PORTABLE   Final Result      Right IJ central venous catheter tip projects over the lower superior vena cava. No pneumothorax. XR ABDOMEN (KUB) (SINGLE AP VIEW)   Final Result     No acute findings. XR CHEST PORTABLE   Final Result     No evidence of acute cardiopulmonary disease. XR CHEST STANDARD (2 VW)   Final Result   :   1. No acute abnormality. 2. Tortuous ectatic thoracic aorta. Assessment and Plan:     Blanco Carson is a 69 y.o. female with prior history of ESRD on PD, Diabetes, hypertension, hyperlipidemia, CHF, and DVT on Eliquis who presents with nausea and vomiting, and feeling unwell for about a week. We are consulted for ESRD on PD.    1. ESRD on CCPD  - Continue CCPD   - has exit side drainage - added gent cream   - on abx for UTI   - Bp cont to be low   - add midodrine       2. hypokalemia  - replaced   - Monitor daily       3. Hypomagnesemia  - replaced     4.  Hypotension   -  Cont to be low   - d/w Dr Gurvinder Frederick MD

## 2019-08-18 PROBLEM — K92.2 GI BLEED: Status: ACTIVE | Noted: 2019-01-01

## 2019-08-18 NOTE — ED NOTES
Bed: B18-18  Expected date:   Expected time:   Means of arrival:   Comments:  Kemi Guerra 83, [facility]  08/18/19 3328

## 2019-08-18 NOTE — ED TRIAGE NOTES
Rn received report from Luna Marte from [address] [place] [address] stating the patient has had x3 episodes of coffee ground emesis.

## 2019-08-18 NOTE — ED NOTES
Per patient, brother, Mahin Hinds has been update on course of treatment plan/admission for patient.      Jewell Bundy RN  08/18/19 4174

## 2019-08-19 NOTE — PROGRESS NOTES
Facility([facility]- at SNF since 8/15)  Home Equipment: 4 wheeled walker  ADL Assistance: Independent  Homemaking Assistance: Needs assistance  Ambulation Assistance: Independent(using 4 wheeled walker)  Transfer Assistance: Independent  Additional Comments: Pt normally lives at home in a level apartment with son. Pt admitted to [facility] 8/7 - 8/15 for hypokalemia and then d/c'd to SNF. Objective  Strength RLE  Strength RLE: WFL  Strength LLE  Strength LLE: WFL     Bed mobility  Supine to Sit: Maximum assistance(HOB raised, poor effort)     Transfers  Sit to Stand:  Moderate Assistance(from EOB to walker)  Stand to sit: Minimal Assistance(Decreased control to sit)     Ambulation  Ambulation?: Yes  Ambulation 1  Device: Rolling Walker  Assistance: Minimal assistance  Gait Deviations: Slow Bekah;Decreased step length;Decreased step height  Distance: 4-5 steps     Balance  Standing - Static: Fair  Standing - Dynamic: Poor(Min A with rolling walker)    Treatment included gait and transfer training with patient education     Plan   Times per week: 2-5  Current Treatment Recommendations: Functional Mobility Training, Gait Training, Strengthening    Safety Devices  Type of devices: Left in chair, Chair alarm in place, Call light within reach, Nurse notified      AM-PAC Score  AM-PAC Inpatient Mobility Raw Score : 12 (08/19/19 1030)  AM-PAC Inpatient T-Scale Score : 35.33 (08/19/19 1030)  Mobility Inpatient CMS 0-100% Score: 68.66 (08/19/19 1030)  Mobility Inpatient CMS G-Code Modifier : CL (08/19/19 1030)          Goals  Short term goals  Time Frame for Short term goals: Discharge  Short term goal 1: bed mobility SBA  Short term goal 2: sit <> stand CGA  Short term goal 3: ambulate 40ft with rolling walker CGA  Patient Goals   Patient goals : Not stated       Therapy Time   Individual Concurrent Group Co-treatment   Time In 0945         Time Out 1025         Minutes 40         Timed Code Treatment Minutes:  25  Total Treatment Minutes:  [address] [place], [place]

## 2019-08-19 NOTE — PROGRESS NOTES
Occupational Therapy   Occupational Therapy Initial Assessment and Treatment  Date: 2019   Patient Name: Brenda Up  MRN: [id number]     : 1940    Date of Service: 2019    Discharge Recommendations:  Brenda Up scored a 16/24 on the AM-PAC ADL Inpatient form. Current research shows that an AM-PAC score of 17 or less is typically not associated with a discharge to the patient's home setting. Based on the patients AM-PAC score and their current ADL deficits, it is recommended that the patient have 3-5 sessions per week of Occupational Therapy at d/c to increase the patients independence. OT Equipment Recommendations  Equipment Needed: No  Other: defer recommendations to discharge faciltiy    Assessment   Performance deficits / Impairments: Decreased functional mobility ; Decreased ADL status; Decreased endurance;Decreased balance;Decreased strength  Assessment: Pt admitted from SNF w/ coffee ground emesis (recently hospitalized -8/15). Pt reports she didn't really get to begin therapy before being brought back to hospital for this admission. At baseline, pt independent at home. Ayliny, pt is requiring hands on assist w/ bed mobility, transfers, mobiltiy, and ADLs which require standing. Pt will benefit from returning to SNF for continued rehabiliation prior to discharge home. Continue per POC. Treatment Diagnosis: impaired ADLs/transfers and decreased functional activity tolerance  Prognosis: Good  OT Education: OT Role;Plan of Care  REQUIRES OT FOLLOW UP: Yes  Activity Tolerance  Activity Tolerance: Patient limited by fatigue  Safety Devices  Safety Devices in place: Yes  Type of devices: Nurse notified; Left in chair;Chair alarm in place;Call light within reach           Patient Diagnosis(es): The encounter diagnosis was Hematemesis with nausea.      has a past medical history of DVT (deep venous thrombosis) ([facility] [address] End stage renal disease ([facility] [address] Hyperlipidemia, assistance(Min/Mod A flexed posture, BUE support of walker)  Standing Balance  Time: ~90 seconds   Activity: bed to chair transfer using wh walker  Comment: fatigued  ADL  Feeding: Independent(ice chips from cup using spoon)  LE Dressing: Moderate assistance; Increased time to complete(assist for clothing up/down in standing; able to adjust R sock while seated at EOB)  Tone RUE  RUE Tone: Normotonic  Tone LUE  LUE Tone: Normotonic  Coordination  Movements Are Fluid And Coordinated: Yes     Bed mobility  Supine to Sit: Maximum assistance(HOB elevated; minimal effort by pt)  Transfers  Stand Pivot Transfers: Minimal assistance(bed to chair using wh walker)  Sit to stand: Moderate assistance  Stand to sit: Minimal assistance     Cognition  Overall Cognitive Status: Exceptions  Arousal/Alertness: Delayed responses to stimuli  Following Commands: Follows one step commands with repetition; Follows one step commands with increased time  Attention Span: Attends with cues to redirect  Initiation: Requires cues for some  Cognition Comment: increased time/effort for all activity                 LUE AROM (degrees)  LUE AROM : WFL  Left Hand AROM (degrees)  Left Hand AROM: WFL  RUE AROM (degrees)  RUE AROM : WFL  Right Hand AROM (degrees)  Right Hand AROM: WFL  LUE Strength  Gross LUE Strength: WFL  RUE Strength  Gross RUE Strength: WFL               Pt seen by OT for eval and treat. Treatment included:  Bed mobility, functional transfer/mobility         Plan  This note will serve as a discharge summary in the event the patient is discharged prior to next treatment session.     Plan  Times per week: 2-5  Times per day: Daily  Current Treatment Recommendations: Strengthening, Balance Training, Functional Mobility Training, Endurance Training, Safety Education & Training, Self-Care / ADL                                                      AM-PAC Score        AM-PAC Inpatient Daily Activity Raw Score: 16 (08/19/19 1206)  AM-PAC

## 2019-08-19 NOTE — CONSULTS
[address] free to contact me   [facility] [address]  Office : [phone]  Fax :[phone]

## 2019-08-19 NOTE — PROGRESS NOTES
erosive esophagitis)  - Coffee-ground emesis with subsequent drop in hemoglobin (12.7 > 9.8 > 9.6)  - EGD (6/13) showed large duodenal ulcer in the second portion of the duodenum and severe erosive esophagitis  - Pantoprazole 40 mg BID  - Carafate  - AST 34  - ALT 82 (newly elevated)  - Total Bilirubin <0.2  - Alk Phos: 84  - Albumin 2.4 (8/18)  - MARVA Pending  - Hepatitis panel pending  - Abdominal ultrasound 8/19 showed a normal liver with mild perihepatic ascites and a right renal cyst  - Started clear liquid diet 8/19    ESRD (with daily PD)  - Nephrology (Dr. Melia Overton) following  - Nephrocaps, calcitriol, sensipar, sevelamer    Type 2 Diabetes  - Low-dose sliding scale insulin  - Hypoglycemia protocol    Hyperlipidemia  - Continue Simvastatin    Atrial Fibrillation  - Holding home Apixaban  - Holding home Aspirin    DVT Prophylaxis  - Held for GI Bleed    Code Status: Full Code  FEN: Clear Liquids  DISPO: Ina Meadows MD, PGY-1  08/19/19  2:36 PM    This patient has been staffed and discussed with Smitha Diez MD.

## 2019-08-19 NOTE — CONSULTS
[address] [place] [address]  GI Consultation      Patient: Jevon Yates  : 1940       Date:  2019    Subjective:       History of Present Illness  Patient is a 69 y.o.  female  who is seen in consult for C-G emesis. 1 episode of C-G emesis yesterday. No abd pain melena, hematochezia, GERD, dysphagia, syncope. No recurrence since admission. Past Medical History:   Diagnosis Date    DVT (deep venous thrombosis) (HCC)     End stage renal disease (HCC)     Hyperlipidemia     Hypertension     Osteoarthritis     Pancreatitis chronic     Peritoneal dialysis status ([facility] [address]     Type II or unspecified type diabetes mellitus without mention of complication, not stated as uncontrolled     Vitamin D deficiency       Past Surgical History:   Procedure Laterality Date    CATARACT REMOVAL Left 13    had elevated BP post op    CORONARY ANGIOPLASTY      JOINT REPLACEMENT  2011    R WILDER    OTHER SURGICAL HISTORY Left 2017    INSERTION OF LAPAROSCOPIC PERITONEAL DIALYSIS CATHETER;    THYROIDECTOMY, PARTIAL      UPPER GASTROINTESTINAL ENDOSCOPY N/A 2019    EGD BIOPSY AND BRUSHING performed by Erick Lopez MD at [place] [address]      Past Endoscopic History EGD  Severe erosions were noted in the distal esophagus. Erosions were noted in the mid and distal esophagus. The lesions  were sampled with brush cytology technique. Cold forceps biopsies were obtained for histology. Erosive gastritis was noted in the body and antrum. Cold biopsy was performed. A large duodenal ulcer was noted in the second portion of the duodenum. Severe erosive duodenitis was observed in the bulb, post bulbar and second portion of duodenum    Admission Meds  No current facility-administered medications on file prior to encounter.       Current Outpatient Medications on File Prior to Encounter   Medication Sig Dispense Refill    sucralfate (CARAFATE) 1 GM/10ML suspension Take 10 mLs by mouth 4 times daily (before meals and nightly) 1200 mL 3    midodrine (PROAMATINE) 5 MG tablet Take 1 tablet by mouth 3 times daily (with meals) 90 tablet 3    phenol 1.4 % LIQD mouth spray Take 1 spray by mouth every 2 hours as needed for Sore Throat 1 mL 0    sodium chloride 1 g tablet Take 1 tablet by mouth 2 times daily (with meals) 90 tablet 3    melatonin 1 MG tablet Take 3 tablets by mouth nightly 3 tablet 0    polyethylene glycol (GLYCOLAX) packet Take 17 g by mouth daily as needed for Constipation 527 g 1    insulin lispro (HUMALOG) 100 UNIT/ML pen Inject 0-6 Units into the skin 3 times daily (with meals) 5 pen 3    pregabalin (LYRICA) 75 MG capsule Take 1 capsule by mouth daily for 30 days.  30 capsule 0    Cholecalciferol (VITAMIN D3) 1000 units TABS Take 2 tablets by mouth daily 60 tablet 5    cinacalcet (SENSIPAR) 60 MG tablet TAKE 1 TABLET BY MOUTH ONE TIME A DAY 30 tablet 0    sevelamer (RENVELA) 800 MG tablet Take 2 tablets by mouth 3 times daily (with meals) 90 tablet 2    apixaban (ELIQUIS) 2.5 MG TABS tablet Take 1 tablet by mouth 2 times daily 60 tablet 1    psyllium (HYDROCIL) 95 % PACK packet Take 1 packet by mouth daily 56 each 0    vitamin E 400 UNIT capsule Take 1 capsule by mouth nightly 30 capsule 3    Capsaicin-Lidocaine-Menthol 0.05-5-3 % CREA Apply 1 applicator topically 2 times daily as needed (BLLE numbness, neuropathy, pain) 1 Tube 0    calcitRIOL (ROCALTROL) 0.25 MCG capsule Take 1 capsule by mouth daily 30 capsule 0    acetaminophen (TYLENOL) 325 MG tablet Take 2 tablets by mouth every 6 hours as needed for Pain 60 tablet 0    aspirin 81 MG EC tablet Take 1 tablet by mouth daily 30 tablet 3    docusate sodium (COLACE) 100 MG capsule Take 1 capsule by mouth 2 times daily 30 capsule 0    ondansetron (ZOFRAN) 4 MG tablet Take 1 tablet by mouth every 8 hours as needed for Nausea 20 tablet 0    simvastatin (ZOCOR) 40 MG tablet Take 1 tablet by mouth nightly 30 80     Recent Labs     08/18/19  1913   PROTIME 12.4   INR 1.09       Assessment:     Active Problems:    GI bleed  Resolved Problems:    * No resolved hospital problems. *  C-G emesis  Mild drop in H/H  EGD 8/13 with large duodenal ulcer severe esophagitis  Elevated ALT    Recommendations:     BID PPI/Carafate  Hold on repeat EGD since just done 6 days ago and no melena  Hepatitis profile, MARVA  ABD US    Thank you for the opportunity to participate in [facility].     Karole Buerger

## 2019-08-19 NOTE — CARE COORDINATION
achievement of predicted outcomes: Family support, Caregiver support, Cooperative, Pleasant and Good insight into deficits    Barriers to discharge: Medical complications and Medication managment    Additional Case Management Notes: Patient agreeable to return to SNF at [facility] at discharge. Patient needs pre-cert for return, noted upated PT/OT HELEN recinos spoke with Shabana Ornelas in admissions and she reports that she will start pre-cert today for patient to be able to return to SNF at discharge. Claudio Suarez and her family were provided with choice of provider; she and her family are in agreement with the discharge plan.     Care Transition patient: Katherine Caal RN  [facility]  Case Management Department  Ph: [phone]   Fax: [phone]

## 2019-08-19 NOTE — ED PROVIDER NOTES
0      calcitRIOL (ROCALTROL) 0.25 MCG capsule Take 1 capsule by mouth daily  Qty: 30 capsule, Refills: 0      acetaminophen (TYLENOL) 325 MG tablet Take 2 tablets by mouth every 6 hours as needed for Pain  Qty: 60 tablet, Refills: 0      aspirin 81 MG EC tablet Take 1 tablet by mouth daily  Qty: 30 tablet, Refills: 3      docusate sodium (COLACE) 100 MG capsule Take 1 capsule by mouth 2 times daily  Qty: 30 capsule, Refills: 0      ondansetron (ZOFRAN) 4 MG tablet Take 1 tablet by mouth every 8 hours as needed for Nausea  Qty: 20 tablet, Refills: 0      simvastatin (ZOCOR) 40 MG tablet Take 1 tablet by mouth nightly  Qty: 30 tablet, Refills: 2    Associated Diagnoses: Essential hypertension; Systolic heart failure, chronic (HCC)      Elastic Bandages & Supports (MEDICAL COMPRESSION STOCKINGS) MISC 1 each by Does not apply route daily  Qty: 1 each, Refills: 0      B complex-vitamin C-folic acid (NEPHRO-ZINA) 1 MG tablet Take 1 tablet by mouth daily (with breakfast)  Qty: 30 tablet, Refills: 3             Allergies     She is allergic to gabapentin.     Physical Exam     INITIAL VITALS: BP: (!) 117/58, Temp: 98.3 °F (36.8 °C), Pulse: 79, Resp: 16, SpO2: 100 %    Physical Exam    Diagnostic Results       RADIOLOGY:  No orders to display       LABS:   Results for orders placed or performed during the hospital encounter of 08/18/19   CBC   Result Value Ref Range    WBC 11.3 (H) 4.0 - 11.0 K/uL    RBC 3.88 (L) 4.00 - 5.20 M/uL    Hemoglobin 12.7 12.0 - 16.0 g/dL    Hematocrit 39.2 36.0 - 48.0 %    .0 (H) 80.0 - 100.0 fL    MCH 32.7 26.0 - 34.0 pg    MCHC 32.4 31.0 - 36.0 g/dL    RDW 14.1 12.4 - 15.4 %    Platelets 633 350 - 344 K/uL    MPV 7.5 5.0 - 10.5 fL   Comprehensive Metabolic Panel w/ Reflex to MG   Result Value Ref Range    Sodium 140 136 - 145 mmol/L    Potassium reflex Magnesium 4.1 3.5 - 5.1 mmol/L    Chloride 97 (L) 99 - 110 mmol/L    CO2 27 21 - 32 mmol/L    Anion Gap 16 3 - 16    Glucose 97 70 - 99

## 2019-08-20 NOTE — FLOWSHEET NOTE
CCPD started per MD order as bellow:   2.5% 2000 cc  X 5 exchanges over 10 hours.  Total volume 14805  Admitting DX; GI BLEED    PD catheter site:  no tender, no redness  Dressing changed and covered by Tegaderm dressing today  PD effluent: hazy, cloudy, light yellow, no fibrin noticed  Initial drain 147 ml    Pre-CCPD vital sings and weight as bellow     08/20/19 1755   Vitals   /76   Temp 98.6 °F (37 °C)   Temp Source Oral   Pulse 76   Resp 16   SpO2 99 %   Weight 148 lb 2.4 oz (67.2 kg)  (Bed scale-01 pillow, 1 sheet and 1 pad)   Cycler   Verification of Prescription CCPD   Total Volume Programmed 13530 mL   Therapy Time (Hours:Minutes) 10:00   Fill Volume 2000 mL   Last Fill Volume 0 mL   Dextrose Setting Same (Nonextraneal)   Number of Cycles 5   Bag Volume 5000 mL   Number of Bags Used 2   Dianeal Solution Other (Comment)  (2.5% Delflex 5000 ml/bag)   I Drain (mL) 147 mL   Average Dwell Time (Hours:Minutes) 95

## 2019-08-20 NOTE — PROGRESS NOTES
.0*  --  101.0*  --   --   --  101.4*    < > = values in this interval not displayed. Renal:    Recent Labs     08/18/19 1913 08/19/19  0458 08/20/19  0447    139 139   K 4.1 4.0 4.0   CL 97* 99 102   CO2 27 25 25   BUN 58* 62* 48*   CREATININE 8.5* 8.5* 7.3*   GLUCOSE 97 115* 122*   CALCIUM 7.7* 7.2* 7.1*   ANIONGAP 16 15 12     Hepatic:   Recent Labs     08/18/19 1913   AST 34   ALT 82*   BILITOT <0.2   PROT 6.6   LABALBU 2.4*   ALKPHOS 84     Troponin: No results for input(s): TROPONINI in the last 72 hours. BNP: No results for input(s): BNP in the last 72 hours. Lipids: No results for input(s): CHOL, HDL in the last 72 hours. Invalid input(s): LDLCALCU, TRIGLYCERIDE  ABGs:  No results for input(s): PHART, VSV4KIV, PO2ART, HZS8IFJ, BEART, THGBART, C1SLSYLX, ABL7ALJ in the last 72 hours. INR:   Recent Labs     08/18/19 1913   INR 1.09     Lactate: No results for input(s): LACTATE in the last 72 hours. Cultures:  -----------------------------------------------------------------  RAD:   US ABDOMEN LIMITED   Final Result   IMPRESSION :      Sonographically normal liver. Mild perihepatic ascites. Minimally complex right renal cyst.             Assessment/Plan:     Patient is a 68 yo female with a history of ESRD on nightly peritoneal dialysis, Type 2 Diabetes, peripheral neuropathy, and hyperlipidemia who presented to the ED on 8/18 after having an episode of coffee-ground emesis at her nursing facility. An EGD on 8/13 (during most recent admission) ordered for odynophagia showed severe erosive esophagitis and a large duodenal ulcer. Given those recent findings, GI did not want to do a repeat ECG and recommended at this point that she continue with PPI / Carafate BID. We will reintroduce clear liquids today.   H/H stable today just below 10 today (still down from baseline of 13).     UGI Bleed (Likely 2/2 duodenal ulcer and / or erosive esophagitis)  - Coffee-ground emesis

## 2019-08-20 NOTE — PROGRESS NOTES
[address] [place] [address]  GI Progress Note        Marco Field is a 69 y.o. female patient. 1. Hematemesis with nausea        Admit Date: 2019    Subjective:       No N/V tolerating liquids, no melena    Scheduled Meds:   pantoprazole  40 mg Intravenous BID    calcitRIOL  0.25 mcg Oral Daily    vitamin D  2,000 Units Oral Daily    cinacalcet  30 mg Oral Daily    insulin lispro  0-6 Units Subcutaneous TID WC    insulin lispro  0-3 Units Subcutaneous Nightly    melatonin  3 mg Oral Nightly    midodrine  5 mg Oral TID WC    pregabalin  75 mg Oral Daily    sevelamer  1,600 mg Oral TID WC    simvastatin  40 mg Oral Nightly    sodium chloride  1 g Oral BID WC    sodium chloride flush  10 mL Intravenous 2 times per day       Continuous Infusions:   dextrose      lactated ringers 100 mL/hr at 19 0449       PRN Meds:  gentamicin, acetaminophen, capsaicin-menthol-methyl sal, glucose, dextrose, glucagon (rDNA), dextrose, polyethylene glycol, sodium chloride flush, ondansetron      Objective:       Patient Vitals for the past 24 hrs:   BP Temp Temp src Pulse Resp SpO2 Height Weight   19 0400 110/65 98.4 °F (36.9 °C) Oral 83 17 -- -- --   19 0000 (!) 99/54 98.2 °F (36.8 °C) Oral 67 16 -- -- --   19 2000 (!) 106/47 98.3 °F (36.8 °C) Oral 68 17 -- -- --   19 1726 113/69 96.8 °F (36 °C) Oral 76 18 98 % 5' 7\" (1.702 m) 147 lb 14.9 oz (67.1 kg)   19 1304 95/62 97.6 °F (36.4 °C) Oral 67 18 98 % -- 148 lb 9.4 oz (67.4 kg)   19 0905 104/66 97.2 °F (36.2 °C) Oral 81 20 100 % -- --       Exam:  VITALS:  /65   Pulse 83   Temp 98.4 °F (36.9 °C) (Oral)   Resp 17   Ht 5' 7\" (1.702 m)   Wt 147 lb 14.9 oz (67.1 kg)   LMP  (LMP Unknown)   SpO2 98%   BMI 23.17 kg/m²   TEMPERATURE:  Current - Temp: 98.4 °F (36.9 °C);  Max - Temp  Av.8 °F (36.6 °C)  Min: 96.8 °F (36 °C)  Max: 98.4 °F (36.9 °C)    NAD  General appearance: alert, appears stated age, cooperative

## 2019-08-20 NOTE — FLOWSHEET NOTE
Treatment Time: 10 Hours 42 Minutes  Net UF: 193 ml     08/20/19 0700   Vitals   /66   Temp 98 °F (36.7 °C)   Temp Source Oral   Pulse 81   Resp 16   Height 5' 7\" (1.702 m)   Weight 147 lb 11.3 oz (67 kg)   Peritoneal Dialysis Catheter Left lower abdomen   Placement Date: (c)   Catheter Location: Left lower abdomen   Status Deaccessed   Site Condition No Complications   Dressing Status Clean;Dry; Intact   Dressing Occlusive   Cycler   Ultrafiltration (UF) (mL) 193 mL       Treatment completed without complications or complaints from patient. Effluent yellow/clear and lines taped to patient per protocol. Copy of dialysis treatment record placed in chart, to be scanned into EMR.

## 2019-08-21 NOTE — PROGRESS NOTES
7. 3* 7.3*   GLUCOSE 115* 122* 187*   CALCIUM 7.2* 7.1* 7.2*   ANIONGAP 15 12 15     Hepatic:   Recent Labs     08/18/19 1913   AST 34   ALT 82*   BILITOT <0.2   PROT 6.6   LABALBU 2.4*   ALKPHOS 84     Troponin: No results for input(s): TROPONINI in the last 72 hours. BNP: No results for input(s): BNP in the last 72 hours. Lipids: No results for input(s): CHOL, HDL in the last 72 hours. Invalid input(s): LDLCALCU, TRIGLYCERIDE  ABGs:  No results for input(s): PHART, PHD0QRI, PO2ART, TMJ2EQL, BEART, THGBART, S6PIPRLI, FOG2NJQ in the last 72 hours. INR:   Recent Labs     08/18/19 1913   INR 1.09     Lactate: No results for input(s): LACTATE in the last 72 hours. Cultures:  -----------------------------------------------------------------  RAD:   US ABDOMEN LIMITED   Final Result   IMPRESSION :      Sonographically normal liver. Mild perihepatic ascites. Minimally complex right renal cyst.             Assessment/Plan:     Patient is a 68 yo female with a history of ESRD on nightly peritoneal dialysis, Type 2 Diabetes, peripheral neuropathy, and hyperlipidemia who presented to the ED on 8/18 after having an episode of coffee-ground emesis at her nursing facility.  An EGD on 8/13 (during most recent admission) ordered for odynophagia showed severe erosive esophagitis and a large duodenal ulcer.  Given those recent findings, GI did not want to do a repeat ECG and recommended at this point that she continue with PPI / Carafate BID. Hemoglobin has been stable. Patient ready for discharge, pending precert. Will await news from Case Management.   Will hold Eliquis on discharge.     UGI Bleed (Likely 2/2 duodenal ulcer and / or erosive esophagitis)  - Coffee-ground emesis with subsequent drop in hemoglobin (12.7 > 9.8 > 9.6)  - EGD (6/13) showed large duodenal ulcer in the second portion of the duodenum and severe erosive esophagitis  - AST 34  - ALT 82 (newly elevated)  - Total Bilirubin <0.2  - Alk

## 2019-08-21 NOTE — FLOWSHEET NOTE
Deaccessed from PD cycler using aseptic technique. Report rendered and ACES charting placed into chart for future scanning to the EMR.        08/21/19 0505   Vitals   /68   Temp 98.9 °F (37.2 °C)   Temp Source Axillary   Pulse 70   Resp 18   SpO2 100 %   Weight 148 lb 2.4 oz (67.2 kg)   Peritoneal Dialysis Catheter Left lower abdomen   Placement Date: (c)   Catheter Location: Left lower abdomen   Status Deaccessed   Site Condition No Complications   Dressing Status Clean;Dry; Intact   Dressing Occlusive   Cycler   Ultrafiltration (UF) (mL) 1042 mL  (clear / no fibrin)   Average Dwell Time (Hours:Minutes) 92.4         TX SUMMARY;       Total time  10 hours 18 min  Dwell time gained 1 hour 2 min  Total UF 1042  Total volume 9945

## 2019-08-21 NOTE — DISCHARGE SUMMARY
Hospital Medicine Discharge Summary         Patient: Bebe Councilman     MRN: [id number]  Gender: female  : 1940   Age: 69 y.o. Code Status: Full Code     Admit Date: 2019   Discharge Date:     Disposition:  [facility]     Primary Care Provider: Ny Daniels DO    Admitting Physician: Cj Willson MD  Discharge Physician: Kenneth Cunningham MD            Discharge Diagnoses:    Patient Active Problem List   Diagnosis    HTN (hypertension)    Systolic heart failure ([address]    DM (diabetes mellitus) ([address]    History of macrocytic anemia    Smoking    Osteoarthritis    Hypertensive urgency    Hyperkalemia    Renal artery stenosis (HCC)    Acute renal insufficiency    Hyperlipidemia    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)    Unexplained weight loss    ESRD on peritoneal dialysis ([address]    ESRD (end stage renal disease) on dialysis ([address]    Knee pain, left    Debility    Failure to thrive in adult    Numbness of right foot    Encounter for palliative care    Advance directive discussed with patient    Weakness of both legs    Left leg swelling    Peripheral neuropathy    Uncontrolled type 2 diabetes mellitus with hyperglycemia (HCC)    DVT of deep femoral vein, left (HCC)    Onychomycosis    Atrial fibrillation (HCC)    Multifocal atrial tachycardia (HCC)    Elevated troponin    Hypokalemia    Severe malnutrition ([address]    Peritoneal dialysis catheter exit site infection ([address]    GI bleed            HPI on admission:  Patricia Alicea a 69 y. o. female with a past medical history of ESRD on nightly peritoneal dialysis, DM2, peripheral neuropathy and hyperlipidemia who presented to [facility] emergency department on 19 after experiencing an episode on coffee-ground emesis at her nursing facility. The patient reports that she had been feeling fine throughout the day but became nauseous this afternoon and had three episodes of emesis.

## 2019-08-22 NOTE — PROGRESS NOTES
wnl  Heent:  eomi, mmm  Neck: no bruits or jvd noted  Cardiovascular:  S1, S2 without m/r/g  Respiratory: CTA B without w/r/r  Abdomen:  +bs, soft, nt, nd  Ext: + lower extremity edema  Psychiatric: mood and affect appropriate  Musculoskeletal:  Rom, muscular strength intact    Data:   Labs:  CBC:   Recent Labs     08/19/19 0458 08/20/19 0447 08/20/19  2209 08/21/19  0433   WBC 7.8  --  6.1  --  6.1   HGB 9.6*   < > 9.1* 9.7* 9.4*     --  182  --  181    < > = values in this interval not displayed. BMP:    Recent Labs     08/19/19 0458 08/20/19 0447 08/21/19  0433    139 139   K 4.0 4.0 3.8   CL 99 102 100   CO2 25 25 24   BUN 62* 48* 44*   CREATININE 8.5* 7.3* 7.3*   GLUCOSE 115* 122* 187*     Ca/Mg/Phos:   Recent Labs     08/19/19 0458 08/20/19 0447 08/21/19  0433   CALCIUM 7.2* 7.1* 7.2*     Hepatic:   No results for input(s): AST, ALT, ALB, BILITOT, ALKPHOS in the last 72 hours. Troponin: No results for input(s): TROPONINI in the last 72 hours. BNP: No results for input(s): BNP in the last 72 hours. Lipids: No results for input(s): CHOL, TRIG, HDL, LDLCALC, LABVLDL in the last 72 hours. ABGs: No results for input(s): PHART, PO2ART, PGD3DAQ in the last 72 hours. INR:   No results for input(s): INR in the last 72 hours. UA:No results for input(s): Shraon Pennant, GLUCOSEU, BILIRUBINUR, Ford Kayser, BLOODU, PHUR, PROTEINU, UROBILINOGEN, NITRU, LEUKOCYTESUR, LABMICR, URINETYPE in the last 72 hours. Urine Microscopic: No results for input(s): LABCAST, BACTERIA, COMU, HYALCAST, WBCUA, RBCUA, EPIU in the last 72 hours. Urine Culture: No results for input(s): LABURIN in the last 72 hours. Urine Chemistry: No results for input(s): Marii Sailors, PROTEINUR, NAUR in the last 72 hours. IMAGING:  US ABDOMEN LIMITED   Final Result   IMPRESSION :      Sonographically normal liver. Mild perihepatic ascites.       Minimally complex right renal cyst.             Assessment/Plan

## 2019-08-26 NOTE — PROGRESS NOTES
pitting edema noted b/l. NEUROLOGIC: Gross and epicritic sensation is intact on the left and absent on the right. Protective sensation is appreciated at all pedal sites on the left, but is absent on the right. DERMATOLOGIC: Nails 1-5 b/l are thickened, elongated, and discolored. Right hallux nail was loose at the nail bed and attached at the eponychium with mild pus drainage. Webspaces 1-4 b/l are clean, dry, and intact. Hyperkeratosis noted to dorsal PIPJ 2 b/l. Diffuse xerosis b/l lower extremity. No subcutaneous nodules, rashes, or other skin lesions noted. MUSCULOSKELETAL: Muscle strength is 4/5 for all pedal groups tested. 1st MPJ moderate bony eminence with mild hallux abduction b/l. . Rigid 2nd digit flexion at PIPJ and extension at DIPJ noted b/l. ASSESSMENT  Paronychia - right hallux  Onychomycosis, toenails x10   Corns/Calluses x 2  HAV deformity, b/l   Hammertoes, 2nd digit b/l  DM, type II with peripheral neuropathy     PLAN  -Evaluation and management x 15 minutes and greater than 50% of the time spent explaining the etiology and treatment with the patient.   -Nails 1-5 b/l debrided in thickness and length with sterile nail nippers without incident.  -Sharp excisional debridement of hyperkeratotic tissue noted above performed down the skin only using a #15 blade.   -Discussed treatment options for ingrown toenail with the patient. We agreed upon temporary whole nail avulsion procedure. For informed consent, the more common risks, benefits, and alternatives to the procedure were thoroughly discussed with the patient. No guarantees were given regarding the outcome. Patient desired to have the procedure performed today. An appropriate consent form was signed, indicating the patient understands the procedure and its possible complications. Attention was then directed to the  right hallux where 5 cc of 0.5% Lidocaine plain was infiltrated into the base of the affected toe.  The digit was

## 2019-09-02 PROBLEM — I96 DRY GANGRENE (HCC): Status: ACTIVE | Noted: 2019-01-01

## 2019-09-07 PROBLEM — R77.8 ELEVATED TROPONIN: Status: RESOLVED | Noted: 2019-01-01 | Resolved: 2019-01-01

## 2020-01-01 ENCOUNTER — APPOINTMENT (OUTPATIENT)
Dept: INTERVENTIONAL RADIOLOGY/VASCULAR | Age: 80
DRG: 907 | End: 2020-01-01
Payer: MEDICARE

## 2020-01-01 ENCOUNTER — ANESTHESIA (OUTPATIENT)
Dept: OPERATING ROOM | Age: 80
DRG: 907 | End: 2020-01-01
Payer: MEDICARE

## 2020-01-01 ENCOUNTER — ANESTHESIA EVENT (OUTPATIENT)
Dept: OPERATING ROOM | Age: 80
DRG: 907 | End: 2020-01-01
Payer: MEDICARE

## 2020-01-01 ENCOUNTER — HOSPITAL ENCOUNTER (INPATIENT)
Age: 80
LOS: 11 days | DRG: 907 | End: 2020-03-21
Attending: EMERGENCY MEDICINE | Admitting: INTERNAL MEDICINE
Payer: MEDICARE

## 2020-01-01 ENCOUNTER — APPOINTMENT (OUTPATIENT)
Dept: GENERAL RADIOLOGY | Age: 80
DRG: 907 | End: 2020-01-01
Payer: MEDICARE

## 2020-01-01 ENCOUNTER — APPOINTMENT (OUTPATIENT)
Dept: CT IMAGING | Age: 80
DRG: 907 | End: 2020-01-01
Payer: MEDICARE

## 2020-01-01 VITALS
OXYGEN SATURATION: 96 % | DIASTOLIC BLOOD PRESSURE: 115 MMHG | BODY MASS INDEX: 32.46 KG/M2 | HEIGHT: 62 IN | SYSTOLIC BLOOD PRESSURE: 136 MMHG | WEIGHT: 176.37 LBS | TEMPERATURE: 94.5 F

## 2020-01-01 VITALS
SYSTOLIC BLOOD PRESSURE: 131 MMHG | RESPIRATION RATE: 22 BRPM | OXYGEN SATURATION: 96 % | DIASTOLIC BLOOD PRESSURE: 58 MMHG

## 2020-01-01 LAB
A/G RATIO: 0.3 (ref 1.1–2.2)
ABO/RH: NORMAL
ALBUMIN FLUID: 0.7 G/DL
ALBUMIN SERPL-MCNC: 1.2 G/DL (ref 3.4–5)
ALBUMIN SERPL-MCNC: 1.8 G/DL (ref 3.4–5)
ALBUMIN SERPL-MCNC: 1.8 G/DL (ref 3.4–5)
ALBUMIN SERPL-MCNC: 1.9 G/DL (ref 3.4–5)
ALBUMIN SERPL-MCNC: 1.9 G/DL (ref 3.4–5)
ALBUMIN SERPL-MCNC: 2 G/DL (ref 3.4–5)
ALBUMIN SERPL-MCNC: 2.1 G/DL (ref 3.4–5)
ALBUMIN SERPL-MCNC: 2.3 G/DL (ref 3.4–5)
ALBUMIN SERPL-MCNC: 2.3 G/DL (ref 3.4–5)
ALBUMIN SERPL-MCNC: 2.5 G/DL (ref 3.4–5)
ALP BLD-CCNC: 73 U/L (ref 40–129)
ALP BLD-CCNC: 75 U/L (ref 40–129)
ALP BLD-CCNC: 98 U/L (ref 40–129)
ALT SERPL-CCNC: 11 U/L (ref 10–40)
ALT SERPL-CCNC: 15 U/L (ref 10–40)
ALT SERPL-CCNC: 20 U/L (ref 10–40)
AMMONIA: 33 UMOL/L (ref 11–51)
ANION GAP SERPL CALCULATED.3IONS-SCNC: 16 MMOL/L (ref 3–16)
ANION GAP SERPL CALCULATED.3IONS-SCNC: 17 MMOL/L (ref 3–16)
ANION GAP SERPL CALCULATED.3IONS-SCNC: 18 MMOL/L (ref 3–16)
ANION GAP SERPL CALCULATED.3IONS-SCNC: 19 MMOL/L (ref 3–16)
ANION GAP SERPL CALCULATED.3IONS-SCNC: 19 MMOL/L (ref 3–16)
ANION GAP SERPL CALCULATED.3IONS-SCNC: 20 MMOL/L (ref 3–16)
ANION GAP SERPL CALCULATED.3IONS-SCNC: 21 MMOL/L (ref 3–16)
ANION GAP SERPL CALCULATED.3IONS-SCNC: 21 MMOL/L (ref 3–16)
ANION GAP SERPL CALCULATED.3IONS-SCNC: 23 MMOL/L (ref 3–16)
ANISOCYTOSIS: ABNORMAL
ANTIBODY SCREEN: NORMAL
APPEARANCE FLUID: CLEAR
APPEARANCE FLUID: NORMAL
AST SERPL-CCNC: 24 U/L (ref 15–37)
AST SERPL-CCNC: 25 U/L (ref 15–37)
AST SERPL-CCNC: 68 U/L (ref 15–37)
ATYPICAL LYMPHOCYTE RELATIVE PERCENT: 1 % (ref 0–6)
BANDED NEUTROPHILS RELATIVE PERCENT: 1 % (ref 0–7)
BANDED NEUTROPHILS RELATIVE PERCENT: 4 % (ref 0–7)
BASE EXCESS ARTERIAL: -0.7 MMOL/L (ref -3–3)
BASE EXCESS ARTERIAL: -18 MMOL/L (ref -3–3)
BASE EXCESS ARTERIAL: -19 (ref -3–3)
BASE EXCESS ARTERIAL: 1 (ref -3–3)
BASE EXCESS ARTERIAL: 2.7 MMOL/L (ref -3–3)
BASE EXCESS ARTERIAL: 3 (ref -3–3)
BASE EXCESS VENOUS: 0.7 MMOL/L (ref -2–3)
BASOPHILS ABSOLUTE: 0 K/UL (ref 0–0.2)
BASOPHILS ABSOLUTE: 0.1 K/UL (ref 0–0.2)
BASOPHILS RELATIVE PERCENT: 0 %
BASOPHILS RELATIVE PERCENT: 0.1 %
BASOPHILS RELATIVE PERCENT: 0.2 %
BASOPHILS RELATIVE PERCENT: 0.3 %
BASOPHILS RELATIVE PERCENT: 0.3 %
BASOPHILS RELATIVE PERCENT: 0.4 %
BASOPHILS RELATIVE PERCENT: 0.5 %
BASOPHILS RELATIVE PERCENT: 0.8 %
BASOPHILS RELATIVE PERCENT: 1.1 %
BILIRUB SERPL-MCNC: 0.3 MG/DL (ref 0–1)
BILIRUB SERPL-MCNC: 0.3 MG/DL (ref 0–1)
BILIRUB SERPL-MCNC: <0.2 MG/DL (ref 0–1)
BILIRUBIN DIRECT: <0.2 MG/DL (ref 0–0.3)
BILIRUBIN DIRECT: <0.2 MG/DL (ref 0–0.3)
BILIRUBIN, INDIRECT: ABNORMAL MG/DL (ref 0–1)
BILIRUBIN, INDIRECT: ABNORMAL MG/DL (ref 0–1)
BLOOD BANK DISPENSE STATUS: NORMAL
BLOOD BANK PRODUCT CODE: NORMAL
BLOOD CULTURE, ROUTINE: NORMAL
BLOOD CULTURE, ROUTINE: NORMAL
BODY FLUID CULTURE, STERILE: ABNORMAL
BODY FLUID CULTURE, STERILE: NORMAL
BPU ID: NORMAL
BUN BLDV-MCNC: 39 MG/DL (ref 7–20)
BUN BLDV-MCNC: 42 MG/DL (ref 7–20)
BUN BLDV-MCNC: 42 MG/DL (ref 7–20)
BUN BLDV-MCNC: 43 MG/DL (ref 7–20)
BUN BLDV-MCNC: 45 MG/DL (ref 7–20)
BUN BLDV-MCNC: 46 MG/DL (ref 7–20)
BUN BLDV-MCNC: 48 MG/DL (ref 7–20)
BUN BLDV-MCNC: 48 MG/DL (ref 7–20)
BUN BLDV-MCNC: 51 MG/DL (ref 7–20)
BUN BLDV-MCNC: 53 MG/DL (ref 7–20)
BUN BLDV-MCNC: 54 MG/DL (ref 7–20)
BUN BLDV-MCNC: 57 MG/DL (ref 7–20)
BUN BLDV-MCNC: 61 MG/DL (ref 7–20)
BUN BLDV-MCNC: 64 MG/DL (ref 7–20)
BUN BLDV-MCNC: 67 MG/DL (ref 7–20)
CALCIUM IONIZED: 0.95 MMOL/L (ref 1.12–1.32)
CALCIUM SERPL-MCNC: 7.4 MG/DL (ref 8.3–10.6)
CALCIUM SERPL-MCNC: 7.5 MG/DL (ref 8.3–10.6)
CALCIUM SERPL-MCNC: 7.6 MG/DL (ref 8.3–10.6)
CALCIUM SERPL-MCNC: 7.7 MG/DL (ref 8.3–10.6)
CALCIUM SERPL-MCNC: 7.8 MG/DL (ref 8.3–10.6)
CALCIUM SERPL-MCNC: 7.9 MG/DL (ref 8.3–10.6)
CALCIUM SERPL-MCNC: 8 MG/DL (ref 8.3–10.6)
CALCIUM SERPL-MCNC: 8.1 MG/DL (ref 8.3–10.6)
CALCIUM SERPL-MCNC: 8.1 MG/DL (ref 8.3–10.6)
CALCIUM SERPL-MCNC: 8.4 MG/DL (ref 8.3–10.6)
CALCIUM SERPL-MCNC: 8.9 MG/DL (ref 8.3–10.6)
CARBOXYHEMOGLOBIN ARTERIAL: 0.3 % (ref 0–1.5)
CARBOXYHEMOGLOBIN ARTERIAL: 0.7 % (ref 0–1.5)
CARBOXYHEMOGLOBIN ARTERIAL: 1.8 % (ref 0–1.5)
CARBOXYHEMOGLOBIN: 1 % (ref 0–1.5)
CELL COUNT FLUID TYPE: NORMAL
CHLORIDE BLD-SCNC: 89 MMOL/L (ref 99–110)
CHLORIDE BLD-SCNC: 90 MMOL/L (ref 99–110)
CHLORIDE BLD-SCNC: 90 MMOL/L (ref 99–110)
CHLORIDE BLD-SCNC: 91 MMOL/L (ref 99–110)
CHLORIDE BLD-SCNC: 92 MMOL/L (ref 99–110)
CHLORIDE BLD-SCNC: 94 MMOL/L (ref 99–110)
CHLORIDE BLD-SCNC: 95 MMOL/L (ref 99–110)
CHLORIDE BLD-SCNC: 96 MMOL/L (ref 99–110)
CHLORIDE BLD-SCNC: 97 MMOL/L (ref 99–110)
CLOT EVALUATION: NORMAL
CO2: 18 MMOL/L (ref 21–32)
CO2: 19 MMOL/L (ref 21–32)
CO2: 20 MMOL/L (ref 21–32)
CO2: 21 MMOL/L (ref 21–32)
CO2: 22 MMOL/L (ref 21–32)
CO2: 23 MMOL/L (ref 21–32)
CO2: 24 MMOL/L (ref 21–32)
COLOR FLUID: COLORLESS
COLOR FLUID: NORMAL
COLOR FLUID: YELLOW
CORTISOL TOTAL: 15.8 UG/DL
CORTISOL TOTAL: 21.7 UG/DL
CREAT SERPL-MCNC: 4.8 MG/DL (ref 0.6–1.2)
CREAT SERPL-MCNC: 5.6 MG/DL (ref 0.6–1.2)
CREAT SERPL-MCNC: 5.7 MG/DL (ref 0.6–1.2)
CREAT SERPL-MCNC: 5.9 MG/DL (ref 0.6–1.2)
CREAT SERPL-MCNC: 6.1 MG/DL (ref 0.6–1.2)
CREAT SERPL-MCNC: 6.4 MG/DL (ref 0.6–1.2)
CREAT SERPL-MCNC: 6.5 MG/DL (ref 0.6–1.2)
CREAT SERPL-MCNC: 6.6 MG/DL (ref 0.6–1.2)
CREAT SERPL-MCNC: 6.8 MG/DL (ref 0.6–1.2)
CREAT SERPL-MCNC: 7.1 MG/DL (ref 0.6–1.2)
CREAT SERPL-MCNC: 7.2 MG/DL (ref 0.6–1.2)
CREAT SERPL-MCNC: 7.4 MG/DL (ref 0.6–1.2)
CREAT SERPL-MCNC: 7.7 MG/DL (ref 0.6–1.2)
CREAT SERPL-MCNC: 7.8 MG/DL (ref 0.6–1.2)
CREAT SERPL-MCNC: 8.2 MG/DL (ref 0.6–1.2)
CULTURE CATHETER TIP: ABNORMAL
CULTURE, BLOOD 2: NORMAL
DESCRIPTION BLOOD BANK: NORMAL
EKG ATRIAL RATE: 105 BPM
EKG ATRIAL RATE: 106 BPM
EKG ATRIAL RATE: 114 BPM
EKG ATRIAL RATE: 115 BPM
EKG ATRIAL RATE: 122 BPM
EKG ATRIAL RATE: 133 BPM
EKG ATRIAL RATE: 65 BPM
EKG ATRIAL RATE: 76 BPM
EKG DIAGNOSIS: NORMAL
EKG P AXIS: 44 DEGREES
EKG P AXIS: 56 DEGREES
EKG P AXIS: 62 DEGREES
EKG P AXIS: 72 DEGREES
EKG P AXIS: 77 DEGREES
EKG P-R INTERVAL: 130 MS
EKG P-R INTERVAL: 132 MS
EKG P-R INTERVAL: 148 MS
EKG P-R INTERVAL: 150 MS
EKG P-R INTERVAL: 156 MS
EKG P-R INTERVAL: 168 MS
EKG Q-T INTERVAL: 348 MS
EKG Q-T INTERVAL: 350 MS
EKG Q-T INTERVAL: 360 MS
EKG Q-T INTERVAL: 366 MS
EKG Q-T INTERVAL: 370 MS
EKG Q-T INTERVAL: 370 MS
EKG Q-T INTERVAL: 380 MS
EKG Q-T INTERVAL: 428 MS
EKG QRS DURATION: 100 MS
EKG QRS DURATION: 102 MS
EKG QRS DURATION: 102 MS
EKG QRS DURATION: 104 MS
EKG QRS DURATION: 104 MS
EKG QRS DURATION: 108 MS
EKG QRS DURATION: 112 MS
EKG QRS DURATION: 94 MS
EKG QTC CALCULATION (BAZETT): 481 MS
EKG QTC CALCULATION (BAZETT): 483 MS
EKG QTC CALCULATION (BAZETT): 484 MS
EKG QTC CALCULATION (BAZETT): 491 MS
EKG QTC CALCULATION (BAZETT): 495 MS
EKG QTC CALCULATION (BAZETT): 496 MS
EKG QTC CALCULATION (BAZETT): 546 MS
EKG QTC CALCULATION (BAZETT): 554 MS
EKG R AXIS: -56 DEGREES
EKG R AXIS: -63 DEGREES
EKG R AXIS: -66 DEGREES
EKG R AXIS: -69 DEGREES
EKG R AXIS: -70 DEGREES
EKG R AXIS: -75 DEGREES
EKG T AXIS: 41 DEGREES
EKG T AXIS: 45 DEGREES
EKG T AXIS: 52 DEGREES
EKG T AXIS: 52 DEGREES
EKG T AXIS: 64 DEGREES
EKG T AXIS: 78 DEGREES
EKG T AXIS: 80 DEGREES
EKG T AXIS: 89 DEGREES
EKG VENTRICULAR RATE: 105 BPM
EKG VENTRICULAR RATE: 106 BPM
EKG VENTRICULAR RATE: 114 BPM
EKG VENTRICULAR RATE: 115 BPM
EKG VENTRICULAR RATE: 122 BPM
EKG VENTRICULAR RATE: 128 BPM
EKG VENTRICULAR RATE: 131 BPM
EKG VENTRICULAR RATE: 76 BPM
EOSINOPHILS ABSOLUTE: 0 K/UL (ref 0–0.6)
EOSINOPHILS ABSOLUTE: 0.1 K/UL (ref 0–0.6)
EOSINOPHILS ABSOLUTE: 0.2 K/UL (ref 0–0.6)
EOSINOPHILS RELATIVE PERCENT: 0 %
EOSINOPHILS RELATIVE PERCENT: 0.1 %
EOSINOPHILS RELATIVE PERCENT: 0.2 %
EOSINOPHILS RELATIVE PERCENT: 0.3 %
EOSINOPHILS RELATIVE PERCENT: 0.4 %
EOSINOPHILS RELATIVE PERCENT: 0.5 %
EOSINOPHILS RELATIVE PERCENT: 1 %
FLUID DIFF COMMENT: NORMAL
FLUID DIFF COMMENT: NORMAL
FLUID PATH CONSULT: YES
FLUID TYPE: NORMAL
FLUID TYPE: NORMAL
GFR AFRICAN AMERICAN: 11
GFR AFRICAN AMERICAN: 6
GFR AFRICAN AMERICAN: 7
GFR AFRICAN AMERICAN: 8
GFR AFRICAN AMERICAN: 9
GFR AFRICAN AMERICAN: 9
GFR NON-AFRICAN AMERICAN: 5
GFR NON-AFRICAN AMERICAN: 6
GFR NON-AFRICAN AMERICAN: 7
GFR NON-AFRICAN AMERICAN: 9
GLOBULIN: 4.3 G/DL
GLUCOSE BLD-MCNC: 100 MG/DL (ref 70–99)
GLUCOSE BLD-MCNC: 101 MG/DL (ref 70–99)
GLUCOSE BLD-MCNC: 104 MG/DL (ref 70–99)
GLUCOSE BLD-MCNC: 105 MG/DL (ref 70–99)
GLUCOSE BLD-MCNC: 105 MG/DL (ref 70–99)
GLUCOSE BLD-MCNC: 109 MG/DL (ref 70–99)
GLUCOSE BLD-MCNC: 111 MG/DL (ref 70–99)
GLUCOSE BLD-MCNC: 116 MG/DL (ref 70–99)
GLUCOSE BLD-MCNC: 117 MG/DL (ref 70–99)
GLUCOSE BLD-MCNC: 119 MG/DL (ref 70–99)
GLUCOSE BLD-MCNC: 120 MG/DL (ref 70–99)
GLUCOSE BLD-MCNC: 121 MG/DL (ref 70–99)
GLUCOSE BLD-MCNC: 122 MG/DL (ref 70–99)
GLUCOSE BLD-MCNC: 122 MG/DL (ref 70–99)
GLUCOSE BLD-MCNC: 123 MG/DL (ref 70–99)
GLUCOSE BLD-MCNC: 123 MG/DL (ref 70–99)
GLUCOSE BLD-MCNC: 124 MG/DL (ref 70–99)
GLUCOSE BLD-MCNC: 124 MG/DL (ref 70–99)
GLUCOSE BLD-MCNC: 126 MG/DL (ref 70–99)
GLUCOSE BLD-MCNC: 132 MG/DL (ref 70–99)
GLUCOSE BLD-MCNC: 132 MG/DL (ref 70–99)
GLUCOSE BLD-MCNC: 139 MG/DL (ref 70–99)
GLUCOSE BLD-MCNC: 140 MG/DL (ref 70–99)
GLUCOSE BLD-MCNC: 142 MG/DL (ref 70–99)
GLUCOSE BLD-MCNC: 143 MG/DL (ref 70–99)
GLUCOSE BLD-MCNC: 146 MG/DL (ref 70–99)
GLUCOSE BLD-MCNC: 152 MG/DL (ref 70–99)
GLUCOSE BLD-MCNC: 155 MG/DL (ref 70–99)
GLUCOSE BLD-MCNC: 163 MG/DL (ref 70–99)
GLUCOSE BLD-MCNC: 168 MG/DL (ref 70–99)
GLUCOSE BLD-MCNC: 169 MG/DL (ref 70–99)
GLUCOSE BLD-MCNC: 174 MG/DL (ref 70–99)
GLUCOSE BLD-MCNC: 181 MG/DL (ref 70–99)
GLUCOSE BLD-MCNC: 188 MG/DL (ref 70–99)
GLUCOSE BLD-MCNC: 194 MG/DL (ref 70–99)
GLUCOSE BLD-MCNC: 195 MG/DL (ref 70–99)
GLUCOSE BLD-MCNC: 199 MG/DL (ref 70–99)
GLUCOSE BLD-MCNC: 208 MG/DL (ref 70–99)
GLUCOSE BLD-MCNC: 210 MG/DL (ref 70–99)
GLUCOSE BLD-MCNC: 212 MG/DL (ref 70–99)
GLUCOSE BLD-MCNC: 220 MG/DL (ref 70–99)
GLUCOSE BLD-MCNC: 221 MG/DL (ref 70–99)
GLUCOSE BLD-MCNC: 222 MG/DL (ref 70–99)
GLUCOSE BLD-MCNC: 233 MG/DL (ref 70–99)
GLUCOSE BLD-MCNC: 258 MG/DL (ref 70–99)
GLUCOSE BLD-MCNC: 269 MG/DL (ref 70–99)
GLUCOSE BLD-MCNC: 27 MG/DL (ref 70–99)
GLUCOSE BLD-MCNC: 274 MG/DL (ref 70–99)
GLUCOSE BLD-MCNC: 28 MG/DL (ref 70–99)
GLUCOSE BLD-MCNC: 281 MG/DL (ref 70–99)
GLUCOSE BLD-MCNC: 296 MG/DL (ref 70–99)
GLUCOSE BLD-MCNC: 297 MG/DL (ref 70–99)
GLUCOSE BLD-MCNC: 298 MG/DL (ref 70–99)
GLUCOSE BLD-MCNC: 300 MG/DL (ref 70–99)
GLUCOSE BLD-MCNC: 301 MG/DL (ref 70–99)
GLUCOSE BLD-MCNC: 308 MG/DL (ref 70–99)
GLUCOSE BLD-MCNC: 314 MG/DL (ref 70–99)
GLUCOSE BLD-MCNC: 317 MG/DL (ref 70–99)
GLUCOSE BLD-MCNC: 318 MG/DL (ref 70–99)
GLUCOSE BLD-MCNC: 32 MG/DL (ref 70–99)
GLUCOSE BLD-MCNC: 322 MG/DL (ref 70–99)
GLUCOSE BLD-MCNC: 323 MG/DL (ref 70–99)
GLUCOSE BLD-MCNC: 33 MG/DL (ref 70–99)
GLUCOSE BLD-MCNC: 337 MG/DL (ref 70–99)
GLUCOSE BLD-MCNC: 337 MG/DL (ref 70–99)
GLUCOSE BLD-MCNC: 35 MG/DL (ref 70–99)
GLUCOSE BLD-MCNC: 350 MG/DL (ref 70–99)
GLUCOSE BLD-MCNC: 36 MG/DL (ref 70–99)
GLUCOSE BLD-MCNC: 374 MG/DL (ref 70–99)
GLUCOSE BLD-MCNC: 38 MG/DL (ref 70–99)
GLUCOSE BLD-MCNC: 389 MG/DL (ref 70–99)
GLUCOSE BLD-MCNC: 396 MG/DL (ref 70–99)
GLUCOSE BLD-MCNC: 40 MG/DL (ref 70–99)
GLUCOSE BLD-MCNC: 41 MG/DL (ref 70–99)
GLUCOSE BLD-MCNC: 414 MG/DL (ref 70–99)
GLUCOSE BLD-MCNC: 429 MG/DL (ref 70–99)
GLUCOSE BLD-MCNC: 46 MG/DL (ref 70–99)
GLUCOSE BLD-MCNC: 469 MG/DL (ref 70–99)
GLUCOSE BLD-MCNC: 55 MG/DL (ref 70–99)
GLUCOSE BLD-MCNC: 550 MG/DL (ref 70–99)
GLUCOSE BLD-MCNC: 57 MG/DL (ref 70–99)
GLUCOSE BLD-MCNC: 67 MG/DL (ref 70–99)
GLUCOSE BLD-MCNC: 71 MG/DL (ref 70–99)
GLUCOSE BLD-MCNC: 72 MG/DL (ref 70–99)
GLUCOSE BLD-MCNC: 77 MG/DL (ref 70–99)
GLUCOSE BLD-MCNC: 79 MG/DL (ref 70–99)
GLUCOSE BLD-MCNC: 80 MG/DL (ref 70–99)
GLUCOSE BLD-MCNC: 80 MG/DL (ref 70–99)
GLUCOSE BLD-MCNC: 82 MG/DL (ref 70–99)
GLUCOSE BLD-MCNC: 82 MG/DL (ref 70–99)
GLUCOSE BLD-MCNC: 84 MG/DL (ref 70–99)
GLUCOSE BLD-MCNC: 84 MG/DL (ref 70–99)
GLUCOSE BLD-MCNC: 85 MG/DL (ref 70–99)
GLUCOSE BLD-MCNC: 88 MG/DL (ref 70–99)
GLUCOSE BLD-MCNC: 94 MG/DL (ref 70–99)
GLUCOSE, FLUID: 1071 MG/DL
GRAM STAIN RESULT: ABNORMAL
GRAM STAIN RESULT: NORMAL
HAV IGM SER IA-ACNC: NORMAL
HBV SURFACE AB TITR SER: <3.5 MIU/ML
HCO3 ARTERIAL: 10 MMOL/L (ref 21–29)
HCO3 ARTERIAL: 10.9 MMOL/L (ref 21–29)
HCO3 ARTERIAL: 23 MMOL/L (ref 21–29)
HCO3 ARTERIAL: 26 MMOL/L (ref 21–29)
HCO3 ARTERIAL: 26.1 MMOL/L (ref 21–29)
HCO3 ARTERIAL: 27.7 MMOL/L (ref 21–29)
HCO3 VENOUS: 29 MMOL/L (ref 24–28)
HCT VFR BLD CALC: 25.1 % (ref 36–48)
HCT VFR BLD CALC: 26.6 % (ref 36–48)
HCT VFR BLD CALC: 28.1 % (ref 36–48)
HCT VFR BLD CALC: 32.3 % (ref 36–48)
HCT VFR BLD CALC: 33.1 % (ref 36–48)
HCT VFR BLD CALC: 35.5 % (ref 36–48)
HCT VFR BLD CALC: 35.7 % (ref 36–48)
HCT VFR BLD CALC: 35.8 % (ref 36–48)
HCT VFR BLD CALC: 36.3 % (ref 36–48)
HCT VFR BLD CALC: 37.4 % (ref 36–48)
HCT VFR BLD CALC: 38 % (ref 36–48)
HCT VFR BLD CALC: 39 % (ref 36–48)
HCT VFR BLD CALC: 42.2 % (ref 36–48)
HCT VFR BLD CALC: 46.9 % (ref 36–48)
HEMOGLOBIN, ART, EXTENDED: 11.8 G/DL
HEMOGLOBIN, ART, EXTENDED: 11.9 G/DL
HEMOGLOBIN, ART, EXTENDED: 7.1 G/DL
HEMOGLOBIN, VEN, REDUCED: 72.3 %
HEMOGLOBIN: 10.6 G/DL (ref 12–16)
HEMOGLOBIN: 11.3 G/DL (ref 12–16)
HEMOGLOBIN: 11.4 G/DL (ref 12–16)
HEMOGLOBIN: 11.4 G/DL (ref 12–16)
HEMOGLOBIN: 11.7 G/DL (ref 12–16)
HEMOGLOBIN: 11.9 G/DL (ref 12–16)
HEMOGLOBIN: 11.9 G/DL (ref 12–16)
HEMOGLOBIN: 12.4 G/DL (ref 12–16)
HEMOGLOBIN: 13.6 G/DL (ref 12–16)
HEMOGLOBIN: 14.4 G/DL (ref 12–16)
HEMOGLOBIN: 8.1 G/DL (ref 12–16)
HEMOGLOBIN: 8.6 G/DL (ref 12–16)
HEMOGLOBIN: 9 G/DL (ref 12–16)
HEMOGLOBIN: 9.8 G/DL (ref 12–16)
HEPATITIS B CORE IGM ANTIBODY: NORMAL
HEPATITIS B CORE TOTAL ANTIBODY: NEGATIVE
HEPATITIS B SURFACE ANTIGEN INTERPRETATION: NORMAL
HEPATITIS C ANTIBODY INTERPRETATION: NORMAL
INR BLD: 1.31 (ref 0.86–1.14)
IRON SATURATION: 17 % (ref 15–50)
IRON: 40 UG/DL (ref 37–145)
LACTATE: 0.89 MMOL/L (ref 0.4–2)
LACTATE: 11.93 MMOL/L (ref 0.4–2)
LACTIC ACID: 1.4 MMOL/L (ref 0.4–2)
LACTIC ACID: 1.5 MMOL/L (ref 0.4–2)
LACTIC ACID: 1.7 MMOL/L (ref 0.4–2)
LACTIC ACID: 3 MMOL/L (ref 0.4–2)
LD, FLUID: 58 U/L
LIPASE: 24 U/L (ref 13–60)
LYMPHOCYTES ABSOLUTE: 0.3 K/UL (ref 1–5.1)
LYMPHOCYTES ABSOLUTE: 0.6 K/UL (ref 1–5.1)
LYMPHOCYTES ABSOLUTE: 0.6 K/UL (ref 1–5.1)
LYMPHOCYTES ABSOLUTE: 0.7 K/UL (ref 1–5.1)
LYMPHOCYTES ABSOLUTE: 0.8 K/UL (ref 1–5.1)
LYMPHOCYTES ABSOLUTE: 0.8 K/UL (ref 1–5.1)
LYMPHOCYTES ABSOLUTE: 0.9 K/UL (ref 1–5.1)
LYMPHOCYTES ABSOLUTE: 1 K/UL (ref 1–5.1)
LYMPHOCYTES ABSOLUTE: 1 K/UL (ref 1–5.1)
LYMPHOCYTES ABSOLUTE: 1.3 K/UL (ref 1–5.1)
LYMPHOCYTES ABSOLUTE: 1.4 K/UL (ref 1–5.1)
LYMPHOCYTES ABSOLUTE: 2.2 K/UL (ref 1–5.1)
LYMPHOCYTES ABSOLUTE: 3.9 K/UL (ref 1–5.1)
LYMPHOCYTES RELATIVE PERCENT: 10.4 %
LYMPHOCYTES RELATIVE PERCENT: 11.8 %
LYMPHOCYTES RELATIVE PERCENT: 13.5 %
LYMPHOCYTES RELATIVE PERCENT: 14 %
LYMPHOCYTES RELATIVE PERCENT: 2 %
LYMPHOCYTES RELATIVE PERCENT: 29 %
LYMPHOCYTES RELATIVE PERCENT: 3.1 %
LYMPHOCYTES RELATIVE PERCENT: 4.9 %
LYMPHOCYTES RELATIVE PERCENT: 4.9 %
LYMPHOCYTES RELATIVE PERCENT: 5 %
LYMPHOCYTES RELATIVE PERCENT: 5.6 %
LYMPHOCYTES RELATIVE PERCENT: 7 %
LYMPHOCYTES RELATIVE PERCENT: 7.7 %
LYMPHOCYTES, BODY FLUID: 1 %
LYMPHOCYTES, BODY FLUID: 3 %
LYMPHOCYTES, BODY FLUID: 3 %
LYMPHOCYTES, BODY FLUID: 4 %
LYMPHOCYTES, BODY FLUID: 7 %
LYMPHOCYTES, BODY FLUID: 8 %
MACROPHAGE FLUID: 1 %
MACROPHAGE FLUID: 11 %
MACROPHAGE FLUID: 2 %
MAGNESIUM: 1.6 MG/DL (ref 1.8–2.4)
MAGNESIUM: 1.6 MG/DL (ref 1.8–2.4)
MAGNESIUM: 1.8 MG/DL (ref 1.8–2.4)
MAGNESIUM: 1.8 MG/DL (ref 1.8–2.4)
MAGNESIUM: 1.9 MG/DL (ref 1.8–2.4)
MAGNESIUM: 2 MG/DL (ref 1.8–2.4)
MAGNESIUM: 2.1 MG/DL (ref 1.8–2.4)
MAGNESIUM: 2.1 MG/DL (ref 1.8–2.4)
MAGNESIUM: 2.2 MG/DL (ref 1.8–2.4)
MAGNESIUM: 2.2 MG/DL (ref 1.8–2.4)
MAGNESIUM: 2.4 MG/DL (ref 1.8–2.4)
MAGNESIUM: 2.4 MG/DL (ref 1.8–2.4)
MAGNESIUM: 2.5 MG/DL (ref 1.8–2.4)
MAGNESIUM: 2.5 MG/DL (ref 1.8–2.4)
MCH RBC QN AUTO: 29.2 PG (ref 26–34)
MCH RBC QN AUTO: 29.7 PG (ref 26–34)
MCH RBC QN AUTO: 29.8 PG (ref 26–34)
MCH RBC QN AUTO: 30.2 PG (ref 26–34)
MCH RBC QN AUTO: 30.2 PG (ref 26–34)
MCH RBC QN AUTO: 30.3 PG (ref 26–34)
MCH RBC QN AUTO: 30.4 PG (ref 26–34)
MCH RBC QN AUTO: 30.5 PG (ref 26–34)
MCH RBC QN AUTO: 30.5 PG (ref 26–34)
MCH RBC QN AUTO: 30.6 PG (ref 26–34)
MCH RBC QN AUTO: 30.7 PG (ref 26–34)
MCH RBC QN AUTO: 30.8 PG (ref 26–34)
MCHC RBC AUTO-ENTMCNC: 30.2 G/DL (ref 31–36)
MCHC RBC AUTO-ENTMCNC: 30.7 G/DL (ref 31–36)
MCHC RBC AUTO-ENTMCNC: 31.2 G/DL (ref 31–36)
MCHC RBC AUTO-ENTMCNC: 31.8 G/DL (ref 31–36)
MCHC RBC AUTO-ENTMCNC: 31.9 G/DL (ref 31–36)
MCHC RBC AUTO-ENTMCNC: 32 G/DL (ref 31–36)
MCHC RBC AUTO-ENTMCNC: 32.2 G/DL (ref 31–36)
MCHC RBC AUTO-ENTMCNC: 32.3 G/DL (ref 31–36)
MCHC RBC AUTO-ENTMCNC: 32.4 G/DL (ref 31–36)
MCV RBC AUTO: 94.3 FL (ref 80–100)
MCV RBC AUTO: 94.5 FL (ref 80–100)
MCV RBC AUTO: 94.6 FL (ref 80–100)
MCV RBC AUTO: 94.8 FL (ref 80–100)
MCV RBC AUTO: 94.9 FL (ref 80–100)
MCV RBC AUTO: 95.1 FL (ref 80–100)
MCV RBC AUTO: 95.1 FL (ref 80–100)
MCV RBC AUTO: 95.2 FL (ref 80–100)
MCV RBC AUTO: 95.4 FL (ref 80–100)
MCV RBC AUTO: 95.5 FL (ref 80–100)
MCV RBC AUTO: 95.6 FL (ref 80–100)
MCV RBC AUTO: 98.3 FL (ref 80–100)
MESOTHELIAL FLUID: 1 %
MESOTHELIAL FLUID: 2 %
METAMYELOCYTES RELATIVE PERCENT: 1 %
METHEMOGLOBIN ARTERIAL: 0 % (ref 0–1.4)
METHEMOGLOBIN ARTERIAL: 0.1 % (ref 0–1.4)
METHEMOGLOBIN ARTERIAL: 0.7 % (ref 0–1.4)
METHEMOGLOBIN VENOUS: 0.7 % (ref 0–1.5)
MONOCYTE, FLUID: 1 %
MONOCYTE, FLUID: 1 %
MONOCYTE, FLUID: 15 %
MONOCYTE, FLUID: 5 %
MONOCYTE, FLUID: 6 %
MONOCYTES ABSOLUTE: 0.2 K/UL (ref 0–1.3)
MONOCYTES ABSOLUTE: 0.6 K/UL (ref 0–1.3)
MONOCYTES ABSOLUTE: 0.7 K/UL (ref 0–1.3)
MONOCYTES ABSOLUTE: 0.8 K/UL (ref 0–1.3)
MONOCYTES ABSOLUTE: 0.9 K/UL (ref 0–1.3)
MONOCYTES ABSOLUTE: 0.9 K/UL (ref 0–1.3)
MONOCYTES ABSOLUTE: 1 K/UL (ref 0–1.3)
MONOCYTES ABSOLUTE: 1.1 K/UL (ref 0–1.3)
MONOCYTES ABSOLUTE: 1.2 K/UL (ref 0–1.3)
MONOCYTES ABSOLUTE: 1.6 K/UL (ref 0–1.3)
MONOCYTES RELATIVE PERCENT: 10 %
MONOCYTES RELATIVE PERCENT: 10 %
MONOCYTES RELATIVE PERCENT: 2 %
MONOCYTES RELATIVE PERCENT: 3.7 %
MONOCYTES RELATIVE PERCENT: 4 %
MONOCYTES RELATIVE PERCENT: 4.5 %
MONOCYTES RELATIVE PERCENT: 5 %
MONOCYTES RELATIVE PERCENT: 6 %
MONOCYTES RELATIVE PERCENT: 6.3 %
MONOCYTES RELATIVE PERCENT: 7 %
MONOCYTES RELATIVE PERCENT: 7.1 %
MONOCYTES RELATIVE PERCENT: 8.4 %
MONOCYTES RELATIVE PERCENT: 9 %
MYELOCYTE PERCENT: 1 %
NEUTROPHIL, FLUID: 83 %
NEUTROPHIL, FLUID: 85 %
NEUTROPHIL, FLUID: 85 %
NEUTROPHIL, FLUID: 88 %
NEUTROPHIL, FLUID: 93 %
NEUTROPHIL, FLUID: 97 %
NEUTROPHIL, FLUID: 98 %
NEUTROPHILS ABSOLUTE: 10 K/UL (ref 1.7–7.7)
NEUTROPHILS ABSOLUTE: 11.2 K/UL (ref 1.7–7.7)
NEUTROPHILS ABSOLUTE: 12 K/UL (ref 1.7–7.7)
NEUTROPHILS ABSOLUTE: 12.7 K/UL (ref 1.7–7.7)
NEUTROPHILS ABSOLUTE: 13.5 K/UL (ref 1.7–7.7)
NEUTROPHILS ABSOLUTE: 15 K/UL (ref 1.7–7.7)
NEUTROPHILS ABSOLUTE: 15.3 K/UL (ref 1.7–7.7)
NEUTROPHILS ABSOLUTE: 15.7 K/UL (ref 1.7–7.7)
NEUTROPHILS ABSOLUTE: 18.8 K/UL (ref 1.7–7.7)
NEUTROPHILS ABSOLUTE: 5.7 K/UL (ref 1.7–7.7)
NEUTROPHILS ABSOLUTE: 6.4 K/UL (ref 1.7–7.7)
NEUTROPHILS ABSOLUTE: 8.4 K/UL (ref 1.7–7.7)
NEUTROPHILS ABSOLUTE: 8.8 K/UL (ref 1.7–7.7)
NEUTROPHILS RELATIVE PERCENT: 62 %
NEUTROPHILS RELATIVE PERCENT: 75.9 %
NEUTROPHILS RELATIVE PERCENT: 77 %
NEUTROPHILS RELATIVE PERCENT: 78 %
NEUTROPHILS RELATIVE PERCENT: 80.8 %
NEUTROPHILS RELATIVE PERCENT: 84.5 %
NEUTROPHILS RELATIVE PERCENT: 86 %
NEUTROPHILS RELATIVE PERCENT: 87 %
NEUTROPHILS RELATIVE PERCENT: 88.4 %
NEUTROPHILS RELATIVE PERCENT: 89 %
NEUTROPHILS RELATIVE PERCENT: 90 %
NEUTROPHILS RELATIVE PERCENT: 90.8 %
NEUTROPHILS RELATIVE PERCENT: 92.3 %
NUCLEATED CELLS FLUID: 192 /CUMM
NUCLEATED CELLS FLUID: 271 /CUMM
NUCLEATED CELLS FLUID: 28 /CUMM
NUCLEATED CELLS FLUID: 6 /CUMM
NUCLEATED CELLS FLUID: 733 /CUMM
NUCLEATED CELLS FLUID: 8 /CUMM
NUCLEATED CELLS FLUID: 9856 /CUMM
NUCLEATED CELLS FLUID: NORMAL /CUMM
NUCLEATED RED BLOOD CELLS: 1 /100 WBC
NUCLEATED RED BLOOD CELLS: 1 /100 WBC
NUMBER OF CELLS COUNTED FLUID: 100
O2 SAT, ARTERIAL: 100 % (ref 93–100)
O2 SAT, ARTERIAL: 88 % (ref 93–100)
O2 SAT, ARTERIAL: 91 % (ref 93–100)
O2 SAT, ARTERIAL: 98 % (ref 93–100)
O2 SAT, ARTERIAL: 99 % (ref 93–100)
O2 SAT, ARTERIAL: 99 % (ref 93–100)
O2 SAT, VEN: 26 %
OCCULT BLOOD DIAGNOSTIC: ABNORMAL
ORGANISM: ABNORMAL
PCO2 ARTERIAL: 29.5 MMHG (ref 35–45)
PCO2 ARTERIAL: 35.5 MMHG (ref 35–45)
PCO2 ARTERIAL: 37.2 MM HG (ref 35–45)
PCO2 ARTERIAL: 38.3 MMHG (ref 35–45)
PCO2 ARTERIAL: 42.1 MM HG (ref 35–45)
PCO2 ARTERIAL: 45.6 MM HG (ref 35–45)
PCO2, VEN: 65.8 MMHG (ref 41–51)
PDW BLD-RTO: 17.3 % (ref 12.4–15.4)
PDW BLD-RTO: 20.2 % (ref 12.4–15.4)
PDW BLD-RTO: 20.2 % (ref 12.4–15.4)
PDW BLD-RTO: 20.3 % (ref 12.4–15.4)
PDW BLD-RTO: 20.6 % (ref 12.4–15.4)
PDW BLD-RTO: 20.8 % (ref 12.4–15.4)
PDW BLD-RTO: 20.8 % (ref 12.4–15.4)
PDW BLD-RTO: 21 % (ref 12.4–15.4)
PDW BLD-RTO: 21 % (ref 12.4–15.4)
PDW BLD-RTO: 21.4 % (ref 12.4–15.4)
PDW BLD-RTO: 21.4 % (ref 12.4–15.4)
PDW BLD-RTO: 21.7 % (ref 12.4–15.4)
PDW BLD-RTO: 21.8 % (ref 12.4–15.4)
PDW BLD-RTO: 22.5 % (ref 12.4–15.4)
PERFORMED ON: ABNORMAL
PERFORMED ON: NORMAL
PH ARTERIAL: 7.08 (ref 7.35–7.45)
PH ARTERIAL: 7.13 (ref 7.35–7.45)
PH ARTERIAL: 7.37 (ref 7.35–7.45)
PH ARTERIAL: 7.4 (ref 7.35–7.45)
PH ARTERIAL: 7.43 (ref 7.35–7.45)
PH ARTERIAL: 7.47 (ref 7.35–7.45)
PH VENOUS: 7.27 (ref 7.35–7.45)
PH, BODY FLUID: 7.9
PHOSPHORUS: 3.7 MG/DL (ref 2.5–4.9)
PHOSPHORUS: 3.8 MG/DL (ref 2.5–4.9)
PHOSPHORUS: 4 MG/DL (ref 2.5–4.9)
PHOSPHORUS: 4.3 MG/DL (ref 2.5–4.9)
PHOSPHORUS: 4.4 MG/DL (ref 2.5–4.9)
PHOSPHORUS: 4.6 MG/DL (ref 2.5–4.9)
PHOSPHORUS: 4.7 MG/DL (ref 2.5–4.9)
PHOSPHORUS: 4.9 MG/DL (ref 2.5–4.9)
PHOSPHORUS: 4.9 MG/DL (ref 2.5–4.9)
PHOSPHORUS: 5 MG/DL (ref 2.5–4.9)
PHOSPHORUS: 5.1 MG/DL (ref 2.5–4.9)
PHOSPHORUS: 5.2 MG/DL (ref 2.5–4.9)
PHOSPHORUS: 5.7 MG/DL (ref 2.5–4.9)
PLATELET # BLD: 108 K/UL (ref 135–450)
PLATELET # BLD: 168 K/UL (ref 135–450)
PLATELET # BLD: 190 K/UL (ref 135–450)
PLATELET # BLD: 195 K/UL (ref 135–450)
PLATELET # BLD: 203 K/UL (ref 135–450)
PLATELET # BLD: 210 K/UL (ref 135–450)
PLATELET # BLD: 219 K/UL (ref 135–450)
PLATELET # BLD: 226 K/UL (ref 135–450)
PLATELET # BLD: 247 K/UL (ref 135–450)
PLATELET # BLD: 250 K/UL (ref 135–450)
PLATELET # BLD: 253 K/UL (ref 135–450)
PLATELET # BLD: 256 K/UL (ref 135–450)
PLATELET # BLD: 270 K/UL (ref 135–450)
PLATELET # BLD: ABNORMAL K/UL (ref 135–450)
PLATELET SLIDE REVIEW: ADEQUATE
PMV BLD AUTO: 7.7 FL (ref 5–10.5)
PMV BLD AUTO: 7.8 FL (ref 5–10.5)
PMV BLD AUTO: 8 FL (ref 5–10.5)
PMV BLD AUTO: 8.1 FL (ref 5–10.5)
PMV BLD AUTO: 8.3 FL (ref 5–10.5)
PMV BLD AUTO: 8.4 FL (ref 5–10.5)
PMV BLD AUTO: 8.6 FL (ref 5–10.5)
PMV BLD AUTO: 8.7 FL (ref 5–10.5)
PMV BLD AUTO: 8.8 FL (ref 5–10.5)
PMV BLD AUTO: 8.8 FL (ref 5–10.5)
PMV BLD AUTO: 8.9 FL (ref 5–10.5)
PMV BLD AUTO: 8.9 FL (ref 5–10.5)
PMV BLD AUTO: 9.3 FL (ref 5–10.5)
PMV BLD AUTO: 9.6 FL (ref 5–10.5)
PO2 ARTERIAL: 139 MM HG (ref 75–108)
PO2 ARTERIAL: 241 MMHG (ref 75–108)
PO2 ARTERIAL: 246 MMHG (ref 75–108)
PO2 ARTERIAL: 380.4 MM HG (ref 75–108)
PO2 ARTERIAL: 56.4 MM HG (ref 75–108)
PO2 ARTERIAL: 67 MMHG (ref 75–108)
PO2, VEN: 22.9 MMHG (ref 25–40)
POC POTASSIUM: 4.2 MMOL/L (ref 3.5–5.1)
POC SAMPLE TYPE: ABNORMAL
POC SODIUM: 133 MMOL/L (ref 136–145)
POLYCHROMASIA: ABNORMAL
POLYCHROMASIA: ABNORMAL
POTASSIUM REFLEX MAGNESIUM: 3.4 MMOL/L (ref 3.5–5.1)
POTASSIUM REFLEX MAGNESIUM: 3.7 MMOL/L (ref 3.5–5.1)
POTASSIUM REFLEX MAGNESIUM: 3.8 MMOL/L (ref 3.5–5.1)
POTASSIUM REFLEX MAGNESIUM: 6.9 MMOL/L (ref 3.5–5.1)
POTASSIUM SERPL-SCNC: 2.7 MMOL/L (ref 3.5–5.1)
POTASSIUM SERPL-SCNC: 2.9 MMOL/L (ref 3.5–5.1)
POTASSIUM SERPL-SCNC: 3 MMOL/L (ref 3.5–5.1)
POTASSIUM SERPL-SCNC: 3.1 MMOL/L (ref 3.5–5.1)
POTASSIUM SERPL-SCNC: 3.3 MMOL/L (ref 3.5–5.1)
POTASSIUM SERPL-SCNC: 3.4 MMOL/L (ref 3.5–5.1)
POTASSIUM SERPL-SCNC: 4.9 MMOL/L (ref 3.5–5.1)
PROTEIN FLUID: <0.2 G/DL
PROTHROMBIN TIME: 15.2 SEC (ref 10–13.2)
RAPID INFLUENZA  B AGN: NEGATIVE
RAPID INFLUENZA A AGN: NEGATIVE
RBC # BLD: 2.64 M/UL (ref 4–5.2)
RBC # BLD: 2.81 M/UL (ref 4–5.2)
RBC # BLD: 2.94 M/UL (ref 4–5.2)
RBC # BLD: 3.28 M/UL (ref 4–5.2)
RBC # BLD: 3.49 M/UL (ref 4–5.2)
RBC # BLD: 3.72 M/UL (ref 4–5.2)
RBC # BLD: 3.75 M/UL (ref 4–5.2)
RBC # BLD: 3.77 M/UL (ref 4–5.2)
RBC # BLD: 3.85 M/UL (ref 4–5.2)
RBC # BLD: 3.94 M/UL (ref 4–5.2)
RBC # BLD: 3.99 M/UL (ref 4–5.2)
RBC # BLD: 4.11 M/UL (ref 4–5.2)
RBC # BLD: 4.44 M/UL (ref 4–5.2)
RBC # BLD: 4.93 M/UL (ref 4–5.2)
RBC FLUID: 0 /CUMM
RBC FLUID: 2 /CUMM
RBC FLUID: 2 /CUMM
RBC FLUID: 3 /CUMM
RBC FLUID: 4000 /CUMM
RBC FLUID: 7 /CUMM
RBC FLUID: 700 /CUMM
RBC FLUID: 8300 /CUMM
SCHISTOCYTES: ABNORMAL
SLIDE REVIEW: ABNORMAL
SLIDE REVIEW: ABNORMAL
SODIUM BLD-SCNC: 129 MMOL/L (ref 136–145)
SODIUM BLD-SCNC: 129 MMOL/L (ref 136–145)
SODIUM BLD-SCNC: 130 MMOL/L (ref 136–145)
SODIUM BLD-SCNC: 130 MMOL/L (ref 136–145)
SODIUM BLD-SCNC: 131 MMOL/L (ref 136–145)
SODIUM BLD-SCNC: 132 MMOL/L (ref 136–145)
SODIUM BLD-SCNC: 133 MMOL/L (ref 136–145)
SODIUM BLD-SCNC: 133 MMOL/L (ref 136–145)
SODIUM BLD-SCNC: 134 MMOL/L (ref 136–145)
SODIUM BLD-SCNC: 136 MMOL/L (ref 136–145)
SODIUM BLD-SCNC: 137 MMOL/L (ref 136–145)
TCO2 ARTERIAL: 10 MMOL/L
TCO2 ARTERIAL: 12 MMOL/L
TCO2 ARTERIAL: 25 MMOL/L
TCO2 ARTERIAL: 27 MMOL/L
TCO2 ARTERIAL: 28 MMOL/L
TCO2 ARTERIAL: 29 MMOL/L
TCO2 CALC VENOUS: 31 MMOL/L
TOTAL IRON BINDING CAPACITY: 241 UG/DL (ref 260–445)
TOTAL PROTEIN: 5.5 G/DL (ref 6.4–8.2)
TOTAL PROTEIN: 6.8 G/DL (ref 6.4–8.2)
TOTAL PROTEIN: 6.9 G/DL (ref 6.4–8.2)
VANCOMYCIN RANDOM: 16.5 UG/ML
VANCOMYCIN RANDOM: 20.6 UG/ML
VANCOMYCIN RANDOM: 22.9 UG/ML
WBC # BLD: 11 K/UL (ref 4–11)
WBC # BLD: 11.5 K/UL (ref 4–11)
WBC # BLD: 12.4 K/UL (ref 4–11)
WBC # BLD: 13.4 K/UL (ref 4–11)
WBC # BLD: 13.6 K/UL (ref 4–11)
WBC # BLD: 15.5 K/UL (ref 4–11)
WBC # BLD: 15.9 K/UL (ref 4–11)
WBC # BLD: 16.8 K/UL (ref 4–11)
WBC # BLD: 17.3 K/UL (ref 4–11)
WBC # BLD: 17.7 K/UL (ref 4–11)
WBC # BLD: 20.4 K/UL (ref 4–11)
WBC # BLD: 7.5 K/UL (ref 4–11)
WBC # BLD: 8 K/UL (ref 4–11)
WBC # BLD: 8.4 K/UL (ref 4–11)

## 2020-01-01 PROCEDURE — 87804 INFLUENZA ASSAY W/OPTIC: CPT

## 2020-01-01 PROCEDURE — 6360000002 HC RX W HCPCS: Performed by: STUDENT IN AN ORGANIZED HEALTH CARE EDUCATION/TRAINING PROGRAM

## 2020-01-01 PROCEDURE — 2060000000 HC ICU INTERMEDIATE R&B

## 2020-01-01 PROCEDURE — 96365 THER/PROPH/DIAG IV INF INIT: CPT

## 2020-01-01 PROCEDURE — 6370000000 HC RX 637 (ALT 250 FOR IP): Performed by: NURSE PRACTITIONER

## 2020-01-01 PROCEDURE — 85025 COMPLETE CBC W/AUTO DIFF WBC: CPT

## 2020-01-01 PROCEDURE — 49422 REMOVE TUNNELED IP CATH: CPT | Performed by: SURGERY

## 2020-01-01 PROCEDURE — 82803 BLOOD GASES ANY COMBINATION: CPT

## 2020-01-01 PROCEDURE — 87070 CULTURE OTHR SPECIMN AEROBIC: CPT

## 2020-01-01 PROCEDURE — 2500000003 HC RX 250 WO HCPCS: Performed by: STUDENT IN AN ORGANIZED HEALTH CARE EDUCATION/TRAINING PROGRAM

## 2020-01-01 PROCEDURE — P9016 RBC LEUKOCYTES REDUCED: HCPCS

## 2020-01-01 PROCEDURE — 2580000003 HC RX 258: Performed by: INTERNAL MEDICINE

## 2020-01-01 PROCEDURE — 99285 EMERGENCY DEPT VISIT HI MDM: CPT

## 2020-01-01 PROCEDURE — 84100 ASSAY OF PHOSPHORUS: CPT

## 2020-01-01 PROCEDURE — 80202 ASSAY OF VANCOMYCIN: CPT

## 2020-01-01 PROCEDURE — C1752 CATH,HEMODIALYSIS,SHORT-TERM: HCPCS

## 2020-01-01 PROCEDURE — 83615 LACTATE (LD) (LDH) ENZYME: CPT

## 2020-01-01 PROCEDURE — 84132 ASSAY OF SERUM POTASSIUM: CPT

## 2020-01-01 PROCEDURE — 2580000003 HC RX 258: Performed by: STUDENT IN AN ORGANIZED HEALTH CARE EDUCATION/TRAINING PROGRAM

## 2020-01-01 PROCEDURE — 0WPG03Z REMOVAL OF INFUSION DEVICE FROM PERITONEAL CAVITY, OPEN APPROACH: ICD-10-PCS | Performed by: SURGERY

## 2020-01-01 PROCEDURE — 90945 DIALYSIS ONE EVALUATION: CPT

## 2020-01-01 PROCEDURE — 6370000000 HC RX 637 (ALT 250 FOR IP): Performed by: STUDENT IN AN ORGANIZED HEALTH CARE EDUCATION/TRAINING PROGRAM

## 2020-01-01 PROCEDURE — 6370000000 HC RX 637 (ALT 250 FOR IP): Performed by: INTERNAL MEDICINE

## 2020-01-01 PROCEDURE — 93005 ELECTROCARDIOGRAM TRACING: CPT | Performed by: EMERGENCY MEDICINE

## 2020-01-01 PROCEDURE — 36600 WITHDRAWAL OF ARTERIAL BLOOD: CPT

## 2020-01-01 PROCEDURE — 99233 SBSQ HOSP IP/OBS HIGH 50: CPT | Performed by: NURSE PRACTITIONER

## 2020-01-01 PROCEDURE — 06HM33Z INSERTION OF INFUSION DEVICE INTO RIGHT FEMORAL VEIN, PERCUTANEOUS APPROACH: ICD-10-PCS | Performed by: SURGERY

## 2020-01-01 PROCEDURE — 7100000001 HC PACU RECOVERY - ADDTL 15 MIN: Performed by: SURGERY

## 2020-01-01 PROCEDURE — 36415 COLL VENOUS BLD VENIPUNCTURE: CPT

## 2020-01-01 PROCEDURE — 93005 ELECTROCARDIOGRAM TRACING: CPT | Performed by: STUDENT IN AN ORGANIZED HEALTH CARE EDUCATION/TRAINING PROGRAM

## 2020-01-01 PROCEDURE — 36556 INSERT NON-TUNNEL CV CATH: CPT

## 2020-01-01 PROCEDURE — 87205 SMEAR GRAM STAIN: CPT

## 2020-01-01 PROCEDURE — 89050 BODY FLUID CELL COUNT: CPT

## 2020-01-01 PROCEDURE — 99222 1ST HOSP IP/OBS MODERATE 55: CPT | Performed by: INTERNAL MEDICINE

## 2020-01-01 PROCEDURE — 6360000002 HC RX W HCPCS: Performed by: INTERNAL MEDICINE

## 2020-01-01 PROCEDURE — 37799 UNLISTED PX VASCULAR SURGERY: CPT

## 2020-01-01 PROCEDURE — 89051 BODY FLUID CELL COUNT: CPT

## 2020-01-01 PROCEDURE — 83735 ASSAY OF MAGNESIUM: CPT

## 2020-01-01 PROCEDURE — 87186 SC STD MICRODIL/AGAR DIL: CPT

## 2020-01-01 PROCEDURE — 99232 SBSQ HOSP IP/OBS MODERATE 35: CPT | Performed by: NURSE PRACTITIONER

## 2020-01-01 PROCEDURE — 80048 BASIC METABOLIC PNL TOTAL CA: CPT

## 2020-01-01 PROCEDURE — 51798 US URINE CAPACITY MEASURE: CPT

## 2020-01-01 PROCEDURE — 93010 ELECTROCARDIOGRAM REPORT: CPT | Performed by: INTERNAL MEDICINE

## 2020-01-01 PROCEDURE — 80053 COMPREHEN METABOLIC PANEL: CPT

## 2020-01-01 PROCEDURE — 99233 SBSQ HOSP IP/OBS HIGH 50: CPT | Performed by: INTERNAL MEDICINE

## 2020-01-01 PROCEDURE — 80069 RENAL FUNCTION PANEL: CPT

## 2020-01-01 PROCEDURE — 82533 TOTAL CORTISOL: CPT

## 2020-01-01 PROCEDURE — 2709999900 HC NON-CHARGEABLE SUPPLY

## 2020-01-01 PROCEDURE — 97162 PT EVAL MOD COMPLEX 30 MIN: CPT

## 2020-01-01 PROCEDURE — 90935 HEMODIALYSIS ONE EVALUATION: CPT

## 2020-01-01 PROCEDURE — 6360000002 HC RX W HCPCS: Performed by: NURSE PRACTITIONER

## 2020-01-01 PROCEDURE — 86923 COMPATIBILITY TEST ELECTRIC: CPT

## 2020-01-01 PROCEDURE — 92526 ORAL FUNCTION THERAPY: CPT

## 2020-01-01 PROCEDURE — 87077 CULTURE AEROBIC IDENTIFY: CPT

## 2020-01-01 PROCEDURE — C1894 INTRO/SHEATH, NON-LASER: HCPCS

## 2020-01-01 PROCEDURE — 94761 N-INVAS EAR/PLS OXIMETRY MLT: CPT

## 2020-01-01 PROCEDURE — 6360000002 HC RX W HCPCS

## 2020-01-01 PROCEDURE — 2500000003 HC RX 250 WO HCPCS

## 2020-01-01 PROCEDURE — 82947 ASSAY GLUCOSE BLOOD QUANT: CPT

## 2020-01-01 PROCEDURE — 2700000000 HC OXYGEN THERAPY PER DAY

## 2020-01-01 PROCEDURE — 2500000003 HC RX 250 WO HCPCS: Performed by: SURGERY

## 2020-01-01 PROCEDURE — 71045 X-RAY EXAM CHEST 1 VIEW: CPT

## 2020-01-01 PROCEDURE — 86901 BLOOD TYPING SEROLOGIC RH(D): CPT

## 2020-01-01 PROCEDURE — 84157 ASSAY OF PROTEIN OTHER: CPT

## 2020-01-01 PROCEDURE — 96367 TX/PROPH/DG ADDL SEQ IV INF: CPT

## 2020-01-01 PROCEDURE — 99291 CRITICAL CARE FIRST HOUR: CPT | Performed by: INTERNAL MEDICINE

## 2020-01-01 PROCEDURE — 83550 IRON BINDING TEST: CPT

## 2020-01-01 PROCEDURE — 97530 THERAPEUTIC ACTIVITIES: CPT

## 2020-01-01 PROCEDURE — P9017 PLASMA 1 DONOR FRZ W/IN 8 HR: HCPCS

## 2020-01-01 PROCEDURE — G0328 FECAL BLOOD SCRN IMMUNOASSAY: HCPCS

## 2020-01-01 PROCEDURE — 0BH17EZ INSERTION OF ENDOTRACHEAL AIRWAY INTO TRACHEA, VIA NATURAL OR ARTIFICIAL OPENING: ICD-10-PCS | Performed by: STUDENT IN AN ORGANIZED HEALTH CARE EDUCATION/TRAINING PROGRAM

## 2020-01-01 PROCEDURE — 86704 HEP B CORE ANTIBODY TOTAL: CPT

## 2020-01-01 PROCEDURE — 85610 PROTHROMBIN TIME: CPT

## 2020-01-01 PROCEDURE — 74018 RADEX ABDOMEN 1 VIEW: CPT

## 2020-01-01 PROCEDURE — 82140 ASSAY OF AMMONIA: CPT

## 2020-01-01 PROCEDURE — 83605 ASSAY OF LACTIC ACID: CPT

## 2020-01-01 PROCEDURE — 86900 BLOOD TYPING SEROLOGIC ABO: CPT

## 2020-01-01 PROCEDURE — 3600000012 HC SURGERY LEVEL 2 ADDTL 15MIN: Performed by: SURGERY

## 2020-01-01 PROCEDURE — 86850 RBC ANTIBODY SCREEN: CPT

## 2020-01-01 PROCEDURE — 77001 FLUOROGUIDE FOR VEIN DEVICE: CPT

## 2020-01-01 PROCEDURE — 99223 1ST HOSP IP/OBS HIGH 75: CPT | Performed by: INTERNAL MEDICINE

## 2020-01-01 PROCEDURE — 82945 GLUCOSE OTHER FLUID: CPT

## 2020-01-01 PROCEDURE — 83690 ASSAY OF LIPASE: CPT

## 2020-01-01 PROCEDURE — 87040 BLOOD CULTURE FOR BACTERIA: CPT

## 2020-01-01 PROCEDURE — 85027 COMPLETE CBC AUTOMATED: CPT

## 2020-01-01 PROCEDURE — 36592 COLLECT BLOOD FROM PICC: CPT

## 2020-01-01 PROCEDURE — 36620 INSERTION CATHETER ARTERY: CPT | Performed by: INTERNAL MEDICINE

## 2020-01-01 PROCEDURE — 7100000000 HC PACU RECOVERY - FIRST 15 MIN: Performed by: SURGERY

## 2020-01-01 PROCEDURE — 92610 EVALUATE SWALLOWING FUNCTION: CPT

## 2020-01-01 PROCEDURE — 36430 TRANSFUSION BLD/BLD COMPNT: CPT

## 2020-01-01 PROCEDURE — 97166 OT EVAL MOD COMPLEX 45 MIN: CPT

## 2020-01-01 PROCEDURE — P9035 PLATELET PHERES LEUKOREDUCED: HCPCS

## 2020-01-01 PROCEDURE — 5A1D70Z PERFORMANCE OF URINARY FILTRATION, INTERMITTENT, LESS THAN 6 HOURS PER DAY: ICD-10-PCS | Performed by: INTERNAL MEDICINE

## 2020-01-01 PROCEDURE — 2580000003 HC RX 258: Performed by: EMERGENCY MEDICINE

## 2020-01-01 PROCEDURE — 86706 HEP B SURFACE ANTIBODY: CPT

## 2020-01-01 PROCEDURE — 31500 INSERT EMERGENCY AIRWAY: CPT

## 2020-01-01 PROCEDURE — C1769 GUIDE WIRE: HCPCS

## 2020-01-01 PROCEDURE — 82330 ASSAY OF CALCIUM: CPT

## 2020-01-01 PROCEDURE — 3700000001 HC ADD 15 MINUTES (ANESTHESIA): Performed by: SURGERY

## 2020-01-01 PROCEDURE — 83540 ASSAY OF IRON: CPT

## 2020-01-01 PROCEDURE — 3E1M39Z IRRIGATION OF PERITONEAL CAVITY USING DIALYSATE, PERCUTANEOUS APPROACH: ICD-10-PCS | Performed by: INTERNAL MEDICINE

## 2020-01-01 PROCEDURE — 2580000003 HC RX 258: Performed by: NURSE ANESTHETIST, CERTIFIED REGISTERED

## 2020-01-01 PROCEDURE — 92950 HEART/LUNG RESUSCITATION CPR: CPT

## 2020-01-01 PROCEDURE — 99231 SBSQ HOSP IP/OBS SF/LOW 25: CPT | Performed by: NURSE PRACTITIONER

## 2020-01-01 PROCEDURE — 80076 HEPATIC FUNCTION PANEL: CPT

## 2020-01-01 PROCEDURE — 94002 VENT MGMT INPAT INIT DAY: CPT

## 2020-01-01 PROCEDURE — 82042 OTHER SOURCE ALBUMIN QUAN EA: CPT

## 2020-01-01 PROCEDURE — 80074 ACUTE HEPATITIS PANEL: CPT

## 2020-01-01 PROCEDURE — 6360000002 HC RX W HCPCS: Performed by: EMERGENCY MEDICINE

## 2020-01-01 PROCEDURE — 36556 INSERT NON-TUNNEL CV CATH: CPT | Performed by: INTERNAL MEDICINE

## 2020-01-01 PROCEDURE — 6370000000 HC RX 637 (ALT 250 FOR IP)

## 2020-01-01 PROCEDURE — 2000000000 HC ICU R&B

## 2020-01-01 PROCEDURE — 0JPT3XZ REMOVAL OF TUNNELED VASCULAR ACCESS DEVICE FROM TRUNK SUBCUTANEOUS TISSUE AND FASCIA, PERCUTANEOUS APPROACH: ICD-10-PCS | Performed by: SURGERY

## 2020-01-01 PROCEDURE — 3600000002 HC SURGERY LEVEL 2 BASE: Performed by: SURGERY

## 2020-01-01 PROCEDURE — P9047 ALBUMIN (HUMAN), 25%, 50ML: HCPCS | Performed by: INTERNAL MEDICINE

## 2020-01-01 PROCEDURE — 74176 CT ABD & PELVIS W/O CONTRAST: CPT

## 2020-01-01 PROCEDURE — 5A1935Z RESPIRATORY VENTILATION, LESS THAN 24 CONSECUTIVE HOURS: ICD-10-PCS | Performed by: STUDENT IN AN ORGANIZED HEALTH CARE EDUCATION/TRAINING PROGRAM

## 2020-01-01 PROCEDURE — 83986 ASSAY PH BODY FLUID NOS: CPT

## 2020-01-01 PROCEDURE — 3700000000 HC ANESTHESIA ATTENDED CARE: Performed by: SURGERY

## 2020-01-01 PROCEDURE — 99222 1ST HOSP IP/OBS MODERATE 55: CPT | Performed by: SURGERY

## 2020-01-01 PROCEDURE — 02HV33Z INSERTION OF INFUSION DEVICE INTO SUPERIOR VENA CAVA, PERCUTANEOUS APPROACH: ICD-10-PCS | Performed by: RADIOLOGY

## 2020-01-01 PROCEDURE — 2709999900 HC NON-CHARGEABLE SUPPLY: Performed by: SURGERY

## 2020-01-01 PROCEDURE — 87102 FUNGUS ISOLATION CULTURE: CPT

## 2020-01-01 PROCEDURE — 84295 ASSAY OF SERUM SODIUM: CPT

## 2020-01-01 PROCEDURE — 2500000003 HC RX 250 WO HCPCS: Performed by: INTERNAL MEDICINE

## 2020-01-01 RX ORDER — 0.9 % SODIUM CHLORIDE 0.9 %
1000 INTRAVENOUS SOLUTION INTRAVENOUS ONCE
Status: COMPLETED | OUTPATIENT
Start: 2020-01-01 | End: 2020-01-01

## 2020-01-01 RX ORDER — INSULIN LISPRO 100 [IU]/ML
0-6 INJECTION, SOLUTION INTRAVENOUS; SUBCUTANEOUS NIGHTLY
Status: DISCONTINUED | OUTPATIENT
Start: 2020-01-01 | End: 2020-01-01

## 2020-01-01 RX ORDER — POTASSIUM CHLORIDE 20 MEQ/1
40 TABLET, EXTENDED RELEASE ORAL ONCE
Status: DISCONTINUED | OUTPATIENT
Start: 2020-01-01 | End: 2020-01-01

## 2020-01-01 RX ORDER — INSULIN LISPRO 100 [IU]/ML
0-18 INJECTION, SOLUTION INTRAVENOUS; SUBCUTANEOUS
Status: DISCONTINUED | OUTPATIENT
Start: 2020-01-01 | End: 2020-01-01

## 2020-01-01 RX ORDER — PROMETHAZINE HYDROCHLORIDE 25 MG/1
12.5 TABLET ORAL EVERY 6 HOURS PRN
Status: DISCONTINUED | OUTPATIENT
Start: 2020-01-01 | End: 2020-01-01

## 2020-01-01 RX ORDER — CASTOR OIL AND BALSAM, PERU 788; 87 MG/G; MG/G
OINTMENT TOPICAL 2 TIMES DAILY
Status: DISCONTINUED | OUTPATIENT
Start: 2020-01-01 | End: 2020-01-01

## 2020-01-01 RX ORDER — LIDOCAINE HYDROCHLORIDE 10 MG/ML
INJECTION, SOLUTION EPIDURAL; INFILTRATION; INTRACAUDAL; PERINEURAL PRN
Status: DISCONTINUED | OUTPATIENT
Start: 2020-01-01 | End: 2020-01-01 | Stop reason: ALTCHOICE

## 2020-01-01 RX ORDER — POTASSIUM CHLORIDE 20 MEQ/1
20 TABLET, EXTENDED RELEASE ORAL ONCE
Status: COMPLETED | OUTPATIENT
Start: 2020-01-01 | End: 2020-01-01

## 2020-01-01 RX ORDER — INSULIN LISPRO 100 [IU]/ML
5 INJECTION, SOLUTION INTRAVENOUS; SUBCUTANEOUS
Status: DISCONTINUED | OUTPATIENT
Start: 2020-01-01 | End: 2020-01-01

## 2020-01-01 RX ORDER — SODIUM CHLORIDE 9 MG/ML
INJECTION, SOLUTION INTRAVENOUS
Status: DISCONTINUED
Start: 2020-01-01 | End: 2020-01-01

## 2020-01-01 RX ORDER — INSULIN LISPRO 100 [IU]/ML
0-18 INJECTION, SOLUTION INTRAVENOUS; SUBCUTANEOUS EVERY 4 HOURS
Status: DISCONTINUED | OUTPATIENT
Start: 2020-01-01 | End: 2020-01-01

## 2020-01-01 RX ORDER — POTASSIUM CHLORIDE 7.45 MG/ML
10 INJECTION INTRAVENOUS
Status: COMPLETED | OUTPATIENT
Start: 2020-01-01 | End: 2020-01-01

## 2020-01-01 RX ORDER — CLOPIDOGREL BISULFATE 75 MG/1
75 TABLET ORAL DAILY
Status: DISCONTINUED | OUTPATIENT
Start: 2020-01-01 | End: 2020-01-01

## 2020-01-01 RX ORDER — POLYETHYLENE GLYCOL 3350 17 G/17G
17 POWDER, FOR SOLUTION ORAL DAILY
COMMUNITY

## 2020-01-01 RX ORDER — DILTIAZEM HYDROCHLORIDE 5 MG/ML
10 INJECTION INTRAVENOUS ONCE
Status: COMPLETED | OUTPATIENT
Start: 2020-01-01 | End: 2020-01-01

## 2020-01-01 RX ORDER — POTASSIUM CHLORIDE 29.8 MG/ML
20 INJECTION INTRAVENOUS
Status: COMPLETED | OUTPATIENT
Start: 2020-01-01 | End: 2020-01-01

## 2020-01-01 RX ORDER — 0.9 % SODIUM CHLORIDE 0.9 %
20 INTRAVENOUS SOLUTION INTRAVENOUS ONCE
Status: DISCONTINUED | OUTPATIENT
Start: 2020-01-01 | End: 2020-01-01

## 2020-01-01 RX ORDER — INSULIN LISPRO 100 [IU]/ML
0-12 INJECTION, SOLUTION INTRAVENOUS; SUBCUTANEOUS
Status: DISCONTINUED | OUTPATIENT
Start: 2020-01-01 | End: 2020-01-01

## 2020-01-01 RX ORDER — ACETAMINOPHEN 325 MG/1
650 TABLET ORAL EVERY 6 HOURS PRN
Status: DISCONTINUED | OUTPATIENT
Start: 2020-01-01 | End: 2020-01-01

## 2020-01-01 RX ORDER — HEPARIN SODIUM 5000 [USP'U]/ML
5000 INJECTION, SOLUTION INTRAVENOUS; SUBCUTANEOUS EVERY 8 HOURS SCHEDULED
Status: DISCONTINUED | OUTPATIENT
Start: 2020-01-01 | End: 2020-01-01

## 2020-01-01 RX ORDER — MORPHINE SULFATE 2 MG/ML
INJECTION, SOLUTION INTRAMUSCULAR; INTRAVENOUS
Status: COMPLETED
Start: 2020-01-01 | End: 2020-01-01

## 2020-01-01 RX ORDER — BISACODYL 10 MG
10 SUPPOSITORY, RECTAL RECTAL DAILY PRN
COMMUNITY

## 2020-01-01 RX ORDER — SEVELAMER CARBONATE FOR ORAL SUSPENSION 800 MG/1
0.8 POWDER, FOR SUSPENSION ORAL
Status: DISCONTINUED | OUTPATIENT
Start: 2020-01-01 | End: 2020-01-01

## 2020-01-01 RX ORDER — SCOLOPAMINE TRANSDERMAL SYSTEM 1 MG/1
1 PATCH, EXTENDED RELEASE TRANSDERMAL
Status: DISCONTINUED | OUTPATIENT
Start: 2020-01-01 | End: 2020-01-01 | Stop reason: HOSPADM

## 2020-01-01 RX ORDER — SODIUM PHOSPHATE, DIBASIC AND SODIUM PHOSPHATE, MONOBASIC 7; 19 G/133ML; G/133ML
1 ENEMA RECTAL DAILY PRN
COMMUNITY

## 2020-01-01 RX ORDER — CINACALCET 30 MG/1
60 TABLET, FILM COATED ORAL DAILY
Status: DISCONTINUED | OUTPATIENT
Start: 2020-01-01 | End: 2020-01-01

## 2020-01-01 RX ORDER — ALBUMIN (HUMAN) 12.5 G/50ML
25 SOLUTION INTRAVENOUS ONCE
Status: COMPLETED | OUTPATIENT
Start: 2020-01-01 | End: 2020-01-01

## 2020-01-01 RX ORDER — POTASSIUM CHLORIDE 750 MG/1
10 CAPSULE, EXTENDED RELEASE ORAL DAILY
COMMUNITY

## 2020-01-01 RX ORDER — ONDANSETRON 2 MG/ML
4 INJECTION INTRAMUSCULAR; INTRAVENOUS ONCE
Status: COMPLETED | OUTPATIENT
Start: 2020-01-01 | End: 2020-01-01

## 2020-01-01 RX ORDER — CLOPIDOGREL BISULFATE 75 MG/1
75 TABLET ORAL DAILY
COMMUNITY

## 2020-01-01 RX ORDER — SODIUM CHLORIDE 0.9 % (FLUSH) 0.9 %
10 SYRINGE (ML) INJECTION PRN
Status: DISCONTINUED | OUTPATIENT
Start: 2020-01-01 | End: 2020-01-01

## 2020-01-01 RX ORDER — ACETAMINOPHEN 650 MG/1
650 SUPPOSITORY RECTAL EVERY 6 HOURS PRN
Status: DISCONTINUED | OUTPATIENT
Start: 2020-01-01 | End: 2020-01-01

## 2020-01-01 RX ORDER — ACETAMINOPHEN 160 MG/5ML
650 SOLUTION ORAL EVERY 6 HOURS PRN
Status: DISCONTINUED | OUTPATIENT
Start: 2020-01-01 | End: 2020-01-01

## 2020-01-01 RX ORDER — MIDODRINE HYDROCHLORIDE 5 MG/1
10 TABLET ORAL
Status: DISCONTINUED | OUTPATIENT
Start: 2020-01-01 | End: 2020-01-01

## 2020-01-01 RX ORDER — NOREPINEPHRINE BITARTRATE 1 MG/ML
INJECTION, SOLUTION INTRAVENOUS
Status: DISCONTINUED
Start: 2020-01-01 | End: 2020-01-01

## 2020-01-01 RX ORDER — SODIUM CHLORIDE, SODIUM LACTATE, POTASSIUM CHLORIDE, AND CALCIUM CHLORIDE .6; .31; .03; .02 G/100ML; G/100ML; G/100ML; G/100ML
1000 INJECTION, SOLUTION INTRAVENOUS ONCE
Status: DISCONTINUED | OUTPATIENT
Start: 2020-01-01 | End: 2020-01-01

## 2020-01-01 RX ORDER — METOPROLOL TARTRATE 5 MG/5ML
INJECTION INTRAVENOUS
Status: COMPLETED
Start: 2020-01-01 | End: 2020-01-01

## 2020-01-01 RX ORDER — INSULIN LISPRO 100 [IU]/ML
0-3 INJECTION, SOLUTION INTRAVENOUS; SUBCUTANEOUS NIGHTLY
Status: DISCONTINUED | OUTPATIENT
Start: 2020-01-01 | End: 2020-01-01

## 2020-01-01 RX ORDER — INSULIN LISPRO 100 [IU]/ML
0-6 INJECTION, SOLUTION INTRAVENOUS; SUBCUTANEOUS
Status: DISCONTINUED | OUTPATIENT
Start: 2020-01-01 | End: 2020-01-01

## 2020-01-01 RX ORDER — MAGNESIUM SULFATE 1 G/100ML
1 INJECTION INTRAVENOUS
Status: COMPLETED | OUTPATIENT
Start: 2020-01-01 | End: 2020-01-01

## 2020-01-01 RX ORDER — SEVELAMER CARBONATE 800 MG/1
1600 TABLET, FILM COATED ORAL
Status: DISCONTINUED | OUTPATIENT
Start: 2020-01-01 | End: 2020-01-01

## 2020-01-01 RX ORDER — LANTHANUM CARBONATE 500 MG/1
1000 TABLET, CHEWABLE ORAL
Status: DISCONTINUED | OUTPATIENT
Start: 2020-01-01 | End: 2020-01-01

## 2020-01-01 RX ORDER — ATORVASTATIN CALCIUM 10 MG/1
10 TABLET, FILM COATED ORAL DAILY
Status: DISCONTINUED | OUTPATIENT
Start: 2020-01-01 | End: 2020-01-01

## 2020-01-01 RX ORDER — SODIUM CHLORIDE, SODIUM LACTATE, CALCIUM CHLORIDE, MAGNESIUM CHLORIDE AND DEXTROSE 2.5; 538; 448; 25.7; 5.08 G/100ML; MG/100ML; MG/100ML; MG/100ML; MG/100ML
INJECTION, SOLUTION INTRAPERITONEAL DAILY
Status: DISCONTINUED | OUTPATIENT
Start: 2020-01-01 | End: 2020-01-01

## 2020-01-01 RX ORDER — POTASSIUM CHLORIDE 7.45 MG/ML
10 INJECTION INTRAVENOUS
Status: DISCONTINUED | OUTPATIENT
Start: 2020-01-01 | End: 2020-01-01

## 2020-01-01 RX ORDER — MAGNESIUM SULFATE IN WATER 40 MG/ML
4 INJECTION, SOLUTION INTRAVENOUS ONCE
Status: COMPLETED | OUTPATIENT
Start: 2020-01-01 | End: 2020-01-01

## 2020-01-01 RX ORDER — MIDODRINE HYDROCHLORIDE 5 MG/1
5 TABLET ORAL
Status: DISCONTINUED | OUTPATIENT
Start: 2020-01-01 | End: 2020-01-01

## 2020-01-01 RX ORDER — SODIUM CHLORIDE 9 MG/ML
INJECTION, SOLUTION INTRAVENOUS CONTINUOUS PRN
Status: DISCONTINUED | OUTPATIENT
Start: 2020-01-01 | End: 2020-01-01 | Stop reason: SDUPTHER

## 2020-01-01 RX ORDER — INSULIN LISPRO 100 [IU]/ML
0-9 INJECTION, SOLUTION INTRAVENOUS; SUBCUTANEOUS NIGHTLY
Status: DISCONTINUED | OUTPATIENT
Start: 2020-01-01 | End: 2020-01-01

## 2020-01-01 RX ORDER — LORAZEPAM 2 MG/ML
1 INJECTION INTRAMUSCULAR EVERY 6 HOURS PRN
Status: DISCONTINUED | OUTPATIENT
Start: 2020-01-01 | End: 2020-01-01 | Stop reason: HOSPADM

## 2020-01-01 RX ORDER — MAGNESIUM CARB/ALUMINUM HYDROX 105-160MG
30 TABLET,CHEWABLE ORAL ONCE
Status: COMPLETED | OUTPATIENT
Start: 2020-01-01 | End: 2020-01-01

## 2020-01-01 RX ORDER — HYDRALAZINE HYDROCHLORIDE 20 MG/ML
5 INJECTION INTRAMUSCULAR; INTRAVENOUS EVERY 10 MIN PRN
Status: DISCONTINUED | OUTPATIENT
Start: 2020-01-01 | End: 2020-01-01

## 2020-01-01 RX ORDER — ONDANSETRON 2 MG/ML
4 INJECTION INTRAMUSCULAR; INTRAVENOUS
Status: DISCONTINUED | OUTPATIENT
Start: 2020-01-01 | End: 2020-01-01

## 2020-01-01 RX ORDER — DEXTROSE MONOHYDRATE 50 MG/ML
INJECTION, SOLUTION INTRAVENOUS CONTINUOUS
Status: DISCONTINUED | OUTPATIENT
Start: 2020-01-01 | End: 2020-01-01

## 2020-01-01 RX ORDER — POLYETHYLENE GLYCOL 3350 17 G/17G
17 POWDER, FOR SOLUTION ORAL DAILY PRN
Status: DISCONTINUED | OUTPATIENT
Start: 2020-01-01 | End: 2020-01-01

## 2020-01-01 RX ORDER — POLYETHYLENE GLYCOL 3350 17 G/17G
17 POWDER, FOR SOLUTION ORAL 2 TIMES DAILY
Status: DISCONTINUED | OUTPATIENT
Start: 2020-01-01 | End: 2020-01-01

## 2020-01-01 RX ORDER — ASPIRIN 81 MG/1
81 TABLET ORAL DAILY
Status: DISCONTINUED | OUTPATIENT
Start: 2020-01-01 | End: 2020-01-01

## 2020-01-01 RX ORDER — MIDODRINE HYDROCHLORIDE 10 MG/1
10 TABLET ORAL 3 TIMES DAILY
COMMUNITY

## 2020-01-01 RX ORDER — HYDROCODONE BITARTRATE AND ACETAMINOPHEN 5; 325 MG/1; MG/1
1 TABLET ORAL
Status: DISCONTINUED | OUTPATIENT
Start: 2020-01-01 | End: 2020-01-01

## 2020-01-01 RX ORDER — ONDANSETRON 2 MG/ML
4 INJECTION INTRAMUSCULAR; INTRAVENOUS EVERY 6 HOURS PRN
Status: DISCONTINUED | OUTPATIENT
Start: 2020-01-01 | End: 2020-01-01

## 2020-01-01 RX ORDER — DIPHENHYDRAMINE HYDROCHLORIDE 50 MG/ML
12.5 INJECTION INTRAMUSCULAR; INTRAVENOUS
Status: DISCONTINUED | OUTPATIENT
Start: 2020-01-01 | End: 2020-01-01

## 2020-01-01 RX ORDER — PROCHLORPERAZINE EDISYLATE 5 MG/ML
5 INJECTION INTRAMUSCULAR; INTRAVENOUS
Status: DISCONTINUED | OUTPATIENT
Start: 2020-01-01 | End: 2020-01-01

## 2020-01-01 RX ORDER — 0.9 % SODIUM CHLORIDE 0.9 %
500 INTRAVENOUS SOLUTION INTRAVENOUS
Status: DISCONTINUED | OUTPATIENT
Start: 2020-01-01 | End: 2020-01-01

## 2020-01-01 RX ORDER — DEXTROSE AND POTASSIUM CHLORIDE 5; .15 G/100ML; G/100ML
SOLUTION INTRAVENOUS CONTINUOUS
Status: DISCONTINUED | OUTPATIENT
Start: 2020-01-01 | End: 2020-01-01

## 2020-01-01 RX ORDER — 0.9 % SODIUM CHLORIDE 0.9 %
500 INTRAVENOUS SOLUTION INTRAVENOUS ONCE
Status: COMPLETED | OUTPATIENT
Start: 2020-01-01 | End: 2020-01-01

## 2020-01-01 RX ORDER — METOPROLOL TARTRATE 5 MG/5ML
5 INJECTION INTRAVENOUS
Status: COMPLETED | OUTPATIENT
Start: 2020-01-01 | End: 2020-01-01

## 2020-01-01 RX ORDER — SODIUM CHLORIDE 1000 MG
1 TABLET, SOLUBLE MISCELLANEOUS 2 TIMES DAILY WITH MEALS
Status: DISCONTINUED | OUTPATIENT
Start: 2020-01-01 | End: 2020-01-01

## 2020-01-01 RX ORDER — DEXTROSE MONOHYDRATE 50 MG/ML
INJECTION, SOLUTION INTRAVENOUS
Status: DISCONTINUED
Start: 2020-01-01 | End: 2020-01-01

## 2020-01-01 RX ORDER — PANTOPRAZOLE SODIUM 40 MG/10ML
40 INJECTION, POWDER, LYOPHILIZED, FOR SOLUTION INTRAVENOUS 2 TIMES DAILY
Status: DISCONTINUED | OUTPATIENT
Start: 2020-01-01 | End: 2020-01-01

## 2020-01-01 RX ORDER — DEXTROSE MONOHYDRATE 50 MG/ML
100 INJECTION, SOLUTION INTRAVENOUS PRN
Status: DISCONTINUED | OUTPATIENT
Start: 2020-01-01 | End: 2020-01-01

## 2020-01-01 RX ORDER — ASPIRIN 81 MG/1
81 TABLET, CHEWABLE ORAL DAILY
Status: DISCONTINUED | OUTPATIENT
Start: 2020-01-01 | End: 2020-01-01

## 2020-01-01 RX ORDER — MORPHINE SULFATE 2 MG/ML
2 INJECTION, SOLUTION INTRAMUSCULAR; INTRAVENOUS
Status: DISCONTINUED | OUTPATIENT
Start: 2020-01-01 | End: 2020-01-01 | Stop reason: HOSPADM

## 2020-01-01 RX ORDER — INSULIN LISPRO 100 [IU]/ML
0-12 INJECTION, SOLUTION INTRAVENOUS; SUBCUTANEOUS EVERY 4 HOURS
Status: DISCONTINUED | OUTPATIENT
Start: 2020-01-01 | End: 2020-01-01

## 2020-01-01 RX ORDER — INSULIN GLARGINE 100 [IU]/ML
6 INJECTION, SOLUTION SUBCUTANEOUS NIGHTLY
COMMUNITY

## 2020-01-01 RX ORDER — NICOTINE POLACRILEX 4 MG
15 LOZENGE BUCCAL PRN
Status: DISCONTINUED | OUTPATIENT
Start: 2020-01-01 | End: 2020-01-01

## 2020-01-01 RX ORDER — INSULIN LISPRO 100 [IU]/ML
3 INJECTION, SOLUTION INTRAVENOUS; SUBCUTANEOUS ONCE
Status: COMPLETED | OUTPATIENT
Start: 2020-01-01 | End: 2020-01-01

## 2020-01-01 RX ORDER — SODIUM CHLORIDE, SODIUM LACTATE, POTASSIUM CHLORIDE, CALCIUM CHLORIDE 600; 310; 30; 20 MG/100ML; MG/100ML; MG/100ML; MG/100ML
INJECTION, SOLUTION INTRAVENOUS CONTINUOUS
Status: DISCONTINUED | OUTPATIENT
Start: 2020-01-01 | End: 2020-01-01

## 2020-01-01 RX ORDER — ONDANSETRON 2 MG/ML
4 INJECTION INTRAMUSCULAR; INTRAVENOUS EVERY 6 HOURS PRN
Status: DISCONTINUED | OUTPATIENT
Start: 2020-01-01 | End: 2020-01-01 | Stop reason: HOSPADM

## 2020-01-01 RX ORDER — SODIUM CHLORIDE 0.9 % (FLUSH) 0.9 %
10 SYRINGE (ML) INJECTION EVERY 12 HOURS SCHEDULED
Status: DISCONTINUED | OUTPATIENT
Start: 2020-01-01 | End: 2020-01-01

## 2020-01-01 RX ORDER — SODIUM CHLORIDE, SODIUM LACTATE, CALCIUM CHLORIDE, MAGNESIUM CHLORIDE AND DEXTROSE 2.5; 538; 448; 25.7; 5.08 G/100ML; MG/100ML; MG/100ML; MG/100ML; MG/100ML
2200 INJECTION, SOLUTION INTRAPERITONEAL DAILY
Status: DISCONTINUED | OUTPATIENT
Start: 2020-01-01 | End: 2020-01-01 | Stop reason: CLARIF

## 2020-01-01 RX ORDER — DEXTROSE MONOHYDRATE 25 G/50ML
12.5 INJECTION, SOLUTION INTRAVENOUS PRN
Status: DISCONTINUED | OUTPATIENT
Start: 2020-01-01 | End: 2020-01-01

## 2020-01-01 RX ORDER — DEXTROSE MONOHYDRATE 25 G/50ML
50 INJECTION, SOLUTION INTRAVENOUS PRN
Status: DISCONTINUED | OUTPATIENT
Start: 2020-01-01 | End: 2020-01-01 | Stop reason: SDUPTHER

## 2020-01-01 RX ORDER — BISACODYL 10 MG
10 SUPPOSITORY, RECTAL RECTAL DAILY PRN
Status: DISCONTINUED | OUTPATIENT
Start: 2020-01-01 | End: 2020-01-01

## 2020-01-01 RX ORDER — INSULIN LISPRO 100 [IU]/ML
7 INJECTION, SOLUTION INTRAVENOUS; SUBCUTANEOUS ONCE
Status: DISCONTINUED | OUTPATIENT
Start: 2020-01-01 | End: 2020-01-01

## 2020-01-01 RX ORDER — 0.9 % SODIUM CHLORIDE 0.9 %
250 INTRAVENOUS SOLUTION INTRAVENOUS ONCE
Status: DISCONTINUED | OUTPATIENT
Start: 2020-01-01 | End: 2020-01-01

## 2020-01-01 RX ORDER — PROCHLORPERAZINE EDISYLATE 5 MG/ML
10 INJECTION INTRAMUSCULAR; INTRAVENOUS EVERY 6 HOURS PRN
Status: DISCONTINUED | OUTPATIENT
Start: 2020-01-01 | End: 2020-01-01

## 2020-01-01 RX ORDER — MEPERIDINE HYDROCHLORIDE 25 MG/ML
12.5 INJECTION INTRAMUSCULAR; INTRAVENOUS; SUBCUTANEOUS EVERY 5 MIN PRN
Status: DISCONTINUED | OUTPATIENT
Start: 2020-01-01 | End: 2020-01-01

## 2020-01-01 RX ORDER — MAGNESIUM SULFATE IN WATER 40 MG/ML
2 INJECTION, SOLUTION INTRAVENOUS ONCE
Status: COMPLETED | OUTPATIENT
Start: 2020-01-01 | End: 2020-01-01

## 2020-01-01 RX ADMIN — INSULIN LISPRO 2 UNITS: 100 INJECTION, SOLUTION INTRAVENOUS; SUBCUTANEOUS at 02:39

## 2020-01-01 RX ADMIN — ATORVASTATIN CALCIUM 10 MG: 10 TABLET, FILM COATED ORAL at 08:14

## 2020-01-01 RX ADMIN — HEPARIN SODIUM 5000 UNITS: 5000 INJECTION INTRAVENOUS; SUBCUTANEOUS at 05:22

## 2020-01-01 RX ADMIN — VANCOMYCIN HYDROCHLORIDE 1000 MG: 10 INJECTION, POWDER, LYOPHILIZED, FOR SOLUTION INTRAVENOUS at 12:01

## 2020-01-01 RX ADMIN — CASTOR OIL AND BALSAM, PERU: 788; 87 OINTMENT TOPICAL at 07:41

## 2020-01-01 RX ADMIN — METOPROLOL TARTRATE 12.5 MG: 25 TABLET, FILM COATED ORAL at 16:01

## 2020-01-01 RX ADMIN — MIDODRINE HYDROCHLORIDE 10 MG: 5 TABLET ORAL at 09:05

## 2020-01-01 RX ADMIN — MIDODRINE HYDROCHLORIDE 10 MG: 5 TABLET ORAL at 09:12

## 2020-01-01 RX ADMIN — MIDODRINE HYDROCHLORIDE 10 MG: 5 TABLET ORAL at 20:50

## 2020-01-01 RX ADMIN — DEXTROSE MONOHYDRATE: 50 INJECTION, SOLUTION INTRAVENOUS at 17:44

## 2020-01-01 RX ADMIN — ALBUMIN (HUMAN) 25 G: 0.25 INJECTION, SOLUTION INTRAVENOUS at 18:50

## 2020-01-01 RX ADMIN — MEROPENEM 500 MG: 500 INJECTION, POWDER, FOR SOLUTION INTRAVENOUS at 11:55

## 2020-01-01 RX ADMIN — DILTIAZEM HYDROCHLORIDE 15 MG: 30 TABLET, FILM COATED ORAL at 22:09

## 2020-01-01 RX ADMIN — MIDODRINE HYDROCHLORIDE 10 MG: 5 TABLET ORAL at 16:01

## 2020-01-01 RX ADMIN — INSULIN LISPRO 6 UNITS: 100 INJECTION, SOLUTION INTRAVENOUS; SUBCUTANEOUS at 13:42

## 2020-01-01 RX ADMIN — SODIUM CHLORIDE 500 ML: 9 INJECTION, SOLUTION INTRAVENOUS at 18:50

## 2020-01-01 RX ADMIN — CASTOR OIL AND BALSAM, PERU: 788; 87 OINTMENT TOPICAL at 20:57

## 2020-01-01 RX ADMIN — HEPARIN SODIUM 5000 UNITS: 5000 INJECTION INTRAVENOUS; SUBCUTANEOUS at 13:42

## 2020-01-01 RX ADMIN — DEXTROSE 50 % IN WATER (D50W) INTRAVENOUS SYRINGE 25 G: at 17:34

## 2020-01-01 RX ADMIN — MIDODRINE HYDROCHLORIDE 5 MG: 5 TABLET ORAL at 12:04

## 2020-01-01 RX ADMIN — DILTIAZEM HYDROCHLORIDE 15 MG: 30 TABLET, FILM COATED ORAL at 11:40

## 2020-01-01 RX ADMIN — MIDODRINE HYDROCHLORIDE 10 MG: 5 TABLET ORAL at 16:15

## 2020-01-01 RX ADMIN — DEXTROSE MONOHYDRATE 2 MCG/MIN: 50 INJECTION, SOLUTION INTRAVENOUS at 02:51

## 2020-01-01 RX ADMIN — CASTOR OIL AND BALSAM, PERU: 788; 87 OINTMENT TOPICAL at 22:25

## 2020-01-01 RX ADMIN — MIDODRINE HYDROCHLORIDE 5 MG: 5 TABLET ORAL at 08:13

## 2020-01-01 RX ADMIN — Medication 10 ML: at 20:52

## 2020-01-01 RX ADMIN — DILTIAZEM HYDROCHLORIDE 15 MG: 30 TABLET, FILM COATED ORAL at 14:13

## 2020-01-01 RX ADMIN — HEPARIN SODIUM: 1000 INJECTION INTRAVENOUS; SUBCUTANEOUS at 19:47

## 2020-01-01 RX ADMIN — DILTIAZEM HYDROCHLORIDE 15 MG: 30 TABLET, FILM COATED ORAL at 22:25

## 2020-01-01 RX ADMIN — MEROPENEM 500 MG: 500 INJECTION, POWDER, FOR SOLUTION INTRAVENOUS at 10:53

## 2020-01-01 RX ADMIN — METOPROLOL TARTRATE 12.5 MG: 25 TABLET, FILM COATED ORAL at 21:57

## 2020-01-01 RX ADMIN — HEPARIN SODIUM: 1000 INJECTION INTRAVENOUS; SUBCUTANEOUS at 18:10

## 2020-01-01 RX ADMIN — AMIODARONE HYDROCHLORIDE 150 MG: 50 INJECTION, SOLUTION INTRAVENOUS at 20:09

## 2020-01-01 RX ADMIN — INSULIN LISPRO 3 UNITS: 100 INJECTION, SOLUTION INTRAVENOUS; SUBCUTANEOUS at 14:23

## 2020-01-01 RX ADMIN — DEXTROSE 50 % IN WATER (D50W) INTRAVENOUS SYRINGE 12.5 G: at 11:25

## 2020-01-01 RX ADMIN — MIDODRINE HYDROCHLORIDE 5 MG: 5 TABLET ORAL at 11:51

## 2020-01-01 RX ADMIN — CASTOR OIL AND BALSAM, PERU: 788; 87 OINTMENT TOPICAL at 09:59

## 2020-01-01 RX ADMIN — POLYETHYLENE GLYCOL (3350) 17 G: 17 POWDER, FOR SOLUTION ORAL at 13:59

## 2020-01-01 RX ADMIN — ATORVASTATIN CALCIUM 10 MG: 10 TABLET, FILM COATED ORAL at 09:05

## 2020-01-01 RX ADMIN — INSULIN LISPRO 10 UNITS: 100 INJECTION, SOLUTION INTRAVENOUS; SUBCUTANEOUS at 09:22

## 2020-01-01 RX ADMIN — DILTIAZEM HYDROCHLORIDE 15 MG: 30 TABLET, FILM COATED ORAL at 01:38

## 2020-01-01 RX ADMIN — MIDODRINE HYDROCHLORIDE 10 MG: 5 TABLET ORAL at 13:15

## 2020-01-01 RX ADMIN — HEPARIN SODIUM: 1000 INJECTION INTRAVENOUS; SUBCUTANEOUS at 19:43

## 2020-01-01 RX ADMIN — CASTOR OIL AND BALSAM, PERU: 788; 87 OINTMENT TOPICAL at 23:24

## 2020-01-01 RX ADMIN — HEPARIN SODIUM 5000 UNITS: 5000 INJECTION INTRAVENOUS; SUBCUTANEOUS at 22:09

## 2020-01-01 RX ADMIN — ASPIRIN 81 MG: 81 TABLET, COATED ORAL at 09:38

## 2020-01-01 RX ADMIN — POTASSIUM CHLORIDE 20 MEQ: 29.8 INJECTION, SOLUTION INTRAVENOUS at 08:59

## 2020-01-01 RX ADMIN — POLYETHYLENE GLYCOL (3350) 17 G: 17 POWDER, FOR SOLUTION ORAL at 08:17

## 2020-01-01 RX ADMIN — Medication 10 ML: at 09:12

## 2020-01-01 RX ADMIN — DEXTROSE 50 % IN WATER (D50W) INTRAVENOUS SYRINGE 12.5 G: at 17:05

## 2020-01-01 RX ADMIN — SEVELAMER CARBONATE 800 MG: 800 TABLET, FILM COATED ORAL at 09:00

## 2020-01-01 RX ADMIN — INSULIN GLARGINE 15 UNITS: 100 INJECTION, SOLUTION SUBCUTANEOUS at 06:28

## 2020-01-01 RX ADMIN — MEROPENEM 500 MG: 500 INJECTION, POWDER, FOR SOLUTION INTRAVENOUS at 11:30

## 2020-01-01 RX ADMIN — CLOPIDOGREL BISULFATE 75 MG: 75 TABLET ORAL at 08:55

## 2020-01-01 RX ADMIN — POLYETHYLENE GLYCOL (3350) 17 G: 17 POWDER, FOR SOLUTION ORAL at 20:57

## 2020-01-01 RX ADMIN — DILTIAZEM HYDROCHLORIDE 15 MG: 30 TABLET, FILM COATED ORAL at 02:39

## 2020-01-01 RX ADMIN — MIDODRINE HYDROCHLORIDE 10 MG: 5 TABLET ORAL at 07:43

## 2020-01-01 RX ADMIN — HEPARIN SODIUM 5000 UNITS: 5000 INJECTION INTRAVENOUS; SUBCUTANEOUS at 13:36

## 2020-01-01 RX ADMIN — DILTIAZEM HYDROCHLORIDE 15 MG: 30 TABLET, FILM COATED ORAL at 06:26

## 2020-01-01 RX ADMIN — DILTIAZEM HYDROCHLORIDE 15 MG: 30 TABLET, FILM COATED ORAL at 11:38

## 2020-01-01 RX ADMIN — DILTIAZEM HYDROCHLORIDE 15 MG: 30 TABLET, FILM COATED ORAL at 13:56

## 2020-01-01 RX ADMIN — PROCHLORPERAZINE EDISYLATE 10 MG: 5 INJECTION INTRAMUSCULAR; INTRAVENOUS at 07:26

## 2020-01-01 RX ADMIN — CINACALCET 60 MG: 30 TABLET, FILM COATED ORAL at 09:38

## 2020-01-01 RX ADMIN — CASTOR OIL AND BALSAM, PERU: 788; 87 OINTMENT TOPICAL at 22:39

## 2020-01-01 RX ADMIN — INSULIN GLARGINE 6 UNITS: 100 INJECTION, SOLUTION SUBCUTANEOUS at 23:55

## 2020-01-01 RX ADMIN — ATORVASTATIN CALCIUM 10 MG: 10 TABLET, FILM COATED ORAL at 10:18

## 2020-01-01 RX ADMIN — MORPHINE SULFATE 2 MG: 2 INJECTION, SOLUTION INTRAMUSCULAR; INTRAVENOUS at 14:16

## 2020-01-01 RX ADMIN — MINERAL OIL 30 ML: 1000 SOLUTION ORAL at 12:02

## 2020-01-01 RX ADMIN — VANCOMYCIN HYDROCHLORIDE 1500 MG: 10 INJECTION, POWDER, LYOPHILIZED, FOR SOLUTION INTRAVENOUS at 19:32

## 2020-01-01 RX ADMIN — INSULIN LISPRO 8 UNITS: 100 INJECTION, SOLUTION INTRAVENOUS; SUBCUTANEOUS at 09:54

## 2020-01-01 RX ADMIN — Medication 10 ML: at 22:26

## 2020-01-01 RX ADMIN — INSULIN LISPRO 2 UNITS: 100 INJECTION, SOLUTION INTRAVENOUS; SUBCUTANEOUS at 23:55

## 2020-01-01 RX ADMIN — HEPARIN SODIUM 5000 UNITS: 5000 INJECTION INTRAVENOUS; SUBCUTANEOUS at 23:22

## 2020-01-01 RX ADMIN — ACETAMINOPHEN 650 MG: 325 TABLET ORAL at 22:17

## 2020-01-01 RX ADMIN — Medication 10 ML: at 08:56

## 2020-01-01 RX ADMIN — MIDODRINE HYDROCHLORIDE 10 MG: 5 TABLET ORAL at 13:38

## 2020-01-01 RX ADMIN — CASTOR OIL AND BALSAM, PERU: 788; 87 OINTMENT TOPICAL at 09:06

## 2020-01-01 RX ADMIN — ACETAMINOPHEN 650 MG: 325 TABLET ORAL at 17:43

## 2020-01-01 RX ADMIN — ATORVASTATIN CALCIUM 10 MG: 10 TABLET, FILM COATED ORAL at 13:37

## 2020-01-01 RX ADMIN — SEVELAMER CARBONATE 1600 MG: 800 TABLET, FILM COATED ORAL at 08:55

## 2020-01-01 RX ADMIN — HEPARIN SODIUM 5000 UNITS: 5000 INJECTION INTRAVENOUS; SUBCUTANEOUS at 06:31

## 2020-01-01 RX ADMIN — DEXTROSE MONOHYDRATE 0.5 MG/MIN: 50 INJECTION, SOLUTION INTRAVENOUS at 13:36

## 2020-01-01 RX ADMIN — SEVELAMER CARBONATE 0.8 G: 800 POWDER, FOR SUSPENSION ORAL at 12:43

## 2020-01-01 RX ADMIN — PROCHLORPERAZINE EDISYLATE 10 MG: 5 INJECTION INTRAMUSCULAR; INTRAVENOUS at 22:01

## 2020-01-01 RX ADMIN — MIDODRINE HYDROCHLORIDE 10 MG: 5 TABLET ORAL at 11:38

## 2020-01-01 RX ADMIN — POTASSIUM CHLORIDE 20 MEQ: 20 TABLET, EXTENDED RELEASE ORAL at 07:00

## 2020-01-01 RX ADMIN — POLYETHYLENE GLYCOL 3350 17 G: 17 POWDER, FOR SOLUTION ORAL at 20:52

## 2020-01-01 RX ADMIN — INSULIN LISPRO 1 UNITS: 100 INJECTION, SOLUTION INTRAVENOUS; SUBCUTANEOUS at 01:18

## 2020-01-01 RX ADMIN — MIDODRINE HYDROCHLORIDE 10 MG: 5 TABLET ORAL at 20:08

## 2020-01-01 RX ADMIN — MIDODRINE HYDROCHLORIDE 10 MG: 5 TABLET ORAL at 11:39

## 2020-01-01 RX ADMIN — MINERAL OIL 30 ML: 1000 SOLUTION ORAL at 10:29

## 2020-01-01 RX ADMIN — Medication 10 ML: at 20:07

## 2020-01-01 RX ADMIN — SEVELAMER CARBONATE 0.8 G: 800 POWDER, FOR SUSPENSION ORAL at 12:14

## 2020-01-01 RX ADMIN — INSULIN LISPRO 1 UNITS: 100 INJECTION, SOLUTION INTRAVENOUS; SUBCUTANEOUS at 23:22

## 2020-01-01 RX ADMIN — HEPARIN SODIUM 5000 UNITS: 5000 INJECTION INTRAVENOUS; SUBCUTANEOUS at 06:27

## 2020-01-01 RX ADMIN — DILTIAZEM HYDROCHLORIDE 15 MG: 30 TABLET, FILM COATED ORAL at 20:51

## 2020-01-01 RX ADMIN — SODIUM CHLORIDE 500 ML: 9 INJECTION, SOLUTION INTRAVENOUS at 03:20

## 2020-01-01 RX ADMIN — CEFEPIME HYDROCHLORIDE 1 G: 1 INJECTION, POWDER, FOR SOLUTION INTRAMUSCULAR; INTRAVENOUS at 18:03

## 2020-01-01 RX ADMIN — PROCHLORPERAZINE EDISYLATE 10 MG: 5 INJECTION INTRAMUSCULAR; INTRAVENOUS at 18:15

## 2020-01-01 RX ADMIN — DILTIAZEM HYDROCHLORIDE 15 MG: 30 TABLET, FILM COATED ORAL at 00:18

## 2020-01-01 RX ADMIN — ASPIRIN 81 MG: 81 TABLET, COATED ORAL at 09:51

## 2020-01-01 RX ADMIN — CALCIUM GLUCONATE 1 G: 98 INJECTION, SOLUTION INTRAVENOUS at 17:58

## 2020-01-01 RX ADMIN — INSULIN LISPRO 2 UNITS: 100 INJECTION, SOLUTION INTRAVENOUS; SUBCUTANEOUS at 18:39

## 2020-01-01 RX ADMIN — METOPROLOL TARTRATE 12.5 MG: 25 TABLET, FILM COATED ORAL at 09:50

## 2020-01-01 RX ADMIN — POTASSIUM CHLORIDE 20 MEQ: 29.8 INJECTION, SOLUTION INTRAVENOUS at 08:30

## 2020-01-01 RX ADMIN — MEROPENEM 1 G: 1 INJECTION, POWDER, FOR SOLUTION INTRAVENOUS at 13:03

## 2020-01-01 RX ADMIN — METOPROLOL TARTRATE 12.5 MG: 25 TABLET, FILM COATED ORAL at 01:40

## 2020-01-01 RX ADMIN — CASTOR OIL AND BALSAM, PERU: 788; 87 OINTMENT TOPICAL at 20:52

## 2020-01-01 RX ADMIN — SODIUM CHLORIDE: 900 INJECTION, SOLUTION INTRAVENOUS at 16:51

## 2020-01-01 RX ADMIN — Medication 10 ML: at 23:24

## 2020-01-01 RX ADMIN — MIDODRINE HYDROCHLORIDE 10 MG: 5 TABLET ORAL at 08:42

## 2020-01-01 RX ADMIN — DILTIAZEM HYDROCHLORIDE 10 MG: 5 INJECTION INTRAVENOUS at 19:00

## 2020-01-01 RX ADMIN — PROCHLORPERAZINE EDISYLATE 10 MG: 5 INJECTION INTRAMUSCULAR; INTRAVENOUS at 08:50

## 2020-01-01 RX ADMIN — POLYETHYLENE GLYCOL (3350) 17 G: 17 POWDER, FOR SOLUTION ORAL at 08:57

## 2020-01-01 RX ADMIN — DILTIAZEM HYDROCHLORIDE 15 MG: 30 TABLET, FILM COATED ORAL at 06:20

## 2020-01-01 RX ADMIN — HEPARIN SODIUM 5000 UNITS: 5000 INJECTION INTRAVENOUS; SUBCUTANEOUS at 05:19

## 2020-01-01 RX ADMIN — PROCHLORPERAZINE EDISYLATE 10 MG: 5 INJECTION INTRAMUSCULAR; INTRAVENOUS at 15:52

## 2020-01-01 RX ADMIN — Medication 10 ML: at 20:09

## 2020-01-01 RX ADMIN — ATORVASTATIN CALCIUM 10 MG: 10 TABLET, FILM COATED ORAL at 08:34

## 2020-01-01 RX ADMIN — LANTHANUM CARBONATE 1000 MG: 500 TABLET, CHEWABLE ORAL at 17:47

## 2020-01-01 RX ADMIN — LANTHANUM CARBONATE 1000 MG: 500 TABLET, CHEWABLE ORAL at 13:15

## 2020-01-01 RX ADMIN — MIDODRINE HYDROCHLORIDE 5 MG: 5 TABLET ORAL at 09:38

## 2020-01-01 RX ADMIN — POTASSIUM CHLORIDE 20 MEQ: 29.8 INJECTION, SOLUTION INTRAVENOUS at 07:30

## 2020-01-01 RX ADMIN — INSULIN LISPRO 6 UNITS: 100 INJECTION, SOLUTION INTRAVENOUS; SUBCUTANEOUS at 02:14

## 2020-01-01 RX ADMIN — DILTIAZEM HYDROCHLORIDE 5 MG/HR: 5 INJECTION INTRAVENOUS at 19:01

## 2020-01-01 RX ADMIN — ATORVASTATIN CALCIUM 10 MG: 10 TABLET, FILM COATED ORAL at 10:17

## 2020-01-01 RX ADMIN — POTASSIUM CHLORIDE 10 MEQ: 7.46 INJECTION, SOLUTION INTRAVENOUS at 10:41

## 2020-01-01 RX ADMIN — DILTIAZEM HYDROCHLORIDE 15 MG: 30 TABLET, FILM COATED ORAL at 00:36

## 2020-01-01 RX ADMIN — SEVELAMER CARBONATE 0.8 G: 800 POWDER, FOR SUSPENSION ORAL at 18:00

## 2020-01-01 RX ADMIN — ASPIRIN 81 MG: 81 TABLET, COATED ORAL at 08:55

## 2020-01-01 RX ADMIN — DILTIAZEM HYDROCHLORIDE 15 MG: 30 TABLET, FILM COATED ORAL at 09:39

## 2020-01-01 RX ADMIN — MIDODRINE HYDROCHLORIDE 10 MG: 5 TABLET ORAL at 12:43

## 2020-01-01 RX ADMIN — DEXTROSE MONOHYDRATE 0.5 MG/MIN: 50 INJECTION, SOLUTION INTRAVENOUS at 05:16

## 2020-01-01 RX ADMIN — CLOPIDOGREL BISULFATE 75 MG: 75 TABLET ORAL at 09:50

## 2020-01-01 RX ADMIN — Medication 10 ML: at 22:14

## 2020-01-01 RX ADMIN — HEPARIN SODIUM 5000 UNITS: 5000 INJECTION INTRAVENOUS; SUBCUTANEOUS at 15:11

## 2020-01-01 RX ADMIN — INSULIN GLARGINE 15 UNITS: 100 INJECTION, SOLUTION SUBCUTANEOUS at 06:27

## 2020-01-01 RX ADMIN — INSULIN LISPRO 1 UNITS: 100 INJECTION, SOLUTION INTRAVENOUS; SUBCUTANEOUS at 20:18

## 2020-01-01 RX ADMIN — ASPIRIN 81 MG: 81 TABLET, COATED ORAL at 08:38

## 2020-01-01 RX ADMIN — DEXTROSE 50 % IN WATER (D50W) INTRAVENOUS SYRINGE 12.5 G: at 20:19

## 2020-01-01 RX ADMIN — CLOPIDOGREL BISULFATE 75 MG: 75 TABLET ORAL at 09:20

## 2020-01-01 RX ADMIN — DEXTROSE MONOHYDRATE 1 MG/MIN: 50 INJECTION, SOLUTION INTRAVENOUS at 20:21

## 2020-01-01 RX ADMIN — LANTHANUM CARBONATE 1000 MG: 500 TABLET, CHEWABLE ORAL at 11:39

## 2020-01-01 RX ADMIN — MIDODRINE HYDROCHLORIDE 5 MG: 5 TABLET ORAL at 07:34

## 2020-01-01 RX ADMIN — POTASSIUM CHLORIDE 20 MEQ: 29.8 INJECTION, SOLUTION INTRAVENOUS at 09:30

## 2020-01-01 RX ADMIN — INSULIN LISPRO 8 UNITS: 100 INJECTION, SOLUTION INTRAVENOUS; SUBCUTANEOUS at 21:45

## 2020-01-01 RX ADMIN — CEFEPIME 1 G: 1 INJECTION, POWDER, FOR SOLUTION INTRAMUSCULAR; INTRAVENOUS at 18:58

## 2020-01-01 RX ADMIN — INSULIN GLARGINE 10 UNITS: 100 INJECTION, SOLUTION SUBCUTANEOUS at 23:23

## 2020-01-01 RX ADMIN — DILTIAZEM HYDROCHLORIDE 15 MG: 30 TABLET, FILM COATED ORAL at 14:06

## 2020-01-01 RX ADMIN — ONDANSETRON 4 MG: 2 INJECTION INTRAMUSCULAR; INTRAVENOUS at 16:06

## 2020-01-01 RX ADMIN — Medication 10 ML: at 00:55

## 2020-01-01 RX ADMIN — MAGNESIUM SULFATE HEPTAHYDRATE 1 G: 1 INJECTION, SOLUTION INTRAVENOUS at 20:50

## 2020-01-01 RX ADMIN — DILTIAZEM HYDROCHLORIDE 15 MG: 30 TABLET, FILM COATED ORAL at 03:25

## 2020-01-01 RX ADMIN — ATORVASTATIN CALCIUM 10 MG: 10 TABLET, FILM COATED ORAL at 22:10

## 2020-01-01 RX ADMIN — LANTHANUM CARBONATE 1000 MG: 500 TABLET, CHEWABLE ORAL at 08:43

## 2020-01-01 RX ADMIN — DILTIAZEM HYDROCHLORIDE 15 MG: 30 TABLET, FILM COATED ORAL at 17:15

## 2020-01-01 RX ADMIN — INSULIN LISPRO 12 UNITS: 100 INJECTION, SOLUTION INTRAVENOUS; SUBCUTANEOUS at 10:28

## 2020-01-01 RX ADMIN — INSULIN LISPRO 2 UNITS: 100 INJECTION, SOLUTION INTRAVENOUS; SUBCUTANEOUS at 08:20

## 2020-01-01 RX ADMIN — MEROPENEM 1 G: 1 INJECTION, POWDER, FOR SOLUTION INTRAVENOUS at 13:36

## 2020-01-01 RX ADMIN — DEXTROSE 50 % IN WATER (D50W) INTRAVENOUS SYRINGE 12.5 G: at 20:01

## 2020-01-01 RX ADMIN — CASTOR OIL AND BALSAM, PERU: 788; 87 OINTMENT TOPICAL at 22:08

## 2020-01-01 RX ADMIN — HEPARIN SODIUM: 1000 INJECTION INTRAVENOUS; SUBCUTANEOUS at 19:46

## 2020-01-01 RX ADMIN — METOPROLOL TARTRATE 5 MG: 5 INJECTION INTRAVENOUS at 01:25

## 2020-01-01 RX ADMIN — POTASSIUM CHLORIDE 20 MEQ: 29.8 INJECTION, SOLUTION INTRAVENOUS at 09:49

## 2020-01-01 RX ADMIN — DEXTROSE 50 % IN WATER (D50W) INTRAVENOUS SYRINGE 12.5 G: at 17:13

## 2020-01-01 RX ADMIN — INSULIN LISPRO 2 UNITS: 100 INJECTION, SOLUTION INTRAVENOUS; SUBCUTANEOUS at 05:26

## 2020-01-01 RX ADMIN — DILTIAZEM HYDROCHLORIDE 15 MG: 30 TABLET, FILM COATED ORAL at 07:05

## 2020-01-01 RX ADMIN — ATORVASTATIN CALCIUM 10 MG: 10 TABLET, FILM COATED ORAL at 09:21

## 2020-01-01 RX ADMIN — HEPARIN SODIUM 5000 UNITS: 5000 INJECTION INTRAVENOUS; SUBCUTANEOUS at 22:06

## 2020-01-01 RX ADMIN — DILTIAZEM HYDROCHLORIDE 15 MG: 30 TABLET, FILM COATED ORAL at 00:24

## 2020-01-01 RX ADMIN — ATORVASTATIN CALCIUM 10 MG: 10 TABLET, FILM COATED ORAL at 08:55

## 2020-01-01 RX ADMIN — CASTOR OIL AND BALSAM, PERU: 788; 87 OINTMENT TOPICAL at 20:09

## 2020-01-01 RX ADMIN — Medication 10 ML: at 09:11

## 2020-01-01 RX ADMIN — METOPROLOL TARTRATE 12.5 MG: 25 TABLET, FILM COATED ORAL at 10:20

## 2020-01-01 RX ADMIN — SEVELAMER CARBONATE 0.8 G: 800 POWDER, FOR SUSPENSION ORAL at 09:15

## 2020-01-01 RX ADMIN — SODIUM CHLORIDE 500 ML: 9 INJECTION, SOLUTION INTRAVENOUS at 20:02

## 2020-01-01 RX ADMIN — Medication 10 ML: at 12:17

## 2020-01-01 RX ADMIN — Medication 10 ML: at 08:32

## 2020-01-01 RX ADMIN — POLYETHYLENE GLYCOL 3350 17 G: 17 POWDER, FOR SOLUTION ORAL at 22:25

## 2020-01-01 RX ADMIN — METOPROLOL TARTRATE 5 MG: 5 INJECTION INTRAVENOUS at 05:45

## 2020-01-01 RX ADMIN — DARBEPOETIN ALFA 60 MCG: 60 INJECTION, SOLUTION INTRAVENOUS; SUBCUTANEOUS at 13:40

## 2020-01-01 RX ADMIN — METOPROLOL TARTRATE 12.5 MG: 25 TABLET, FILM COATED ORAL at 10:14

## 2020-01-01 RX ADMIN — INSULIN LISPRO 8 UNITS: 100 INJECTION, SOLUTION INTRAVENOUS; SUBCUTANEOUS at 08:35

## 2020-01-01 RX ADMIN — INSULIN GLARGINE 20 UNITS: 100 INJECTION, SOLUTION SUBCUTANEOUS at 10:22

## 2020-01-01 RX ADMIN — HEPARIN SODIUM 5000 UNITS: 5000 INJECTION INTRAVENOUS; SUBCUTANEOUS at 22:30

## 2020-01-01 RX ADMIN — ASPIRIN 81 MG 81 MG: 81 TABLET ORAL at 08:42

## 2020-01-01 RX ADMIN — HEPARIN SODIUM 5000 UNITS: 5000 INJECTION INTRAVENOUS; SUBCUTANEOUS at 21:57

## 2020-01-01 RX ADMIN — DEXTROSE MONOHYDRATE 10 MCG/KG/MIN: 5 INJECTION, SOLUTION INTRAVENOUS at 12:33

## 2020-01-01 RX ADMIN — POLYETHYLENE GLYCOL 3350 17 G: 17 POWDER, FOR SOLUTION ORAL at 09:15

## 2020-01-01 RX ADMIN — CASTOR OIL AND BALSAM, PERU: 788; 87 OINTMENT TOPICAL at 10:21

## 2020-01-01 RX ADMIN — METOPROLOL TARTRATE 12.5 MG: 25 TABLET, FILM COATED ORAL at 12:05

## 2020-01-01 RX ADMIN — MAGNESIUM SULFATE HEPTAHYDRATE 4 G: 40 INJECTION, SOLUTION INTRAVENOUS at 09:40

## 2020-01-01 RX ADMIN — HEPARIN SODIUM 5000 UNITS: 5000 INJECTION INTRAVENOUS; SUBCUTANEOUS at 14:04

## 2020-01-01 RX ADMIN — MEROPENEM 1 G: 1 INJECTION, POWDER, FOR SOLUTION INTRAVENOUS at 12:13

## 2020-01-01 RX ADMIN — SEVELAMER CARBONATE 0.8 G: 800 POWDER, FOR SUSPENSION ORAL at 20:51

## 2020-01-01 RX ADMIN — MIDODRINE HYDROCHLORIDE 5 MG: 5 TABLET ORAL at 12:01

## 2020-01-01 RX ADMIN — HEPARIN SODIUM 5000 UNITS: 5000 INJECTION INTRAVENOUS; SUBCUTANEOUS at 05:51

## 2020-01-01 RX ADMIN — POLYETHYLENE GLYCOL (3350) 17 G: 17 POWDER, FOR SOLUTION ORAL at 12:02

## 2020-01-01 RX ADMIN — CLOPIDOGREL BISULFATE 75 MG: 75 TABLET ORAL at 12:02

## 2020-01-01 RX ADMIN — MAGNESIUM SULFATE HEPTAHYDRATE 2 G: 40 INJECTION, SOLUTION INTRAVENOUS at 12:10

## 2020-01-01 RX ADMIN — CLOPIDOGREL BISULFATE 75 MG: 75 TABLET ORAL at 09:51

## 2020-01-01 RX ADMIN — METOPROLOL TARTRATE 12.5 MG: 25 TABLET, FILM COATED ORAL at 05:19

## 2020-01-01 RX ADMIN — MIDODRINE HYDROCHLORIDE 5 MG: 5 TABLET ORAL at 08:55

## 2020-01-01 RX ADMIN — CEFEPIME HYDROCHLORIDE 1 G: 1 INJECTION, POWDER, FOR SOLUTION INTRAMUSCULAR; INTRAVENOUS at 18:15

## 2020-01-01 RX ADMIN — DILTIAZEM HYDROCHLORIDE 15 MG: 30 TABLET, FILM COATED ORAL at 06:18

## 2020-01-01 RX ADMIN — Medication 10 ML: at 09:51

## 2020-01-01 RX ADMIN — INSULIN GLARGINE 10 UNITS: 100 INJECTION, SOLUTION SUBCUTANEOUS at 12:07

## 2020-01-01 RX ADMIN — ASPIRIN 81 MG: 81 TABLET, COATED ORAL at 08:15

## 2020-01-01 RX ADMIN — DEXTROSE MONOHYDRATE 2 MCG/MIN: 50 INJECTION, SOLUTION INTRAVENOUS at 19:30

## 2020-01-01 RX ADMIN — DEXTROSE 50 % IN WATER (D50W) INTRAVENOUS SYRINGE 12.5 G: at 17:07

## 2020-01-01 RX ADMIN — HEPARIN SODIUM 5000 UNITS: 5000 INJECTION INTRAVENOUS; SUBCUTANEOUS at 07:08

## 2020-01-01 RX ADMIN — POLYETHYLENE GLYCOL (3350) 17 G: 17 POWDER, FOR SOLUTION ORAL at 22:14

## 2020-01-01 RX ADMIN — INSULIN LISPRO 5 UNITS: 100 INJECTION, SOLUTION INTRAVENOUS; SUBCUTANEOUS at 09:55

## 2020-01-01 RX ADMIN — CASTOR OIL AND BALSAM, PERU: 788; 87 OINTMENT TOPICAL at 08:31

## 2020-01-01 RX ADMIN — MIDODRINE HYDROCHLORIDE 5 MG: 5 TABLET ORAL at 09:49

## 2020-01-01 RX ADMIN — HEPARIN SODIUM 5000 UNITS: 5000 INJECTION INTRAVENOUS; SUBCUTANEOUS at 15:50

## 2020-01-01 RX ADMIN — MIDODRINE HYDROCHLORIDE 5 MG: 5 TABLET ORAL at 18:15

## 2020-01-01 RX ADMIN — HEPARIN SODIUM 5000 UNITS: 5000 INJECTION INTRAVENOUS; SUBCUTANEOUS at 23:13

## 2020-01-01 RX ADMIN — Medication 10 ML: at 22:12

## 2020-01-01 RX ADMIN — POLYETHYLENE GLYCOL (3350) 17 G: 17 POWDER, FOR SOLUTION ORAL at 23:21

## 2020-01-01 RX ADMIN — CASTOR OIL AND BALSAM, PERU: 788; 87 OINTMENT TOPICAL at 09:12

## 2020-01-01 RX ADMIN — INSULIN LISPRO 8 UNITS: 100 INJECTION, SOLUTION INTRAVENOUS; SUBCUTANEOUS at 09:57

## 2020-01-01 RX ADMIN — INSULIN LISPRO 4 UNITS: 100 INJECTION, SOLUTION INTRAVENOUS; SUBCUTANEOUS at 06:19

## 2020-01-01 RX ADMIN — HEPARIN SODIUM 5000 UNITS: 5000 INJECTION INTRAVENOUS; SUBCUTANEOUS at 13:51

## 2020-01-01 RX ADMIN — HEPARIN SODIUM 5000 UNITS: 5000 INJECTION INTRAVENOUS; SUBCUTANEOUS at 06:51

## 2020-01-01 RX ADMIN — ASPIRIN 81 MG: 81 TABLET, COATED ORAL at 10:23

## 2020-01-01 RX ADMIN — HEPARIN SODIUM 5000 UNITS: 5000 INJECTION INTRAVENOUS; SUBCUTANEOUS at 00:55

## 2020-01-01 RX ADMIN — MIDODRINE HYDROCHLORIDE 5 MG: 5 TABLET ORAL at 13:57

## 2020-01-01 RX ADMIN — MIDODRINE HYDROCHLORIDE 10 MG: 5 TABLET ORAL at 12:13

## 2020-01-01 RX ADMIN — INSULIN LISPRO 1 UNITS: 100 INJECTION, SOLUTION INTRAVENOUS; SUBCUTANEOUS at 11:42

## 2020-01-01 RX ADMIN — HEPARIN SODIUM 5000 UNITS: 5000 INJECTION INTRAVENOUS; SUBCUTANEOUS at 21:47

## 2020-01-01 RX ADMIN — ACETAMINOPHEN 650 MG: 325 TABLET ORAL at 02:27

## 2020-01-01 RX ADMIN — HEPARIN SODIUM 5000 UNITS: 5000 INJECTION INTRAVENOUS; SUBCUTANEOUS at 22:25

## 2020-01-01 RX ADMIN — INSULIN LISPRO 5 UNITS: 100 INJECTION, SOLUTION INTRAVENOUS; SUBCUTANEOUS at 08:33

## 2020-01-01 RX ADMIN — HEPARIN SODIUM 5000 UNITS: 5000 INJECTION INTRAVENOUS; SUBCUTANEOUS at 05:42

## 2020-01-01 RX ADMIN — POLYETHYLENE GLYCOL (3350) 17 G: 17 POWDER, FOR SOLUTION ORAL at 07:48

## 2020-01-01 RX ADMIN — CASTOR OIL AND BALSAM, PERU: 788; 87 OINTMENT TOPICAL at 20:19

## 2020-01-01 RX ADMIN — PROCHLORPERAZINE EDISYLATE 10 MG: 5 INJECTION INTRAMUSCULAR; INTRAVENOUS at 06:20

## 2020-01-01 RX ADMIN — SEVELAMER CARBONATE 0.8 G: 800 POWDER, FOR SUSPENSION ORAL at 09:06

## 2020-01-01 RX ADMIN — HEPARIN SODIUM 5000 UNITS: 5000 INJECTION INTRAVENOUS; SUBCUTANEOUS at 14:58

## 2020-01-01 RX ADMIN — DILTIAZEM HYDROCHLORIDE 15 MG: 30 TABLET, FILM COATED ORAL at 02:26

## 2020-01-01 RX ADMIN — MIDODRINE HYDROCHLORIDE 5 MG: 5 TABLET ORAL at 18:00

## 2020-01-01 RX ADMIN — CASTOR OIL AND BALSAM, PERU: 788; 87 OINTMENT TOPICAL at 08:42

## 2020-01-01 RX ADMIN — HEPARIN SODIUM 5000 UNITS: 5000 INJECTION INTRAVENOUS; SUBCUTANEOUS at 06:11

## 2020-01-01 RX ADMIN — CLOPIDOGREL BISULFATE 75 MG: 75 TABLET ORAL at 08:42

## 2020-01-01 RX ADMIN — MAGNESIUM SULFATE HEPTAHYDRATE 1 G: 1 INJECTION, SOLUTION INTRAVENOUS at 22:08

## 2020-01-01 RX ADMIN — CASTOR OIL AND BALSAM, PERU: 788; 87 OINTMENT TOPICAL at 21:00

## 2020-01-01 RX ADMIN — Medication 10 ML: at 07:42

## 2020-01-01 RX ADMIN — CLOPIDOGREL BISULFATE 75 MG: 75 TABLET ORAL at 10:23

## 2020-01-01 RX ADMIN — POTASSIUM CHLORIDE 10 MEQ: 7.46 INJECTION, SOLUTION INTRAVENOUS at 09:41

## 2020-01-01 RX ADMIN — ATORVASTATIN CALCIUM 10 MG: 10 TABLET, FILM COATED ORAL at 08:42

## 2020-01-01 RX ADMIN — Medication 10 ML: at 20:20

## 2020-01-01 RX ADMIN — PROCHLORPERAZINE EDISYLATE 10 MG: 5 INJECTION INTRAMUSCULAR; INTRAVENOUS at 23:37

## 2020-01-01 RX ADMIN — MIDODRINE HYDROCHLORIDE 10 MG: 5 TABLET ORAL at 08:33

## 2020-01-01 RX ADMIN — INSULIN LISPRO 3 UNITS: 100 INJECTION, SOLUTION INTRAVENOUS; SUBCUTANEOUS at 06:27

## 2020-01-01 RX ADMIN — PROCHLORPERAZINE EDISYLATE 10 MG: 5 INJECTION INTRAMUSCULAR; INTRAVENOUS at 12:17

## 2020-01-01 RX ADMIN — MIDODRINE HYDROCHLORIDE 5 MG: 5 TABLET ORAL at 16:11

## 2020-01-01 RX ADMIN — HEPARIN SODIUM 5000 UNITS: 5000 INJECTION INTRAVENOUS; SUBCUTANEOUS at 14:14

## 2020-01-01 RX ADMIN — ATORVASTATIN CALCIUM 10 MG: 10 TABLET, FILM COATED ORAL at 09:50

## 2020-01-01 RX ADMIN — INSULIN LISPRO 6 UNITS: 100 INJECTION, SOLUTION INTRAVENOUS; SUBCUTANEOUS at 13:08

## 2020-01-01 RX ADMIN — SODIUM CHLORIDE 1000 ML: 9 INJECTION, SOLUTION INTRAVENOUS at 16:50

## 2020-01-01 RX ADMIN — SODIUM CHLORIDE 500 ML: 9 INJECTION, SOLUTION INTRAVENOUS at 04:11

## 2020-01-01 RX ADMIN — MEROPENEM 1 G: 1 INJECTION, POWDER, FOR SOLUTION INTRAVENOUS at 13:14

## 2020-01-01 RX ADMIN — SEVELAMER CARBONATE 0.8 G: 800 POWDER, FOR SUSPENSION ORAL at 20:13

## 2020-01-01 RX ADMIN — Medication 10 ML: at 21:00

## 2020-01-01 RX ADMIN — CASTOR OIL AND BALSAM, PERU: 788; 87 OINTMENT TOPICAL at 08:43

## 2020-01-01 ASSESSMENT — PAIN DESCRIPTION - LOCATION
LOCATION: ABDOMEN

## 2020-01-01 ASSESSMENT — PAIN SCALES - GENERAL
PAINLEVEL_OUTOF10: 0
PAINLEVEL_OUTOF10: 3
PAINLEVEL_OUTOF10: 0
PAINLEVEL_OUTOF10: 5
PAINLEVEL_OUTOF10: 0
PAINLEVEL_OUTOF10: 6
PAINLEVEL_OUTOF10: 0
PAINLEVEL_OUTOF10: 3
PAINLEVEL_OUTOF10: 0

## 2020-01-01 ASSESSMENT — PAIN SCALES - PAIN ASSESSMENT IN ADVANCED DEMENTIA (PAINAD)
FACIALEXPRESSION: 0
CONSOLABILITY: 0
BODYLANGUAGE: 0
NEGVOCALIZATION: 0
BREATHING: 0
TOTALSCORE: 0

## 2020-01-01 ASSESSMENT — PULMONARY FUNCTION TESTS
PIF_VALUE: 0
PIF_VALUE: 1
PIF_VALUE: 1
PIF_VALUE: 0
PIF_VALUE: 1
PIF_VALUE: 0
PIF_VALUE: 1
PIF_VALUE: 0
PIF_VALUE: 1
PIF_VALUE: 0
PIF_VALUE: 47
PIF_VALUE: 1
PIF_VALUE: 0
PIF_VALUE: 1
PIF_VALUE: 36
PIF_VALUE: 23
PIF_VALUE: 43
PIF_VALUE: 0

## 2020-01-01 ASSESSMENT — PAIN DESCRIPTION - FREQUENCY
FREQUENCY: CONTINUOUS
FREQUENCY: INTERMITTENT
FREQUENCY: CONTINUOUS

## 2020-01-01 ASSESSMENT — ENCOUNTER SYMPTOMS
DIARRHEA: 0
SHORTNESS OF BREATH: 0
CHEST TIGHTNESS: 0
NAUSEA: 1
VOMITING: 0
WHEEZING: 0
VOMITING: 0
NAUSEA: 1
SHORTNESS OF BREATH: 0
DIARRHEA: 0
SHORTNESS OF BREATH: 0
WHEEZING: 0
WHEEZING: 0
SHORTNESS OF BREATH: 0
VOMITING: 0
NAUSEA: 1
CHEST TIGHTNESS: 0
WHEEZING: 0
DIARRHEA: 0
DIARRHEA: 0
VOMITING: 0
NAUSEA: 1

## 2020-01-01 ASSESSMENT — PAIN DESCRIPTION - PROGRESSION
CLINICAL_PROGRESSION: NOT CHANGED
CLINICAL_PROGRESSION: GRADUALLY WORSENING

## 2020-01-01 ASSESSMENT — PAIN SCALES - WONG BAKER
WONGBAKER_NUMERICALRESPONSE: 0

## 2020-01-01 ASSESSMENT — PAIN DESCRIPTION - ORIENTATION
ORIENTATION: MID
ORIENTATION: MID

## 2020-01-01 ASSESSMENT — PAIN DESCRIPTION - DESCRIPTORS
DESCRIPTORS: ACHING

## 2020-01-01 ASSESSMENT — PAIN DESCRIPTION - PAIN TYPE
TYPE: ACUTE PAIN

## 2020-01-01 ASSESSMENT — PAIN - FUNCTIONAL ASSESSMENT
PAIN_FUNCTIONAL_ASSESSMENT: ACTIVITIES ARE NOT PREVENTED
PAIN_FUNCTIONAL_ASSESSMENT: PREVENTS OR INTERFERES SOME ACTIVE ACTIVITIES AND ADLS

## 2020-01-01 ASSESSMENT — LIFESTYLE VARIABLES: SMOKING_STATUS: 0

## 2020-01-01 ASSESSMENT — PAIN DESCRIPTION - ONSET
ONSET: ON-GOING
ONSET: ON-GOING

## 2020-03-10 PROBLEM — K65.9 PERITONITIS (HCC): Status: ACTIVE | Noted: 2020-01-01

## 2020-03-10 NOTE — ED PROVIDER NOTES
erythema. Diagnostic Results     EKG   Sinus tachycardia with PACs, ventricular rate 106. Intervals normal, left axis deviation noted. Left anterior fascicular block noted. No ST or T wave changes suggestive of acute ischemia. Compared to previous dated 10/27/2019, not significantly changed. RADIOLOGY:  XR CHEST PORTABLE   Final Result      No focal consolidation.                  LABS:   Results for orders placed or performed during the hospital encounter of 03/10/20   Rapid influenza A/B antigens   Result Value Ref Range    Rapid Influenza A Ag Negative Negative    Rapid Influenza B Ag Negative Negative   Basic Metabolic Panel w/ Reflex to MG   Result Value Ref Range    Sodium 132 (L) 136 - 145 mmol/L    Potassium reflex Magnesium 6.9 (HH) 3.5 - 5.1 mmol/L    Chloride 91 (L) 99 - 110 mmol/L    CO2 21 21 - 32 mmol/L    Anion Gap 20 (H) 3 - 16    Glucose 67 (L) 70 - 99 mg/dL    BUN 67 (H) 7 - 20 mg/dL    CREATININE 8.2 (HH) 0.6 - 1.2 mg/dL    GFR Non- 5 (A) >60    GFR  6 (A) >60    Calcium 8.9 8.3 - 10.6 mg/dL   Phosphorus   Result Value Ref Range    Phosphorus 5.7 (H) 2.5 - 4.9 mg/dL   Blood Gas, Venous   Result Value Ref Range    pH, Delroy 7.267 (L) 7.350 - 7.450    pCO2, Delroy 65.8 (H) 41.0 - 51.0 mmHg    pO2, Delroy 22.9 (L) 25.0 - 40.0 mmHg    HCO3, Venous 29.0 (H) 24.0 - 28.0 mmol/L    Base Excess, Delroy 0.7 -2.0 - 3.0 mmol/L    O2 Sat, Delroy 26 Not established %    Carboxyhemoglobin 1.0 0.0 - 1.5 %    MetHgb, Delroy 0.7 0.0 - 1.5 %    TC02 (Calc), Delroy 31 mmol/L    Hemoglobin, Delroy, Reduced 72.30 %   CBC Auto Differential   Result Value Ref Range    WBC 11.0 4.0 - 11.0 K/uL    RBC 4.44 4.00 - 5.20 M/uL    Hemoglobin 13.6 12.0 - 16.0 g/dL    Hematocrit 42.2 36.0 - 48.0 %    MCV 95.2 80.0 - 100.0 fL    MCH 30.7 26.0 - 34.0 pg    MCHC 32.3 31.0 - 36.0 g/dL    RDW 22.5 (H) 12.4 - 15.4 %    Platelets 817 971 - 223 K/uL    MPV 8.9 5.0 - 10.5 fL    Neutrophils % 77.0 %    Lymphocytes % 11.8 %    Monocytes % 10.0 %    Eosinophils % 0.4 %    Basophils % 0.8 %    Neutrophils Absolute 8.4 (H) 1.7 - 7.7 K/uL    Lymphocytes Absolute 1.3 1.0 - 5.1 K/uL    Monocytes Absolute 1.1 0.0 - 1.3 K/uL    Eosinophils Absolute 0.0 0.0 - 0.6 K/uL    Basophils Absolute 0.1 0.0 - 0.2 K/uL   Basic Metabolic Panel w/ Reflex to MG   Result Value Ref Range    Sodium 137 136 - 145 mmol/L    Potassium reflex Magnesium 3.8 3.5 - 5.1 mmol/L    Chloride 97 (L) 99 - 110 mmol/L    CO2 22 21 - 32 mmol/L    Anion Gap 18 (H) 3 - 16    Glucose 88 70 - 99 mg/dL    BUN 61 (H) 7 - 20 mg/dL    CREATININE 7.4 (HH) 0.6 - 1.2 mg/dL    GFR Non- 5 (A) >60    GFR  6 (A) >60    Calcium 7.8 (L) 8.3 - 10.6 mg/dL   Body Fluid Cell Count with Differential   Result Value Ref Range    Cell Count Fluid Type Peritoneal dial     Color, Fluid Pale Yellow     Appearance, Fluid Hazy     Clot Eval. see below     Nucl Cell, Fluid 10,121 /cumm    RBC, Fluid 4,000 /cumm    Neutrophil Count, Fluid 83 %    Lymphocytes, Body Fluid 8 %    Monocyte Count, Fluid 6 %    Macrophages 1 %    Mesothelial, Fluid 2 %   Lactic Acid, Plasma   Result Value Ref Range    Lactic Acid 3.0 (H) 0.4 - 2.0 mmol/L   POCT Glucose   Result Value Ref Range    POC Glucose 84 70 - 99 mg/dl    Performed on ACCU-CHEK    EKG 12 Lead   Result Value Ref Range    Ventricular Rate 106 BPM    Atrial Rate 106 BPM    P-R Interval 132 ms    QRS Duration 100 ms    Q-T Interval 370 ms    QTc Calculation (Bazett) 491 ms    P Axis 72 degrees    R Axis -66 degrees    T Axis 52 degrees    Diagnosis       EKG performed in ER and to be interpreted by ER physician. Reconfirmed by MD, ER ([id number]),  Akilah Bates ([id number]) on 3/10/2020 5:22:03 PM   EKG 12 Lead   Result Value Ref Range    Ventricular Rate 114 BPM    Atrial Rate 114 BPM    P-R Interval 148 ms    QRS Duration 104 ms    Q-T Interval 360 ms    QTc Calculation (Bazett) 496 ms    P Axis 62 degrees    R Axis -66 degrees    T Axis 80 degrees    Diagnosis       EKG performed in ER and to be interpreted by ER physician. Confirmed by MD, ER (500),  Evangelina Abdiass ([phone]) on 3/10/2020 6:58:23 PM         RECENT VITALS:  BP: 106/60,Temp: 98.6 °F (37 °C), Pulse: 132, Resp: 16, SpO2: 92 %     Procedures   Procedures    MEDICAL DECISION MAKING     MDM: Matt Vu is a 67 y.o. female with history of peritoneal dialysis, diabetes, heart failure, multiple other comorbidities presenting for fatigue, weakness. On arrival, she is fatigued appearing but appropriately interactive. Her vital signs are notable for blood pressures in the 72U systolic and she is tachycardic in the 110s to 130s. EKG demonstrates sinus tachycardia without acute changes. Her physical examination is notable for clear lungs and a benign abdomen. She appears somewhat volume depleted with dry mucous membranes and tenting skin. Her PD catheter does not appear concerning at the site, however there is concern for occult peritonitis given her vague symptoms. Labs obtained and notable for normal potassium, hypocalcemia. Given that her phos is not significantly elevated and she is somewhat hypotensive this was supplemented. Patient given 1.5 L normal saline for resuscitation, was not given 30 mL/KG due to history of heart failure and ESRD. Peritoneal fluid returned with elevated cell counts concerning for peritonitis. Given vancomycin and cefepime. Discussed with her nephrologist, Dr. Donnell Richardson, who arranged peritoneal dialysis and follow the patient. Will admit to stepdown for further care and management of peritonitis and sepsis. Critical Care:  Due to the immediate potential for life-threatening deterioration due to sepsis, transient hypotension, electrolyte abnormalities, I spent 35 minutes providing critical care.   Thistime excludes time spent performing procedures but includes time spent on direct patient care, history retrieval, review of the chart, and Inject 0-6 Units into the skin 3 times daily (with meals)    MELATONIN 1 MG TABLET    Take 3 tablets by mouth nightly    MIDODRINE (PROAMATINE) 5 MG TABLET    Take 1 tablet by mouth 3 times daily (with meals)    ONDANSETRON (ZOFRAN) 4 MG TABLET    Take 1 tablet by mouth every 8 hours as needed for Nausea    PANTOPRAZOLE (PROTONIX) 40 MG TABLET    Take 1 tablet by mouth 2 times daily (before meals)    PHENOL 1.4 % LIQD MOUTH SPRAY    Take 1 spray by mouth every 2 hours as needed for Sore Throat    PREGABALIN (LYRICA) 75 MG CAPSULE    Take 1 capsule by mouth daily for 30 days. PSYLLIUM (HYDROCIL) 95 % PACK PACKET    Take 1 packet by mouth daily    SEVELAMER (RENVELA) 800 MG TABLET    Take 2 tablets by mouth 3 times daily (with meals)    SIMVASTATIN (ZOCOR) 40 MG TABLET    Take 1 tablet by mouth nightly    SODIUM CHLORIDE 1 G TABLET    Take 1 tablet by mouth 2 times daily (with meals)    SUCRALFATE (CARAFATE) 1 GM/10ML SUSPENSION    Take 10 mLs by mouth 4 times daily (before meals and nightly)    VITAMIN E 400 UNIT CAPSULE    Take 1 capsule by mouth nightly       Allergies     She is allergic to gabapentin. ED Course     Nursing Notes, Past Medical Hx, Past Surgical Hx, Social Hx,Allergies, and Family Hx were reviewed. Patient was given the following medications:  Orders Placed This Encounter   Medications    0.9 % sodium chloride bolus    dextrose 50 % IV solution    calcium gluconate 1 g in dextrose 5 % 100 mL IVPB    0.9 % sodium chloride bolus    vancomycin (VANCOCIN) 1,500 mg in dextrose 5 % 250 mL IVPB    cefepime (MAXIPIME) 1 g IVPB minibag    dianeal PD-2 2.5% CAPD 2,200 mL       CONSULTS:  IP CONSULT TO NEPHROLOGY  IP CONSULT TO PHARMACY  IP CONSULT TO HOSPITALIST    PATIENT REFERRED TO:  No follow-up provider specified.     DISCHARGE MEDICATIONS:  New Prescriptions    No medications on file        Kenia Betancourt MD  03/10/20 0200

## 2020-03-10 NOTE — ED NOTES
Patient reports generalized fatigue began 3 days ago. PD patient, last dialysis last night. Patient denies fever, pain, cough, congestion.       Damian Zarate, RN  03/10/20 [address] [place]  03/10/20 5207

## 2020-03-10 NOTE — ED NOTES
Dialysis called to request someone to collect sample of body fluid per MD.      Floyr Sheriff RN  03/10/20 5957

## 2020-03-11 NOTE — PROGRESS NOTES
Clinical Pharmacy Progress Note    ADMIT DATE: 3/10/2020     66 yo F with PMHx significant for DVT, ESRD on PD, HTN, OA, chronic pancreatitis, DM, GI bleed; who presented to ED from NH on 3/10 with complaints of fever and chills. Admitted 3/11 with sepsis 2/2 suspected peritonitis. Currently on IV cefepime + vancomycin. Pharmacy asked to dose vancomycin per Dr. Td Contreras. PERTINENT MEDICATIONS:  Cefepime 1g IV q24h - day #2  Vancomycin - pharmacy to dose - day #2  Date Dose Vanc Level   3/10 1.5g IV     3/11  16.5 mcg/mL          LABORATORY:  Recent Labs     03/10/20  1708 03/11/20  0434    132*   K 3.8 3.7   CL 97* 92*   CO2 22 22   BUN 61* 57*   CREATININE 7.4* 7.1*   GLUCOSE 88 317*     Estimated CrCl not calculated d/t ESRD on HD    Recent Labs     03/10/20  1611 03/11/20  0434   WBC 11.0 8.4   HGB 13.6 11.9*    190     MICROBIOLOGY:  Rapid influenza (3/10): Negative  Blood (3/10): Sent x 2   Peritoneal dialysis fluid (3/11): No organisms on Gram Stain    VITALS:  /73   Pulse 115   Temp 98 °F (36.7 °C) (Oral)   Resp 18   Ht 5' 2\" (1.575 m)   Wt 169 lb 12.1 oz (77 kg) Comment: bed scale  LMP  (LMP Unknown)   SpO2 97%   BMI 31.05 kg/m²     Intake/Output Summary (Last 24 hours) at 3/11/2020 0818  Last data filed at 3/11/2020 0305  Gross per 24 hour   Intake 0 ml   Output 11 ml   Net -11 ml     PROPHYLAXIS:  Stress ulcer: not ordered  VTE:  Heparin 5,000 units TID    ASSESSMENT/PLAN:  1)  Sepsis 2/2 suspected peritonitis: Cefepime + Vancomycin - Day #2   · Vancomycin--pharmacy to dose  · Given ESRD on PD, vancomycin will be dosed based on intermittent levels. Have entered placeholder onto [facility] · Received vancomycin 1.5g IV last evening (~19 mg/kg). · Vancomycin level this AM = 16.5 mcg/mL. Will give vancomycin 1g IV today. · Repeat vancomycin level ordered for Thurs 3/12 AM. Will re-dose as appropriate.   · Renal function will be monitored / repeat doses and levels will be

## 2020-03-11 NOTE — PROGRESS NOTES
NUTRITION ASSESSMENT  Admission Date: 3/10/2020     Type and Reason for Visit: Initial, Positive Nutrition Screen(wounds)    NUTRITION RECOMMENDATIONS:   1. PO Diet: Continue renal diet   2. ONS: Start Nepro qd  3. Encourage po intakes and protein for wound healing     NUTRITION ASSESSMENT:  Pt is nutritionally compromised AEB wounds. Pt admitted for fever, chills, and fatigue. Pt w/hx of DM, chronic pancreatitis, and ESRD on PD. Pt voices that she has not had the best appetite. Denied wt changes. per EMR, pt is +20 lbs. Fluid shifts noted from dialysis. Pt favorable to ONS for additional protein to support wound healing. Will continue to monitor per [facility]. MALNUTRITION ASSESSMENT  Context: Acute illness or injury   Malnutrition Status: At risk for malnutrition  Findings of the 6 clinical characteristics of malnutrition (Minimum of 2 out of 6 clinical characteristics is required to make the diagnosis of moderate or severe Protein Calorie Malnutrition based on AND/ASPEN Guidelines):         Interpretation of Weight Loss %: No significant weight loss     Body Fat Status: No significant subcutaneous fat loss     Muscle Mass Status: No significant muscle mass loss     Fluid Accumulation Status: Mild fluid accumulation  Fluid Accumulation Location: Extremities  Reduced  Strength: Not measured    NUTRITION DIAGNOSIS   Problem: Increased Nutrient Needs  Etiology: Increased demand for nutrient  Signs & Symptoms: known losses from dialysis    [address] and/or Nutrient Delivery:Continue Current diet  or Start ONS   Nutrition education/counseling/coordination of care: Continue Inpatient Monitoring     NUTRITION MONITORING & EVALUATION:  Evaluation:Goals set   Goals: Pt will consume >/=50% of meals and ONS this admission   Monitoring: Meal Intake , Supplement Intake , Weight  or Wound Healing      OBJECTIVE DATA:  · Nutrition-Focused Physical Findings: +1 BLE edema   · Wounds Stage II     Past Medical History:   Diagnosis Date    DVT (deep venous thrombosis) (HCC)     End stage renal disease (HCC)     Hyperlipidemia     Hypertension     Osteoarthritis     Pancreatitis chronic     Peritoneal dialysis status ([facility]     Type II or unspecified type diabetes mellitus without mention of complication, not stated as uncontrolled     Vitamin D deficiency         ANTHROPOMETRICS  Current Height: 5' 2\" (157.5 cm)  Current Weight: 169 lb 12.1 oz (77 kg)(bed scale)    Admission weight: 169 lb 12.1 oz (77 kg)  Ideal Bodyweight 110 lb   Usual Bodyweight ~140-145 lb per EMR    Weight Changes +20 lbs        BMI BMI (Calculated): 0 (calculated at 31)    Wt Readings from Last 50 Encounters:   03/10/20 169 lb 12.1 oz (77 kg)   10/27/19 146 lb (66.2 kg)   08/27/19 146 lb (66.2 kg)   08/21/19 148 lb 2.4 oz (67.2 kg)   08/15/19 148 lb 13 oz (67.5 kg)   06/11/19 143 lb 3.2 oz (65 kg)   05/31/19 142 lb 6.7 oz (64.6 kg)   04/15/19 139 lb 5.3 oz (63.2 kg)   03/27/19 133 lb 9.6 oz (60.6 kg)   02/18/19 135 lb (61.2 kg)   02/13/19 134 lb 7.7 oz (61 kg)   02/01/19 136 lb 7.4 oz (61.9 kg)   COMPARATIVE STANDARDS  Estimated Total Kcals/Day : 15-18 Current Bodyweight (77 kg) 9760-5571 kcal    Estimated Total Protein (g/day) : 1.3-1.5 Ideal Bodyweight  (55 kg) 72-83 g/day  Estimated Daily Total Fluid (ml/day): 1 mL/kcal per day     Food / Nutrition-Related History  Pre-Admission / Home Diet:  Pre-Admission/Home Diet: General   Home Supplements / Herbals:    none noted  Food Restrictions / Cultural Requests:    none noted    Diet Orders / Intake / Nutrition Support  Current diet/supplement order: DIET RENAL;  Dietary Nutrition Supplements: Renal Oral Supplement     NSG Recorded PO:   PO Fluids P.O.: 0 mL  PO Meals     PO Intake: DEVAN  PO Supplement: None   PO Supplement Intake: n/a  NUTRITION RISK LEVEL: Risk Level:  Moderate    Liban Schooling, [address] [place] [address], [facility]:  [phone]  Office:  [phone]

## 2020-03-11 NOTE — CONSULTS
Clinical Pharmacy Consult Note    Admit date: 3/10/2020     Asked by Dr. Dorothy Mendoza to enter orders for PD fluid. Dr. Dorothy Mendoza requesting Delflex 2.5%  2200 mL per exchange  5 exchanges over 10 hours. Spoke with Velta Ormond at [facility] about orders as well. Dr. Dorothy Mendoza also requesting heparin in PD fluid - clarified that he wants heparin 1,000 units per 1L of fluid. See orders.     Van Lopez PharmD, BCPS  Wireless: [phone]  or [phone]  3/11/2020 2:52 PM

## 2020-03-11 NOTE — PROGRESS NOTES
Perfect served MD regarding patient's HR jumping from 120's up to the 140's patient denies pain except for some abdominal discomfort and is trying to move her bowel at the moment. Awaiting for response.

## 2020-03-11 NOTE — CONSULTS
Clinical Pharmacy Consult Note    Admit date: 3/10/2020    Subjective/Objective:  68 yo female NH resident with extensive PMHx including but not limited to  DM2, HF, HTN, HLD, DVT,  ESRD (on PD) presented with 3 days h/o fever and chills. Patient was found to have sepsis 2/2 peritonitis. Pharmacy is consulted to dose Vancomycin per Dr. Elsie Dubois    Pertinent Medications:  Cefepime 1 gm every 24 hours - day#1  Vancomycin IV; Pharmacy to dose -- day # 1    PERTINENT LABS:  Recent Labs     03/10/20  1557 03/10/20  1708   * 137   K 6.9* 3.8   CL 91* 97*   CO2 21 22   PHOS 5.7*  --    BUN 67* 61*   CREATININE 8.2* 7.4*       CrCl cannot be calculated (Unknown ideal weight. ). Recent Labs     03/10/20  1611   WBC 11.0   HGB 13.6   HCT 42.2   MCV 95.2              Weight: 169 lb 12.1 oz (77 kg)    Micro: Blood and body fluid cultures are pending. Rapid influenza A/B antigens test were negative. Assessment/Plan:  Vancomycin  · Patient received vancomycin 1500 mg IVx1 prior to this consult. Pharmacy will continue to dose vancomycin intermittently based on random level due to ESRD. · Clinical pharmacist will follow-up later to monitor and adjusted therapy as appropriate. Please call with any questions. Thank you for consulting pharmacy!   Jose Dueñas Rph  3/10/2020 10:14 PM

## 2020-03-11 NOTE — PROGRESS NOTES
Clinical Pharmacy Progress Note  Medication History     Admit Date: 3/10/2020    List of of current medications patient is taking is complete. Home Medication list in EPIC updated to reflect changes noted below. Source of information: medication list from [facility]; list dated 3/10/20    Changes made to medication list:   Medications removed: (include reason, ex: therapy completed, inactive medication)   Sodium chloride tablets - not on MAR from NH   Sucralfate - not on MAR from NH   Cinacalcet - not on MAR from NH   Medications added:    Lantus 6 units QHS   Fleets enema PRN    Please call with any questions.   Jasbir Colindres, JosefaD, [facility]  Wireless # [phone]  3/11/2020 8:07 AM

## 2020-03-11 NOTE — PROGRESS NOTES
Treatment time: 12 Hours 16 minutes  Net UF: 203 ml    Treatment completed without complications or complaints from patient. Effluent yellow/clear and lines taped to patient per protocol. Report given to Haylie De Dios RN. Copy of dialysis treatment record placed in chart, to be scanned into EMR.

## 2020-03-11 NOTE — PROGRESS NOTES
Units Subcutaneous Nightly     Continuous Infusions:   dextrose       PRN Meds:glucose, dextrose, glucagon (rDNA), dextrose, sodium chloride flush, acetaminophen **OR** acetaminophen, polyethylene glycol, promethazine **OR** ondansetron    Objective:   Vitals:   T-max:  Patient Vitals for the past 8 hrs:   BP Temp Temp src Pulse Resp SpO2 Height Weight   03/11/20 1333 101/76 99.7 °F (37.6 °C) Oral 110 -- -- -- --   03/11/20 1201 102/60 -- -- 92 -- -- -- --   03/11/20 1129 (!) 90/58 -- -- 96 -- -- -- --   03/11/20 1030 109/61 98.4 °F (36.9 °C) Oral 96 18 97 % 5' 2\" (1.575 m) 169 lb 8.5 oz (76.9 kg)   03/11/20 0952 109/61 -- -- 122 -- -- -- --   03/11/20 0931 115/62 -- -- 111 -- -- -- --   03/11/20 0808 110/68 98.2 °F (36.8 °C) Oral 102 24 97 % -- --       Intake/Output Summary (Last 24 hours) at 3/11/2020 1600  Last data filed at 3/11/2020 1211  Gross per 24 hour   Intake 60 ml   Output 214 ml   Net -154 ml       Review of Systems  See above    Physical Exam  Constitutional:       Appearance: She is ill-appearing. HENT:      Head: Normocephalic and atraumatic. Eyes:      Pupils: Pupils are equal, round, and reactive to light. Cardiovascular:      Rate and Rhythm: Regular rhythm. Tachycardia present. Pulses: Normal pulses. Heart sounds: Murmur present. Pulmonary:      Effort: Pulmonary effort is normal.      Breath sounds: Normal breath sounds. No wheezing or rales. Abdominal:      General: Abdomen is flat. Bowel sounds are normal. There is distension. Palpations: Abdomen is soft. Tenderness: There is abdominal tenderness (mild). There is no guarding. Musculoskeletal: Normal range of motion. General: Swelling present. No tenderness. Right lower leg: Edema (1+) present. Left lower leg: Edema (1+) present. Skin:     General: Skin is warm and dry. Capillary Refill: Capillary refill takes less than 2 seconds. Neurological:      Mental Status: She is alert. bleed as patient was found not taking it. [id number] 7. Left atrium mildly dilated on Echo 8/2019  -- consult cardiology for outpatient follow up  -- will monitor for now    Type II diabetes  Last [facility] 3/24/2019 6.5. On home lantus 6u nightly, and sliding scale  -- hypoglycemia protocol  -- medium dose sliding scale  -- continue home lantus 6u  -- Accu-checks q4h    Hx of PVD  Follows vascular surgery outpatient.  On home plavix  -- continue home plavix 75    Hypotension  Pt BP in the low side, on home midodrine  -- continue home midodrine  -- no IVF given ESRD  -- cortisol AM      Code Status: FULL  FEN:  Renal Diet  PPX: Heparin  DISPO: Eric Norman MD, PGY-1  03/11/20  4:00 PM    This patient has been staffed and discussed with Jeremy Velasquez MD.

## 2020-03-11 NOTE — H&P
HOSPITALISTS HISTORY AND PHYSICAL    3/10/2020 11:25 PM    Patient Information:  Shravan Valero is a 67 y.o. female [id number]  PCP:  Perla Morales MD (Tel: [phone] )    Chief complaint:    Chief Complaint   Patient presents with    Fatigue        History of Present Illness:  Cristian Skaggs is a 67 y.o. female who presented with 3 days of fever and chills but no symptoms of shortness of breath cough phlegm nausea abdominal pain does not make urine. Lives in a nursing home for last 4 months. In the ER slightly hypertensive did had mild lactic acidosis was lethargic    History obtained from patient and going over the medical records  Old medical records show   Vascular surgery follow-up for peripheral vascular disease, admission August 2019 for GI bleed and duodenal ulcer, Eliquis stopped at that time. REVIEW OF SYSTEMS:   All other ROS negative except mentioned in [address]    Past Medical History:   has a past medical history of DVT (deep venous thrombosis) ([facility] End stage renal disease ([facility] Hyperlipidemia, Hypertension, Osteoarthritis, Pancreatitis chronic, Peritoneal dialysis status ([facility] [facility] Type II or unspecified type diabetes mellitus without mention of complication, not stated as uncontrolled, and Vitamin D deficiency. Past Surgical History:   has a past surgical history that includes Thyroidectomy, partial; Coronary angioplasty; joint replacement (5/4/2011); Cataract removal (Left, 5/29/13); other surgical history (Left, 05/19/2017); and Upper gastrointestinal endoscopy (N/A, 8/13/2019). Medications:  No current facility-administered medications on file prior to encounter. Current Outpatient Medications on File Prior to Encounter   Medication Sig Dispense Refill    cinacalcet (SENSIPAR) 60 MG tablet TAKE ONE TABLET BY MOUTH DAILY.  28 tablet 0    calcitRIOL (ROCALTROL) 0.25 MCG Comments)     Gabapentin-induced myoclonus        Social History:  Patient Lives in nursing home, used to work in home    reports that she has been smoking cigarettes. She has a 12.00 pack-year smoking history. She has never used smokeless tobacco. She reports that she does not drink alcohol or use drugs. Family History:  family history is not on file. , no family h/o kidney disesae, no heart issues    Physical Exam:  /64   Pulse 95   Temp 98.6 °F (37 °C)   Resp 16   Wt 169 lb 12.1 oz (77 kg) Comment: bed scale  LMP  (LMP Unknown)   SpO2 100%   BMI 26.59 kg/m²   Obese  Oral mucosa is moist  No abdominal tenderness, getting her PD, no tenderness around PD catheter site  Lungs are clear  Barely able to lift her legs against gravity  General appearance:  Appears comfortable. Well nourished  Eyes: Sclera clear, pupils equal  ENT: Moist mucus membranes, no thrush. Trachea midline. Cardiovascular: Regular rhythm, normal S1, S2. No murmur, gallop, rub. No edema in lower extremities  Respiratory: Clear to auscultation bilaterally, no wheeze, good inspiratory effort  Gastrointestinal: Abdomen soft, non-tender, not distended, normal bowel sounds  Musculoskeletal: No cyanosis in digits, neck supple  Neurology: Cranial nerves grossly intact. Alert and oriented in time, place and person. Bilateral leg weakness  Psychiatry: Appropriate affect.  Not agitated  Skin: Warm, dry, normal turgor, no rash  Brisk capillary refill, peripheral pulses palpable   Labs:  CBC:   Lab Results   Component Value Date    WBC 11.0 03/10/2020    RBC 4.44 03/10/2020    HGB 13.6 03/10/2020    HCT 42.2 03/10/2020    MCV 95.2 03/10/2020    MCH 30.7 03/10/2020    MCHC 32.3 03/10/2020    RDW 22.5 03/10/2020     03/10/2020    MPV 8.9 03/10/2020     BMP:    Lab Results   Component Value Date     03/10/2020    K 3.8 03/10/2020    CL 97 03/10/2020    CO2 22 03/10/2020    BUN 61 03/10/2020    CREATININE 7.4 03/10/2020    CALCIUM 7.8 03/10/2020    GFRAA 6 03/10/2020    GFRAA 32 06/07/2013    LABGLOM 5 03/10/2020    GLUCOSE 88 03/10/2020     XR CHEST PORTABLE   Final Result      No focal consolidation. Chest Xray:   EKG:    I visualized CXR images and EKG strips sinus tachycardia with rbb    Discussed case  with  and ER provider    Problem List  Active Problems:    Peritonitis ([address]  Resolved Problems:    * No resolved hospital problems. *        Assessment/Plan:   Sepsis with concerns of PD fluid peritonitis  Vanco and cefepime  Repeat fluid analysis and cultures ordered by nephrology, discussed with nephrology  Even her ESRD status patient already been given 1 and half liter of fluid in the ER will use fluid as PRN as boluses if needed, patient does mention that she does run low blood pressure at baseline    Acute hypercarbic respiratory failure  Lethargic, infection  No history of COPD, patient alert awake talking at time of my evaluation  Repeat VBG in the morning    Diabetes mellitus history  Insulin sliding scale    End-stage renal disease  PD patient nephrology managing PD      DVT prophylaxis Heparin  Code status full  Diet renal  IV access peripheral  Cali Catheter no    Admit as inpatient/ I anticipate hospitalization spanning more than two midnights for investigation and treatment of the above medically necessary diagnoses. Please note that some part of this chart was generated using Dragon dictation software. Although every effort was made to ensure the accuracy of this automated transcription, some errors in transcription may have occurred inadvertently. If you may need any clarification, please do not hesitate to contact me through [facility]        Salena Wood MD    3/10/2020 11:25 PM

## 2020-03-11 NOTE — CONSULTS
Ayaan Marin MD at [facility] reviewed. No pertinent family history. reports that she has been smoking cigarettes. She has a 12.00 pack-year smoking history. She has never used smokeless tobacco. She reports that she does not drink alcohol or use drugs.     Allergies:  Gabapentin    Current Medications:    glucose (GLUTOSE) 40 % oral gel 15 g, PRN  dextrose 50 % IV solution, PRN  glucagon (rDNA) injection 1 mg, PRN  dextrose 5 % solution, PRN  polyethylene glycol (GLYCOLAX) packet 17 g, BID  darbepoetin sandeep-polysorbate (ARANESP) injection 60 mcg, Weekly  insulin glargine (LANTUS) injection pen 6 Units, Nightly  clopidogrel (PLAVIX) tablet 75 mg, Daily  delflex lo-prasad 2.5% 5,000 mL with heparin (porcine) 5,000 Units solution, Daily    And  delflex lo-prasad 2.5% 5,000 mL with heparin (porcine) 5,000 Units solution, Daily    And  delflex lo-prasad 2.5% 5,000 mL with heparin (porcine) 5,000 Units solution, Daily  aspirin EC tablet 81 mg, Daily  midodrine (PROAMATINE) tablet 5 mg, TID WC  sevelamer (RENVELA) tablet 1,600 mg, TID WC  atorvastatin (LIPITOR) tablet 10 mg, Daily  sodium chloride flush 0.9 % injection 10 mL, 2 times per day  sodium chloride flush 0.9 % injection 10 mL, PRN  acetaminophen (TYLENOL) tablet 650 mg, Q6H PRN    Or  acetaminophen (TYLENOL) suppository 650 mg, Q6H PRN  polyethylene glycol (GLYCOLAX) packet 17 g, Daily PRN  promethazine (PHENERGAN) tablet 12.5 mg, Q6H PRN    Or  ondansetron (ZOFRAN) injection 4 mg, Q6H PRN  heparin (porcine) injection 5,000 Units, 3 times per day  cefepime (MAXIPIME) 1 g IVPB minibag, Q24H  vancomycin (VANCOCIN) intermittent dosing (placeholder), See Admin Instructions  insulin lispro (1 Unit Dial) 0-12 Units, TID WC  insulin lispro (1 Unit Dial) 0-6 Units, Nightly        Review of Systems:   14 point ROS obtained but were negative except mentioned in HPI      Physical exam:     Vitals:  /76   Pulse 110   Temp 98.9 °F (37.2 °C) (Oral)   Resp 24   Ht 5' 2\" (1.575 m)   Wt 169 lb 8.5 oz (76.9 kg)   LMP  (LMP Unknown)   SpO2 95%   BMI 31.01 kg/m²   Constitutional:  Lethargic   Skin: no rash, turgor wnl  Heent:  eomi, mmm  Neck: no bruits or jvd noted  Cardiovascular:  S1, S2 without m/r/g  Respiratory: CTA B without w/r/r  Abdomen:  +bs, soft, nt, nd  Ext:+lower extremity edema  Psychiatric: mood and affect appropriate  Musculoskeletal:  Rom, muscular strength intact    Data:   Labs:  CBC:   Recent Labs     03/10/20  1611 03/11/20  0434   WBC 11.0 8.4   HGB 13.6 11.9*    190     BMP:    Recent Labs     03/10/20  1557 03/10/20  1708 03/11/20  0434   * 137 132*   K 6.9* 3.8 3.7   CL 91* 97* 92*   CO2 21 22 22   BUN 67* 61* 57*   CREATININE 8.2* 7.4* 7.1*   GLUCOSE 67* 88 317*     Ca/Mg/Phos:   Recent Labs     03/10/20  1557 03/10/20  1708 03/11/20  0434   CALCIUM 8.9 7.8* 8.4   MG  --   --  1.60*   PHOS 5.7*  --  4.7     Hepatic:   Recent Labs     03/11/20  0434   AST 24   ALT 11   BILITOT 0.3   ALKPHOS 75     Troponin: No results for input(s): TROPONINI in the last 72 hours. BNP: No results for input(s): BNP in the last 72 hours. Lipids: No results for input(s): CHOL, TRIG, HDL, LDLCALC, LABVLDL in the last 72 hours. ABGs: No results for input(s): PHART, PO2ART, GUI8CID in the last 72 hours. INR: No results for input(s): INR in the last 72 hours. UA:No results for input(s): [place], GLUCOSEU, BILIRUBINUR, Eyal Klinefelter, BLOODU, PHUR, PROTEINU, UROBILINOGEN, NITRU, LEUKOCYTESUR, LABMICR, URINETYPE in the last 72 hours. Urine Microscopic: No results for input(s): LABCAST, BACTERIA, COMU, HYALCAST, WBCUA, RBCUA, EPIU in the last 72 hours. Urine Culture: No results for input(s): LABURIN in the last 72 hours. Urine Chemistry: No results for input(s): Nonah Ates, PROTEINUR, NAUR in the last 72 hours. IMAGING:  XR CHEST PORTABLE   Final Result      No focal consolidation. Assessment/Plan   1.  PD peritonitis     2. HTN    3. Anemia    4. Acid- base/ Electrolyte imbalance     5.  Constipation     Plan       Pt seen on PD \  Need constipation treated   Has PD peritonitis   Add heparin to bags   Cont Abx         No IVF      Anemia management   MBD management      Labs in am        D/w Primary team                  Thank you for allowing us to participate in care of [address] free to contact me   [facility] [address]  Office : [phone]  Fax :[phone]

## 2020-03-11 NOTE — CARE COORDINATION
Case Management Assessment           Initial Evaluation                Date / Time of Evaluation: 3/11/2020 11:46 AM                 Assessment Completed by: Kaylan Lin    Patient Name: Danyell Simmons     YOB: 1940  Diagnosis: Peritonitis ([address] [K65.9]  Peritonitis [facility]) [K65.9]     Date / Time: 3/10/2020  3:26 PM    Patient Admission Status: Inpatient    If patient is discharged prior to next notation, then this note serves as note for discharge by case management. Current PCP: Alcario Gottron, MD  Clinic Patient: No    Chart Reviewed: Yes  Patient/ Family Interviewed: Yes    Initial assessment completed at bedside with: Pt and charge nurse    Hospitalization in the last 30 days: No    Emergency Contacts:  Extended Emergency Contact Information  Primary Emergency Contact: Umu De Luna, [address] Phone: [phone]  Work Phone: [phone]  Mobile Phone: [phone]  Relation: Brother/Sister    Advance Directives:   Code Status: Full [address]: No        Financial  Payor: Karen Lees / Plan: MEDICARE PART A AND B / Product Type: *No Product type* /     Pre-cert required for SNF: Yes    Pharmacy    [facility] [address] Kaleigh Gilman [phone] - F [phone]  [address] [address]  Phone: [phone] Fax: [phone]      Potential assistance Purchasing Medications: Potential Assistance Purchasing Medications: No  Does Patient want to participate in local refill/ meds to beds program?: No    Meds To Beds General Rules:  1. Can ONLY be done Monday- Friday between 8:30am-5pm  2. Prescription(s) must be in pharmacy by 3pm to be filled same day  3. Copy of patient's insurance/ prescription drug card and patient face sheet must be sent along with the prescription(s)  4. Cost of Rx cannot be added to hospital bill.  If financial assistance is needed, please contact unit  or social worker;  or  CANNOT provide pharmacy voucher for patients co-pays  5. Patients can then  the prescription on their way out of the hospital at discharge, or pharmacy can deliver to the bedside if staff is available. (payment due at time of pick-up or delivery - cash, check, or card accepted)     Able to afford home medications/ co-pay costs: Yes    ADLS  Support Systems: Family Members    PT AM-PAC:   /24  OT AM-PAC:   /24    [place]: From [address] to RETURN to current housing: Yes  Barriers to RETURNING to current housing: no    Home Care Information  Currently ACTIVE with [address]: No  Home Care Agency: Not Applicable    Currently ACTIVE with [place] on Aging: No  Passport/ Waiver: No  Passport/ Waiver Services: Not Applicable      Durable Medical Equipment  DME Provider: n/a  Equipment: defer to [address] and [address] [place] prior to admission: No  [address]: Not Applicable      Dialysis  Active with HD/PD prior to admission: Yes  Nephrologist: Dr. Ángel Baum:  Not Applicable Pt does PD at the facility    DISCHARGE PLAN:  Disposition: [facility] ([facility]): [address]  Phone: [phone]  Fax: [phone]    Transportation PLAN for discharge: EMS transportation     Factors facilitating achievement of predicted outcomes: Pleasant and Has needed Durable Medical Equipment at home    Barriers to discharge: Limited family support, Decreased endurance, Upper extremity weakness, Lower extremity weakness, Skin Care and Medication managment    Additional Case Management Notes: CM and Charge Nurse met with pt at bedside. Pt from [address] CM spoke with Issac Whitaker at [address], pt does not need pre cert to return. Pt is PD pt. Pt will need transport at discharge.      The Plan for Transition of Care is related to the following treatment goals of Peritonitis ([address] [K65.9]  Peritonitis ([address] [K65.9]    The Patient and/or patient representative William Mcneil and her family were provided with a choice of provider and agrees with the discharge plan Not Indicated    Freedom of choice list was provided with basic dialogue that supports the patient's individualized plan of care/goals and shares the quality data associated with the providers.  Not Indicated      Care Transition patient: No    Carlos Mills RN  [facility] "[employer]  Case Management Department  Ph: [phone]   Fax: [phone]

## 2020-03-12 NOTE — PROGRESS NOTES
Call received from lab with critical vancomycin trough of 22.9. Pharmacy in charge of dosing vanc, message sent via [facility] to notify.

## 2020-03-12 NOTE — PROGRESS NOTES
Pt's blood pressure is 94/59. Administered ordered midodrine, but held diltiazem as it does not meet parameters.  Notified resident and cardiology NP.

## 2020-03-12 NOTE — PROGRESS NOTES
Progress Note    Admit Date: 3/10/2020  Day: 3  Diet: Dietary Nutrition Supplements: Renal Oral Supplement  Dietary Nutrition Supplements: Renal Oral Supplement  DIET GENERAL; Daily Fluid Restriction: 1200 ml; Low Phosphorus    CC: fatigue    Interval history:   - patient had small bowel movement yesterday but wasn't given enema as ordered  - patient had PD   - yesterday PM, patient had episode of HR reaching 130, ECG ordered which revealed atrial tachycardia, diltiazem ordered but not given as rate converted to NSR  - patient seen and examined at bedside. She reports some abdomen pain that comes and goes. Endorses nausea but no vomiting. Denies fever, night sweats, chills, chest pain, cough, dyspnea, palpitations, vomiting, diarrhea, dysuria. HPI:   Citlali Bailey is a 67 y.o. F with PMH of ESRD (on PD), HTN, PVD (on plavix) and type II DM who presented with fatigue and subjective fever and chills for 3 days. Patient had no other symptoms including nausea, vomiting, shortness of breath, cough, chest pain. At ED, patient was found hypotnesive with lactic acidosis, and hence received 2L total IVF. Patient was also started on vanc and cefepime for peritonitis. Had PD fluid analysis.      Medications:     Scheduled Meds:   insulin lispro  5 Units Subcutaneous TID WC    insulin lispro  0-12 Units Subcutaneous TID WC    insulin lispro  0-6 Units Subcutaneous Nightly    dilTIAZem  15 mg Oral 4 times per day    insulin glargine  10 Units Subcutaneous BID    polyethylene glycol  17 g Oral BID    darbepoetin sandeep-polysorbate  60 mcg Subcutaneous Weekly    clopidogrel  75 mg Oral Daily    custom dialysis builder   Intraperitoneal Daily    And    custom dialysis builder   Intraperitoneal Daily    And    custom dialysis builder   Intraperitoneal Daily    aspirin  81 mg Oral Daily    midodrine  5 mg Oral TID WC    sevelamer  1,600 mg Oral TID WC    atorvastatin  10 mg Oral Daily    sodium chloride flush  10 mL Intravenous 2 times per day    heparin (porcine)  5,000 Units Subcutaneous 3 times per day    cefepime  1 g Intravenous Q24H    vancomycin (VANCOCIN) intermittent dosing (placeholder)   Other See Admin Instructions     Continuous Infusions:   dextrose       PRN Meds:prochlorperazine, glucose, dextrose, glucagon (rDNA), dextrose, sodium chloride flush, acetaminophen **OR** acetaminophen, polyethylene glycol    Objective:   Vitals:   T-max:  Patient Vitals for the past 8 hrs:   BP Temp Temp src Pulse Resp SpO2   03/12/20 0815 (!) 109/58 -- -- 97 -- --   03/12/20 0752 110/63 98.1 °F (36.7 °C) Oral 126 18 97 %   03/12/20 0601 -- -- -- 105 -- --   03/12/20 0600 98/69 98.8 °F (37.1 °C) Oral -- 18 --   03/12/20 0522 (!) 94/59 98.2 °F (36.8 °C) Oral 104 18 95 %   03/12/20 0417 -- -- -- 99 -- --       Intake/Output Summary (Last 24 hours) at 3/12/2020 1057  Last data filed at 3/12/2020 0600  Gross per 24 hour   Intake 280 ml   Output 68541 ml   Net -42016 ml       Review of Systems  See above    Physical Exam  Constitutional:       Appearance: She is ill-appearing. HENT:      Head: Normocephalic and atraumatic. Eyes:      Pupils: Pupils are equal, round, and reactive to light. Cardiovascular:      Rate and Rhythm: Regular rhythm. Tachycardia present. Pulses: Normal pulses. Heart sounds: Murmur present. Pulmonary:      Effort: Pulmonary effort is normal.      Breath sounds: Normal breath sounds. No wheezing or rales. Abdominal:      General: Abdomen is flat. Bowel sounds are normal. There is distension. Palpations: Abdomen is soft. Tenderness: There is abdominal tenderness (mild). There is no guarding. Musculoskeletal: Normal range of motion. General: Swelling present. No tenderness. Right lower leg: Edema (1+) present. Left lower leg: Edema (1+) present. Skin:     General: Skin is warm and dry. Capillary Refill: Capillary refill takes less than 2 seconds. intervention. Has hx of afib in previous hospital admission. Was on anti-coagulation for DVT in the past but d/c 8/2019 following GI bleed as patient was found not taking it. [id number]. Left atrium mildly dilated on Echo 8/2019. Cardiology didn't think ECG revaeled atrial fibrillation. -- follow up cardiology recs  -- can give diltiazem if BP can tolerate  -- will monitor for now    Type II diabetes  Last [facility] 3/24/2019 6.5. On home lantus 6u nightly, and sliding scale  -- hypoglycemia protocol  -- medium dose sliding scale  -- 3 lispro with meals tid  -- change lantus 10u BID  -- Accu-checks q4h    Hx of PVD  Follows vascular surgery outpatient. On home plavix  -- continue home plavix 75    Hypotension  Pt BP in the low side, on home midodrine.  Cortisol AM wnl.   -- continue home midodrine  -- no IVF given ESRD        Code Status: FULL  FEN:  Renal, low carb Diet  PPX: Heparin  DISPO: Wards, PT/OT consulted    Vicky Mallory MD, PGY-1  03/12/20  10:57 AM    This patient has been staffed and discussed with Luca Cline MD.

## 2020-03-12 NOTE — PROGRESS NOTES
Margins Unattached edges 3/12/2020  3:15 PM   Number of days: 1       Response to treatment:no c/o's  Plan:   Plan of Care: Wound 03/10/20 Toe (Comment  which one) Anterior;Right Great toe deformed post nail removal-Dressing/Treatment: Betadine swabs, Open to air  Wound 03/10/20 Buttocks Right Stage 2 PI-Dressing/Treatment: Other (comment)(venelex ordered)    Specialty Bed Required : yes, ordered  [x] Low Air Loss   [] Pressure Redistribution  [] Fluid Immersion  [] Bariatric  [] Total Pressure Relief  [] Other:     Current Diet: Dietary Nutrition Supplements: Renal Oral Supplement  Dietary Nutrition Supplements: Renal Oral Supplement  DIET GENERAL; Daily Fluid Restriction: 1200 ml; Low Phosphorus    Patient/Caregiver Teaching:etiology, treatment  Level of patient/caregiver understanding able to:   [x] Indicates understanding       [] Needs reinforcement  [] Unsuccessful      [x] Verbal Understanding  [] Demonstrated understanding       [] No evidence of learning  [] Refused teaching         [] N/A       Electronically signed by Sharmila Falcon RN, Kieran Russell on 3/12/2020 at 4:57 PM

## 2020-03-12 NOTE — PROGRESS NOTES
Electrophysiology - PROGRESS NOTE    Admit Date: 3/10/2020     Chief Complaint: MAT, tachycardia     Interval History:   Patient seen and examined and notes reviewed. Patient is being followed for MAT, tachycardia. Today she is c/o nausea, there is some concern for constipation. She denies CP or SOB.      In: 220 [P.O.:220]  Out: 85939    Wt Readings from Last 2 Encounters:   03/11/20 169 lb 15.6 oz (77.1 kg)   10/27/19 146 lb (66.2 kg)         Data:   Scheduled Meds:   Scheduled Meds:   mineral oil  30 mL Oral Once    insulin lispro  5 Units Subcutaneous TID WC    insulin lispro  0-12 Units Subcutaneous TID WC    insulin lispro  0-6 Units Subcutaneous Nightly    polyethylene glycol  17 g Oral BID    darbepoetin sandeep-polysorbate  60 mcg Subcutaneous Weekly    insulin glargine  6 Units Subcutaneous Nightly    clopidogrel  75 mg Oral Daily    custom dialysis builder   Intraperitoneal Daily    And    custom dialysis builder   Intraperitoneal Daily    And    custom dialysis builder   Intraperitoneal Daily    dilTIAZem  30 mg Oral Once    aspirin  81 mg Oral Daily    midodrine  5 mg Oral TID WC    sevelamer  1,600 mg Oral TID WC    atorvastatin  10 mg Oral Daily    sodium chloride flush  10 mL Intravenous 2 times per day    heparin (porcine)  5,000 Units Subcutaneous 3 times per day    cefepime  1 g Intravenous Q24H    vancomycin (VANCOCIN) intermittent dosing (placeholder)   Other See Admin Instructions     Continuous Infusions:   dextrose       PRN Meds:.prochlorperazine, glucose, dextrose, glucagon (rDNA), dextrose, sodium chloride flush, acetaminophen **OR** acetaminophen, polyethylene glycol  Continuous Infusions:   dextrose         Intake/Output Summary (Last 24 hours) at 3/12/2020 1024  Last data filed at 3/12/2020 0600  Gross per 24 hour   Intake 280 ml   Output 39566 ml   Net -28905 ml       CBC:   Lab Results   Component Value Date WBC 8.4 03/11/2020    HGB 11.9 03/11/2020     03/11/2020     BMP:  Lab Results   Component Value Date     03/12/2020    K 4.9 03/12/2020    K 3.7 03/11/2020    CL 89 03/12/2020    CO2 19 03/12/2020    BUN 48 03/12/2020    CREATININE 6.8 03/12/2020    GLUCOSE 429 03/12/2020     INR:   Lab Results   Component Value Date    INR 1.09 08/18/2019    INR 1.17 08/08/2019    INR 2.90 05/31/2019        CARDIAC LABS  ENZYMES:No results for input(s): CKMB, CKMBINDEX, TROPONINI in the last 72 hours. Invalid input(s): CKTOTAL;3  FASTING LIPID PANEL:  Lab Results   Component Value Date    HDL 49 02/03/2015    HDL 62 10/19/2011    LDLCALC 54 02/03/2015    TRIG 84 02/03/2015    TSH 1.28 06/02/2013     LIVER PROFILE:  Lab Results   Component Value Date    AST 24 03/11/2020    AST 34 08/18/2019    ALT 11 03/11/2020    ALT 82 08/18/2019       -----------------------------------------------------------------  Telemetry: Personally reviewed  NSR, multiple runs of MAT    Objective:   Vitals: BP (!) 109/58   Pulse 97   Temp 98.1 °F (36.7 °C) (Oral)   Resp 18   Ht 5' 2\" (1.575 m)   Wt 169 lb 15.6 oz (77.1 kg)   LMP  (LMP Unknown)   SpO2 97%   BMI 31.09 kg/m²   General appearance: alert, appears stated age and cooperative, No acute distress   Eyes: Conjunctiva and pupils normal and reactive  Skin: Skin color, texture, turgor normal. No rashes or ecchymosis.   Neck: no JVD, supple, symmetrical, trachea midline   Lungs: , no accessory muscle use, no respiratory distress  Heart: irreg, tachy  Abdomen: soft, non-tender; bowel sounds normal  Extremities: No edema, DP +  Psychiatric: normal insight and affect    Patient Active Problem List:     HTN (hypertension)     Systolic heart failure (HCC)     DM (diabetes mellitus) ([facility]     History of macrocytic anemia     Smoking     Osteoarthritis     Hypertensive urgency     Hyperkalemia     Renal artery stenosis (HCC)     Acute renal insufficiency     Hyperlipidemia     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 mellitus ([address]     Unexplained weight loss     ESRD on peritoneal dialysis ([address]     ESRD (end stage renal disease) on dialysis ([address]     Knee pain, left     Debility     Failure to thrive in adult     Numbness of right foot     Encounter for palliative care     Advance directive discussed with patient     Weakness of both legs     Left leg swelling     Peripheral neuropathy     Uncontrolled type 2 diabetes mellitus with hyperglycemia (HCC)     DVT of deep femoral vein, left (HCC)     Onychomycosis     Atrial fibrillation (HCC)     Multifocal atrial tachycardia (HCC)     Hypokalemia     Severe malnutrition ([address]     Peritoneal dialysis catheter exit site infection ([address]     GI bleed     Dry gangrene to right hallux(HCC)     Peritonitis ([address]        Assessment & Plan:      1. MAT  2. ST  3. Nausea    68 y/o woman with a h/o ESRD,on PD,  HTN, HLD, who p/w 3 days of fever and chills, noted to have irregular rhythm on tele, found to have a MAT.      MAT  - Runs of MAT this am  - Dilt ordered x 1 and held for low BP  - Will start sm dose dilt with hold parameters  - Treat underlying cause - (consider abd xray?)      Danuta Psoey [address]

## 2020-03-12 NOTE — PLAN OF CARE
Problem: Falls - Risk of:  Goal: Will remain free from falls  Description: Will remain free from falls  3/11/2020 2034 by Jack George RN  Outcome: Met This Shift  3/11/2020 0831 by Milton Bernstein RN  Outcome: Ongoing  Goal: Absence of physical injury  Description: Absence of physical injury  3/11/2020 0831 by Milton Bernstein RN  Outcome: Ongoing     Problem: Nutrition  Goal: Optimal nutrition therapy  3/11/2020 2034 by Jack George RN  Outcome: Not Met This Shift  3/11/2020 1007 by Meghan Tineo RD, LD  Outcome: Ongoing     Problem: Infection:  Goal: Will remain free from infection  Description: Will remain free from infection  Outcome: Ongoing     Problem: Safety:  Goal: Free from accidental physical injury  Description: Free from accidental physical injury  Outcome: Met This Shift  Goal: Free from intentional harm  Description: Free from intentional harm  Outcome: Met This Shift     Problem: Daily Care:  Goal: Daily care needs are met  Description: Daily care needs are met  Outcome: Met This Shift     Problem: Pain:  Goal: Patient's pain/discomfort is manageable  Description: Patient's pain/discomfort is manageable  Outcome: Ongoing     Problem: Skin Integrity:  Goal: Skin integrity will stabilize  Description: Skin integrity will stabilize  Outcome: Ongoing

## 2020-03-12 NOTE — PROGRESS NOTES
On call residents to bedside to evaluate pt. New order placed for oral diltiazem with instruction to administer if HR is sustaining in 130-140's for 20-30 minutes. Patient's HR currently 100's. Will continue to monitor.

## 2020-03-12 NOTE — FLOWSHEET NOTE
03/11/20 1950   Vitals   /65   Temp 99.8 °F (37.7 °C)   Temp Source Oral   Pulse 128   Resp 18   SpO2 94 %   Weight 169 lb 15.6 oz (77.1 kg)   Cycler   Verification of Prescription CCPD   Total Volume Programmed 84637 mL   Therapy Time (Hours:Minutes) 10   Fill Volume 2200 mL   Dextrose Setting Same (Nonextraneal)   Number of Cycles 5   Bag Volume 5000 mL   Number of Bags Used 3   Dianeal Solution Other (Comment)  (Delflex 2.5% 5000ml)   I Drain (mL) 25 mL   Orders verified. Solutions picked up from Pharmacy. Supplies taken to Pt's room. Report received. Cycler set up, primed and pre tested. Using aseptic technique, Pt. connected to cycler. CCPD initiated without problem.

## 2020-03-12 NOTE — PROGRESS NOTES
Clinical Pharmacy Progress Note    ADMIT DATE: 3/10/2020     INTERVAL UPDATE: HR in 100-150s overnight. EKG obtained overnight. 66 yo F with PMHx significant for DVT, ESRD on PD, HTN, OA, chronic pancreatitis, DM, GI bleed; who presented to ED from NH on 3/10 with complaints of fever and chills. Admitted 3/11 with sepsis 2/2 suspected peritonitis. Currently on IV cefepime + vancomycin. Pharmacy asked to dose vancomycin per Dr. Pita De Jesus. PERTINENT MEDICATIONS:  Cefepime 1g IV q24h - day #3  Vancomycin - pharmacy to dose - day #3  Date Dose Vanc Level   3/10 1.5g IV     3/11 1g IV 16.5 mcg/mL   3/12  22.9 mcg/mL     LABORATORY:  Recent Labs     03/11/20  0434 03/12/20  0454   * 129*   K 3.7 4.9   CL 92* 89*   CO2 22 19*   BUN 57* 48*   CREATININE 7.1* 6.8*   GLUCOSE 317* 429*     Estimated CrCl not calculated d/t ESRD on HD    Recent Labs     03/10/20  1611 03/11/20  0434   WBC 11.0 8.4   HGB 13.6 11.9*    190     MICROBIOLOGY:  Rapid influenza (3/10): Negative  Blood (3/10):     NGTD x 2   Peritoneal dialysis fluid (3/11): No organisms on Gram Stain    VITALS:  /63   Pulse 126   Temp 98.1 °F (36.7 °C) (Oral)   Resp 18   Ht 5' 2\" (1.575 m)   Wt 169 lb 15.6 oz (77.1 kg)   LMP  (LMP Unknown)   SpO2 97%   BMI 31.09 kg/m²     Intake/Output Summary (Last 24 hours) at 3/12/2020 4896  Last data filed at 3/12/2020 7054  Gross per 24 hour   Intake 280 ml   Output 228 ml   Net 52 ml     PROPHYLAXIS:  Stress ulcer: not ordered  VTE:  Heparin 5,000 units TID    ASSESSMENT/PLAN:  1)  Sepsis 2/2 suspected peritonitis: Cefepime + Vancomycin - Day #3  · Vancomycin--pharmacy to dose  · Given ESRD on PD, vancomycin will be dosed based on intermittent levels. Have entered placeholder onto [facility] · Received vancomycin 1g IV yesterday as level was 16.5 mcg/mL. · Vancomycin level today = 22.9 mcg/mL -- will not give dose today as level > 20 mcg/mL.   · Repeat vancomycin level ordered for tomorrow AM (Fri 3/13). Will re-dose as appropriate. · Renal function will be monitored / repeat doses and levels will be ordered as appropriate. · Cefepime--  · Currently ordered 1g IV q24h - dose appropriate for ESRD on PD. Please call with any questions.   Eleuterio Lane PharmD, BCPS  Wireless: [phone]  or [phone]  3/12/2020 8:08 AM

## 2020-03-12 NOTE — PLAN OF CARE
Problem: Falls - Risk of:  Goal: Will remain free from falls  Description: Will remain free from falls  3/12/2020 0544 by Dia Phoenix, RN  Note: Patient remains free of falls thus far this shift. Patient is alert and oriented x4. Patient utilizes call light appropriately to call for assistance with no attempts made at self transfer. Currently resting in bed quietly. Safety precautions include fall armband, fall blanket to bed, bed alarm, and non slip footwear. Bed in lowest position, bed wheels locked, bed alarm on, side rails up 2/4 and call light within reach. Will continue to monitor. Problem: Infection:  Goal: Will remain free from infection  Description: Will remain free from infection  3/12/2020 0544 by Dia Phoenix, RN  Note: Patient remains afebrile thus far this shift. Continues on IV ABX for peritonitis. Problem: Skin Integrity:  Goal: Skin integrity will stabilize  Description: Skin integrity will stabilize  3/12/2020 0544 by Dia Phoenix, RN  Note: No new skin integrity issues noted at this time. Patient repositioned Q2H utilizing pillow support. Feet elevated off bed with pillows. Will continue to monitor.

## 2020-03-13 PROBLEM — K59.00 CONSTIPATION: Status: ACTIVE | Noted: 2020-01-01

## 2020-03-13 NOTE — PROGRESS NOTES
Daily    custom dialysis builder   Intraperitoneal Daily    And    custom dialysis builder   Intraperitoneal Daily    And    custom dialysis builder   Intraperitoneal Daily    aspirin  81 mg Oral Daily    midodrine  5 mg Oral TID WC    sevelamer  1,600 mg Oral TID WC    atorvastatin  10 mg Oral Daily    sodium chloride flush  10 mL Intravenous 2 times per day    heparin (porcine)  5,000 Units Subcutaneous 3 times per day    cefepime  1 g Intravenous Q24H     Continuous Infusions:   dextrose       PRN Meds:prochlorperazine, glucose, dextrose, glucagon (rDNA), dextrose, sodium chloride flush, acetaminophen **OR** acetaminophen, polyethylene glycol    Objective:   Vitals:   T-max:  Patient Vitals for the past 8 hrs:   BP Temp Temp src Pulse Resp SpO2 Weight   03/13/20 1513 113/72 -- -- -- -- -- --   03/13/20 1506 (!) 73/53 98.2 °F (36.8 °C) Oral 109 16 95 % --   03/13/20 1425 (!) 89/54 -- -- 108 -- -- --   03/13/20 1400 105/64 -- -- 114 -- -- --   03/13/20 1300 94/60 -- -- 128 -- -- --   03/13/20 1200 112/70 -- -- 140 -- -- --   03/13/20 1108 (!) 95/59 -- -- -- -- -- --   03/13/20 1058 (!) 91/57 98.5 °F (36.9 °C) Oral 127 14 95 % --   03/13/20 1042 (!) 102/45 -- -- 106 -- -- --   03/13/20 1015 (!) 93/56 98.6 °F (37 °C) -- 129 12 -- 171 lb 15.3 oz (78 kg)   03/13/20 1000 (!) 99/55 -- -- 126 -- -- --   03/13/20 0945 105/63 -- -- -- -- -- --   03/13/20 0900 (!) 95/57 -- -- 111 -- -- --   03/13/20 0840 111/71 -- -- 100 -- -- --       Intake/Output Summary (Last 24 hours) at 3/13/2020 1531  Last data filed at 3/13/2020 1210  Gross per 24 hour   Intake 520 ml   Output -2 ml   Net 522 ml       Review of Systems  See above    Physical Exam  Constitutional:       Appearance: She is ill-appearing. HENT:      Head: Normocephalic and atraumatic. Eyes:      Pupils: Pupils are equal, round, and reactive to light. Cardiovascular:      Rate and Rhythm: Regular rhythm. Tachycardia present. Pulses: Normal pulses. LACTATE in the last 72 hours. Cultures:  -----------------------------------------------------------------  RAD:   CT ABDOMEN PELVIS WO CONTRAST Additional Contrast? None   Final Result      No acute intra-abdominal process. Small amount of fluid in the leftward pelvis and perihepatic region with small foci of pneumoperitoneum, not to be unexpected given the peritoneal dialysis catheter. Diverticulosis. Renal cysts. Bibasilar atelectasis. XR CHEST PORTABLE   Final Result      No focal consolidation. Assessment/Plan:   Peritonitis in peritoneal dialysis  Subjective fever, fatigue, abdomen pain. Recent fluid analysis 3/13 was still cloudy, revealed 9856 nuc cells (85% neutrophils). Nephrology onboard. -- continue vanc, cefepime  -- follow up PD fluid analysis gram stain  -- follow up PD fluid cx  -- follow up nephrology recs  -- consult surgery regarding PD cath replacement given no improvement     ESRD on PD  Nephrology consulted. Had lines obstruction issues now resolved. -- continue PD  -- follow up nephology recs  -- daily renal function panel    Nausea, vomiting - improving  Could be related to peritonitis. CT abdomen WO contrast was unremarkable. Today feeling better. -- plan as above  -- check liver enzymes  -- check lipase  -- compazine for nausea  -- SLP evaluation    Polymorphic atrial tachycardia-RVR  Has hx of afib in previous hospital admission. Was on anti-coagulation for DVT in the past but d/c 8/2019 following GI bleed as patient was found not taking it. -- follow up cardiology recs  -- diltiazem 15 mg q6h as tolerated by BP (added by cardiology)  -- lopressor 12.5 BID as tolerated by BP (added by cardiology)  -- will monitor for now    Type II diabetes  Last [facility] 3/24/2019 6.5.   On home lantus 6u nightly, and sliding scale  -- hypoglycemia protocol  -- change to high dose sliding scale  -- change lispro to 5u with meals tid  -- change lantus 15u BID  -- Accu-checks q4h    Hx of PVD  Follows vascular surgery outpatient. On home plavix  -- continue home plavix 75    Hypotension  Pt BP in the low side, on home midodrine. Became hypotensive 70's requiring 500cc bolus following diltiazem drip.  Cortisol AM 21.   -- continue home midodrine  -- no IVF given ESRD      Code Status: FULL  FEN:  Renal, low carb Diet  PPX: Heparin  DISPO: Wards, PT/OT consulted    Aren Bernabe MD, PGY-1  03/13/20  3:31 PM    This patient has been staffed and discussed with Chelle Bills MD.

## 2020-03-13 NOTE — PROGRESS NOTES
Dr. Vivian Singer at bedside, new order for 500 ml bolus wide open and hold dilt gtt at this time. Recheck BP after bolus finished.

## 2020-03-13 NOTE — PLAN OF CARE
Problem: Falls - Risk of:  Goal: Will remain free from falls  Description: Will remain free from falls  Note: Patient remains free of falls thus far this shift. Patient is alert and oriented x4. Patient utilizes call light appropriately to call for assistance with no attempts made at self transfer. Currently resting in bed quietly. Safety precautions include fall armband, fall blanket to bed, bed alarm, and non slip footwear. Bed in lowest position, bed wheels locked, bed alarm on, side rails up 2/4 and call light within reach. Will continue to monitor.

## 2020-03-13 NOTE — PROGRESS NOTES
Stacy cycler set up for CCPD and connected using sterile technique. Effulent hazy yellow and sample sent for Cell count and culture. PD catheter site tender to touch but no redness or drainage noted cath exit site. Dressing changed per protocol. Report given to MIR mackey.

## 2020-03-13 NOTE — PROGRESS NOTES
CC: MAT, tachycardia  HPI:     S: Sleeping when I arrived. Appears comfortable. No complaints    Tele: Sinus Tach, PAC, PVC, MAT    O:  Physical Exam:  /63   Pulse 111   Temp 98.6 °F (37 °C) (Oral)   Resp 22   Ht 5' 2\" (1.575 m)   Wt 169 lb 15.6 oz (77.1 kg)   LMP  (LMP Unknown)   SpO2 97%   BMI 31.09 kg/m²    General (appearance):  No acute distress  Eyes: anicteric   Neck: soft, No JVD  Ears/Nose/Mouth/Thorat: No cyanosis  CV: Irreg, tachycardic   Respiratory:  Diminished, normal effort  GI: soft, non-tender, non-distended  Skin: Warm, dry. No rashes  Neuro/Psych: Coopertave  Ext:  No c/c. No significant edema  Pulses:  2+ radial     I.O's=     Weight  Admission: Weight: 169 lb 12.1 oz (77 kg)   Today: Weight: (PD still running, will weigh after completed)    CBC:   Recent Labs     20  0434 20  0454 20  0504   WBC 8.4 7.5 8.0   HGB 11.9* 14.4 12.4   HCT 38.0 46.9 39.0   MCV 95.4 95.2 94.9    see below 203     BMP:   Recent Labs     20  0454 20  2151 20  0504   * 132* 132*   K 4.9 3.3* 3.3*   CL 89* 91* 91*   CO2 19* 23 18*   PHOS 4.6 5.2* 4.9   BUN 48* 54* 48*   CREATININE 6.8* 7.8* 7.2*     Mag:   Lab Results   Component Value Date    MG 1.80 2020     LIVER PROFILE:   Recent Labs     20  0434   AST 24   ALT 11   BILIDIR <0.2   BILITOT 0.3   ALKPHOS 75     Pro-BNP:   Lab Results   Component Value Date    PROBNP 9,434 2019    PROBNP 6,622 2019    PROBNP 3,305 2017     Imagin2019 Echo:   Left ventricular cavity size is normal. There is mild concentric hypertrophy   with more pronounced septal hypertrophy. Overall left ventricular systolic   function appears hyperdynamic with an estimated ejection fraction of >65%. No regional wall motion abnormalities are noted. Diastolic filling   parameters suggests grade II diastolic dysfunction.     Assessment:  67 y.o. admitted with fever and chills, found to have MAT  Issues:  -MAT  -Tachycardia  -HTN; now with soft BP  -HLD  -PAD (on plavix)  -ESRD/ PD  -DM  -Peritonitis     Plan:  -Keep K>4, Mg>2.  -Diltiazem 15 mg po q 6 hours. Hold for SBP < 95 mmHg or HR < 60  -ASA, plavix   -Statin   -Midodrine   -Will try small dose of BB with parameters    HR remains tachy but we are limited with medication titration d/t soft BPs.

## 2020-03-13 NOTE — PROGRESS NOTES
Called rapid response at about 17:45 as pt's blood pressure is 61/46. Pt was started on IV fluids and transferred to ICU. Called report to ICU nurse and transferred pt at about 18:15.

## 2020-03-13 NOTE — PROGRESS NOTES
Pt's HR is still transient however starting to increase, now mostly 120's with HR as high as the 190's. BP is 104/81. On call resident made aware, PO diltiazem given at this time.

## 2020-03-13 NOTE — PROCEDURES
Jed Madsen is a 67 y.o. female patient. 1. Peritonitis associated with peritoneal dialysis, initial encounter ([facility]    2. Septicemia ([facility]    3. Peritonitis [facility])      Past Medical History:   Diagnosis Date    DVT (deep venous thrombosis) (HCC)     End stage renal disease (HCC)     Hyperlipidemia     Hypertension     Osteoarthritis     Pancreatitis chronic     Peritoneal dialysis status ([facility]     Type II or unspecified type diabetes mellitus without mention of complication, not stated as uncontrolled     Vitamin D deficiency      Blood pressure 85/67, pulse 85, temperature 98 °F (36.7 °C), temperature source Oral, resp. rate 17, height 5' 2\" (1.575 m), weight 172 lb 2.9 oz (78.1 kg), SpO2 93 %, not currently breastfeeding. Insert Arterial Line  Date/Time: 3/13/2020 7:36 PM  Performed by: Ester Ricci MD  Authorized by: Ester Ricci MD   Consent: The procedure was performed in an emergent situation. Verbal consent obtained. Risks and benefits: risks, benefits and alternatives were discussed  Consent given by: patient  Patient understanding: patient states understanding of the procedure being performed  Patient consent: the patient's understanding of the procedure matches consent given  Procedure consent: procedure consent matches procedure scheduled  Relevant documents: relevant documents present and verified  Test results: test results available and properly labeled  Site marked: the operative site was marked  Imaging studies: imaging studies available  Required items: required blood products, implants, devices, and special equipment available  Patient identity confirmed: arm band and verbally with patient  Time out: Immediately prior to procedure a \"time out\" was called to verify the correct patient, procedure, equipment, support staff and site/side marked as required. Preparation: Patient was prepped and draped in the usual sterile fashion.   Indications: multiple ABGs and hemodynamic
chlorhexidine  Skin prep agent dried: skin prep agent completely dried prior to procedure  Sterile barriers: all five maximum sterile barriers used - cap, mask, sterile gown, sterile gloves, and large sterile sheet  Hand hygiene: hand hygiene performed prior to central venous catheter insertion  Location details: right femoral  Patient position: flat  Catheter type: triple lumen  Catheter size: 7 Fr  Pre-procedure: landmarks identified  Ultrasound guidance: yes  Sterile ultrasound techniques: sterile gel and sterile probe covers were used  Number of attempts: 2  Successful placement: yes  Post-procedure: line sutured and dressing applied  Assessment: blood return through all ports and free fluid flow  Patient tolerance: Patient tolerated the procedure well with no immediate complications  Comments: Estimated blood loss 10 cc          Ashli Littlejohn MD  3/13/2020

## 2020-03-13 NOTE — PROGRESS NOTES
Patient had 20 beats of vtach on telemetry, asymptomatic. BP 90/58. Notified Dr. Tiffany Lugo via perfect serve. Will continue to monitor.

## 2020-03-13 NOTE — FLOWSHEET NOTE
03/13/20 1015   Vitals   BP (!) 93/56   Temp 98.6 °F (37 °C)   Pulse 129   Resp 12   Weight 171 lb 15.3 oz (78 kg)   Cycler   Ultrafiltration (UF) (mL) -2 mL   Average Dwell Time (Hours:Minutes) 01:33     tx complete, pt disconnected using aseptic technique, flow sheets printed and put in pt chart for EMR

## 2020-03-13 NOTE — PLAN OF CARE
SLP evaluation completed. Please refer to EMR.     Em Finn MA CCC-SLP; [id number]  Speech-Language Pathologist

## 2020-03-13 NOTE — PROGRESS NOTES
Speech Language Pathology  Facility/Department: [place] [address] [facility]   CLINICAL BEDSIDE SWALLOW EVALUATION    NAME: Pradeep Morris  : 1940  MRN: [id number]    ADMISSION DATE: 3/10/2020  ADMITTING DIAGNOSIS: has HTN (hypertension); Systolic heart failure ([address] DM (diabetes mellitus) ([address] History of macrocytic anemia; Smoking; Osteoarthritis; Hypertensive urgency; Hyperkalemia; Renal artery stenosis ([address] Acute renal insufficiency; Hyperlipidemia; 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 mellitus ([address] Unexplained weight loss; ESRD on peritoneal dialysis ([address] ESRD (end stage renal disease) on dialysis ([address] Knee pain, left; Debility; Failure to thrive in adult; Numbness of right foot; Encounter for palliative care; Advance directive discussed with patient; Weakness of both legs; Left leg swelling; Peripheral neuropathy; Uncontrolled type 2 diabetes mellitus with hyperglycemia ([address] DVT of deep femoral vein, left ([address] Onychomycosis; Atrial fibrillation ([address] Multifocal atrial tachycardia ([address] Hypokalemia; Severe malnutrition ([address] Peritoneal dialysis catheter exit site infection [facility]); GI bleed; Dry gangrene to right hallux(HCC); Peritonitis ([address] and Constipation on their problem list.  ONSET DATE: 3/13/20    Recent Chest Xray/CT of Chest: (3/12/20)  Mild bibasilar atelectasis or scarring. Date of Eval: 3/13/2020  Evaluating Therapist: Pratima Anderson    Current Diet level:  Current Diet : Regular  Current Liquid Diet : Thin      Primary Complaint  Patient Complaint: swallowing \"not going very well\" - unclear if d/t oropharyngeal deficits vs nausea from PO    Pain:  Denied    Reason for Referral  Pradeep Morris was referred for a bedside swallow evaluation to assess the efficiency of her swallow function, identify signs and symptoms of aspiration and make recommendations regarding safe dietary consistencies, effective compensatory strategies, and safe eating environment.     Impression  Dysphagia Functional Limits  Hearing  Hearing: Within functional limits    Oral Motor Deficits  Oral/Motor  Oral Motor: Exceptions to [facility]  Labial ROM: Reduced left; Reduced right  Labial Coordination: Reduced  Lingual Symmetry: Abnormal symmetry left    Oral Phase Dysfunction  Oral Phase  Oral Phase: Exceptions  Oral Phase Dysfunction  Decreased Anterior to Posterior Transit: All  Lingual/Palatal Residue: All  Oral Phase  Oral Phase - Comment: Piecemeal swallowing exhibited     Indicators of Pharyngeal Phase Dysfunction   Pharyngeal Phase  Pharyngeal Phase: Exceptions  Indicators of Pharyngeal Phase Dysfunction  Cough - Immediate: Thin    Prognosis  Prognosis  Prognosis for safe diet advancement: (n/a - regular with thins recommended)  Barriers to reach goals: cognitive deficits;behavior;fatigue  Individuals consulted  Consulted and agree with results and recommendations: Patient;RN    Education  Patient Education: Educated pt to role of SLP, purpose of evaluation, impact xerostomia has on swallow, risks associated with dysphagia / aspiration, results of session, and recommended compensatory strategies. Patient Education Response: No evidence of learning  Safety Devices in place: Yes  Type of devices: Bed alarm in place; Left in bed;Call light within reach;Nurse notified       Therapy Time  SLP Individual Minutes  Time In: 1130  Time Out: 8645  Minutes: 15  SLP Co-Treatment Minutes  Time In: 0000  Time Out: 0000  Minutes: 0  SLP Total Treatment Time  Timed Code Treatment Minutes: 0 Minutes  Total Treatment Time: JOANNE Villegas CCC-SLP; [id number]  Speech-Language Pathologist  Pager # [phone]    If patient is discharged prior to next session, this note will serve as discharge summary.

## 2020-03-13 NOTE — PROGRESS NOTES
Physical Therapy  Attempted to see pt for PT evaluation: Pt off floor for CT at 8:25. Additionally, pt hypotensive with elevated resting HR (120 - 160 overnight and this morning). Will follow up later today as medically appropriate.   Trista Jay, PT DPT [id number]

## 2020-03-13 NOTE — PROGRESS NOTES
Pt's blood sugar is 33. Pt opens her eyes, but does not communicate. Administered IV D50 per Mar. After recheck, pt's blood sugar is over 100.

## 2020-03-13 NOTE — PROGRESS NOTES
Pt is having to be coached to swallow her food/medication. Pt holds the food/medication on the back of her tongue.  Notified MD.

## 2020-03-13 NOTE — CONSULTS
PT/INR:    Lab Results   Component Value Date    PROTIME 12.4 08/18/2019    INR 1.09 08/18/2019     HgBA1c:    Lab Results   Component Value Date    LABA1C 6.5 03/24/2019     LIPASE:    Lab Results   Component Value Date    LIPASE 24.0 03/13/2020     Blood Culture:  No components found for: Myke Morris  Results for Don Felder (MRN [id number]) as of 3/13/2020 13:10   Ref. Range 3/10/2020 16:43 3/10/2020 17:08 3/10/2020 18:04 3/11/2020 01:48 3/12/2020 21:30   Culture, Blood 2 Unknown No Growth to date. Any change in status will be called. Gram Stain Result Unknown   No WBCs or organisms seen Cytospin performe. .. Organism Unknown   Gram negative ilya (A) Gram negative ilya (A)    Body Fluid Culture, Sterile Unknown   Heavy growth. .. Light growth. .. Rapid Influenza A Ag Latest Ref Range: Negative   Negative      Rapid Influenza B Ag Latest Ref Range: Negative   Negative      RAPID INFLUENZA A/B ANTIGENS Unknown  Rpt        IMPRESSION/RECOMMENDATIONS:     67year old  with End stage renal disease requiring peritoneal dialysis admitted with peritonitis. She also has necrotic tip of big toe on right foot which is being painted with betadine. She remains hypotensive and tachycardic. Currently receiving Maxipime and peritoneal antibiotics. There was some dysfunction of her PD catheter but that has improved. D/W Dr. Jennifer Pepe. Plan to continue antibiotics and re-assess on Monday for PD cath removal and possible replacement.       Griffin Foster) ARIADNA Chavez  Resident Support Staff  [phone]

## 2020-03-13 NOTE — PROGRESS NOTES
ICU TRANSFER NOTE      7:27 PM3/  Admit Date: 3/10/2020   Hospital Day: 4                                                   PCP   Sergio Carlin MD  : 1940                                                       CodeStatus:Full Code  MRN: [id number]                                                        Diet: Dietary Nutrition Supplements: Renal Oral Supplement  Dietary Nutrition Supplements: Renal Oral Supplement  DIET GENERAL; Daily Fluid Restriction: 1200 ml; Low Phosphorus      Cailin Davison is a 67 y.o. F with PMH of ESRD (on PD), HTN, PVD (on plavix) and type II DM who presented three days ago with fatigue and subjective fever and chills x 3 days. While in the ED, patient was found to be hypotnesive with a lactic acidosis which improved after 2L IVFs. Work-up thus far is concerning for peritonitis as recent fluid analysis revealed 9856 nucleated cells with 85% neutrophils. Cultures with gram negative rods, still awaiting species. Patient was started on cefepime. Additionally, patient was being followed by EP cards for MAT, she was started on dilt gtt; however, her systolic blood pressure dropped to the 70s. She was switched to low doses of PO dilt and lopressor but had several episodes of emesis and was unable to keep the pills down. Additionally, midodrine was added to help with low blood pressures. Due to vomiting, CT abdomen WO contrast was ordered and was unremarkable. On 3/13, a code blue was called as patient was hypotensive with systolic's in the 91H, HR in the 80s and O2 sats at 82% on RA. She was unresponsive. Patient never lost pulse. Prior to code, patient's BG was 33, she received D50 which improved glucose to 100s. She was given a 250mL bolus of IVFs which did not improve pressures. ABG and lactic acid reassuring. RFP unchanged from prior. CBC with slight leukocytosis of 12.4. Patient will be transferred to ICU for CVA placement and likely pressor support.  Albumin started per Nose: Nose normal.      Mouth/Throat:      Mouth: Mucous membranes are dry. Eyes:      Extraocular Movements: Extraocular movements intact. Cardiovascular:      Rate and Rhythm: Normal rate and regular rhythm. Pulmonary:      Effort: Pulmonary effort is normal.   Abdominal:      General: Abdomen is flat. Musculoskeletal:         General: Swelling present. Right lower leg: Edema present. Left lower leg: Edema present. Skin:     General: Skin is dry. Neurological:      Comments: Lethargic, arouses to voice.           The objective and subjective findings as well as the treatment course in the [place] have been reviewed with the floor team. The treatment plan has been reviewed with the floor team. The patient is being transferred to the ICU for pressor support     Leeroy Hunt, PGY-2  Pager; [phone]  03/13/20  7:27 PM

## 2020-03-14 NOTE — CONSULTS
likely pressor support. Albumin started per nephrology and antibiotics changed to ceftazidime.      Medications:     Scheduled Meds:   metoprolol tartrate  12.5 mg Oral BID    insulin lispro  0-18 Units Subcutaneous TID WC    insulin lispro  0-9 Units Subcutaneous Nightly    custom dialysis builder   Intraperitoneal Daily    And    custom dialysis builder   Intraperitoneal Daily    And    custom dialysis builder   Intraperitoneal Daily    cefTAZidime (FORTAZ) IV  500 mg Intravenous Daily    insulin lispro  5 Units Subcutaneous TID WC    Venelex   Topical BID    dilTIAZem  15 mg Oral 4 times per day    polyethylene glycol  17 g Oral BID    darbepoetin sandeep-polysorbate  60 mcg Subcutaneous Weekly    clopidogrel  75 mg Oral Daily    aspirin  81 mg Oral Daily    midodrine  5 mg Oral TID WC    sevelamer  1,600 mg Oral TID WC    atorvastatin  10 mg Oral Daily    sodium chloride flush  10 mL Intravenous 2 times per day    heparin (porcine)  5,000 Units Subcutaneous 3 times per day     Continuous Infusions:   norepinephrine 2 mcg/min (03/14/20 0251)    dextrose       PRN Meds:prochlorperazine, glucose, dextrose, glucagon (rDNA), dextrose, sodium chloride flush, acetaminophen **OR** acetaminophen, polyethylene glycol    Objective:   Vitals:   T-max:  Patient Vitals for the past 8 hrs:   BP Temp Temp src Pulse Resp SpO2   03/14/20 0500 111/82 -- -- 123 17 100 %   03/14/20 0400 105/68 98.6 °F (37 °C) Oral 96 16 100 %   03/14/20 0300 106/78 -- -- 91 16 100 %   03/14/20 0230 -- -- -- 107 16 100 %   03/14/20 0219 -- -- -- 128 17 --   03/14/20 0212 -- -- -- 129 21 100 %   03/14/20 0200 116/79 -- -- 100 25 100 %   03/14/20 0140 -- -- -- 123 18 100 %   03/14/20 0138 124/89 -- -- -- -- --   03/14/20 0115 -- -- -- 112 15 --   03/14/20 0100 102/74 -- -- 105 23 100 %   03/14/20 0030 -- -- -- 96 17 --   03/14/20 0000 121/88 97.5 °F (36.4 °C) Oral 106 15 100 %   03/13/20 2345 -- -- -- 95 14 (!) 36 %   03/13/20 2330 -- -- -- 104 15 100 %   03/13/20 2315 -- -- -- 90 14 --   03/13/20 2300 (!) 83/51 -- -- 99 15 --   03/13/20 2245 -- -- -- 104 14 100 %   03/13/20 2230 101/74 -- -- 103 16 100 %   03/13/20 2217 104/67 -- -- 103 16 100 %   03/13/20 2215 -- -- -- 112 16 --       Intake/Output Summary (Last 24 hours) at 3/14/2020 0611  Last data filed at 3/14/2020 0500  Gross per 24 hour   Intake 347 ml   Output -2 ml   Net 349 ml       Review of Systems   Constitutional: Positive for fatigue. Negative for chills and fever. HENT: Negative. Eyes: Negative for visual disturbance. Respiratory: Negative for chest tightness, shortness of breath and wheezing. Cardiovascular: Negative for chest pain and leg swelling. Gastrointestinal: Positive for nausea. Negative for diarrhea and vomiting. Genitourinary: Negative for flank pain. Musculoskeletal: Negative for arthralgias. Neurological: Negative for weakness, light-headedness and headaches. Psychiatric/Behavioral: Negative for confusion and decreased concentration. Physical Exam  Vitals signs reviewed. Constitutional:       General: She is sleeping. Interventions: Nasal cannula in place. HENT:      Head: Normocephalic and atraumatic. Mouth/Throat:      Mouth: Mucous membranes are moist.      Pharynx: Oropharynx is clear. Eyes:      Extraocular Movements: Extraocular movements intact. Pupils: Pupils are equal, round, and reactive to light. Cardiovascular:      Rate and Rhythm: Normal rate. Rhythm irregular. Heart sounds: No murmur. Pulmonary:      Effort: Pulmonary effort is normal.      Breath sounds: Normal breath sounds. Abdominal:      General: Abdomen is flat. Palpations: Abdomen is soft. Tenderness: There is no abdominal tenderness. Musculoskeletal:         General: Swelling (BLLE) present. Skin:     General: Skin is warm and dry. Neurological:      General: No focal deficit present.       Mental Status: She is oriented to person, place, and time and easily aroused. LABS:    CBC:   Recent Labs     03/13/20  0504 03/13/20 1745 03/14/20  0525   WBC 8.0 12.4* 11.5*   HGB 12.4 11.3* 11.7*   HCT 39.0 35.5* 36.3    168 226   MCV 94.9 95.6 94.3     Renal:    Recent Labs     03/12/20  2151 03/13/20  0504 03/13/20 1745 03/14/20  0525   * 132* 133* 131*   K 3.3* 3.3* 3.4* 3.1*   CL 91* 91* 95* 89*   CO2 23 18* 20* 21   BUN 54* 48* 51* 45*   CREATININE 7.8* 7.2* 7.7* 6.4*   GLUCOSE 116* 323* 84 414*   CALCIUM 8.0* 8.1* 7.6* 7.7*   MG 2.00 1.80 2.50*  2.40 2.10   PHOS 5.2* 4.9  --  5.1*   ANIONGAP 18* 23* 18* 21*     Hepatic:   Recent Labs     03/13/20  0504 03/13/20 1745 03/14/20  0525   AST 68* 25  --    ALT 20 15  --    BILITOT <0.2 0.3  --    BILIDIR <0.2  --   --    PROT 6.9 5.5*  --    LABALBU 2.0*  1.8* 1.2* 2.5*   ALKPHOS 98 73  --      ABGs:    Recent Labs     03/12/20 2135 03/13/20 1748   PHART 7.474* 7.426   [id number] 35.5 42.1   PO2ART 67.0* 139.0*   [id number] 26 27.7   BEART 2.7 3   [id number] 91* 99   [id number] 27 29       INR: No results for input(s): INR in the last 72 hours. Lactate:   Recent Labs     03/13/20  1748   LACTATE 0.89     Cultures:  -----------------------------------------------------------------  RAD:   XR CHEST PORTABLE   Final Result      No significant change in mild left lower lobe consolidation. CT ABDOMEN PELVIS WO CONTRAST Additional Contrast? None   Final Result      No acute intra-abdominal process. Small amount of fluid in the leftward pelvis and perihepatic region with small foci of pneumoperitoneum, not to be unexpected given the peritoneal dialysis catheter. Diverticulosis. Renal cysts. Bibasilar atelectasis. XR CHEST PORTABLE   Final Result      No focal consolidation.                    Assessment/Plan:     Hypotension, leukocytosis 2/2 to Peritonitis  Subjective fever, fatigue, abdomen pain. Recent fluid analysis 3/13 was still

## 2020-03-14 NOTE — PROGRESS NOTES
Nephrology progress Note                                                                                                                                                                                                                                                                                                                                                               Office : [phone]     Fax :[phone]              Patient's Name: Odilia Mauricio  10:08 AM  3/14/2020    Reason for Consult:  ESRD       History of Present Ilness:     Odilia Mauricio is a 67 y.o. female with history of diabetes, heart failure, ESRD on peritoneal dialysis, multiple other comorbidities presenting for fatigue and weakness. Patient states she has been feeling this way for approximately 3 days. Denies pain, fevers, changes in bowel movements. Has not had any issues with her peritoneal dialysis catheter or PD rounds. Last run was last night. She does not make urine. Notes some scant nonbloody emesis.    Has constipation   Per PD fluid studies she has peritonitis     /INTERVAL HISTORY    the patient status post code  No chest pain  No shortness of breath   Seen on PD  No abdominal pain   PD fluid clear    Allergies:  Gabapentin    Current Medications:    insulin glargine (LANTUS;BASAGLAR) injection pen 20 Units, Nightly  meropenem (MERREM) 1 g in sodium chloride 0.9 % 100 mL IVPB (mini-bag), Q8H  metoprolol tartrate (LOPRESSOR) tablet 12.5 mg, BID  insulin lispro (1 Unit Dial) 0-18 Units, TID WC  insulin lispro (1 Unit Dial) 0-9 Units, Nightly  delflex lo-prasad 4.25% 5,000 mL with heparin (porcine) 5,000 Units solution, Daily    And  delflex lo-prasad 4.25% 5,000 mL with heparin (porcine) 5,000 Units solution, Daily    And  delflex lo-prasad 4.25% 5,000 mL with heparin (porcine) 5,000 Units solution, Daily  norepinephrine (LEVOPHED) 16 mg in dextrose 5 % 250 mL infusion, Continuous  prochlorperazine (COMPAZINE) injection 10 mg, Q6H PRN  insulin lispro (1 Unit Dial) 5 Units, TID WC  Venelex ointment, BID  dilTIAZem (CARDIZEM) tablet 15 mg, 4 times per day  glucose (GLUTOSE) 40 % oral gel 15 g, PRN  dextrose 50 % IV solution, PRN  glucagon (rDNA) injection 1 mg, PRN  dextrose 5 % solution, PRN  polyethylene glycol (GLYCOLAX) packet 17 g, BID  darbepoetin sandeep-polysorbate (ARANESP) injection 60 mcg, Weekly  clopidogrel (PLAVIX) tablet 75 mg, Daily  aspirin EC tablet 81 mg, Daily  midodrine (PROAMATINE) tablet 5 mg, TID WC  sevelamer (RENVELA) tablet 1,600 mg, TID WC  atorvastatin (LIPITOR) tablet 10 mg, Daily  sodium chloride flush 0.9 % injection 10 mL, 2 times per day  sodium chloride flush 0.9 % injection 10 mL, PRN  acetaminophen (TYLENOL) tablet 650 mg, Q6H PRN    Or  acetaminophen (TYLENOL) suppository 650 mg, Q6H PRN  polyethylene glycol (GLYCOLAX) packet 17 g, Daily PRN  heparin (porcine) injection 5,000 Units, 3 times per day      Physical exam:     Vitals:  /75   Pulse 91   Temp 98.6 °F (37 °C) (Oral)   Resp 15   Ht 5' 2\" (1.575 m)   Wt 172 lb 2.9 oz (78.1 kg)   LMP  (LMP Unknown)   SpO2 100%   BMI 31.49 kg/m²   Constitutional: awake   Skin: no rash, turgor wnl  Neck: no bruits or jvd noted  Cardiovascular:  S1, S2 reg  Respiratory: CTA   Abdomen:  +bs, soft, nt, PD catheter in place  Ext:+lower extremity edema      Data:   Labs:  CBC:   Recent Labs     03/13/20  0504 03/13/20  1745 03/14/20  0525   WBC 8.0 12.4* 11.5*   HGB 12.4 11.3* 11.7*    168 226     BMP:    Recent Labs     03/13/20  0504 03/13/20  1745 03/14/20  0525   * 133* 131*   K 3.3* 3.4* 3.1*   CL 91* 95* 89*   CO2 18* 20* 21   BUN 48* 51* 45*   CREATININE 7.2* 7.7* 6.4*   GLUCOSE 323* 84 414*     Ca/Mg/Phos:   Recent Labs     03/12/20  2151 03/13/20  0504 03/13/20  1745 03/14/20  0525   CALCIUM 8.0* 8.1* 7.6* 7.7*   MG 2.00 1.80 2.50*  2.40 2.10   PHOS 5.2* 4.9  --  5.1*     Hepatic:   Recent Labs     03/13/20  0504 03/13/20  1745   AST 68* 25 ALT 20 15   BILITOT <0.2 0.3   ALKPHOS 98 73     Troponin: No results for input(s): TROPONINI in the last 72 hours. BNP: No results for input(s): BNP in the last 72 hours. Lipids: No results for input(s): CHOL, TRIG, HDL, LDLCALC, LABVLDL in the last 72 hours. ABGs:   Recent Labs     03/13/20  1748   PHART 7.426   PO2ART 139.0*   IAY8PEY 42.1     INR: No results for input(s): INR in the last 72 hours. UA:No results for input(s): Johnice Blocker, GLUCOSEU, BILIRUBINUR, Dacia Novel, BLOODU, PHUR, PROTEINU, UROBILINOGEN, NITRU, LEUKOCYTESUR, LABMICR, URINETYPE in the last 72 hours. Urine Microscopic: No results for input(s): LABCAST, BACTERIA, COMU, HYALCAST, WBCUA, RBCUA, EPIU in the last 72 hours. Urine Culture: No results for input(s): LABURIN in the last 72 hours. Urine Chemistry: No results for input(s): Dwight Sober, PROTEINUR, NAUR in the last 72 hours. IMAGING:  XR CHEST PORTABLE   Final Result      No significant change in mild left lower lobe consolidation. CT ABDOMEN PELVIS WO CONTRAST Additional Contrast? None   Final Result      No acute intra-abdominal process. Small amount of fluid in the leftward pelvis and perihepatic region with small foci of pneumoperitoneum, not to be unexpected given the peritoneal dialysis catheter. Diverticulosis. Renal cysts. Bibasilar atelectasis. XR CHEST PORTABLE   Final Result      No focal consolidation. Assessment/   1. PD peritonitis     2. HTN    3. Anemia    4. Acid- base/ Electrolyte imbalance     5.  Constipation     Plan   Pt seen on PD \  Has PD peritonitis   continue heparin with bags   Cont Abx      No IVF      Anemia management   MBD management      Labs in am         Thank you for allowing us to participate in care of [address] [place] [address] free to contact me   [facility] [address]  Office : [phone]  Fax :[phone]

## 2020-03-14 NOTE — FLOWSHEET NOTE
03/14/20 0212   Vitals   Pulse 129   Resp 21   Art Line   ABP (Arterial line BP) 127/58   ABP mean (Arterial line mean) 76 mmHg   Oxygen Therapy   SpO2 100 %     Pt . HR ranging from 's. Pt denies pain or discomfort. Pt was SA on monitor. Notified residents. Order given for EKG to confirm rhythm.

## 2020-03-14 NOTE — PROGRESS NOTES
Occupational Therapy/Physical Therapy  Reason Patient Not Seen    Spoke with RN prior to attempting, patient remains on PD at time of attempt but may be stable and appropriate for therapy evaluations later this afternoon. Will re-attempt as schedule permits and as patient is appropriate. [facility], [facility] #[id number]    Shakira Washington.  Ayden Antonio, PT DPT

## 2020-03-14 NOTE — FLOWSHEET NOTE
REPORT RECEIVED. SUPPLIES TAKEN TO ROOM. PATIENT PLACED ON CCPD USING ASEPTIC TECHNIQUE. NO COMPLAINTS. EFFLUENT CLOUDY YELLOW.  WILL NEED CX AND CELL COUNT 3/14/20 AM      03/13/20 2045   Vitals   /72   Temp 97.5 °F (36.4 °C)   Temp Source Axillary   Pulse 84   Resp 13   Weight 172 lb 2.9 oz (78.1 kg)   Cycler   Verification of Prescription CCPD   Total Volume Programmed 68482 mL   Therapy Time (Hours:Minutes) 10   Fill Volume 2200 mL   Last Fill Volume 0 mL   Number of Cycles 5   Bag Volume 5000 mL   Number of Bags Used 3   Dianeal Solution Other (Comment)

## 2020-03-14 NOTE — PROGRESS NOTES
suggests grade II diastolic dysfunction. Assessment:  67 y.o. admitted with fever and chills, found to have MAT  Issues:  -MAT  -Tachycardia  -HTN; now with soft BP  -HLD  -PAD (on plavix)  -ESRD/ PD  -DM  -Peritonitis     Plan:  -Keep K>4, Mg>2.  -Diltiazem 15 mg po q 6 hours. Hold for SBP < 95 mmHg or HR < 60  -Metoprolol 12.5 mg every 6 hours.  Hold for SBP < 95 mmHg or HR < 60  -ASA, plavix   -Statin   -Midodrine   -Pressors as needed

## 2020-03-14 NOTE — PROGRESS NOTES
Pt s/p Code blue  She was hypotensive   BP better now   Pt has been tachycardic   She will start CCPD later tonight   K was replaced   Give albumin as needed for hypotension   Send cell count in am   Change to last for PD peritonitis   Cx pending     D/w ICU team

## 2020-03-14 NOTE — FLOWSHEET NOTE
03/14/20 0000   Vitals   Temp 97.5 °F (36.4 °C)   Temp Source Oral   Pulse 106   Heart Rate Source Monitor   Resp 15   /88   MAP (mmHg) 97   BP Location Left upper arm   BP Upper/Lower Upper   BP Method Automatic   Patient Position Semi fowlers   Level of Consciousness 0   MEWS Score 2   Patient Currently in Pain Denies   Cardiac Rhythm Other (Comment)  (SA)   Ectopy PAC;PVC   Ectopy Frequency Frequent   Art Line   ABP (Arterial line BP) 117/59   ABP mean (Arterial line mean) 73 mmHg   Oxygen Therapy   SpO2 100 %   Pulse Oximeter Device Mode Continuous   Pulse Oximeter Device Location Forehead   O2 Device Nasal cannula   O2 Flow Rate (L/min) 3 L/min   Pain Assessment   Pain Assessment 0-10   Pain Level 0   Patient's Stated Pain Goal No pain     Pt was oriented but lethargic at start of shift. Started pt on Levo at 1930 for Art line BP of 75/43, MAP 55. Pt BP started to stabilized, weaned levo down and turned drip off at 2154, residents aware. Pt BS was 36 at 36, gave half amp D50, residents aware. BS increased to 41 at 2017, gave second half amp D50. Residents aware. Repeated BS at 2038 via Art line. BS increased to 208. Pt now stable, A&O x 4. Pt denies pain or discomfort. Pt turned and repositioned every 2 hours. HOB elevate. Art line BP now 123/52, Map 71. Pt cont with peritoneal dialysis. Bed alarm. Pt resting quietly. No distress noted. Call light within reach. Will cont to monitor.

## 2020-03-14 NOTE — PROGRESS NOTES
Temp:    98.7 °F (37.1 °C)   TempSrc:       SpO2:    100%   Weight:       Height:             Physical exam:         Physical Exam  Constitutional:       Appearance: Normal appearance. HENT:      Head: Normocephalic and atraumatic. Neck:      Musculoskeletal: Normal range of motion and neck supple. Cardiovascular:      Rate and Rhythm: Regular rhythm. Tachycardia present. Pulses: Normal pulses. Heart sounds: Normal heart sounds. Pulmonary:      Effort: Pulmonary effort is normal.      Breath sounds: Normal breath sounds. Abdominal:      General: Abdomen is flat. Palpations: Abdomen is soft. Musculoskeletal: Normal range of motion. General: No swelling. Skin:     General: Skin is warm and dry. Capillary Refill: Capillary refill takes less than 2 seconds. Neurological:      General: No focal deficit present. Mental Status: She is alert and oriented to person, place, and time. Psychiatric:         Mood and Affect: Mood normal.         Behavior: Behavior normal.               Labs      Recent Labs     03/13/20  0504 03/13/20 1745 03/14/20  0525   WBC 8.0 12.4* 11.5*   HGB 12.4 11.3* 11.7*   HCT 39.0 35.5* 36.3    168 226   MCV 94.9 95.6 94.3        Recent Labs     03/12/20  2151 03/13/20  0504 03/13/20 1745 03/14/20  0525   * 132* 133* 131*   K 3.3* 3.3* 3.4* 3.1*   CL 91* 91* 95* 89*   CO2 23 18* 20* 21   PHOS 5.2* 4.9  --  5.1*   BUN 54* 48* 51* 45*   CREATININE 7.8* 7.2* 7.7* 6.4*       Recent Labs     03/13/20  0504 03/13/20  1745   AST 68* 25   ALT 20 15   BILIDIR <0.2  --    BILITOT <0.2 0.3   ALKPHOS 98 73       Recent Labs     03/13/20  0504 03/13/20 1745 03/14/20  0525   MG 1.80 2.50*  2.40 2.10       Radiology  XR CHEST PORTABLE   Final Result      No significant change in mild left lower lobe consolidation. CT ABDOMEN PELVIS WO CONTRAST Additional Contrast? None   Final Result      No acute intra-abdominal process.       Small amount of

## 2020-03-15 PROBLEM — T85.71XA DIALYSIS-ASSOCIATED PERITONITIS (HCC): Status: ACTIVE | Noted: 2020-01-01

## 2020-03-15 NOTE — PROGRESS NOTES
grade II diastolic dysfunction. Assessment:  67 y.o. admitted with fever and chills, found to have MAT  Issues:  -MAT  -Tachycardia  -HTN; now with soft BP  -HLD  -PAD (on plavix)  -ESRD/ PD  -DM  -Peritonitis     Plan:  -Keep K>4, Mg>2.  -Diltiazem 15 mg po q 6 hours. Hold for SBP < 95 mmHg or HR < 60  -Metoprolol 12.5 mg every 6 hours.  Hold for SBP < 95 mmHg or HR < 60  -ASA, plavix   -Statin   -Midodrine   -Pressors as needed

## 2020-03-15 NOTE — PROGRESS NOTES
Pt peritoneal dialysis machine started alarming error in infusing. Call made to dialysis nurse to make her aware. Dialysis nurses advised to call the Customer support number on the machine to troubleshoot the problem. After going through all of the troubleshooting the customer support advised that writer call the dialysis nurse to let her know there is a problem with the re infusing of the system and that only 4 of the 5 cycles infused completely. Call the dialysis nurse to let her know the problem. The nurse advised that a dialysis nurse will be in this am at 0600 to take care of the machine.

## 2020-03-15 NOTE — PROGRESS NOTES
Physical Therapy    Facility/Department: [facility] ICU  Initial Assessment and treatment    NAME: Anastacia Savage  : 1940  MRN: [id number]    Date of Service: 3/15/2020    Discharge Recommendations:    Anastacia Savage scored a 8/24 on the AM-PAC short mobility form. Current research shows that an AM-PAC score of 17 or less is typically not associated with a discharge to the patient's home setting. Based on the patients AM-PAC score and their current functional mobility deficits, it is recommended that the patient have 3-5 sessions per week of Physical Therapy at d/c to increase the patients independence. **See assessment    PT Equipment Recommendations  Equipment Needed: No(defer)    Assessment   Body structures, Functions, Activity limitations: Decreased functional mobility ; Decreased strength;Decreased endurance;Decreased balance  Assessment: Patient tolerated session fair with mobility being limited due to weakness, decreased activity tolerance and poor static sitting balance. Patient able to perform bed mobility with assist x 1-2 and appears lethargic throughout session, with delayed response time and increased time required to complete mobility. Patient from long term care facility where she has two person assist to get up to w/c. Patient appears to be functioning near baseline level. Anticipate return to long term care at discharge. Will follow while in acute care setting to maximize functional mobility. Treatment Diagnosis: impaired mobility associated with peritonitis  Prognosis: Good  Decision Making: Medium Complexity  Patient Education: Educated on role of PT, importance of mobility; patient verbalized understanding. REQUIRES PT FOLLOW UP: Yes  Activity Tolerance  Activity Tolerance: Patient limited by endurance; Patient limited by fatigue       Patient Diagnosis(es): The primary encounter diagnosis was Peritonitis associated with peritoneal dialysis, initial encounter [facility]).  Diagnoses of Septicemia ([facility] [address] and Peritonitis ([facility] [address] were also pertinent to this visit. has a past medical history of DVT (deep venous thrombosis) ([facility] [address] End stage renal disease ([facility] [address] Hyperlipidemia, Hypertension, Osteoarthritis, Pancreatitis chronic, Peritoneal dialysis status ([facility] [address] Type II or unspecified type diabetes mellitus without mention of complication, not stated as uncontrolled, and Vitamin D deficiency. has a past surgical history that includes Thyroidectomy, partial; Coronary angioplasty; joint replacement (5/4/2011); Cataract removal (Left, 5/29/13); other surgical history (Left, 05/19/2017); and Upper gastrointestinal endoscopy (N/A, 8/13/2019). Restrictions  Position Activity Restriction  Other position/activity restrictions: up as tolerated  Vision/Hearing  Vision: Within Functional Limits  Hearing: Within functional limits     Subjective  General  Chart Reviewed: Yes  Patient assessed for rehabilitation services?: Yes  Additional Pertinent Hx: DM2, OA, HTN, HLD, vid Def,  ESRD, paritoneal dialysis, chronic pancreatitis, DVT  Response To Previous Treatment: Not applicable  Family / Caregiver Present: No  Referring Practitioner: Jovita Lowry  Referral Date : 03/12/20  Diagnosis: Adm 3/10 with fatigue and weakness, Dx, peritonitis associated with PD, septicemia. Follows Commands: Within Functional Limits  Other (Comment): lethargic, delayed response time and verbal cues required  General Comment  Comments: Patient supine in bed upon arrival, appears drowsy. Subjective  Subjective: Patient agreeable to PT evaluation with encouragement. Pain Screening  Patient Currently in Pain: Denies          Orientation  Orientation  Overall Orientation Status: Within Functional Limits  Social/Functional History  Social/Functional History  Type of Home: Facility([facility])  Additional Comments: Spoke with caregiver at [facility]: pt requires assist of 2 for pivot transfers to w/c.  She's able to complete feeding and

## 2020-03-15 NOTE — PROGRESS NOTES
Decreased recall of recent events              Treatment consisted of:  ADLs, transfer training, education on activity promotion and fall precautions.      Plan   Plan  Times per week: 2-5x      AM-PAC Score  AM-PAC Inpatient Daily Activity Raw Score: 12 (03/15/20 1257)  AM-PAC Inpatient ADL T-Scale Score : 30.6 (03/15/20 1257)  ADL Inpatient CMS 0-100% Score: 66.57 (03/15/20 1257)  ADL Inpatient CMS G-Code Modifier : CL (03/15/20 1257)    Goals  Short term goals  Time Frame for Short term goals: by discharge  Short term goal 1: sba sitting balance at eob x10 minutes during ADLS/UE exercises- not met  Short term goal 2: complete 10 reps AROM of UEs for strengthening for ADLs- not met  Short term goal 3: tolerate oob transfer assessment- not met       Therapy Time   Individual Concurrent Group Co-treatment   Time In 1100         Time Out 1132         Minutes 32         Timed Code Treatment Minutes:   17  Total Treatment Minutes:  [facility] 77-75 OTR/L, 2149

## 2020-03-15 NOTE — CONSULTS
ICU Consult Note    Admit Date: 3/10/2020  Day: 3/15/2020     Diet: Dietary Nutrition Supplements: Renal Oral Supplement  Dietary Nutrition Supplements: Renal Oral Supplement  DIET GENERAL; Daily Fluid Restriction: 1200 ml; Low Phosphorus    CC: fatigue    Interval history: Patient was seen and examined at bedside. No events overnight, she appears somewhat sleepy but oriented x 3. She denies any pain, fevers, chills, nausea, vomiting, chest pain, SOB, abdominal pain. HPI: Anastacia Savage is a 67 y.o. F with PMH of ESRD (on PD), HTN, PVD (on plavix) and type II DM who presented three days ago with fatigue and subjective fever and chills x 3 days. While in the ED, patient was found to be hypotnesive with a lactic acidosis which improved after 2L IVFs. Work-up thus far is concerning for peritonitis as recent fluid analysis revealed 9856 nucleated cells with 85% neutrophils. Cultures with gram negative rods, still awaiting species. Patient was started on cefepime. Additionally, patient was being followed by EP cards for MAT, she was started on dilt gtt; however, her systolic blood pressure dropped to the 70s. She was switched to low doses of PO dilt and lopressor but had several episodes of emesis and was unable to keep the pills down. Additionally, midodrine was added to help with low blood pressures. Due to vomiting, CT abdomen WO contrast was ordered and was unremarkable.      On 3/13, a code blue was called as patient was hypotensive with systolic's in the 01F, HR in the 80s and O2 sats at 82% on RA. She was unresponsive. Patient never lost pulse. Prior to code, patient's BG was 33, she received D50 which improved glucose to 100s. She was given a 250mL bolus of IVFs which did not improve pressures. ABG and lactic acid reassuring. RFP unchanged from prior. CBC with slight leukocytosis of 12.4.      Patient transferred to ICU for CVC placement and likely pressor support.  Albumin started per nephrology and antibiotics changed to ceftazidime.      Medications:     Scheduled Meds:   midodrine  10 mg Oral TID     meropenem  500 mg Intravenous Q24H    potassium chloride  40 mEq Oral Once    metoprolol tartrate  12.5 mg Oral Q6H    insulin lispro  0-6 Units Subcutaneous TID     insulin lispro  0-3 Units Subcutaneous Nightly    insulin glargine  15 Units Subcutaneous Daily    custom dialysis builder   Intraperitoneal Daily    And    custom dialysis builder   Intraperitoneal Daily    And    custom dialysis builder   Intraperitoneal Daily    Venelex   Topical BID    dilTIAZem  15 mg Oral 4 times per day    polyethylene glycol  17 g Oral BID    darbepoetin sandeep-polysorbate  60 mcg Subcutaneous Weekly    clopidogrel  75 mg Oral Daily    aspirin  81 mg Oral Daily    sevelamer  1,600 mg Oral TID WC    atorvastatin  10 mg Oral Daily    sodium chloride flush  10 mL Intravenous 2 times per day    heparin (porcine)  5,000 Units Subcutaneous 3 times per day     Continuous Infusions:   norepinephrine Stopped (03/14/20 1200)    dextrose       PRN Meds:prochlorperazine, glucose, dextrose, glucagon (rDNA), dextrose, sodium chloride flush, acetaminophen **OR** acetaminophen, polyethylene glycol    Objective:   Vitals:   T-max:  Patient Vitals for the past 8 hrs:   BP Temp Temp src Pulse Resp SpO2 Weight   03/15/20 0930 -- -- -- 129 22 100 % --   03/15/20 0900 100/69 -- -- 94 19 -- --   03/15/20 0842 -- -- -- 96 -- -- --   03/15/20 0830 -- -- -- 97 25 -- --   03/15/20 0800 (!) 75/53 98 °F (36.7 °C) Oral 108 16 100 % --   03/15/20 0730 -- -- -- 112 19 -- --   03/15/20 0700 99/66 -- -- 89 17 -- --   03/15/20 0634 (!) 92/59 98.4 °F (36.9 °C) Oral 93 15 -- 171 lb 15.3 oz (78 kg)   03/15/20 0630 -- -- -- 89 15 -- --   03/15/20 0618 (!) 104/50 -- -- -- -- -- --   03/15/20 0608 (!) 92/59 -- -- 90 15 100 % --   03/15/20 0600 (!) 127/100 -- -- 93 20 100 % --   03/15/20 0500 (!) 92/46 -- -- 94 17 -- --   03/15/20 0400 96/76 -- PD fluid cx - Achromobacter xylosoxidans  - Levo if required. ESRD on PD  Nephrology consulted. Had lines obstruction issues now resolved. -- continue PD  -- follow up nephology recs  -- daily renal function panel     Nausea, vomiting - improving  Could be related to peritonitis. CT abdomen WO contrast was unremarkable. Today feeling better. -- plan as above  -- SLP evaluation     Polymorphic atrial tachycardia-RVR  Has hx of afib in previous hospital admission. Was on anti-coagulation for DVT in the past but d/c 8/2019 following GI bleed as patient was found not taking it. -- follow up cardiology recs  -- diltiazem 15 mg q6h as tolerated by BP (added by cardiology)  -- lopressor 12.5 BID as tolerated by BP (added by cardiology)  -- will monitor for now     Type II diabetes  Last [facility] 3/24/2019 6.5. On home lantus 6u nightly, and sliding scale. H/o PVD, check POCT glu from A line. -- hypoglycemia protocol  -- change to high dose sliding scale  -- change lispro to 5u with meals tid  -- change lantus 20u nightly  -- Accu-checks q4h - check from A line.      Hx of PVD  Follows vascular surgery outpatient. On home plavix  -- continue home plavix 75      Code Status: Full Code   FEN: Dietary Nutrition Supplements: Renal Oral Supplement  Dietary Nutrition Supplements: Renal Oral Supplement  DIET GENERAL; Daily Fluid Restriction: 1200 ml; Low Phosphorus  PPX:  Heparin  DISPO: ICU    Latisha Hood MD, PGY-2  03/15/20  10:04 AM    This patient will be staffed and discussed with ICU Attending. Patient examined, findings as discussed with Dr. Alverto Connor. Agree with assessment and plan. Improving peritonitis, with significant decrease in neutrophils in peritoneal fluid, and improving mentation.

## 2020-03-15 NOTE — PROGRESS NOTES
0. 3   ALKPHOS 98 73     Troponin: No results for input(s): TROPONINI in the last 72 hours. BNP: No results for input(s): BNP in the last 72 hours. Lipids: No results for input(s): CHOL, TRIG, HDL, LDLCALC, LABVLDL in the last 72 hours. ABGs:   Recent Labs     03/13/20  1748   PHART 7.426   PO2ART 139.0*   LTN9VGP 42.1     INR: No results for input(s): INR in the last 72 hours. UA:No results for input(s): Jaylene Hinders, GLUCOSEU, BILIRUBINUR, Pilar Blackhawk, BLOODU, PHUR, PROTEINU, UROBILINOGEN, NITRU, LEUKOCYTESUR, LABMICR, URINETYPE in the last 72 hours. Urine Microscopic: No results for input(s): LABCAST, BACTERIA, COMU, HYALCAST, WBCUA, RBCUA, EPIU in the last 72 hours. Urine Culture: No results for input(s): LABURIN in the last 72 hours. Urine Chemistry: No results for input(s): Honora Guerra, PROTEINUR, NAUR in the last 72 hours. IMAGING:  XR CHEST PORTABLE   Final Result      No significant change in mild left lower lobe consolidation. CT ABDOMEN PELVIS WO CONTRAST Additional Contrast? None   Final Result      No acute intra-abdominal process. Small amount of fluid in the leftward pelvis and perihepatic region with small foci of pneumoperitoneum, not to be unexpected given the peritoneal dialysis catheter. Diverticulosis. Renal cysts. Bibasilar atelectasis. XR CHEST PORTABLE   Final Result      No focal consolidation. Assessment/   1. PD peritonitis     2. HTN    3. Anemia    4. Acid- base/ Electrolyte imbalance     5.  Constipation     Plan   Maintenance PD   continue heparin with bags   Cont Abx      No IVF      Anemia management   MBD management      Labs in am     Thank you for allowing us to participate in care of [address] [place] [address] free to contact me   [facility] [address]  Office : [phone]  Fax :[phone]

## 2020-03-15 NOTE — PROGRESS NOTES
dysphagia    BSE Impression 3/13/2020  Assessment limited as pt declining most PO offerings. Pt presents with oral dysphagia marked by significant bolus holding of all consistencies, requiring max verbal and tactile cues to initiate swallow. Piecemeal swallowing exhibited with all trials. Reactive cough noted x 1 with thin liquids via straw - suspect this was d/t poor coordination of swallow function after prolonged hold of bolus. Pt noted to have improved posterior transit and initiation of swallow when next bolus presented at labial seal to give tactile cue. Very minimal PO trials accepted of puree and thins via straw; pt declined further solids and endorsed mechanical soft solids from breakfast tray were \"too tough to chew. \" When questioned if needing pureed diet, pt declined. Difficult to determine if pt's bolus holding is d/t cognition and fatigue vs reduced desire to swallow PO 2/2 previous nausea and emesis. Although cough exhibited with liquids, no concerns for respiratory decline noted on current diet - recommend continue regular consistency with thin liquids and ensure meats are ground per baseline diet. SLP will monitor clinically for diet tolerance as pt not appropriate to participate in instrumental swallow assessment d/t prolonged holding and reduced acceptance of PO. MBS results - will monitor ability to participate    Pain: denies    Current Diet : regular - recommend downgrade to puree/thin liquids 3/15    Treatment:  Pt seen bedside to address the following goals:  1- Patient will consume recommended diet consistency w/o overt signs associated with aspiration or respiratory decline. 3/15:  Pt analyzed with portion of breakfast. Pt exhibits holding and then slow propulsion of bolus, does eventually clear oral cavity. No throat clearing or cough noted, however extra reflexive dry swallows felt upon palpation of anterior neck, indicating possible pharyngeal residue.   No respiratory decline noted

## 2020-03-15 NOTE — FLOWSHEET NOTE
Disconnected from [facility] per protocol. Effluent: cloudy, fibrin noted. Total time: 7 hr 34 min   Total UF:  45 ml. Total Volume:  8812 ml. Dwell time lost:  0 hr 57 min. Pt Tolerated procedure with some difficulty. Not able to drain after 3 cycles. Low flow volume. MD secure messaged and informed to put pt on tonight again. Fluid sent to lab for cell count and culture.    Report given to: Alem Stephens RN.          03/15/20 0634   Vitals   BP (!) 92/59   Temp 98.4 °F (36.9 °C)   Temp Source Oral   Pulse 93   Resp 15   Weight 171 lb 15.3 oz (78 kg)   Cycler   Ultrafiltration (UF) (mL) 45 mL

## 2020-03-15 NOTE — PROGRESS NOTES
Hospital Medicine Care  Progress Note      Chief complain; Fatigue            Subjective:     Sleepy  No nausea, emesis  No abd pain  Poor appetite  Hypotensive overnight      Review of Systems:     Review of Systems as mentioned above, other ros is negative. Objective:       Medications        Scheduled Meds:   midodrine  10 mg Oral TID WC    meropenem  500 mg Intravenous Q24H    potassium chloride  40 mEq Oral Once    metoprolol tartrate  12.5 mg Oral Q6H    insulin lispro  0-6 Units Subcutaneous TID WC    insulin lispro  0-3 Units Subcutaneous Nightly    insulin glargine  15 Units Subcutaneous Daily    custom dialysis builder   Intraperitoneal Daily    And    custom dialysis builder   Intraperitoneal Daily    And    custom dialysis builder   Intraperitoneal Daily    Venelex   Topical BID    dilTIAZem  15 mg Oral 4 times per day    polyethylene glycol  17 g Oral BID    darbepoetin sandeep-polysorbate  60 mcg Subcutaneous Weekly    clopidogrel  75 mg Oral Daily    aspirin  81 mg Oral Daily    sevelamer  1,600 mg Oral TID WC    atorvastatin  10 mg Oral Daily    sodium chloride flush  10 mL Intravenous 2 times per day    heparin (porcine)  5,000 Units Subcutaneous 3 times per day     Continuous Infusions:   norepinephrine Stopped (03/14/20 1200)    dextrose       PRN Meds:.prochlorperazine, glucose, dextrose, glucagon (rDNA), dextrose, sodium chloride flush, acetaminophen **OR** acetaminophen, polyethylene glycol      Allergies  Allergies   Allergen Reactions    Gabapentin Other (See Comments)     Gabapentin-induced myoclonus         Vitals:    03/15/20 0930 03/15/20 1000 03/15/20 1030 03/15/20 1100   BP:  111/72  88/64   Pulse: 129 122 98 81   Resp: 22 21 25 23   Temp:       TempSrc:       SpO2: 100% 100%     Weight:       Height:             Physical exam:         Physical Exam  Constitutional:       Appearance: Normal appearance. HENT:      Head: Normocephalic and atraumatic.    Neck: Musculoskeletal: Normal range of motion and neck supple. Cardiovascular:      Rate and Rhythm: Regular rhythm. Tachycardia present. Pulses: Normal pulses. Heart sounds: Normal heart sounds. Pulmonary:      Effort: Pulmonary effort is normal.      Breath sounds: Normal breath sounds. Abdominal:      General: Abdomen is flat. Palpations: Abdomen is soft. Musculoskeletal: Normal range of motion. General: No swelling. Skin:     General: Skin is warm and dry. Capillary Refill: Capillary refill takes less than 2 seconds. Neurological:      General: No focal deficit present. Mental Status: She is alert and oriented to person, place, and time. Psychiatric:         Mood and Affect: Mood normal.         Behavior: Behavior normal.               Labs      Recent Labs     03/13/20  1745 03/14/20  0525 03/15/20  0445   WBC 12.4* 11.5* 13.6*   HGB 11.3* 11.7* 11.9*   HCT 35.5* 36.3 37.4    226 253   MCV 95.6 94.3 94.8        Recent Labs     03/13/20  0504 03/13/20 1745 03/14/20 0525 03/15/20  0445   * 133* 131* 133*   K 3.3* 3.4* 3.1* 3.4*   CL 91* 95* 89* 92*   CO2 18* 20* 21 23   PHOS 4.9  --  5.1* 4.9   BUN 48* 51* 45* 43*   CREATININE 7.2* 7.7* 6.4* 6.6*       Recent Labs     03/13/20  0504 03/13/20 1745   AST 68* 25   ALT 20 15   BILIDIR <0.2  --    BILITOT <0.2 0.3   ALKPHOS 98 73       Recent Labs     03/13/20  1745 03/14/20  0525 03/15/20  0445   MG 2.50*  2.40 2.10 2.10       Radiology  XR CHEST PORTABLE   Final Result      No significant change in mild left lower lobe consolidation. CT ABDOMEN PELVIS WO CONTRAST Additional Contrast? None   Final Result      No acute intra-abdominal process. Small amount of fluid in the leftward pelvis and perihepatic region with small foci of pneumoperitoneum, not to be unexpected given the peritoneal dialysis catheter. Diverticulosis. Renal cysts. Bibasilar atelectasis.       XR CHEST PORTABLE

## 2020-03-16 NOTE — PROGRESS NOTES
Pt still very lethargic, delayed responses. GCS 4-4-6. Pt's lungs sound more coarse than previous assessments yesterday. Rn relayed concerns about resp status, Pt's ability to eat, lethargy to Day team.   Pt not requiring any additional oxygen at the present time. Still on nasal canula 4L.    Brooks Garcia

## 2020-03-16 NOTE — PROGRESS NOTES
NG inserted in Pt's Left nares,   Orders obtained for NG, Chest X-ray, and X-ray to verify NG.      Lee Caraballo

## 2020-03-16 NOTE — PROGRESS NOTES
acetaminophen, prochlorperazine, glucose, dextrose, glucagon (rDNA), dextrose, sodium chloride flush      Intake/Output Summary (Last 24 hours) at 3/16/2020 1359  Last data filed at 3/16/2020 1330  Gross per 24 hour   Intake 761 ml   Output 1903 ml   Net -1142 ml       Physical Exam Performed:    /78   Pulse 79   Temp 98.4 °F (36.9 °C) (Axillary)   Resp 15   Ht 5' 2\" (1.575 m)   Wt 176 lb 9.4 oz (80.1 kg) Comment: bed scale  LMP  (LMP Unknown)   SpO2 100%   BMI 32.30 kg/m²     General appearance: lethargic, NG tube +Ve  HEENT: Pupils equal, round, and reactive to light. Conjunctivae/corneas clear. Neck: Supple. No jugular venous distention. Trachea midline. Respiratory:  Normal respiratory effort. Clear to auscultation, bilaterally without Rales/Wheezes/Rhonchi. Cardiovascular: Regular rate and rhythm with normal S1/S2 without murmurs, rubs or gallops. Abdomen: Soft, non-tender, non-distended with normal bowel sounds. Musculoskeletal: BL LE 1+ pitting edema  Neurologic:  AOx2, follows up simple commands. Psychiatric: Alert         Labs:   Recent Labs     03/14/20  0525 03/15/20  0445 03/16/20  0502   WBC 11.5* 13.6* 20.4*   HGB 11.7* 11.9* 11.4*   HCT 36.3 37.4 35.7*    253 250     Recent Labs     03/14/20  0525 03/15/20  0445 03/16/20  0502   * 133* 130*   K 3.1* 3.4* 2.9*   CL 89* 92* 90*   CO2 21 23 23   BUN 45* 43* 42*   CREATININE 6.4* 6.6* 6.1*   CALCIUM 7.7* 8.1* 7.9*   PHOS 5.1* 4.9 5.0*     Recent Labs     03/13/20  1745   AST 25   ALT 15   BILITOT 0.3   ALKPHOS 73     No results for input(s): INR in the last 72 hours. No results for input(s): Mcdaniel Flatter in the last 72 hours.     Urinalysis:      Lab Results   Component Value Date    NITRU Negative 04/08/2019    WBCUA 20-50 04/08/2019    BACTERIA 3+ 04/08/2019    RBCUA 10-20 04/08/2019    BLOODU LARGE 04/08/2019    SPECGRAV 1.025 04/08/2019    GLUCOSEU 100 04/08/2019       Radiology:  XR ABDOMEN (KUB) (SINGLE AP

## 2020-03-16 NOTE — PROGRESS NOTES
Speech Language Pathology    Chart reviewed and d/w RN, Andreea Davis. Attempted to see pt for dysphagia f/u but pt unable to rouse sufficiently for PO trials despite max verbal and tactile stimuli. Pt only opened eyes briefly before appearing to fall back asleep; no responses to questions / prompts elicited and pt withdrew from tactile stim of ice chip on labial seal. Pt unsafe to take PO at this time d/t significant lethargy. Will re-attempt tx as pt able to participate and schedule permits. D/w RN. Estuardo Ramirez MA CCC-SLP; [id number]  Speech-Language Pathologist  Pager # [phone] / [phone] - Second attempt made. Pt somnolent with no change in alertness. Not appropriate at this time. Will re-attempt at a later date.      Aron Pedraza M.A., Lyle  Speech-Language Pathologist

## 2020-03-16 NOTE — PROGRESS NOTES
ICU Progress Note    Admit Date: 3/10/2020  Day: 3/16/2020     Diet: Dietary Nutrition Supplements: Renal Oral Supplement  Dietary Nutrition Supplements: Renal Oral Supplement  DIET GENERAL; Daily Fluid Restriction: 1200 ml; Low Phosphorus    CC: fatigue    Interval history: lethargic this morning, although she is oriented. Falling asleep during exam.    HPI: Isidoro Gupta is a 67 y.o. F with PMH of ESRD (on PD), HTN, PVD (on plavix) and type II DM who presented three days ago with fatigue and subjective fever and chills x 3 days. While in the ED, patient was found to be hypotnesive with a lactic acidosis which improved after 2L IVFs. Work-up thus far is concerning for peritonitis as recent fluid analysis revealed 9856 nucleated cells with 85% neutrophils. Cultures with gram negative rods, still awaiting species. Patient was started on cefepime. Additionally, patient was being followed by EP cards for MAT, she was started on dilt gtt; however, her systolic blood pressure dropped to the 70s. She was switched to low doses of PO dilt and lopressor but had several episodes of emesis and was unable to keep the pills down. Additionally, midodrine was added to help with low blood pressures. Due to vomiting, CT abdomen WO contrast was ordered and was unremarkable.      On 3/13, a code blue was called as patient was hypotensive with systolic's in the 50A, HR in the 80s and O2 sats at 82% on RA. She was unresponsive. Patient never lost pulse. Prior to code, patient's BG was 33, she received D50 which improved glucose to 100s. She was given a 250mL bolus of IVFs which did not improve pressures. ABG and lactic acid reassuring. RFP unchanged from prior. CBC with slight leukocytosis of 12.4.      Patient transferred to ICU for CVC placement and likely pressor support. Albumin started per nephrology and antibiotics changed to ceftazidime.      Medications:     Scheduled Meds:   potassium chloride  20 mEq Intravenous Q1H    midodrine  10 mg Oral TID     custom dialysis builder   Intraperitoneal Once    meropenem  500 mg Intravenous Q24H    potassium chloride  40 mEq Oral Once    metoprolol tartrate  12.5 mg Oral Q6H    insulin lispro  0-6 Units Subcutaneous TID WC    insulin lispro  0-3 Units Subcutaneous Nightly    insulin glargine  15 Units Subcutaneous Daily    Venelex   Topical BID    dilTIAZem  15 mg Oral 4 times per day    polyethylene glycol  17 g Oral BID    darbepoetin sandeep-polysorbate  60 mcg Subcutaneous Weekly    clopidogrel  75 mg Oral Daily    aspirin  81 mg Oral Daily    sevelamer  1,600 mg Oral TID WC    atorvastatin  10 mg Oral Daily    sodium chloride flush  10 mL Intravenous 2 times per day    heparin (porcine)  5,000 Units Subcutaneous 3 times per day     Continuous Infusions:   norepinephrine Stopped (03/14/20 1200)    dextrose       PRN Meds:prochlorperazine, glucose, dextrose, glucagon (rDNA), dextrose, sodium chloride flush, acetaminophen **OR** acetaminophen, polyethylene glycol    Objective:   Vitals:   T-max:  Patient Vitals for the past 8 hrs:   BP Temp Temp src Pulse Resp SpO2 Weight   03/16/20 0630 (!) 102/55 -- -- 97 15 -- --   03/16/20 0620 (!) 103/59 -- -- -- -- -- --   03/16/20 0615 101/63 -- -- 112 17 -- --   03/16/20 0600 103/63 -- -- 100 18 -- --   03/16/20 0553 103/63 98.4 °F (36.9 °C) Axillary 103 16 100 % 176 lb 9.4 oz (80.1 kg)   03/16/20 0519 (!) 120/48 -- -- 112 -- -- --   03/16/20 0500 106/63 -- -- 112 17 -- --   03/16/20 0400 99/82 98 °F (36.7 °C) Axillary 99 14 -- --   03/16/20 0330 105/67 -- -- 93 17 -- --   03/16/20 0300 119/70 -- -- 91 15 -- --   03/16/20 0230 110/68 -- -- 94 17 100 % --   03/16/20 0200 120/74 -- -- 96 18 91 % --   03/16/20 0100 106/67 -- -- 95 19 100 % --   03/16/20 0024 (!) 129/53 -- -- -- -- -- --   03/16/20 0000 112/87 98.2 °F (36.8 °C) Axillary 93 17 98 % --   03/15/20 2330 (!) 108/59 -- -- 98 16 97 % --       Intake/Output Summary (Last 24 03/14/20  0525 03/15/20  0445 03/16/20  0502   * 133* 130*   K 3.1* 3.4* 2.9*   CL 89* 92* 90*   CO2 21 23 23   BUN 45* 43* 42*   CREATININE 6.4* 6.6* 6.1*   GLUCOSE 414* 337* 469*   CALCIUM 7.7* 8.1* 7.9*   MG 2.10 2.10 1.80   PHOS 5.1* 4.9 5.0*   ANIONGAP 21* 18* 17*     Hepatic:   Recent Labs     03/13/20  1745 03/14/20  0525 03/15/20  0445 03/16/20  0502   AST 25  --   --   --    ALT 15  --   --   --    BILITOT 0.3  --   --   --    PROT 5.5*  --   --   --    LABALBU 1.2* 2.5* 2.3* 2.3*   ALKPHOS 73  --   --   --      ABGs:    Recent Labs     03/13/20  1748   PHART 7.426   HPG3HMF 42.1   PO2ART 139.0*   JYT0FLY 27.7   BEART 3   T4RNFJLA 99   LDK8GHM 29       INR: No results for input(s): INR in the last 72 hours. Lactate:   Recent Labs     03/13/20 1748   LACTATE 0.89     Cultures:  -----------------------------------------------------------------  RAD:   XR CHEST PORTABLE   Final Result      No significant change in mild left lower lobe consolidation. CT ABDOMEN PELVIS WO CONTRAST Additional Contrast? None   Final Result      No acute intra-abdominal process. Small amount of fluid in the leftward pelvis and perihepatic region with small foci of pneumoperitoneum, not to be unexpected given the peritoneal dialysis catheter. Diverticulosis. Renal cysts. Bibasilar atelectasis. XR CHEST PORTABLE   Final Result      No focal consolidation. Assessment/Plan:     Hypotension, leukocytosis 2/2 to Peritonitis  Subjective fever, fatigue, abdomen pain. Recent fluid analysis 3/13 was still cloudy, revealed 9856 nuc cells (85% neutrophils). Nephrology onboard.   - Cont vanc  - D/C ceftazidime - start meropenem 3/14/20   -- PD fluid cx - Achromobacter xylosoxidans  - Levo if required. ESRD on PD  Nephrology consulted. Had lines obstruction issues now resolved.    -- continue PD  -- follow up nephology recs  -- daily renal function panel     Nausea, vomiting - improving  Could be related to peritonitis. CT abdomen WO contrast was unremarkable. Today feeling better. -- plan as above  -- SLP evaluation     Polymorphic atrial tachycardia-RVR  Has hx of afib in previous hospital admission. Was on anti-coagulation for DVT in the past but d/c 8/2019 following GI bleed as patient was found not taking it. -- follow up cardiology recs  -- diltiazem 15 mg q6h as tolerated by BP (added by cardiology)  -- lopressor 12.5 BID as tolerated by BP (added by cardiology)  -- will monitor for now     Type II diabetes  Last [facility] 3/24/2019 6.5. On home lantus 6u nightly, and sliding scale. H/o PVD, check POCT glu from A line. -- hypoglycemia protocol  -- change to high dose sliding scale  -- change lispro to 5u with meals tid  -- change lantus 20u nightly  -- Accu-checks q4h - check from A line.      Hx of PVD  Follows vascular surgery outpatient. On home plavix  -- continue home plavix 75      Code Status: Full Code   FEN: Dietary Nutrition Supplements: Renal Oral Supplement  Dietary Nutrition Supplements: Renal Oral Supplement  DIET GENERAL; Daily Fluid Restriction: 1200 ml; Low Phosphorus  PPX:  Heparin  DISPO: ICU    Karyle Nevins, DO, PGY-2  03/16/20  7:01 AM    PD related peritonitis   Hypotension, with ESRD, chronic, on midodrine   Upper airway coarse crackles suggestive of recurrent aspiration, and consistent with her lethargy   Paroxysmal atrial tachycardia     Change cardizem to PRN  NT suction   OOB to chair if possible   Start TF  Aspiration precautions.     Continue merrem  Discussed with nephrology

## 2020-03-16 NOTE — CONSULTS
Diagnosis Date    DVT (deep venous thrombosis) (HCC)     End stage renal disease (HCC)     Hyperlipidemia     Hypertension     Osteoarthritis     Pancreatitis chronic     Peritoneal dialysis status ([facility] [address]     Type II or unspecified type diabetes mellitus without mention of complication, not stated as uncontrolled     Vitamin D deficiency        Past Surgical History:        Procedure Laterality Date    CATARACT REMOVAL Left 5/29/13    had elevated BP post op    CORONARY ANGIOPLASTY      JOINT REPLACEMENT  5/4/2011    R WILDER    OTHER SURGICAL HISTORY Left 05/19/2017    INSERTION OF LAPAROSCOPIC PERITONEAL DIALYSIS CATHETER;    THYROIDECTOMY, PARTIAL      UPPER GASTROINTESTINAL ENDOSCOPY N/A 8/13/2019    EGD BIOPSY AND BRUSHING performed by Ever Ghosh MD at [address] ENDOSCOPY       Current Medications:    Current Facility-Administered Medications: bisacodyl (DULCOLAX) suppository 10 mg, 10 mg, Rectal, Daily PRN  [START ON 3/17/2020] atorvastatin (LIPITOR) tablet 10 mg, 10 mg, Per NG tube, Daily  [START ON 3/17/2020] clopidogrel (PLAVIX) tablet 75 mg, 75 mg, Per NG tube, Daily  dilTIAZem (CARDIZEM) tablet 15 mg, 15 mg, Per NG tube, 4 times per day  metoprolol tartrate (LOPRESSOR) tablet 12.5 mg, 12.5 mg, Per NG tube, Q6H  midodrine (PROAMATINE) tablet 10 mg, 10 mg, Per NG tube, TID WC  polyethylene glycol (GLYCOLAX) packet 17 g, 17 g, Per NG tube, BID  lanthanum (FOSRENOL) chewable tablet 1,000 mg, 1,000 mg, Per NG tube, TID WC  [START ON 3/17/2020] meropenem (MERREM) 1 g in sodium chloride 0.9 % 100 mL IVPB (mini-bag), 1 g, Intravenous, Q24H  [START ON 3/17/2020] aspirin chewable tablet 81 mg, 81 mg, Per NG tube, Daily  polyethylene glycol (GLYCOLAX) packet 17 g, 17 g, Per NG tube, Daily PRN  acetaminophen (TYLENOL) 160 MG/5ML solution 650 mg, 650 mg, Per NG tube, Q6H PRN **OR** acetaminophen (TYLENOL) suppository 650 mg, 650 mg, Rectal, Q6H PRN  insulin lispro (1 Unit Dial) 0-18 Units, 0-18 Mainly assist; intake normal or reduced; occasional assist; LOC full/confusion  30% ? Bed Bound; Extensive disease; Total care; intake reduced; LOC full/confusion  20% ? Bed Bound; Extensive disease; Total care; intake minimal; Drowsy/coma  10% ? Bed Bound; Extensive disease; Total care; Mouth care only; Drowsy/coma  0 ? Death    PPS: 20%    Vitals:    /63   Pulse 81   Temp 98.4 °F (36.9 °C) (Axillary)   Resp 21   Ht 5' 2\" (1.575 m)   Wt 176 lb 9.4 oz (80.1 kg) Comment: bed scale  LMP  (LMP Unknown)   SpO2 100%   BMI 32.30 kg/m²     DATA:    CBC with Differential:    Lab Results   Component Value Date    WBC 20.4 03/16/2020    RBC 3.75 03/16/2020    HGB 11.4 03/16/2020    HCT 35.7 03/16/2020     03/16/2020    MCV 95.2 03/16/2020    MCH 30.4 03/16/2020    MCHC 31.9 03/16/2020    RDW 21.0 03/16/2020    SEGSPCT 74.8 05/30/2013    BANDSPCT 1 03/13/2020    METASPCT 1 03/24/2019    LYMPHOPCT 3.1 03/16/2020    MONOPCT 4.5 03/16/2020    BASOPCT 0.1 03/16/2020    MONOSABS 0.9 03/16/2020    LYMPHSABS 0.6 03/16/2020    EOSABS 0.0 03/16/2020    BASOSABS 0.0 03/16/2020     BMP:    Lab Results   Component Value Date     03/16/2020    K 2.9 03/16/2020    K 3.4 03/13/2020    CL 90 03/16/2020    CO2 23 03/16/2020    BUN 42 03/16/2020    LABALBU 2.3 03/16/2020    CREATININE 6.1 03/16/2020    CALCIUM 7.9 03/16/2020    GFRAA 8 03/16/2020    GFRAA 32 06/07/2013    LABGLOM 7 03/16/2020    GLUCOSE 469 03/16/2020     Albumin:    Lab Results   Component Value Date    LABALBU 2.3 03/16/2020         Conclusion/Time spent:         Recommendations see above    Pt [id number]  Time spent with patient and/or family: 5  Time reviewing records: 10  Time communicating with staff: 10    A total of 25 minutes spent with the patient and family on unit greater than 50% in counseling regarding palliative care and in goals of care for the patient. Thank you to Dr. Alisson Rosario for this consultation.  We will continue to follow Ms.

## 2020-03-16 NOTE — CARE COORDINATION
Case Management Assessment           Daily Note                 Date/ Time of Note: 3/16/2020 11:15 AM         Note completed by: Mahendra Hawley    Patient Name: Lori Stiles  YOB: 1940    Diagnosis:Peritonitis [facility]) [K65.9]  Peritonitis [facility]) [K65.9]  Patient Admission Status: Inpatient    Date of Admission:3/10/2020  3:26 PM Length of Stay: 6 GLOS:        Current Plan of Care: pt to return to [facility] at d/c.   ________________________________________________________________________________________  PT AM-PAC: 8 / 24 per last evaluation on: 3/15/20    OT AM-PAC: 12 / 24 per last evaluation on: 3/15/20    DME Needs for discharge: No (defer)    ________________________________________________________________________________________  Discharge Plan: pt to return to LTC at [facility]. Tentative discharge date: TBD/ unknown     Current barriers to discharge: medical clearance     Referrals completed: Not Applicable    Resources/ information provided: Not indicated at this time  ________________________________________________________________________________________  Case Management Notes:    Student called Swapna Kidd, from [facility] [phone], to confirm that pt is still able to return. Student left vm and left [facility] # for call back. Sarahy Servin and her family were provided with choice of provider; she and her family are in agreement with the discharge plan.     Care Transition Patient: Katherine Hawley   Social Work Student   Phone: [phone] Alternate [facility]) [phone]

## 2020-03-16 NOTE — PROGRESS NOTES
Patient remains lethargic through all assessments. Able to answer orientation questions appropriately when she feels like answering them. She denies any SOB or pain. Patient has had poor PO intake only eating a few bites of her dinner and drinking sips of water with encouragement. Earlier in shift PD machine alarming, dialysis nurse called and sent to bedside, no further issues. Catheter remains in place, dressing C/D/I. No signs of acute distress, will continue to monitor.

## 2020-03-16 NOTE — PROGRESS NOTES
insulin glargine (LANTUS;BASAGLAR) injection pen 5 Units, Daily  insulin lispro (1 Unit Dial) 0-6 Units, TID WC  insulin lispro (1 Unit Dial) 0-3 Units, Nightly  dextrose 5 % with KCl 20 mEq infusion, Continuous  delflex lo-prasad 1.5% 5,000 mL with heparin (porcine) 2,500 Units solution, Daily  norepinephrine (LEVOPHED) 16 mg in dextrose 5 % 250 mL infusion, Continuous  prochlorperazine (COMPAZINE) injection 10 mg, Q6H PRN  Venelex ointment, BID  glucose (GLUTOSE) 40 % oral gel 15 g, PRN  dextrose 50 % IV solution, PRN  glucagon (rDNA) injection 1 mg, PRN  dextrose 5 % solution, PRN  darbepoetin sandeep-polysorbate (ARANESP) injection 60 mcg, Weekly  sodium chloride flush 0.9 % injection 10 mL, 2 times per day  sodium chloride flush 0.9 % injection 10 mL, PRN  heparin (porcine) injection 5,000 Units, 3 times per day      Physical exam:     Vitals:  BP (!) 79/50   Pulse 73   Temp 98.6 °F (37 °C) (Axillary)   Resp 15   Ht 5' 2\" (1.575 m)   Wt 176 lb 9.4 oz (80.1 kg) Comment: bed scale  LMP  (LMP Unknown)   SpO2 97%   BMI 32.30 kg/m²   Constitutional: awake   Skin: no rash, turgor wnl  Neck: no bruits or jvd noted  Cardiovascular:  S1, S2 reg  Respiratory: CTA   Abdomen:  +bs, soft, nt, PD catheter in place  Ext:+lower extremity edema      Data:   Labs:  CBC:   Recent Labs     03/14/20  0525 03/15/20  0445 03/16/20  0502   WBC 11.5* 13.6* 20.4*   HGB 11.7* 11.9* 11.4*    253 250     BMP:    Recent Labs     03/14/20  0525 03/15/20  0445 03/16/20  0502   * 133* 130*   K 3.1* 3.4* 2.9*   CL 89* 92* 90*   CO2 21 23 23   BUN 45* 43* 42*   CREATININE 6.4* 6.6* 6.1*   GLUCOSE 414* 337* 469*     Ca/Mg/Phos:   Recent Labs     03/14/20  0525 03/15/20  0445 03/16/20  0502   CALCIUM 7.7* 8.1* 7.9*   MG 2.10 2.10 1.80   PHOS 5.1* 4.9 5.0*     Hepatic:   No results for input(s): AST, ALT, ALB, BILITOT, ALKPHOS in the last 72 hours. Troponin: No results for input(s): TROPONINI in the last 72 hours.   BNP: No results care of [address] free to contact me   [facility] [address]  Office : [phone]  Fax :[phone]

## 2020-03-16 NOTE — CONSULTS
NUTRITION ASSESSMENT  Admission Date: 3/10/2020     Type and Reason for Visit: Initial, Consult(TF ordering and mngmt)    NUTRITION RECOMMENDATIONS:   1. Recommend order \"Diet: Tube feed continuous/ NPO\". Initiate Nepro (renal formula) at 10 mL/hr and as tolerated, increase by 10 mL/hr q 4 hours until goal of 35 mL/hr is met via  N/G d.t risk of refeeding. 2. Recommend 30 mL H20 q 4 hours. Recommend reevaluate IV fluids at this time. Increase flush if Na increases greater than 145 mEq/L.  3. Administer 1 proteinex modular daily   4. Monitor closely and correct lytes (K, Phos, Mg) before progressing TF to goal d/t risk of refeeding syndrome (hx extended inadequate nutritional intake). 5. Ensure head of bed is 30 - 45 degrees during continuous or bolus gastric feeding and for one hour after bolus. Turn off the feeding if head of bed is lowered less than 30 degrees. 6. Monitor for tolerance ( bowel habits, N/V, cramping). 7. Irrigate tube with 30 mL water before, between and after each medication. NUTRITION ASSESSMENT:  Pt remains nutritionally compromised AEB NPO status and start of EN. Pt w/PMH of ESRD (on PD), HTN, and type II DM. Pt admitted for fatigue, fever, and chills x 3 days. Pt w/NG placed for nutrition as pt deemed NPO per SLP, continues on PD. TF orders below. Pt noted w/fluid shifts this admission. Noted increased protein needs r/t wounds. Will monitor TF clifton. Tube Feeding Goal: Nepro (renal formula) @ 35 mL/hr provides 840 mL TV, 1512 kcal, 68 grams protein, 610 mL free water. Administer 1 proteinex modular daily to provide a total of 1616 kcals and 94 gms of protein. Minimum water bolus 30 ml every 4 hours for tube maintenance as pt w/decreased Na. MALNUTRITION ASSESSMENT  Context: Acute illness or injury   Malnutrition Status:  At risk for malnutrition  Findings of the 6 clinical characteristics of malnutrition (Minimum of 2 out of 6 clinical characteristics is required to make the

## 2020-03-17 NOTE — PROGRESS NOTES
dextrose, glucagon (rDNA), dextrose, sodium chloride flush      Intake/Output Summary (Last 24 hours) at 3/17/2020 1151  Last data filed at 3/17/2020 0600  Gross per 24 hour   Intake 358 ml   Output 53 ml   Net 305 ml       Physical Exam Performed:    /79   Pulse 105   Temp 99 °F (37.2 °C) (Axillary)   Resp 21   Ht 5' 2\" (1.575 m)   Wt 176 lb 9.4 oz (80.1 kg)   LMP  (LMP Unknown)   SpO2 95%   BMI 32.30 kg/m²     General appearance: lethargic, NG tube +Ve  HEENT: Pupils equal, round, and reactive to light. Conjunctivae/corneas clear. Neck: Supple. No jugular venous distention. Trachea midline. Respiratory:  Normal respiratory effort. Clear to auscultation, bilaterally without Rales/Wheezes/Rhonchi. Cardiovascular: Regular rate and rhythm with normal S1/S2 without murmurs, rubs or gallops. Abdomen: Soft, non-tender, non-distended with normal bowel sounds. Musculoskeletal: BL LE 1+ pitting edema  Neurologic:  AOx2, follows up simple commands. Psychiatric: Alert         Labs:   Recent Labs     03/15/20  0445 03/16/20  0502 03/17/20  0551   WBC 13.6* 20.4* 16.8*   HGB 11.9* 11.4* 11.4*   HCT 37.4 35.7* 35.8*    250 256     Recent Labs     03/15/20  0445 03/16/20  0502 03/17/20  0551   * 130* 132*   K 3.4* 2.9* 3.3*   CL 92* 90* 92*   CO2 23 23 23   BUN 43* 42* 39*   CREATININE 6.6* 6.1* 5.9*   CALCIUM 8.1* 7.9* 7.8*   PHOS 4.9 5.0* 4.4     No results for input(s): AST, ALT, BILIDIR, BILITOT, ALKPHOS in the last 72 hours. No results for input(s): INR in the last 72 hours. No results for input(s): Bal Peterson in the last 72 hours. Urinalysis:      Lab Results   Component Value Date    NITRU Negative 04/08/2019    WBCUA 20-50 04/08/2019    BACTERIA 3+ 04/08/2019    RBCUA 10-20 04/08/2019    BLOODU LARGE 04/08/2019    SPECGRAV 1.025 04/08/2019    GLUCOSEU 100 04/08/2019       Radiology:  XR CHEST PORTABLE   Final Result      No significant interval change.       XR ABDOMEN (KUB) (SINGLE AP VIEW)   Final Result      Feeding tube with tip overlying the fundus of the stomach should be repositioned as described      XR CHEST PORTABLE   Final Result      No significant change in bilateral lower lobe atelectasis or pneumonitis. NG tube as described            XR CHEST PORTABLE   Final Result      No significant change in mild left lower lobe consolidation. CT ABDOMEN PELVIS WO CONTRAST Additional Contrast? None   Final Result      No acute intra-abdominal process. Small amount of fluid in the leftward pelvis and perihepatic region with small foci of pneumoperitoneum, not to be unexpected given the peritoneal dialysis catheter. Diverticulosis. Renal cysts. Bibasilar atelectasis. XR CHEST PORTABLE   Final Result      No focal consolidation. Assessment/Plan:    Active Hospital Problems    Diagnosis Date Noted    Constipation [K59.00] 03/13/2020    Dialysis-associated peritonitis ([facility] [address] Hortensia Ezequiel 03/10/2020    Multifocal atrial tachycardia (HCC) [I47.1] 08/08/2019    ESRD on peritoneal dialysis ([facility] [address] [N18.6, Z99.2]      #Septic shock likely secondary to peritonitis  #Peritonitis likely secondary to PD catheter  Cultures positive for  Achromobacter  Continue antibiotics with meropenem. As per nephrology no need to change PD catheter. #ESRD on PD. Management as per nephrology. #Multifactorial treat till tachycardia  Cardiology has been following. Recommended diltiazem 15 mg every 6 with hold parameters. #Diabetes mellitus type 2  Hemoglobin A1c 3/24/2019 6.4. Looks like falsely low with anemia. We will treat diabetes mellitus on random blood sugar levels. Continue sliding scale and Lantus adjust dose as needed. #History of PVD  Continue home Plavix 25 mg.     DVT Prophylaxis: Heparin  Diet: Diet Tube Feed Continuous/Cyclic w/ Diet  Dietary Nutrition Supplements: Protein Modular  Code Status: Full Code    PT/OT Eval Status: Pending    Dispo -continue ICU care.     Jasbir Jones MD

## 2020-03-17 NOTE — PROGRESS NOTES
noted per chart review. Recommend downgrade of diet to puree/thin liquids with close monitoring for s/s of aspiration. Will monitor ability to participate in [address] -   Cont goal  3/17: Pt made NPO d/t significant lethargy and AMS. Note decline in respiratory status overnight. Pt reported to be more alert today by RN and SLP cleared to re-assess pt. Pt required verbal and tactile stim to alert to SLP's entry but maintained adequate alertness throughout session. Oral care completed with suction kit prior to PO trials. Administered pt ice chip - pt with initial swallow / laryngeal movement palpable on anterior portion of neck but then appeared to hold remainder of bolus (tight lip closure). When questioned if portion of ice chip remained, pt indicated there was still PO in oral cavity. Pt continued to demonstrate prolonged holding w/ no initiation to propel posteriorly or initiate swallow despite max cues. Attempted to stimulate swallow with 1/2 tsp thin water but this did not elicit lingual or laryngeal movement. Residue from ice chips and water bolus eventually suctioned from oral cavity. Pt then analyzed solely with 1/2 tsp thin water - pt completed posterior propulsion and laryngeal movement palpable for 80% of opps. Attempted trials of puree but pt turned away and declined offerings. Pt accepted gelatin x 2 with palpable laryngeal movement x 1. Provided thin liquid rinse via straw - laryngeal movement palpable but pt continued with prolonged bolus holding. Pt noted to have audible rhonchi with respirations while completing hold of bolus - no posterior propulsion achieved despite max cues. Pt eventually completed swallow and upon inspection of oral cavity, max residue remained on R side from gelatin that SLP suctioned out. As pt not consistently initiating swallow and requiring boluses to be suctioned from oral cavity intermittently, pt not safe for PO diet at this time.  Recommend continue NPO with exception of 1/2 tsp thin water with 1:1 supervision from nursing when pt alert and initiating swallow. Goal not met - modify goal to:  Patient will participate in repeat BSE. 2- The patient Gabrielle Ewing will verbalize/demonstrate understanding of dysphagia recommendations  3/15: pt educated to purpose of visit, s/s of aspiration and concern if aspiration occurs. Pt stated partial understanding   Cont goal  3/17: Attempted to educate pt to purpose of visit, concerns for dysphagia, safety risks with bolus holding / inconsistent initiation of swallow, aspiration risks, and rationale for ongoing NPO status. Pt demonstrates no comprehension. Patient/Family/Caregiver Education:  As above    Compensatory Strategies:  1/2 tsp thin water only per RN  Only provide water when pt alert   D/C water if pt stops initiating swallow  Upright fully for water via 1/2 tsp     Plan:  Continued daily Dysphagia treatment with goals per  plan of care. Diet recommendations: NPO + TF; 1/2 tsp thin water per RN when pt alert and initiating swallow  DC recommendation: TBD  Treatment: 15  D/W nursing Webster Lather  Needs met prior to leaving room, call button in reach. Frances Habermann, MA CCC-SLP; [id number]  Speech-Language Pathologist    Pg.  # [id number]  If patient is discharged prior to next treatment, this note will serve as the discharge summary

## 2020-03-17 NOTE — PLAN OF CARE
Problem: Falls - Risk of:  Goal: Will remain free from falls  Description: Will remain free from falls  Note: No attempts to get out of bed. Nonskid socks on. Bed locked and in lowest position. Side rails up x3. Exit alarm in use. Call light in reach. Remains free from injury. Will cont to monitor safety. Problem: Skin Integrity:  Goal: Skin integrity will stabilize  Description: Skin integrity will stabilize  Note: Venelex applied to buttocks. R great toe VIOLETA. Pt turned and repositioned every two hours, and changed for stool incontinence. Pt on low air loss pressure alternating mattress. Will cont to monitor skin integrity. Problem: Cardiac:  Goal: Ability to maintain vital signs within normal range will improve  Description: Ability to maintain vital signs within normal range will improve  Note: Pt HR fluctuating, 100-130's t/o day, A Fib. SBP 90's for most of day. Dr aware of findings. Will cont to monitor cardiac status.

## 2020-03-17 NOTE — PROGRESS NOTES
ESRD (end stage renal disease) on dialysis [facility])     Knee pain, left     Debility     Failure to thrive in adult     Numbness of right foot     Encounter for palliative care     Advance directive discussed with patient     Weakness of both legs     Left leg swelling     Peripheral neuropathy     Uncontrolled type 2 diabetes mellitus with hyperglycemia (HCC)     DVT of deep femoral vein, left (HCC)     Onychomycosis     Atrial fibrillation (HCC)     Multifocal atrial tachycardia (HCC)     Hypokalemia     Severe malnutrition ([address]     Peritoneal dialysis catheter exit site infection ([address]     GI bleed     Dry gangrene to right hallux(HCC)     Peritonitis ([address]        Assessment & Plan:      1. MAT  2. ST  3. Sepsis - peritonitis    66 y/o woman with a h/o ESRD,on PD,  HTN, HLD, who p/w 3 days of fever and chills, noted to have irregular rhythm on tele, found to have a MAT.      MAT/ST  - Runs of MAT on tele  - Dilt 15 Q 6 with hold parameters   - BP soft  - Keep on tele  - Tx underlying cause  - Keep K+ > 4.0 and Mg > 2.0 - replacement ordered        Jhon Casey CNP  [address]

## 2020-03-17 NOTE — FLOWSHEET NOTE
03/16/20 2125   Vitals   /76   Temp 98.6 °F (37 °C)   Pulse 98   Resp 21   Weight 176 lb 9.4 oz (80.1 kg)   Cycler   Verification of Prescription CCPD   Total Volume Programmed 39149 mL   Therapy Time (Hours:Minutes) 10   Fill Volume 2200 mL   Dextrose Setting Same (Nonextraneal)   Number of Cycles 5   Bag Volume 5000 mL   Number of Bags Used 3   I Drain (mL) 53 mL   Orders verified. Supplies taken to pt's room. Cycler set up, primed and set up. Using aseptic technique, pt connected to cycler. CCPD initiated without problem.

## 2020-03-17 NOTE — PROGRESS NOTES
Nephrology progress Note                                                                                                                                                                                                                                                                                                                                                               Office : [phone]     Fax :[phone]              Patient's Name: Moni Varner  10:21 AM  3/17/2020    Reason for Consult:  ESRD       History of Present Ilness:     Moni Varner is a 67 y.o. female with history of diabetes, heart failure, ESRD on peritoneal dialysis, multiple other comorbidities presenting for fatigue and weakness. Patient states she has been feeling this way for approximately 3 days. Denies pain, fevers, changes in bowel movements. Has not had any issues with her peritoneal dialysis catheter or PD rounds. Last run was last night. She does not make urine. Notes some scant nonbloody emesis.    Has constipation   Per PD fluid studies she has peritonitis     INTERVAL HISTORY    More awake   No shortness of breath   tolerating PD  No abdominal pain     Allergies:  Gabapentin    Current Medications:    magnesium sulfate 4 g in 100 mL IVPB premix, Once  dilTIAZem (CARDIZEM) tablet 15 mg, 4 times per day  insulin lispro (1 Unit Dial) 0-12 Units, Q4H  bisacodyl (DULCOLAX) suppository 10 mg, Daily PRN  atorvastatin (LIPITOR) tablet 10 mg, Daily  clopidogrel (PLAVIX) tablet 75 mg, Daily  midodrine (PROAMATINE) tablet 10 mg, TID WC  polyethylene glycol (GLYCOLAX) packet 17 g, BID  lanthanum (FOSRENOL) chewable tablet 1,000 mg, TID WC  meropenem (MERREM) 1 g in sodium chloride 0.9 % 100 mL IVPB (mini-bag), Q24H  aspirin chewable tablet 81 mg, Daily  polyethylene glycol (GLYCOLAX) packet 17 g, Daily PRN  acetaminophen (TYLENOL) 160 MG/5ML solution 650 mg, Q6H PRN    Or  acetaminophen (TYLENOL) suppository 650 mg, Q6H PRN  insulin glargine (LANTUS;BASAGLAR) injection pen 5 Units, Daily  delflex lo-prasad 1.5% 5,000 mL with heparin (porcine) 2,500 Units solution, Daily  norepinephrine (LEVOPHED) 16 mg in dextrose 5 % 250 mL infusion, Continuous  prochlorperazine (COMPAZINE) injection 10 mg, Q6H PRN  Venelex ointment, BID  glucose (GLUTOSE) 40 % oral gel 15 g, PRN  dextrose 50 % IV solution, PRN  glucagon (rDNA) injection 1 mg, PRN  dextrose 5 % solution, PRN  darbepoetin sandeep-polysorbate (ARANESP) injection 60 mcg, Weekly  sodium chloride flush 0.9 % injection 10 mL, 2 times per day  sodium chloride flush 0.9 % injection 10 mL, PRN  heparin (porcine) injection 5,000 Units, 3 times per day      Physical exam:     Vitals:  /79   Pulse 105   Temp 99 °F (37.2 °C) (Axillary)   Resp 21   Ht 5' 2\" (1.575 m)   Wt 176 lb 9.4 oz (80.1 kg)   LMP  (LMP Unknown)   SpO2 95%   BMI 32.30 kg/m²   Constitutional: AA  Skin: no rash, turgor wnl  Neck: no bruits or jvd noted  Cardiovascular:  S1, S2 reg  Respiratory: CTA   Abdomen:  +bs, soft, nt, PD catheter in place  Ext:+lower extremity edema      Data:   Labs:  CBC:   Recent Labs     03/15/20  0445 03/16/20  0502 03/17/20  0551   WBC 13.6* 20.4* 16.8*   HGB 11.9* 11.4* 11.4*    250 256     BMP:    Recent Labs     03/15/20  0445 03/16/20  0502 03/17/20  0551   * 130* 132*   K 3.4* 2.9* 3.3*   CL 92* 90* 92*   CO2 23 23 23   BUN 43* 42* 39*   CREATININE 6.6* 6.1* 5.9*   GLUCOSE 337* 469* 281*     Ca/Mg/Phos:   Recent Labs     03/15/20  0445 03/16/20  0502 03/17/20  0551   CALCIUM 8.1* 7.9* 7.8*   MG 2.10 1.80 1.60*   PHOS 4.9 5.0* 4.4     Hepatic:   No results for input(s): AST, ALT, ALB, BILITOT, ALKPHOS in the last 72 hours. Troponin: No results for input(s): TROPONINI in the last 72 hours. BNP: No results for input(s): BNP in the last 72 hours. Lipids: No results for input(s): CHOL, TRIG, HDL, LDLCALC, LABVLDL in the last 72 hours.   ABGs:   Recent Labs [facility] [address]  Office : [phone]  Fax :[phone]

## 2020-03-17 NOTE — PROGRESS NOTES
Palliative Medicine Progress Note    Admit Date: 3/10/2020  Hospital Day:  Hospital Day: 8     CC: Fatigue  HPI: Ms Riddhi Edwards is a 67year old female with PMH of DVT, ESRD on PD, HLD, HTN, and type 2 DM who presented with 3 days of fever and chills. She was admitted with sepsis secondary to PD fluid peritonitis, started on vancomycin and cefepime, later switched to meropenem 3/14 based on culture sensitivity. Electrophysiology was consulted for multifocal atrial tachycardia. She has required pressor support and was transferred to ICU. Recommendations:     Spoke with pt's brother Tyrone Burgess, who is the pt's HCPOA. He will be faxing a copy of her HCPOA to the ICU today. He states she has lived at the [facility] for a few months as she has been unable to care for herself at home. 1. Goals of Care/Advanced Care planning/Code status: Full Code, had a long discussion today regarding resuscitation and possible intubation with pt's brother Tyrone Burgess. At this time he would like for the team to continue with aggressive management. 2. Pain: pt denies pain and appears comfortable overall. 3. SOB: pt is breathing comfortably on 4 L oxygen. She has audible congestion with frequent NT suctioning per EMR. 4. Lethargy/AMS: pt is lethargic, awakens to voice and is oriented to person and place, able to follow commands. She is not able to verbalize insight into her medical problems so likely cannot make her own decisions at this time. Her brother Tyrone Burgess is her HCPOA and he will be faxing a copy of this paperwork today.    5. Disposition: will likely return to [facility] [address] when medically ready       Subjective:     Scheduled Meds:   magnesium sulfate  4 g Intravenous Once    dilTIAZem  15 mg Per NG tube 4 times per day    atorvastatin  10 mg Per NG tube Daily    clopidogrel  75 mg Per NG tube Daily    midodrine  10 mg Per NG tube TID     polyethylene glycol  17 g Per NG tube BID    lanthanum  1,000 mg Per NG tube TID   meropenem  1 g Intravenous Q24H    aspirin  81 mg Per NG tube Daily    insulin glargine  5 Units Subcutaneous Daily    insulin lispro  0-6 Units Subcutaneous TID WC    insulin lispro  0-3 Units Subcutaneous Nightly    custom dialysis builder   Intraperitoneal Daily    Venelex   Topical BID    darbepoetin sandeep-polysorbate  60 mcg Subcutaneous Weekly    sodium chloride flush  10 mL Intravenous 2 times per day    heparin (porcine)  5,000 Units Subcutaneous 3 times per day       Continuous Infusions:   norepinephrine Stopped (03/14/20 1200)    dextrose         PRN Meds:bisacodyl, polyethylene glycol, acetaminophen **OR** acetaminophen, prochlorperazine, glucose, dextrose, glucagon (rDNA), dextrose, sodium chloride flush    Review of Systems. Review of Systems - History obtained from unobtainable from patient due to mental status      Performance status 20      Objective:     Patient Vitals for the past 8 hrs:   BP Temp Temp src Pulse Resp SpO2   03/17/20 0902 -- 99 °F (37.2 °C) Axillary -- -- --   03/17/20 0900 106/79 -- -- 105 21 --   03/17/20 0830 (!) 97/51 -- -- 103 11 95 %   03/17/20 0800 108/80 -- -- 129 18 96 %   03/17/20 0730 (!) 107/59 -- -- 96 21 --   03/17/20 0700 102/74 -- -- 103 18 --   03/17/20 0600 97/63 -- -- 116 14 --   03/17/20 0500 111/84 -- -- 106 17 --   03/17/20 0400 105/68 98.7 °F (37.1 °C) Axillary 95 14 --   03/17/20 0320 -- -- -- -- 22 93 %   03/17/20 0300 105/64 -- -- 99 22 --   03/17/20 0200 100/68 -- -- 102 16 --     I/O last 3 completed shifts: In: 709 [I.V.:271; NG/GT:438]  Out: 53   No intake/output data recorded.     /79   Pulse 105   Temp 99 °F (37.2 °C) (Axillary)   Resp 21   Ht 5' 2\" (1.575 m)   Wt 176 lb 9.4 oz (80.1 kg)   LMP  (LMP Unknown)   SpO2 95%   BMI 32.30 kg/m²   General appearance: lethargic  Lungs: rhonchi bilaterally  Heart: regular rate and rhythm, S1, S2 normal, no murmur, click, rub or gallop  Abdomen: soft, non-tender; bowel sounds normal;

## 2020-03-17 NOTE — PROGRESS NOTES
breath and wheezing. Cardiovascular: Negative for chest pain and leg swelling. Gastrointestinal: Positive for nausea. Negative for diarrhea and vomiting. Genitourinary: Negative for flank pain. Musculoskeletal: Negative for arthralgias. Neurological: Negative for weakness, light-headedness and headaches. Psychiatric/Behavioral: Negative for confusion and decreased concentration. Physical Exam  Vitals signs reviewed. Constitutional:       General: She is sleeping. Interventions: Nasal cannula in place. HENT:      Head: Normocephalic and atraumatic. Mouth/Throat:      Mouth: Mucous membranes are moist.      Pharynx: Oropharynx is clear. Eyes:      Extraocular Movements: Extraocular movements intact. Pupils: Pupils are equal, round, and reactive to light. Cardiovascular:      Rate and Rhythm: Normal rate. Rhythm irregular. Heart sounds: No murmur. Pulmonary:      Effort: Pulmonary effort is normal.      Breath sounds: Normal breath sounds. Abdominal:      General: Abdomen is flat. Palpations: Abdomen is soft. Tenderness: There is no abdominal tenderness. Musculoskeletal:         General: Swelling (BLLE) present. Skin:     General: Skin is warm and dry. Neurological:      General: No focal deficit present. Mental Status: She is oriented to person, place, and time and easily aroused.            LABS:    CBC:   Recent Labs     03/15/20  0445 03/16/20  0502 03/17/20  0551   WBC 13.6* 20.4* 16.8*   HGB 11.9* 11.4* 11.4*   HCT 37.4 35.7* 35.8*    250 256   MCV 94.8 95.2 95.1     Renal:    Recent Labs     03/15/20  0445 03/16/20  0502 03/17/20  0551   * 130* 132*   K 3.4* 2.9* 3.3*   CL 92* 90* 92*   CO2 23 23 23   BUN 43* 42* 39*   CREATININE 6.6* 6.1* 5.9*   GLUCOSE 337* 469* 281*   CALCIUM 8.1* 7.9* 7.8*   MG 2.10 1.80 1.60*   PHOS 4.9 5.0* 4.4   ANIONGAP 18* 17* 17*     Hepatic:   Recent Labs     03/15/20  0445 03/16/20  0502 03/17/20  0551   LABALBU 2.3* 2.3* 2.1*     ABGs:    Recent Labs     03/16/20 2012 03/17/20  0319   PHART 7.366 7.402   MCW7XAO 45.6* 38.3   PO2ART 56.4* 246.0*   IIA8LON 26.1 23   BEART 1 -0.7   W1WBDKRD 88* 98   IDK1QJO 28 25       INR: No results for input(s): INR in the last 72 hours. Lactate:   No results for input(s): LACTATE in the last 72 hours. Cultures:  -----------------------------------------------------------------  RAD:   XR CHEST PORTABLE   Final Result      No significant interval change. XR ABDOMEN (KUB) (SINGLE AP VIEW)   Final Result      Feeding tube with tip overlying the fundus of the stomach should be repositioned as described      XR CHEST PORTABLE   Final Result      No significant change in bilateral lower lobe atelectasis or pneumonitis. NG tube as described            XR CHEST PORTABLE   Final Result      No significant change in mild left lower lobe consolidation. CT ABDOMEN PELVIS WO CONTRAST Additional Contrast? None   Final Result      No acute intra-abdominal process. Small amount of fluid in the leftward pelvis and perihepatic region with small foci of pneumoperitoneum, not to be unexpected given the peritoneal dialysis catheter. Diverticulosis. Renal cysts. Bibasilar atelectasis. XR CHEST PORTABLE   Final Result      No focal consolidation. Assessment/Plan:     Hypotension, leukocytosis 2/2 to Peritonitis  Subjective fever, fatigue, abdomen pain. Recent fluid analysis 3/13 was still cloudy, revealed 9856 nuc cells (85% neutrophils). Nephrology onboard.   - Cont vanc  - D/C ceftazidime - start meropenem 3/14/20   -- PD fluid cx - Achromobacter xylosoxidans  - Levo if required. ESRD on PD  Nephrology consulted. Had lines obstruction issues that have now resolved. -- continue PD  -- follow up nephology recs  -- daily renal function panel     Nausea, vomiting - improving  Could be related to peritonitis.  CT abdomen WO

## 2020-03-17 NOTE — FLOWSHEET NOTE
03/17/20 1245   Vitals   BP (!) 91/57   Temp 97.4 °F (36.3 °C)   Temp Source Oral   Pulse 121   Resp 27   Weight 177 lb 4 oz (80.4 kg)   Cycler   Ultrafiltration (UF) (mL) -91 mL     tx complete pt disconnected using aseptic technique

## 2020-03-18 NOTE — PROGRESS NOTES
Patient transferred to [phone]. Report called to RN from night shift RN. Patient was cleaned up after large watery BM before transfer and CVC dressing changed. Insulin pens/patient meds, patient belongings, and PD machine transferred with patient.

## 2020-03-18 NOTE — PLAN OF CARE
Problem: Nutrition  Goal: Optimal nutrition therapy  3/18/2020 1219 by Earvin Moritz, RD, LD  Outcome: Ongoing   Nutrition Problem: Inadequate oral intake  Intervention: Food and/or Nutrient Delivery: Continue current Tube Feeding  Nutritional Goals: Pt will tolerate EN at goal rate this admission to meet 100% of nutritional needs

## 2020-03-18 NOTE — FLOWSHEET NOTE
03/17/20 1815   Vitals   BP 92/61   Temp 98.7 °F (37.1 °C)   Temp Source Oral   Pulse 116   Resp 20   SpO2 100 %   Weight 175 lb 14.8 oz (79.8 kg)   Cycler   Verification of Prescription CCPD   Total Volume Programmed 15091 mL   Therapy Time (Hours:Minutes) 10   Fill Volume 2200 mL   Dextrose Setting Same (Nonextraneal)   Number of Cycles 5   Bag Volume 5000 mL   Number of Bags Used 3   Dianeal Solution Other (Comment)   I Drain (mL) 7 mL   Orders verified. Supplies taken to Pt's room. Report received. Cycler set up, primed and pre tested. Dressing over Tenckhoff Catheter exit site changed. Using aseptic technique, Pt connected to cycler. CCPD initiated without problem.

## 2020-03-18 NOTE — PROGRESS NOTES
Pt taken to surgery to remove Tenckhoff Catheter. Dr. Rene Luna notified and ordered CCPD to be held.

## 2020-03-18 NOTE — PROGRESS NOTES
Hospitalist Progress Note      PCP: Jenniffer Batres MD    Date of Admission: 3/10/2020    Chief Complaint: Fatigue, lethargy    Hospital Course: 42-year-old female with history of ESRD on peritoneal dialysis, hypertension, PVD, type 2 diabetes mellitus admitted on 3/with 3 days history of fever, fatigue and chills she was found to be in severe sepsis in ER work-up was concerning for peritonitis. Fluid analysis positive for infection. Peritoneal dialysis fluid cultures positive for Achromobacter xylosoxidans ssp xylosoxidans. CODE BLUE was called on 3/13 for hypotensive patient never lost pulse but she responded with IV fluid she was started on vasopressors and transferred to the ICU. Subjective: Patient seen and examined today in PCU. PD catheter fluid analysis still showing neutrophil. Patient denies any abdominal pain, nausea, vomiting, fever, chills. No acute event reported overnight. discussed with nephrology will remove PD catheter patient is not a good candidate for hemodialysis discussed with palliative care nurse practitioner regarding discuss goals of care with brother.       Medications:  Reviewed    Infusion Medications    amiodarone 0.5 mg/min (03/18/20 0516)    norepinephrine Stopped (03/14/20 1200)    dextrose       Scheduled Medications    dilTIAZem  15 mg Per NG tube 4 times per day    insulin lispro  0-12 Units Subcutaneous Q4H    insulin glargine  10 Units Subcutaneous Daily    custom dialysis builder   Intraperitoneal Daily    sevelamer  0.8 g Per NG tube TID WC    atorvastatin  10 mg Per NG tube Daily    [Held by provider] clopidogrel  75 mg Per NG tube Daily    midodrine  10 mg Per NG tube TID WC    polyethylene glycol  17 g Per NG tube BID    meropenem  1 g Intravenous Q24H    [Held by provider] aspirin  81 mg Per NG tube Daily    Venelex   Topical BID    darbepoetin sandeep-polysorbate  60 mcg Subcutaneous Weekly    sodium chloride flush  10 mL Intravenous 2 times per day    heparin (porcine)  5,000 Units Subcutaneous 3 times per day     PRN Meds: bisacodyl, polyethylene glycol, acetaminophen **OR** acetaminophen, prochlorperazine, glucose, dextrose, glucagon (rDNA), dextrose, sodium chloride flush      Intake/Output Summary (Last 24 hours) at 3/18/2020 1338  Last data filed at 3/18/2020 0700  Gross per 24 hour   Intake 1071.17 ml   Output 169 ml   Net 902.17 ml       Physical Exam Performed:    BP (!) 96/56   Pulse 81   Temp 97.6 °F (36.4 °C) (Oral)   Resp 22   Ht 5' 2\" (1.575 m)   Wt 176 lb 2.4 oz (79.9 kg)   LMP  (LMP Unknown)   SpO2 98%   BMI 32.22 kg/m²     General appearance: lethargic, NG tube +Ve  HEENT: Pupils equal, round, and reactive to light. Conjunctivae/corneas clear. Neck: Supple. No jugular venous distention. Trachea midline. Respiratory:  Normal respiratory effort. Clear to auscultation, bilaterally without Rales/Wheezes/Rhonchi. Cardiovascular: Regular rate and rhythm with normal S1/S2 without murmurs, rubs or gallops. Abdomen: Soft, non-tender, non-distended with normal bowel sounds. Musculoskeletal: BL LE 1+ pitting edema  Neurologic:  AOx2, follows up simple commands. Psychiatric: Alert         Labs:   Recent Labs     03/16/20  0502 03/17/20  0551 03/18/20  0441   WBC 20.4* 16.8* 15.5*   HGB 11.4* 11.4* 10.6*   HCT 35.7* 35.8* 33.1*    256 270     Recent Labs     03/16/20  0502 03/17/20  0551 03/18/20  0441 03/18/20  1201   * 132* 130*  --    K 2.9* 3.3* 2.7* 3.0*   CL 90* 92* 89*  --    CO2 23 23 22  --    BUN 42* 39* 46*  --    CREATININE 6.1* 5.9* 5.6*  --    CALCIUM 7.9* 7.8* 7.9*  --    PHOS 5.0* 4.4 3.7  --      No results for input(s): AST, ALT, BILIDIR, BILITOT, ALKPHOS in the last 72 hours. Recent Labs     03/18/20  1200   INR 1.31*     No results for input(s): Levonia Citrin in the last 72 hours.     Urinalysis:      Lab Results   Component Value Date    NITRU Negative 04/08/2019    WBCUA 20-50 04/08/2019    BACTERIA 3+ 04/08/2019    RBCUA 10-20 04/08/2019    BLOODU LARGE 04/08/2019    SPECGRAV 1.025 04/08/2019    GLUCOSEU 100 04/08/2019       Radiology:  XR CHEST PORTABLE   Final Result      No significant interval change. XR ABDOMEN (KUB) (SINGLE AP VIEW)   Final Result      Feeding tube with tip overlying the fundus of the stomach should be repositioned as described      XR CHEST PORTABLE   Final Result      No significant change in bilateral lower lobe atelectasis or pneumonitis. NG tube as described            XR CHEST PORTABLE   Final Result      No significant change in mild left lower lobe consolidation. CT ABDOMEN PELVIS WO CONTRAST Additional Contrast? None   Final Result      No acute intra-abdominal process. Small amount of fluid in the leftward pelvis and perihepatic region with small foci of pneumoperitoneum, not to be unexpected given the peritoneal dialysis catheter. Diverticulosis. Renal cysts. Bibasilar atelectasis. XR CHEST PORTABLE   Final Result      No focal consolidation. Assessment/Plan:    Active Hospital Problems    Diagnosis Date Noted    Constipation [K59.00] 03/13/2020    Dialysis-associated peritonitis ([facility] [address] [place] 03/10/2020    Multifocal atrial tachycardia (HCC) [I47.1] 08/08/2019    ESRD on peritoneal dialysis ([address] [N18.6, Z99.2]      #Septic shock likely secondary to peritonitis  #Peritonitis likely secondary to PD catheter  Cultures positive for  Achromobacter  Continue antibiotics with meropenem. Discussed with nephrology will remove PD catheter. #ESRD on PD. Management as per nephrology. Patient will need HD catheter. #MAT tachycardia  Cardiology has been following. Recommended diltiazem 15 mg every 6 with hold parameters. Drip. #Diabetes mellitus type 2  Hemoglobin A1c 3/24/2019 6.4. Looks like falsely low with anemia. We will treat diabetes mellitus on random blood sugar levels.   Continue sliding scale and Lantus adjust dose as needed. #History of PVD  Continue home Plavix 25 mg. DVT Prophylaxis: Heparin  Diet: Diet NPO Effective Now  Code Status: Full Code    PT/OT Eval Status: Pending    Dispo -discontinue PD catheter. Patient will initially catheter for dialysis. Palliative on team regarding goals of care.     Orquidea Bryson MD

## 2020-03-18 NOTE — BRIEF OP NOTE
Brief Postoperative Note  ______________________________________________________________    Patient: Toña Fu  YOB: 1940  MRN: [id number]  Date of Procedure: 3/18/2020    Pre-Op Diagnosis: INFECTED PD CATH    Post-Op Diagnosis: Same       Procedure(s):  CATHETER REMOVAL PERITONEAL DIALYSIS    Anesthesia: Anesthesia type not filed in the log. Surgeon(s): Martina Rodriguez DO    Assistant: Sukhdeep Ricketts DO PGY-1    Estimated Blood Loss (mL): <4OR    Complications: None    Specimens:   ID Type Source Tests Collected by Time Destination   1 : [address] [place] [address], [address] [place] [address],[address],  3/18/2020 1139        Implants:  * No implants in log *      Drains:   NG/OG/NJ/NE Tube Nasogastric Left nostril (Active)   Surrounding Skin Dry; Intact 3/18/2020  5:30 PM   Securement device Yes 3/18/2020  5:30 PM   Status Clamped 3/18/2020  5:30 PM   Placement Verified by External Catheter Length 3/18/2020 12:00 PM   NG/OG/NJ/NE External Measurement (cm) 60 cm 3/18/2020  5:30 PM   Tube Feeding Renal Formula 3/18/2020 12:00 PM   Tube Feeding Status Continuous 3/18/2020 12:00 PM   Rate/Schedule 35 mL/hr 3/18/2020 12:00 PM   Tube Feeding Supplement (Product) Protein Modular 3/18/2020 12:00 PM   Tube Feeding Supplement Amount (mL) 75 3/18/2020  6:00 AM   Tube Feeding Intake (mL) 210 ml 3/18/2020 12:00 PM   Free Water Flush (mL) 30 mL 3/18/2020 12:00 PM   Free Water Rate 30 q4h 3/18/2020 12:00 PM   Residual Volume (ml) 0 ml 3/18/2020 12:00 PM   Output (mL) 0 ml 3/18/2020 12:00 PM       Findings: PD cathether removed in its entirety and sent for pathology    Sukhdeep Ricketts DO  Date: 3/18/2020  Time: 7:30 PM

## 2020-03-18 NOTE — PROGRESS NOTES
tube BID    meropenem  1 g Intravenous Q24H    [Held by provider] aspirin  81 mg Per NG tube Daily    Venelex   Topical BID    darbepoetin sandeep-polysorbate  60 mcg Subcutaneous Weekly    sodium chloride flush  10 mL Intravenous 2 times per day    heparin (porcine)  5,000 Units Subcutaneous 3 times per day       Continuous Infusions:   amiodarone Stopped (03/18/20 1424)    norepinephrine Stopped (03/14/20 1200)    dextrose         PRN Meds:bisacodyl, polyethylene glycol, acetaminophen **OR** acetaminophen, prochlorperazine, glucose, dextrose, glucagon (rDNA), dextrose, sodium chloride flush    Review of Systems -   Limited due to mental status/lethargy. She did deny pain. Performance status 20%    Objective:     Patient Vitals for the past 8 hrs:   BP Temp Temp src Pulse Resp SpO2   03/18/20 1358 (!) 86/53 -- -- 88 -- --   03/18/20 1335 (!) 96/56 99.4 °F (37.4 °C) Axillary 81 22 --   03/18/20 1058 -- -- -- -- -- 98 %   03/18/20 0839 105/64 97.6 °F (36.4 °C) Oral 71 20 --   03/18/20 0700 (!) 124/59 -- -- 85 19 --     I/O last 3 completed shifts: In: 1071.2 [I.V.:357.2; NG/GT:614; IV Piggyback:100]  Out: 78   No intake/output data recorded. PHYSICAL EXAM:    General appearance: appears stated age, cooperative and fatigued  Lungs: diminished breath sounds bilaterally  Heart: regular rate and rhythm  Abdomen: soft, non-tender; bowel sounds normal; no masses,  no organomegaly  Extremities: edema trace BLE  Skin: necrotic toe RLE  Neurologic: Mental status: alertness: lethargic, orientation: person, place. WBC/Hgb/Hct/Plts:  15.5/10.6/33.1/270 (03/18 0441)           Assessment:     Principal Problem:    Dialysis-associated peritonitis ([facility] [address]  Active Problems:    ESRD on peritoneal dialysis ([facility] [address]    Multifocal atrial tachycardia (HCC)    Constipation  Resolved Problems:    * No resolved hospital problems.  *    Pt [phone], Family [phone]  Time spent with patient and/or family: 21  Time reviewing records: 5  Time communicating with providers: 5    A total of 30 minutes spent with the patient and family on unit greater than 50% face to face time in counseling regarding palliative care and goals of care for the patient.      Jennifer Osullivan NP  [address] [place] [address]

## 2020-03-18 NOTE — ANESTHESIA PRE PROCEDURE
Ramses Rodríguez MD   melatonin 1 MG tablet Take 3 tablets by mouth nightly 8/15/19  Yes Ramses Rodríguez MD   insulin lispro (HUMALOG) 100 UNIT/ML pen Inject 0-6 Units into the skin 3 times daily (with meals)  Patient taking differently: Inject 0-6 Units into the skin 3 times daily (with meals) Inject as per sliding scale: If -199 = 1 units; 200-249 = 2 units; 250-299 = 3 units; 300-349 = 4 units; 350-399 = 5 units; 400-449 = 6 units; if > 450 - CALL MD 8/15/19  Yes Ramses Rodríguez MD   pregabalin (LYRICA) 75 MG capsule Take 1 capsule by mouth daily for 30 days. Patient taking differently: Take 75 mg by mouth every evening.   8/15/19 3/11/20 Yes Ramses Rodríguez MD   Cholecalciferol (VITAMIN D3) 1000 units TABS Take 2 tablets by mouth daily 8/2/19  Yes Jaylen Witt MD   sevelamer (RENVELA) 800 MG tablet Take 2 tablets by mouth 3 times daily (with meals) 8/2/19  Yes Jaylen Witt MD   psyllium (HYDROCIL) 95 % PACK packet Take 1 packet by mouth daily 8/2/19  Yes Jaylen Witt MD   vitamin E 400 UNIT capsule Take 1 capsule by mouth nightly 8/2/19  Yes Jaylen Witt MD   acetaminophen (TYLENOL) 325 MG tablet Take 2 tablets by mouth every 6 hours as needed for Pain 8/2/19  Yes Jaylen Witt MD   ondansetron [facility]) 4 MG tablet Take 1 tablet by mouth every 8 hours as needed for Nausea 8/2/19  Yes Jaylen Witt MD   simvastatin (ZOCOR) 40 MG tablet Take 1 tablet by mouth nightly 8/2/19  Yes Jaylen Witt MD   B complex-vitamin C-folic acid (NEPHRO-ZINA) 1 MG tablet Take 1 tablet by mouth daily (with breakfast) 8/2/19  Yes Jaylen Witt MD   Elastic Bandages & Supports ([address] [facility] 1 each by Does not apply route daily 8/2/19   Jaylen Witt MD       Current medications:    Current Facility-Administered Medications   Medication Dose Route Frequency Provider Last Rate Last Dose    delflex lo-prasad 1.5% 5,000 mL with heparin (porcine) 2,500 Units solution   Intraperitoneal Daily Angeles Bojorquez, DO   10 mg at 03/18/20 1338    polyethylene glycol (GLYCOLAX) packet 17 g  17 g Per NG tube BID Angeles Bojorquez, DO        meropenem (MERREM) 1 g in sodium chloride 0.9 % 100 mL IVPB (mini-bag)  1 g Intravenous Q24H Angeles Bojorquez, DO   Stopped at 03/18/20 1406    [Held by provider] aspirin chewable tablet 81 mg  81 mg Per NG tube Daily Angeles Bojorquez, DO   81 mg at 03/17/20 0842    polyethylene glycol (GLYCOLAX) packet 17 g  17 g Per NG tube Daily PRN Angeles Bojorquez, DO        acetaminophen (TYLENOL) 160 MG/5ML solution 650 mg  650 mg Per NG tube Q6H PRN Angeles Bojorquez DO        Or    acetaminophen (TYLENOL) suppository 650 mg  650 mg Rectal Q6H PRN Angeles Bojorquez, DO        norepinephrine (LEVOPHED) 16 mg in dextrose 5 % 250 mL infusion  2 mcg/min Intravenous Continuous Angeles Bojorquez, DO   Stopped at 03/14/20 1200    prochlorperazine (COMPAZINE) injection 10 mg  10 mg Intravenous Q6H PRN Angeles Bojorquez, DO   10 mg at 03/15/20 7836    Venelex ointment   Topical BID Angeles Bojorquez DO        glucose (GLUTOSE) 40 % oral gel 15 g  15 g Oral PRN Angeles Bojorquez, DO        dextrose 50 % IV solution  12.5 g Intravenous PRN Angeles Bojorquez, DO   25 g at 03/16/20 1734    glucagon (rDNA) injection 1 mg  1 mg Intramuscular PRN Angeles Bojorquez, DO        dextrose 5 % solution  100 mL/hr Intravenous PRN Angeles Bojorquez, DO        darbepoetin sandeep-polysorbate (ARANESP) injection 60 mcg  60 mcg Subcutaneous Weekly Angeles Bojorquez DO   60 mcg at 03/11/20 1340    sodium chloride flush 0.9 % injection 10 mL  10 mL Intravenous 2 times per day Angeles Bojorquez, DO   10 mL at 03/17/20 2020    sodium chloride flush 0.9 % injection 10 mL  10 mL Intravenous PRN Angeles Bojorquez, DO   10 mL at 03/14/20 1217    heparin (porcine) injection 5,000 Units  5,000 Units Subcutaneous 3 times per day Angeles Bojorquez, DO   Stopped at 03/18/20 1403       Allergies:     Allergies   Allergen Reactions    Gabapentin Other (See Comments) 03/18/2020    K 3.0 03/18/2020    K 3.4 03/13/2020    CL 89 03/18/2020    CO2 22 03/18/2020    BUN 46 03/18/2020    CREATININE 5.6 03/18/2020    GFRAA 9 03/18/2020    GFRAA 32 06/07/2013    AGRATIO 0.3 03/13/2020    LABGLOM 7 03/18/2020    GLUCOSE 297 03/18/2020    PROT 5.5 03/13/2020    PROT 7.3 12/10/2012    CALCIUM 7.9 03/18/2020    BILITOT 0.3 03/13/2020    ALKPHOS 73 03/13/2020    AST 25 03/13/2020    ALT 15 03/13/2020       POC Tests:   Recent Labs     03/18/20  1326   POCGLU 126*       Coags:   Lab Results   Component Value Date    PROTIME 15.2 03/18/2020    INR 1.31 03/18/2020    APTT 39.0 08/18/2019       HCG (If Applicable): No results found for: PREGTESTUR, PREGSERUM, HCG, HCGQUANT     ABGs:   Lab Results   Component Value Date    PHART 7.402 03/17/2020    PO2ART 246.0 03/17/2020    SDQ1ETP 38.3 03/17/2020    AEU9CSX 23 03/17/2020    BEART -0.7 03/17/2020    T9CLBOSU 98 03/17/2020        Type & Screen (If Applicable):  No results found for: LABABO, [address]    Anesthesia Evaluation  Patient summary reviewed and Nursing notes reviewed no history of anesthetic complications:   Airway: Mallampati: II  TM distance: >3 FB   Neck ROM: limited  Mouth opening: > = 3 FB Dental:    (+) upper dentures and lower dentures      Pulmonary: breath sounds clear to auscultation      (-) not a current smoker (light smoker)          Patient did not smoke on day of surgery.                 ROS comment: On 2 L of O2   Cardiovascular:  Exercise tolerance: poor (<4 METS),   (+) hypertension: moderate, CAD (hx angioplasty): no interval change, dysrhythmias: atrial fibrillation,     (-) past MI    NYHA Classification: III  ECG reviewed  Rhythm: irregular  Rate: normal  Echocardiogram reviewed                  Neuro/Psych:   (+) neuromuscular disease (peripheral neuropathy):,             GI/Hepatic/Renal:   (+) renal disease (had dialysis yesterday  K+ 3.0): ESRD and dialysis,      (-) KATE ELKINS comment: Peritoneal dialysis catheter  For removal    .   Endo/Other:    (+) DiabetesType II DM, using insulin, . Abdominal:           Vascular: negative vascular ROS. Anesthesia Plan      MAC     ASA 4 - emergent       Induction: intravenous. MIPS: Prophylactic antiemetics administered. Anesthetic plan and risks discussed with patient. Plan discussed with CRNA.     Attending anesthesiologist reviewed and agrees with Pre Eval content              Katlyn Aleman DO   3/18/2020

## 2020-03-18 NOTE — FLOWSHEET NOTE
Disconnected from [facility] per protocol. Effluent: cloudy yellow, fibrin noted. Specimen obtained and sent to lab. Total time: 9 hr 53 min   Total UF:  162 ml. Total Volume:  49431 ml. Dwell time gained:  0 hr 36 min. Pt Tolerated procedure without difficulty of catheter. Pt's SBP 80-90's. Report given to: Lady Ragsdale RN.   Last BM: 3/17/20     03/18/20 0455   Vitals   BP (!) 87/56   Temp 98.7 °F (37.1 °C)   Temp Source Oral   Pulse 87   Resp 25   Weight 176 lb 2.4 oz (79.9 kg)   Cycler   I Drain Alarm 3 mL   Ultrafiltration (UF) (mL) 162 mL

## 2020-03-18 NOTE — PROGRESS NOTES
Muscle Mass Status: Unable to assess     Fluid Accumulation Status: No significant fluid accumulation  Fluid Accumulation Location: Extremities  Reduced  Strength: Not measured    NUTRITION DIAGNOSIS   Problem: Inadequate Oral Intake  Etiology: Insufficient energy/nutrient consumption  Signs & Symptoms: Intake 0-25%, NPO status due to medical condition and Nutrition Support-EN    NUTRITION INTERVENTION  Food and/or Nutrient Delivery:Continue Current Tube Feeding   Nutrition education/counseling/coordination of care: Continue Inpatient Monitoring     NUTRITION MONITORING & EVALUATION:  Evaluation:Progressing towards goal   Goals: Pt will tolerate EN at goal rate this admission to meet 100% of nutritional needs  Monitoring: GI Function , Mental Status/Confusion , TF Intake , TF Tolerance  or Weight      OBJECTIVE DATA:  · Nutrition-Focused Physical Findings: non-pitting BLE edema, LBM 3/18  · Wounds Stage II and Unstageable     Past Medical History:   Diagnosis Date    DVT (deep venous thrombosis) (HCC)     End stage renal disease (HCC)     Hyperlipidemia     Hypertension     Osteoarthritis     Pancreatitis chronic     Per[facility] dialysis status ([facility]     Type II or unspecified type diabetes mellitus without mention of complication, not stated as uncontrolled     Vitamin D deficiency         ANTHROPOMETRICS  Current Height: 5' 2\" (157.5 cm)  Current Weight: 176 lb 2.4 oz (79.9 kg)    Admission weight: 169 lb 12.1 oz (77 kg)  Ideal Bodyweight 110 lb    Weight Changes wt change noted r/t fluid shifts        BMI BMI (Calculated): 32.3    Wt Readings from Last 50 Encounters:   03/16/20 176 lb 9.4 oz (80.1 kg)   10/27/19 146 lb (66.2 kg)   08/27/19 146 lb (66.2 kg)   08/21/19 148 lb 2.4 oz (67.2 kg)   08/15/19 148 lb 13 oz (67.5 kg)   06/11/19 143 lb 3.2 oz (65 kg)   05/31/19 142 lb 6.7 oz (64.6 kg)   04/15/19 139 lb 5.3 oz (63.2 kg)   03/27/19 133 lb 9.6 oz (60.6 kg)   02/18/19 135 lb (61.2 kg)   02/13/19 134 lb 7.7 oz (61 kg)   02/01/19 136 lb 7.4 oz (61.9 kg)   COMPARATIVE STANDARDS  Estimated Total Kcals/Day : 26-34 Ideal Bodyweight  (50 kg) 4208-0192 kcal    Estimated Total Protein (g/day) : 1.3-1.8 Ideal Bodyweight  (50 kg) 65-90 g/day  Estimated Daily Total Fluid (ml/day): 1 mL/kcal per day     Food / Nutrition-Related History  Pre-Admission / Home Diet:  Pre-Admission/Home Diet: General   Home Supplements / Herbals:    none noted  Food Restrictions / Cultural Requests:    none noted    Diet Orders / Intake / Nutrition Support  Current diet/supplement order: Diet NPO Effective Now     NSG Recorded PO:   PO Fluids P.O.: 0 mL  PO Meals PO Meals Eaten (%): 0   PO Intake: 0%  and NPO  PO Supplement: NPO   PO Supplement Intake: NPO     Tube Feeding Goal: Nepro (renal formula) @ 35 mL/hr provides 840 mL TV, 1512 kcal, 68 grams protein, 610 mL free water. Administer 1 proteinex modular daily to provide a total of 1616 kcals and 94 gms of protein. Minimum water bolus 30 ml every 4 hours for tube maintenance as pt w/decreased Na.       NUTRITION RISK LEVEL: Risk Level: [address], LD  Deepak:  [phone]  Office:  [phone]

## 2020-03-18 NOTE — PROGRESS NOTES
K+ 2.74 this am. Perfect service to hospitalist, order received  BP cuff moved to [facility], BP improved

## 2020-03-18 NOTE — FLOWSHEET NOTE
Patient received  from the OR to PACU #9 post CATHETER REMOVAL PERITONEAL DIALYSIS (N/A ) of Dr. Sandra Aguilar. Placed on PACU monitoring equipment. Unable to obtain spo2. Report given per CRNA and Dr. Tere Anthony. Per report, patient was done under local with no anesthesia. Unable to obtain spo2 for intra op time as well. On arrival, patient is sleepy but arouseable, oriented to person and denies pain. Multiple attempts to obtain spo2 made. Changed from multiple fingers, bilateral ears and nares. Dr. Smita Jackson aware.

## 2020-03-18 NOTE — PROGRESS NOTES
Speech Language Pathology  Facility/Department: Count includes the [facility]  Dysphagia Daily Treatment Note    NAME: Gladis Lorenzo  : 1940  MRN: [id number]    Patient Diagnosis(es):   Patient Active Problem List    Diagnosis Date Noted    Constipation 2020    Dialysis-associated peritonitis ([address] 03/10/2020    Dry gangrene to right hallux(HCC) 2019    GI bleed 2019    Peritoneal dialysis catheter exit site infection ([address] 08/15/2019    Severe malnutrition ([address] 2019    Hypokalemia     Multifocal atrial tachycardia ([address] 2019    Atrial fibrillation ([address] 2019    Onychomycosis 2019    DVT of deep femoral vein, left ([address] 2019    Uncontrolled type 2 diabetes mellitus with hyperglycemia ([address] 2019    Peripheral neuropathy 2019    Left leg swelling 2019    Weakness of both legs 2019    Numbness of right foot     Encounter for palliative care     Advance directive discussed with patient     Failure to thrive in adult 2019    Debility 2019    Knee pain, left 2019    ESRD (end stage renal disease) on dialysis ([address] 2018    ESRD on peritoneal dialysis ([address]     Unexplained weight loss 2015    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 mellitus ([address] 2015    Renal artery stenosis (HCC)     Acute renal insufficiency     Hyperlipidemia     Hyperkalemia 2015    Hypertensive urgency 2013    HTN (hypertension)     Systolic heart failure ([address] 10/17/2011    DM (diabetes mellitus) ([address] 10/17/2011    History of macrocytic anemia 10/17/2011    Smoking 10/17/2011    Osteoarthritis 10/17/2011     Allergies: Allergies   Allergen Reactions    Gabapentin Other (See Comments)     Gabapentin-induced myoclonus       Recent Chest Xray/CT of Chest: (3/12/20)  Mild bibasilar atelectasis or scarring.     Previous MBS - none  Chart reviewed.   Medical Diagnosis: peritonitis  Treatment

## 2020-03-18 NOTE — ANESTHESIA POSTPROCEDURE EVALUATION
Department of Anesthesiology  Postprocedure Note    Patient: Blanco Carson  MRN: [id number]  YOB: 1940  Date of evaluation: 3/18/2020  Time:  5:45 PM     Procedure Summary     Date:  03/18/20 Room / Location:  [address] [place] [address]    Anesthesia Start:  7316 Anesthesia Stop:  4026    Procedure:  CATHETER REMOVAL PERITONEAL DIALYSIS (N/A ) Diagnosis:  (INFECTED PD CATH)    Surgeon: Eugenio Medley DO Responsible Provider:  Sanjay Lorenzo DO    Anesthesia Type:  MAC ASA Status:  4 - Emergent          Anesthesia Type: No value filed. Ramsey Phase I:      Ramsey Phase II:      Last vitals: Reviewed and per EMR flowsheets.        Anesthesia Post Evaluation    Patient location during evaluation: PACU  Patient participation: complete - patient participated  Level of consciousness: awake and alert  Pain score: 0  Airway patency: patent  Nausea & Vomiting: no nausea and no vomiting  Complications: no  Cardiovascular status: hemodynamically stable  Respiratory status: acceptable  Hydration status: stable  Comments: Pt did not have any sedation     Local only

## 2020-03-19 NOTE — PROGRESS NOTES
Hospitalist Progress Note      PCP: Vandana Garcia MD    Date of Admission: 3/10/2020    Chief Complaint: Fatigue, lethargy    Hospital Course: 30-year-old female with history of ESRD on peritoneal dialysis, hypertension, PVD, type 2 diabetes mellitus admitted on 3/with 3 days history of fever, fatigue and chills she was found to be in severe sepsis in ER work-up was concerning for peritonitis. Fluid analysis positive for infection. Peritoneal dialysis fluid cultures positive for Achromobacter xylosoxidans ssp xylosoxidans. CODE BLUE was called on 3/13 for hypotensive patient never lost pulse but she responded with IV fluid she was started on vasopressors and transferred to the ICU. Patent dialysis catheter was removed on 3/18 cultures still positive for MDRO Achromobacter       Subjective: Patient seen and examined today in PCU. Denies any abdominal pain, nausea, vomiting, fever, chills. She is alert oriented to self and place. She is not able to carry on conversation regarding hemodialysis. Blood pressure was on the lower side heart rate in better control amiodarone drip was stopped. Will Start her on beta-blocker once blood pressure is more stable.     Medications:  Reviewed    Infusion Medications    amiodarone Stopped (03/18/20 9664)    dextrose       Scheduled Medications    dilTIAZem  15 mg Per NG tube 4 times per day    insulin lispro  0-12 Units Subcutaneous Q4H    insulin glargine  10 Units Subcutaneous Daily    sevelamer  0.8 g Per NG tube TID WC    atorvastatin  10 mg Per NG tube Daily    [Held by provider] clopidogrel  75 mg Per NG tube Daily    midodrine  10 mg Per NG tube TID WC    polyethylene glycol  17 g Per NG tube BID    meropenem  1 g Intravenous Q24H    [Held by provider] aspirin  81 mg Per NG tube Daily    Venelex   Topical BID    darbepoetin sandeep-polysorbate  60 mcg Subcutaneous Weekly    sodium chloride flush  10 mL Intravenous 2 times per day    heparin (porcine) 1.025 04/08/2019    GLUCOSEU 100 04/08/2019       Radiology:  XR CHEST PORTABLE   Final Result      No significant interval change. XR ABDOMEN (KUB) (SINGLE AP VIEW)   Final Result      Feeding tube with tip overlying the fundus of the stomach should be repositioned as described      XR CHEST PORTABLE   Final Result      No significant change in bilateral lower lobe atelectasis or pneumonitis. NG tube as described            XR CHEST PORTABLE   Final Result      No significant change in mild left lower lobe consolidation. CT ABDOMEN PELVIS WO CONTRAST Additional Contrast? None   Final Result      No acute intra-abdominal process. Small amount of fluid in the leftward pelvis and perihepatic region with small foci of pneumoperitoneum, not to be unexpected given the peritoneal dialysis catheter. Diverticulosis. Renal cysts. Bibasilar atelectasis. XR CHEST PORTABLE   Final Result      No focal consolidation. Assessment/Plan:    Active Hospital Problems    Diagnosis Date Noted    Constipation [K59.00] 03/13/2020    Dialysis-associated peritonitis ([facility] [address] Terrance Denis 03/10/2020    Multifocal atrial tachycardia (HCC) [I47.1] 08/08/2019    ESRD on peritoneal dialysis ([facility] [address] [N18.6, Z99.2]      #Septic shock likely secondary to peritonitis  #Peritonitis likely secondary to PD catheter  Status post PD catheter removal on 3/18  Repeat cultures cultures positive for  Achromobacter  Continue antibiotics with meropenem. Patient will need at least 3 weeks of antibiotic  Temporary HD catheter for hemodialysis. #ESRD on PD. PD catheter was removed on 3/18. Tunnel catheter today for HD. Management as per nephrology. #MAT tachycardia  Cardiology has been following. Amiodarone drip on hold. Due to hypotension. Will add beta-blocker once blood pressure is able to tolerate. #Diabetes mellitus type 2  Hemoglobin A1c 3/24/2019 6.4.   Looks like falsely low with anemia. We will treat diabetes mellitus on random blood sugar levels. Continue sliding scale and Lantus adjust dose as needed. #History of PVD  Hold aspirin and Plavix as patient will be needing tunneled catheter. DVT Prophylaxis: Heparin  Diet: Diet NPO Effective Now  Code Status: Full Code    PT/OT Eval Status: Pending    Dispo -as per palliative team family wants to give a try with HD to see if patient's mental status improved. Reevaluate for speech therapy.     Joy Brar MD

## 2020-03-19 NOTE — PROGRESS NOTES
Palliative Care Chart Review  and Check in Note:     NAME:  Cristian Skaggs  Admit Date: 3/10/2020  Hospital Day:  Hospital Day: 10   Current Code status: Full Code    Palliative care is continuing to following Ms. Hernandez July for symptom management,  and goals of care discussion as needed. Patient's chart reviewed today 3/19/20. Confirmed that pt's brother faxed copy of HCPOA. Pt's primary agent is her brother Nikhil Rosas ([phone]), secondary agent is Edmond Spurling ([phone])    The following are the currently established goals/code status, and Symptom management. Goals of care: Family wants to continue aggressive care, try hemodialysis. PC team have discussed future option of stopping dialysis if pt did not tolerate well, or if she did not find QOL acceptable on HD, or multiple hospitalizations r/t complications. For now, family hopes pt will tolerate HD and that her mental status will improve.       Code status: Full      Discharge plan: will likely return to [facility] when medically ready    [facility]  Inpatient Palliative Care  [phone]

## 2020-03-19 NOTE — PROGRESS NOTES
Surgery Daily Progress Note      CC: Infected PD catheter    SUBJECTIVE:  No acute events overnight, pain controlled, fluid bolus for hypotension, pain controlled, no N/V    ROS:   A 14 point review of systems was conducted, significant findings as noted above. All other systems negative.     OBJECTIVE:    PHYSICAL EXAM:  Vitals:    03/19/20 0000 03/19/20 0355 03/19/20 0500 03/19/20 0531   BP: (!) 95/58 (!) 88/54  (!) 97/58   Pulse: 105 110     Resp: 18 18     Temp: 99 °F (37.2 °C) 99 °F (37.2 °C)  99.1 °F (37.3 °C)   TempSrc: Oral Oral     SpO2: 99% 99%     Weight:   185 lb 3 oz (84 kg)    Height:           General appearance: no acute distress, not alert, does not respond to questions  Chest/Lungs: no wheezes or rhonchi, normal effort, 2.5L NC  Cardiovascular: tachycardic  Abdomen: soft, obese, appropriately tender, non-distended, incision with some bloody staining on dressing, packing in incision  : no springer  Extremities: no edema, dry gangrene on right great toe  Neuro: demented, not alert, does not respond to questions    ASSESSMENT & PLAN:   This is a 67y.o. year old female with a diagnosis of infected PD catheter s/p PD catheter removal POD #1    - Packing removed, incision does not appear infected     - Dressing changed  - Surgery to sign off, please call with further questions    Melvin Dowling DO PGY1  General Surgery Resident  03/19/20 6:36 AM  [phone]

## 2020-03-19 NOTE — FLOWSHEET NOTE
Treatment time: 3  Net Uf: 764 ml     Pre weight: 80.4 kg   Post weight: 79.5 kg  EDW: TBD kg     Access used: R CVC  Access function: GOOD     Medications or blood products given: NONE    Regular outpatient schedule: ACUTE     Summary of response to treatment: PATIENT BECAME SYMPTOMATIC HYPOTENSIVE LAST 20 MIN OF TREATMENT. C/O FEELING LIGHT HEAD. BP CHECK SBP NOTED TO BE 77. TREATMENT TERMINATED. AFTER BLOOD GIVEN BACK BP INCREASED /53. INITIALLY PATIENT WAS DROWSY AFTER BLOOD GIVEN. TOOK SEVERAL MINUTES FOR PATIENT TO RETURN TO BASELINE. REPORTED TO FLOOR NURSE. PATIENT BASELINE UPON LEAVING HD ROOM. TRANSPORTED MIR Reed. Copy of dialysis treatment record placed in chart, to be scanned into EMR.     03/19/20 1503 03/19/20 1820   Treatment   Time On 1516  --    Time Off  --  1803   Treatment Goal 1000  --    Observations & Evaluations   Level of Consciousness 0 0   Vital Signs   /62 (!) 113/53   Temp 98.5 °F (36.9 °C) 98.6 °F (37 °C)   Pulse 89 104   Resp 20 20   Weight 177 lb 4 oz (80.4 kg) 175 lb 4.3 oz (79.5 kg)   Weight Method Bed scale Bed scale   Percent Weight Change -4.29 -1.12   Pain Assessment   Pain Assessment 0-10 0-10   Pain Level 0 0   Treatment Initiation   Dialyze Hours 3  --    Hemodialysis Central Access Right Subclavian   No Placement Date or Time found. Orientation: Right  Access Location: Subclavian   Site Assessment Clean;Dry; Intact  --    Dressing Status Clean;Dry; Intact  --    Post-Hemodialysis Assessment   Post-Treatment Procedures  --  Blood returned;Catheter capped, clamped and heparinized x 2 ports   Machine Disinfection Process  --  Acid/Vinegar Clean;Heat Disinfect; Exterior Machine Disinfection   Rinseback Volume (ml)  --  400 ml   Total Liters Processed (l/min)  --  43.9 l/min   Dialyzer Clearance  --  Lightly streaked   Duration of Treatment (minutes)  --  180 minutes   NET Removed (ml)  --  764 ml   Tolerated Treatment  --  Fair Physician Notified?  --  Yes Cost Of Treatment Patient Responsible For Paying?: 150.00$ Payment Option: Option 2: Don't Bill Medicare, patient responsible for payment. Patient cannot appeal Medicare if billed. Modifier Description: The following is a description of the modifiers listed below. GA - is added to claims with a properly executed Advanced Beneficiary Notice (ABN) in the file. Pola Gómez - is added to claims in which the item or service is statutorily excluded, does not meet the definition of any Medicare benefit, or -for non-Medicare Insurers- is not a contract benefit. GZ - - Item or service expected to be denied as not reasonable and necessary. Procedure (Limit To 20 Characters): stacy de oliveira Reason?: Benign lesions Detail Level: Detailed

## 2020-03-20 NOTE — PLAN OF CARE
Problem: Cardiac:  Goal: Ability to maintain vital signs within normal range will improve  Description: Ability to maintain vital signs within normal range will improve  Outcome: Ongoing  Note: Patients SBP dropped below 100. Patient was asymptomatic. MD notified. Dilt was held per protocol. BP did come back up to 110 but dropped again after receiving diltiazem per order. HR fluctuated, was in A Fib through out shift. Will continue to monitor. Problem: Skin Integrity:  Goal: Absence of new skin breakdown  Description: Absence of new skin breakdown  Outcome: Ongoing  Note: Patient remained free of new skin breakdown through out shift. Patient repositioned every 2 hours. No signs of new redness. New Sacral Heart applied to protect skin. Khurram precautions in place.  Will continue to monitor

## 2020-03-20 NOTE — PROGRESS NOTES
Nutrition Assessment (Enteral Nutrition)    Type and Reason for Visit: Reassess    Nutrition Recommendations:   1. Continue order \"Diet: Tube feed continuous/ NPO\". 2. Increase Nepro (Renal formula) to goal rate of 35mL/hr and monitor tolerance. 3. Recommend 30 mL H20 q 4 hours for tube maintenance. Increase flush if Na increases greater than 145 mEq/L.  4. Recommend 1 Bottle Proteinex P2Go daily via syringe. Flush with 30 mL H20 before and after. Proteinex P2Go should not be mixed directly with the tube feeding formula. 5. Monitor closely and correct lytes (K, Phos, Mg) d/t risk of refeeding syndrome. 6. Ensure head of bed is 30 - 45 degrees during continuous or bolus gastric feeding and for one hour after bolus. Turn off the feeding if head of bed is lowered less than 30 degrees. 7. Monitor for tolerance (residuals greater than 500 mL, bowel habits, N/V, cramping). 8. Irrigate tube with 30 mL water before, between and after each medication. 9. If tube becomes clogged, first try flushing with water. If water does not unclog the tube, may use clog zapper at the direction of the physician/LIP. If tube remains clogged, contact Physician/LIP for additional orders. 10. Closed System Guidelines:  Change tubing and feeding container every 24 hours. Nutrition Assessment: Pt remains nutritionaly compromised AEB NPO and EN not at goal. SLP recc's to continue NPO. Pt currently tolerating EN at 30mL/hr. Goal rate of 35mL/hr. PD cath removal 3/18 to start hemodialysis for better tolerance. Increase TF to goal rate and monitor tolerance with water flushes at 30mL q 4 hours for tube maintenance. Continue to administer one bottle Proteinex daily to meet increased protein needs for wound healing. Monitor lytes daily d/t risk of refeed. Will continue to monitor TF tolerance. Malnutrition Assessment:  · Malnutrition Status:  At risk for malnutrition  · Context: Acute illness or injury  · Findings of

## 2020-03-20 NOTE — PROGRESS NOTES
Speech Language Pathology  Facility/Department: [facility] ICU  Dysphagia Daily Treatment Note    NAME: Christina King  : 1940  MRN: [id number]    Patient Diagnosis(es):   Patient Active Problem List    Diagnosis Date Noted    Constipation 2020    Dialysis-associated peritonitis ([address] 03/10/2020    Dry gangrene to right hallux(HCC) 2019    GI bleed 2019    Peritoneal dialysis catheter exit site infection ([address] 08/15/2019    Severe malnutrition ([address] 2019    Hypokalemia     Multifocal atrial tachycardia ([address] 2019    Atrial fibrillation ([address] 2019    Onychomycosis 2019    DVT of deep femoral vein, left ([address] 2019    Uncontrolled type 2 diabetes mellitus with hyperglycemia ([address] 2019    Peripheral neuropathy 2019    Left leg swelling 2019    Weakness of both legs 2019    Numbness of right foot     Encounter for palliative care     Advance directive discussed with patient     Failure to thrive in adult 2019    Debility 2019    Knee pain, left 2019    ESRD (end stage renal disease) on dialysis ([address] 2018    ESRD on peritoneal dialysis ([address]     Unexplained weight loss 2015    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 mellitus ([address] 2015    Renal artery stenosis (HCC)     Acute renal insufficiency     Hyperlipidemia     Hyperkalemia 2015    Hypertensive urgency 2013    HTN (hypertension)     Systolic heart failure ([address] 10/17/2011    DM (diabetes mellitus) ([address] 10/17/2011    History of macrocytic anemia 10/17/2011    Smoking 10/17/2011    Osteoarthritis 10/17/2011     Allergies: Allergies   Allergen Reactions    Gabapentin Other (See Comments)     Gabapentin-induced myoclonus       Recent Chest Xray/CT of Chest: (3/12/20)  Mild bibasilar atelectasis or scarring.     Previous MBS - none  Chart reviewed.   Medical Diagnosis: peritonitis  Treatment Diagnosis: dysphagia    BSE Impression 3/13/2020  Assessment limited as pt declining most PO offerings. Pt presents with oral dysphagia marked by significant bolus holding of all consistencies, requiring max verbal and tactile cues to initiate swallow. Piecemeal swallowing exhibited with all trials. Reactive cough noted x 1 with thin liquids via straw - suspect this was d/t poor coordination of swallow function after prolonged hold of bolus. Pt noted to have improved posterior transit and initiation of swallow when next bolus presented at labial seal to give tactile cue. Very minimal PO trials accepted of puree and thins via straw; pt declined further solids and endorsed mechanical soft solids from breakfast tray were \"too tough to chew. \" When questioned if needing pureed diet, pt declined. Difficult to determine if pt's bolus holding is d/t cognition and fatigue vs reduced desire to swallow PO 2/2 previous nausea and emesis. Although cough exhibited with liquids, no concerns for respiratory decline noted on current diet - recommend continue regular consistency with thin liquids and ensure meats are ground per baseline diet. SLP will monitor clinically for diet tolerance as pt not appropriate to participate in instrumental swallow assessment d/t prolonged holding and reduced acceptance of PO. MBS results - will monitor ability to participate - pt currently not appropriate at this time    Pain: denies    Current Diet : NPO + TF- recommend trial full liquids, 3/20    Treatment:  Pt seen bedside to address the following goals:  1- Patient will consume recommended diet consistency w/o overt signs associated with aspiration or respiratory decline. 3/15:  Pt analyzed with portion of breakfast. Pt exhibits holding and then slow propulsion of bolus, does eventually clear oral cavity.   No throat clearing or cough noted, however extra reflexive dry swallows felt upon palpation of anterior neck, indicating possible time. Recommend continue NPO with exception of 1/2 tsp thin water with 1:1 supervision from nursing when pt alert and initiating swallow. Goal not met - modify goal to:  Patient will participate in repeat BSE.  3/18: RN reported pt alert, answering questions this AM. Pt with very delayed and mostly absent responses to SLP questions / prompts during session. Mental status and performance with PO not changed since previous day; pt with decline in initiation of swallow with thin liquids and did not accept any PO beyond liquids, turning head away from puree / gelatin offerings. Pt with severe holding of liquids via tsp with thins, requiring placement of spoon in oral cavity and additional small bolus to initiate swallow. Pt then exhibited immediate reactive coughing. Pt accepted nectar thickened juice via tsp and straw but continued to demonstrate very prolonged oral holding and required max verbal and tactile cues to complete posterior propulsion and demonstrate laryngeal movement. Throat clear and audible rhonchi in exhalations noted after swallow. Attempted thins via tsp again but pt still with severe holding and no initiation to propel posteriorly / swallow. Required tactile stim of swab to oral cavity to complete swallow / clear oral cavity. PO trials d/c'd out of concern for pt safety. Pt appears disinterested in PO at this time and w/o ability to initiate / motor plan swallowing process, placing pt at risk of aspiration / choking with all consistencies. Recommend strict NPO with temporary alternate means of nutrition at this time. Will re-assess bedside as pt's mental status improves. Continue goal.  3/20: pt alert, with yankauer in mouth. Pt did state name, voice is weak, slightly wet. Pt able to clear on command. Pt presented with ice chips, water via tsp/cup and straw as well as puree. Pt accepted all trials, however cont to demonstrate prolonged holding of bolus with most trials.  Pt did clear oral cavity with no oral residue noted. No overt signs of aspiration noted, no cough/throat clear and voice remained clear. Swallow was felt upon palpation of anterior neck. Pt presented with small piece of saniya cracker however pt made no attempt at mastication, removed from oral cavity. Pt then declined additional PO trials. Maintaining nutrition via PO at this time would most likely be difficult, due to prolonged /holding of bolus and taking only limited amounts. MBS also not appropriate due to these reasons as well  Cont goal    2- The patient Zabrina Rose will verbalize/demonstrate understanding of dysphagia recommendations  3/15: pt educated to purpose of visit, s/s of aspiration and concern if aspiration occurs. Pt stated partial understanding   Cont goal  3/17: Attempted to educate pt to purpose of visit, concerns for dysphagia, safety risks with bolus holding / inconsistent initiation of swallow, aspiration risks, and rationale for ongoing NPO status. Pt demonstrates no comprehension. 3/18: Attempted to educated pt to rationale for visit, dysphagia noted, risks associated with aspiration and poor swallow initiation, and rationale for ongoing NPO status. Pt demonstrates no comprehension. Goal not met - recommend d/c goal given decline in pt's mental status. 3/20: pt educated to purpose of visit, and recommendation for PO trials. When pt questioned as to if she wanted to eat, she shook her head no. Cont goal    Patient/Family/Caregiver Education:  As above    Compensatory Strategies:  PO only if fully alert  Check oral cavity for pocketing/holding of food/liquids  Make sure pt has swallowed before next bite/sip       Plan:  Continued daily Dysphagia treatment with goals per  plan of care. Diet recommendations: Trial full liquids with strategies above.   However pt would most likely not be able to consume enough to maintain adequate nutrition (due to taking only limited amounts during assessments and requiring

## 2020-03-20 NOTE — PROGRESS NOTES
Hospitalist Progress Note      PCP: Diaz Poe MD    Date of Admission: 3/10/2020    Chief Complaint: Fatigue, lethargy    Hospital Course: 70-year-old female with history of ESRD on peritoneal dialysis, hypertension, PVD, type 2 diabetes mellitus admitted on 3/with 3 days history of fever, fatigue and chills she was found to be in severe sepsis in ER work-up was concerning for peritonitis. Fluid analysis positive for infection. Peritoneal dialysis fluid cultures positive for Achromobacter xylosoxidans ssp xylosoxidans. CODE BLUE was called on 3/13 for hypotensive patient never lost pulse but she responded with IV fluid she was started on vasopressors and transferred to the ICU. Patent dialysis catheter was removed on 3/18 cultures still positive for MDRO Achromobacter. Culture tip for PD catheter negative       Subjective: Patient seen and examined today in PCU. Denies any abdominal pain, nausea, vomiting, fever, chills. She is alert oriented to self and place. No acute event reported overnight. Discussed with  patient will have permanent HD catheter placement on Monday and he did spoke to  regarding outpatient HD arrangements.     Medications:  Reviewed    Infusion Medications    amiodarone Stopped (03/18/20 1424)    dextrose       Scheduled Medications    dilTIAZem  15 mg Per NG tube 4 times per day    insulin lispro  0-12 Units Subcutaneous Q4H    insulin glargine  10 Units Subcutaneous Daily    sevelamer  0.8 g Per NG tube TID     atorvastatin  10 mg Per NG tube Daily    [Held by provider] clopidogrel  75 mg Per NG tube Daily    midodrine  10 mg Per NG tube TID     polyethylene glycol  17 g Per NG tube BID    meropenem  1 g Intravenous Q24H    [Held by provider] aspirin  81 mg Per NG tube Daily    Venelex   Topical BID    darbepoetin sandeep-polysorbate  60 mcg Subcutaneous Weekly    sodium chloride flush  10 mL Intravenous 2 times per day    heparin (porcine)  5,000 Units Subcutaneous 3 times per day     PRN Meds: acetaminophen **OR** acetaminophen, prochlorperazine, glucose, dextrose, glucagon (rDNA), dextrose, sodium chloride flush      Intake/Output Summary (Last 24 hours) at 3/20/2020 1248  Last data filed at 3/20/2020 1218  Gross per 24 hour   Intake 180 ml   Output 300 ml   Net -120 ml       Physical Exam Performed:    /71   Pulse 98   Temp 99.1 °F (37.3 °C) (Oral)   Resp 18   Ht 5' 2\" (1.575 m)   Wt 172 lb 2.9 oz (78.1 kg)   LMP  (LMP Unknown)   SpO2 92%   BMI 31.49 kg/m²     General appearance: Alert, NG tube +Ve  HEENT: Pupils equal, round, and reactive to light. Conjunctivae/corneas clear. Neck: Supple. No jugular venous distention. Trachea midline. Respiratory:  Normal respiratory effort. Clear to auscultation, bilaterally without Rales/Wheezes/Rhonchi. Cardiovascular: Regular rate and rhythm with normal S1/S2 without murmurs, rubs or gallops. Abdomen: Soft, non-tender, non-distended with normal bowel sounds. Musculoskeletal: BL LE 1+ pitting edema  Neurologic:  AOx2, follows up simple commands. Psychiatric: Alert         Labs:   Recent Labs     03/18/20  0441 03/19/20  0538 03/20/20  0455   WBC 15.5* 17.3* 17.7*   HGB 10.6* 9.0* 8.6*   HCT 33.1* 28.1* 26.6*    247 219     Recent Labs     03/18/20  0441 03/18/20  1201 03/19/20  0538 03/20/20  0455   *  --  129* 134*   K 2.7* 3.0* 3.0* 3.3*   CL 89*  --  90* 94*   CO2 22  --  22 21   BUN 46*  --  64* 42*   CREATININE 5.6*  --  6.5* 4.8*   CALCIUM 7.9*  --  7.4* 7.5*   PHOS 3.7  --  4.3 3.8     No results for input(s): AST, ALT, BILIDIR, BILITOT, ALKPHOS in the last 72 hours. Recent Labs     03/18/20  1200   INR 1.31*     No results for input(s): Levonia Citrin in the last 72 hours.     Urinalysis:      Lab Results   Component Value Date    NITRU Negative 04/08/2019    WBCUA 20-50 04/08/2019    BACTERIA 3+ 04/08/2019    RBCUA 10-20 04/08/2019    BLOODU LARGE

## 2020-03-20 NOTE — PLAN OF CARE
Problem: Nutrition  Goal: Optimal nutrition therapy  Outcome: Ongoing   Enteral tube feeds restarted, started at 10mL/hr and will increase by 10mL q4h until goal rate of 35mL/hr is achieved. Problem: Pain:  Goal: Patient's pain/discomfort is manageable  Description: Patient's pain/discomfort is manageable  Outcome: Ongoing   Pt appears to be resting comfortably in bed. VSS. No grimacing or frowning noted. When asked if in pain, pt gestures she is not in pain. Problem: Skin Integrity:  Goal: Skin integrity will stabilize  Description: Skin integrity will stabilize  Outcome: Ongoing   No new s/s of skin breakdown. Venelex ointment applied to pt's stage II to R buttocks. Will continue to turn and reposition pt every two hours and as needed.

## 2020-03-20 NOTE — CARE COORDINATION
Case Management Assessment           Daily Note                 Date/ Time of Note: 3/20/2020 4:06 PM         Note completed by: Luis Lopez    Patient Name: Genevieve Mcclelland  YOB: 1940    Diagnosis:Peritonitis ([facility] [K65.9]  Peritonitis ([facility] [K65.9]  Patient Admission Status: Inpatient    Date of Admission:3/10/2020  3:26 PM Length of Stay: 10 GLOS:        Current Plan of Care: NG with tube feeds, HD,   ________________________________________________________________________________________  PT AM-PAC: 8 / 24 per last evaluation on: 3.15    OT AM-PAC: 12 / 24 per last evaluation on: 3.15    DME Needs for discharge: n/a    ________________________________________________________________________________________  Discharge Plan: [address] [place] [address] ([facility]): [facility]    Tentative discharge date: TBD    Current barriers to discharge: medical clearance    Referrals completed: Dialysis: [facility] and Not Applicable    Resources/ information provided: Not indicated at this time  ________________________________________________________________________________________  Case Management Notes: Patient is from [facility] at [facility], will return at discharge. CM spoke with facility today to give them updates on pt status and her new HD. CM spoke with [facility] and faxed necessary paperwork for new dialysis set up. Patient should be getting permanent HD line on Monday per Dr Silvia Saucedo. CM will follow up with [facility] on Monday. [place] Robles and her family were provided with choice of provider; she and her family are in agreement with the discharge plan.     Care Transition Patient: No    Luis Lopez RN  [facility] [employer]  Case Management Department  Ph: [phone]  Fax: [phone]

## 2020-03-21 NOTE — PROGRESS NOTES
RN spoke with Ana Sanford to let him know his sister will be transported with 4 rings on and her personal wheelchair. He indicated he would get them from the  home.

## 2020-03-21 NOTE — FLOWSHEET NOTE
03/21/20 1102   Encounter Summary   Services provided to: Patient   Continue Visiting   (3/21, vu )   Length of Encounter 30 minutes   Crisis   Type Code   Assessment Unable to respond   Intervention Sustaining presence/ Ministry of presence   Outcome De-escalated   Staff Amanuel Okeefe MA

## 2020-03-21 NOTE — PROGRESS NOTES
AM assessment charted. BP (!) 92/56   Pulse 111   Temp 97.3 °F (36.3 °C) (Oral)   Resp 19   Ht 5' 2\" (1.575 m)   Wt 176 lb 5.9 oz (80 kg)   LMP  (LMP Unknown)   SpO2 96%   BMI 32.26 kg/m²   Pt unable to answer if she has SOB or pain, scored 0 on AD pain scale.

## 2020-03-21 NOTE — PROGRESS NOTES
Patient arrived to ICU with rapid blood transfusions being administered after code blue called. Residents bedside, epinephrine administered from arrival to code status change X 10. Per Eugenia camejo to use vas cath for rapid blood transfusions. [place] Huggar placed on patient for low temperature. Patient comfort care at this time, family in route. Will continue to monitor.

## 2020-03-21 NOTE — PROGRESS NOTES
injection 10 mg, Q6H PRN  Venelex ointment, BID  glucose (GLUTOSE) 40 % oral gel 15 g, PRN  dextrose 50 % IV solution, PRN  glucagon (rDNA) injection 1 mg, PRN  dextrose 5 % solution, PRN  darbepoetin sandeep-polysorbate (ARANESP) injection 60 mcg, Weekly  sodium chloride flush 0.9 % injection 10 mL, 2 times per day  sodium chloride flush 0.9 % injection 10 mL, PRN  heparin (porcine) injection 5,000 Units, 3 times per day      Physical exam:     Vitals:  /66   Pulse 100   Temp 98.1 °F (36.7 °C) (Oral)   Resp 18   Ht 5' 2\" (1.575 m)   Wt 172 lb 2.9 oz (78.1 kg)   LMP  (LMP Unknown)   SpO2 96%   BMI 31.49 kg/m²   Constitutional: AA  Skin: no rash, turgor wnl  Neck: no bruits or jvd noted  Cardiovascular:  S1, S2 reg  Respiratory: CTA   Abdomen:  +bs, soft, nt, PD catheter in place  Ext:+lower extremity edema      Data:   Labs:  CBC:   Recent Labs     03/18/20  0441 03/19/20  0538 03/20/20  0455   WBC 15.5* 17.3* 17.7*   HGB 10.6* 9.0* 8.6*    247 219     BMP:    Recent Labs     03/18/20  0441 03/18/20  1201 03/19/20  0538 03/20/20  0455   *  --  129* 134*   K 2.7* 3.0* 3.0* 3.3*   CL 89*  --  90* 94*   CO2 22  --  22 21   BUN 46*  --  64* 42*   CREATININE 5.6*  --  6.5* 4.8*   GLUCOSE 297*  --  152* 109*     Ca/Mg/Phos:   Recent Labs     03/18/20  0441 03/19/20  0538 03/20/20  0455   CALCIUM 7.9* 7.4* 7.5*   MG 2.50* 2.40 2.20   PHOS 3.7 4.3 3.8     Hepatic:   No results for input(s): AST, ALT, ALB, BILITOT, ALKPHOS in the last 72 hours. Troponin: No results for input(s): TROPONINI in the last 72 hours. BNP: No results for input(s): BNP in the last 72 hours. Lipids: No results for input(s): CHOL, TRIG, HDL, LDLCALC, LABVLDL in the last 72 hours. ABGs:   No results for input(s): PHART, PO2ART, CPW4LIL in the last 72 hours.   INR:   Recent Labs     03/18/20  1200   INR 1.31*     UA:No results for input(s): Matt Or, GLUCOSEU, BILIRUBINUR, Gera Reasons, [address], BLOODU, [facility], PROTEINU, Anemia    4. Acid- base/ Electrolyte imbalance     5.  Constipation     Plan   HD TTS   [facility] at [facility]   continue heparin with bags   Cont Abx   PD cath removed     Off plavix - will need [facility] Monday      No IVF      Anemia management   MBD management      Labs in am     Thank you for allowing us to participate in care of [address] free to contact me   [facility] [address]  Office : [phone]  Fax :[phone]

## 2020-03-21 NOTE — PROGRESS NOTES
Pt s/p code blue today   On pressors   Doing poorly   Will hold HD for now   Monitor her in ICU   Labs repeated   Pt has poor prognosis   D.w Family in length - may go with CC     CC time 34 mins     Dede Gatica MD

## 2020-03-21 NOTE — PROGRESS NOTES
Patient has lower dentures in room. Lower dentures placed in mouth. Patient did not have upper dentures.

## 2020-03-21 NOTE — PROGRESS NOTES
Hospitalist Progress Note      PCP: Zachariah Agee MD    Date of Admission: 3/10/2020    Chief Complaint: Fatigue, lethargy    Hospital Course: 71-year-old female with history of ESRD on peritoneal dialysis, hypertension, PVD, type 2 diabetes mellitus admitted on 3/with 3 days history of fever, fatigue and chills she was found to be in severe sepsis in ER work-up was concerning for peritonitis. Fluid analysis positive for infection. Peritoneal dialysis fluid cultures positive for Achromobacter xylosoxidans ssp xylosoxidans. CODE BLUE was called on 3/13 for hypotensive patient never lost pulse but she responded with IV fluid she was started on vasopressors and transferred to the ICU. Patent dialysis catheter was removed on 3/18 cultures still positive for MDRO Achromobacter. Culture tip for PD catheter negative       Subjective: Patient was evaluated and PCU today she did had multiple large bowel movements since morning. During my evaluation patient lost pulse CODE BLUE was called. ROSC 10 minutes received epinephrine and rapid blood transfusion. She was intubated and transferred to ICU. I did talk to brother Marilin Wetzel [facility]) over the phone and updated him about the situation. Did better DNR CCA. I reevaluated patient later getting ICU along with ICU resident.   They had another conversation with patient's brother Marilin Wetzel and she is made DNR CC    Medications:  Reviewed    Infusion Medications     Scheduled Medications    scopolamine  1 patch Transdermal Q72H     PRN Meds: HYDROmorphone **OR** HYDROmorphone, ondansetron, LORazepam      Intake/Output Summary (Last 24 hours) at 3/21/2020 1336  Last data filed at 3/21/2020 0820  Gross per 24 hour   Intake 0 ml   Output 0 ml   Net 0 ml       Physical Exam Performed:    BP (!) 57/42   Pulse 83   Temp 94.5 °F (34.7 °C)   Resp 16   Ht 5' 2\" (1.575 m)   Wt 176 lb 5.9 oz (80 kg)   LMP  (LMP Unknown)   SpO2 96%   BMI 32.26 kg/m²     General appearance: intubated, NG tube +Ve  HEENT: Pupils dilated  Neck: Supple. No jugular venous distention. Trachea midline. Respiratory:  Intubated on mechanical vent, bl coarse  Cardiovascular: Regular rate and rhythm with normal S1/S2 without murmurs, rubs or gallops. Abdomen: Soft, non-distended with normal bowel sounds. Musculoskeletal: BL LE 1+ pitting edema  Neurologic:  Sedated +ve cough and gag  Psychiatric: sedated        Labs:   Recent Labs     03/20/20  0455 03/21/20  0330 03/21/20  1230   WBC 17.7* 15.9* 13.4*   HGB 8.6* 8.1* 9.8*   HCT 26.6* 25.1* 32.3*    210 108*     Recent Labs     03/19/20  0538 03/20/20  0455 03/21/20  0330   * 134* 136   K 3.0* 3.3* 3.0*   CL 90* 94* 96*   CO2 22 21 24   BUN 64* 42* 53*   CREATININE 6.5* 4.8* 5.7*   CALCIUM 7.4* 7.5* 7.9*   PHOS 4.3 3.8 4.0     No results for input(s): AST, ALT, BILIDIR, BILITOT, ALKPHOS in the last 72 hours. No results for input(s): INR in the last 72 hours. No results for input(s): Florene Trevon in the last 72 hours. Urinalysis:      Lab Results   Component Value Date    NITRU Negative 04/08/2019    WBCUA 20-50 04/08/2019    BACTERIA 3+ 04/08/2019    RBCUA 10-20 04/08/2019    BLOODU LARGE 04/08/2019    SPECGRAV 1.025 04/08/2019    GLUCOSEU 100 04/08/2019       Radiology:  XR CHEST PORTABLE   Final Result      More prominent right perihilar and left basilar opacities favored to be atelectasis. XR ABDOMEN (KUB) (SINGLE AP VIEW)   Final Result       Evaluation is markedly limited due to technique. The enteric tube appears    to course below the diaphragm but the tip cannot be identified. XR ABDOMEN (KUB) (SINGLE AP VIEW)   Final Result      Feeding tube in the stomach. IR NONTUNNELED VASCULAR CATHETER > 5 YEARS   Final Result   IMPRESSION :      Patent right internal jugular vein. Successful temporary dialysis catheter placement.           Fluoroscopy time : 0.5 minutes   Number of exposures obtained : 1   Blood chronic. Discussed with Dr. Ketan Montoya he has been following patient for long time she has a history of hypertension. #MAT tachycardia  Cardiology has been following. #Diabetes mellitus type 2  Hemoglobin A1c 3/24/2019 6.4. Looks like falsely low with anemia. We will treat diabetes mellitus on random blood sugar levels. Continue sliding scale and Lantus adjust dose as needed. #History of PVD  Hold aspirin and Plavix as patient will be needing tunneled catheter on monday    DVT Prophylaxis: Heparin  Diet: No diet orders on file  Code Status: DNR-CC    PT/OT Eval Status: Pending    Dispo -she was transferred to ICU after [address]. Family on their way likely comfort measures once patient family arrives.     Maikol Carrillo MD

## 2020-03-21 NOTE — PROGRESS NOTES
PRN  prochlorperazine (COMPAZINE) injection 10 mg, Q6H PRN  Venelex ointment, BID  glucose (GLUTOSE) 40 % oral gel 15 g, PRN  dextrose 50 % IV solution, PRN  glucagon (rDNA) injection 1 mg, PRN  dextrose 5 % solution, PRN  darbepoetin sandeep-polysorbate (ARANESP) injection 60 mcg, Weekly  sodium chloride flush 0.9 % injection 10 mL, 2 times per day  sodium chloride flush 0.9 % injection 10 mL, PRN  heparin (porcine) injection 5,000 Units, 3 times per day      Physical exam:     Vitals:  /66   Pulse 100   Temp 98.1 °F (36.7 °C) (Oral)   Resp 18   Ht 5' 2\" (1.575 m)   Wt 172 lb 2.9 oz (78.1 kg)   LMP  (LMP Unknown)   SpO2 96%   BMI 31.49 kg/m²   Constitutional: AA  Skin: no rash, turgor wnl  Neck: no bruits or jvd noted  Cardiovascular:  S1, S2 reg  Respiratory: CTA   Abdomen:  +bs, soft, nt, PD catheter in place  Ext:+lower extremity edema      Data:   Labs:  CBC:   Recent Labs     03/18/20  0441 03/19/20  0538 03/20/20  0455   WBC 15.5* 17.3* 17.7*   HGB 10.6* 9.0* 8.6*    247 219     BMP:    Recent Labs     03/18/20  0441 03/18/20  1201 03/19/20  0538 03/20/20  0455   *  --  129* 134*   K 2.7* 3.0* 3.0* 3.3*   CL 89*  --  90* 94*   CO2 22  --  22 21   BUN 46*  --  64* 42*   CREATININE 5.6*  --  6.5* 4.8*   GLUCOSE 297*  --  152* 109*     Ca/Mg/Phos:   Recent Labs     03/18/20  0441 03/19/20  0538 03/20/20  0455   CALCIUM 7.9* 7.4* 7.5*   MG 2.50* 2.40 2.20   PHOS 3.7 4.3 3.8     Hepatic:   No results for input(s): AST, ALT, ALB, BILITOT, ALKPHOS in the last 72 hours. Troponin: No results for input(s): TROPONINI in the last 72 hours. BNP: No results for input(s): BNP in the last 72 hours. Lipids: No results for input(s): CHOL, TRIG, HDL, LDLCALC, LABVLDL in the last 72 hours. ABGs:   No results for input(s): PHART, PO2ART, XSN9OBF in the last 72 hours.   INR:   Recent Labs     03/18/20  1200   INR 1.31*     UA:No results for input(s): RONA Gomez, Rukhsana Cox, Lucinda Fernandez, UROBILINOGEN, NITRU, LEUKOCYTESUR, Cleta Bee in the last 72 hours. Urine Microscopic: No results for input(s): LABCAST, BACTERIA, COMU, HYALCAST, WBCUA, RBCUA, EPIU in the last 72 hours. Urine Culture: No results for input(s): LABURIN in the last 72 hours. Urine Chemistry: No results for input(s): Luke Booty, PROTEINUR, NAUR in the last 72 hours. IMAGING:  XR ABDOMEN (KUB) (SINGLE AP VIEW)   Final Result       Evaluation is markedly limited due to technique. The enteric tube appears    to course below the diaphragm but the tip cannot be identified. XR ABDOMEN (KUB) (SINGLE AP VIEW)   Final Result      Feeding tube in the stomach. IR NONTUNNELED VASCULAR CATHETER > 5 YEARS   Final Result   IMPRESSION :      Patent right internal jugular vein. Successful temporary dialysis catheter placement. Fluoroscopy time : 0.5 minutes   Number of exposures obtained : 1   Blood loss : minimal (less than 5 cc)                  XR CHEST PORTABLE   Final Result      No significant interval change. XR ABDOMEN (KUB) (SINGLE AP VIEW)   Final Result      Feeding tube with tip overlying the fundus of the stomach should be repositioned as described      XR CHEST PORTABLE   Final Result      No significant change in bilateral lower lobe atelectasis or pneumonitis. NG tube as described            XR CHEST PORTABLE   Final Result      No significant change in mild left lower lobe consolidation. CT ABDOMEN PELVIS WO CONTRAST Additional Contrast? None   Final Result      No acute intra-abdominal process. Small amount of fluid in the leftward pelvis and perihepatic region with small foci of pneumoperitoneum, not to be unexpected given the peritoneal dialysis catheter. Diverticulosis. Renal cysts. Bibasilar atelectasis. XR CHEST PORTABLE   Final Result      No focal consolidation. Assessment/   1.  PD peritonitis     2. HTN    3. Anemia    4. Acid- base/ Electrolyte imbalance     5.  Constipation     Plan   Maintenance PD   continue heparin with bags   Cont Abx   Cell count - normal   No need to remove PD cath      No IVF      Anemia management   MBD management      Labs in am     Thank you for allowing us to participate in care of [address] free to contact me   [facility] [address]  Office : [phone]  Fax :[phone]

## 2020-03-21 NOTE — PROGRESS NOTES
Patient body sent to [facility] with 2 rings on her left hand (silver flowers with birthstones, gold band with diamonds) and 2 rings on her right hand (gold band, silver band with diamonds). Patient's wheelchair was not taken by family, will send with body.

## 2020-03-21 NOTE — PROGRESS NOTES
Noted feeding tube dislodged with clip still up by nose at beginning of shift, only 5 cm left in nose. Made hospitalist aware who ordered another corpak reinserted. Remainder of old tube removed without issue. ICU nurse came over and put in new corpak. KUB taken and awaiting results/okay to use tube.

## 2020-03-21 NOTE — PROGRESS NOTES
Call out to [address], spoke with on call MD. Supportive care for now and blood products. We will call if family would like to pursue aggressive care.      Ayush Espinoza, DO PGY1

## 2020-03-21 NOTE — PROGRESS NOTES
Speech Language Pathology  Contact note- discharge    Chart reviewed; code blue called this morning. Pt is now currently intubated. Please re-consult ST when pt extubated and appropriate for re-evaluation.     Thank you,    Haley Bower) [place], [place], [address] [facility] [place], [facility]; [phone]  Speech-Language Pathologist  Pager #: [phone], 11:28am

## 2020-03-21 NOTE — PROGRESS NOTES
1023 code initiated. Code blue called on patient at this time. CPR initiated. MDs present. No pulse  Noted. CPR continued. Order for O negative blood. Pulse and rhythm check at 1027. No pulse at 1028 check. CPR continued. Epi pushed at 1029. NS bolus Liter bag started. Pulse check 1030- no pulse. CPR resumed. Epi pushed at 1032. Pulse check 1033- negative. Epi 1035. Pulse 1036 104bpm. Pt intubated. A+ blood present. B/P 163/105 p 111 1037. B/P 125/82 @1041. 2 units PRBCs rapid infused. 1045 93/53 HR 89. Carotid pulse check at 1054 positive. Femoral pulse check negative. No B/P obtained at 1055. Transfer patient to ICU bed 17. Code status changed per family to [facility].

## 2020-03-21 NOTE — CONSULTS
ICU Consult Note    Admit Date: 3/10/2020  Day: 3/21/2020     Diet: No diet orders on file    CC: Cardiac Arrest    Interval history: Pt had large volume bright red blood per rectum this morning. Then pt found to be without pulse. Code blue called. Rosc Achieved after several rounds of Epi. During code pt was vomiting. Immediately following, pupils were bilaterally dilated and non reactive to light. No corneal reflex present. Family was contacted, they have discussed this situation with the patient and state that she would not want to be kept on a ventilator indefinitely without a good chance for meaningful recovery. They would like to change her code status to [facility]. Medications:     Scheduled Meds:   scopolamine  1 patch Transdermal Q72H     Continuous Infusions:    PRN Meds:HYDROmorphone **OR** HYDROmorphone, ondansetron, LORazepam    Objective:   Vitals:   T-max:  Patient Vitals for the past 8 hrs:   BP Temp Temp src Pulse Resp SpO2   03/21/20 1232 -- -- -- 83 16 --   03/21/20 1228 -- -- -- 88 16 --   03/21/20 1153 (!) 57/42 94.5 °F (34.7 °C) -- 92 30 --   03/21/20 1131 (!) 66/46 94.5 °F (34.7 °C) -- 112 22 --   03/21/20 1128 -- -- -- -- 30 --   03/21/20 1117 -- -- -- 106 21 --   03/21/20 1100 (!) 35/23 -- -- 101 12 --   03/21/20 1055 -- -- -- 80 16 --   03/21/20 1041 125/82 -- -- -- -- --   03/21/20 0820 (!) 92/56 97.3 °F (36.3 °C) Oral 111 19 96 %       Intake/Output Summary (Last 24 hours) at 3/21/2020 1258  Last data filed at 3/21/2020 0820  Gross per 24 hour   Intake 0 ml   Output 0 ml   Net 0 ml       Review of Systems   Unable to perform ROS: Intubated       Physical Exam  Constitutional:       Interventions: She is intubated. HENT:      Head: Normocephalic and atraumatic. Cardiovascular:      Comments: Pulses weak  Pulmonary:      Effort: She is intubated. Breath sounds: Rhonchi present. Abdominal:      Palpations: Abdomen is soft. Skin:     General: Skin is cool and dry. Neurological:      Mental Status: She is unresponsive. Comments: Not reactive to pain           LABS:    CBC:   Recent Labs     03/19/20  0538 03/20/20  0455 03/21/20  0330   WBC 17.3* 17.7* 15.9*   HGB 9.0* 8.6* 8.1*   HCT 28.1* 26.6* 25.1*    219 210   MCV 95.5 94.5 95.1     Renal:    Recent Labs     03/19/20  0538 03/20/20  0455 03/21/20  0330   * 134* 136   K 3.0* 3.3* 3.0*   CL 90* 94* 96*   CO2 22 21 24   BUN 64* 42* 53*   CREATININE 6.5* 4.8* 5.7*   GLUCOSE 152* 109* 105*   CALCIUM 7.4* 7.5* 7.9*   MG 2.40 2.20 2.20   PHOS 4.3 3.8 4.0   ANIONGAP 17* 19* 16     Hepatic:   Recent Labs     03/19/20  0538 03/20/20  0455 03/21/20  0330   LABALBU 1.8* 2.0* 1.9*     Troponin: No results for input(s): TROPONINI in the last 72 hours. BNP: No results for input(s): BNP in the last 72 hours. Lipids: No results for input(s): CHOL, HDL in the last 72 hours. Invalid input(s): LDLCALCU, TRIGLYCERIDE  ABGs:    Recent Labs     03/21/20  1122 03/21/20  1132   PHART 7.076* 7.133*   HNW9BDL 37.2 29.5*   PO2ART 380.4* 241.0*   HGF2HGY 10.9* 10*   BEART -19* -18.0*   K9PTLJAC 100 99   AJR8DIN 12 10       INR: No results for input(s): INR in the last 72 hours. Lactate:   Recent Labs     03/21/20  1122   LACTATE 11.93*     Cultures:  -----------------------------------------------------------------  RAD:   XR CHEST PORTABLE   Final Result      More prominent right perihilar and left basilar opacities favored to be atelectasis. XR ABDOMEN (KUB) (SINGLE AP VIEW)   Final Result       Evaluation is markedly limited due to technique. The enteric tube appears    to course below the diaphragm but the tip cannot be identified. XR ABDOMEN (KUB) (SINGLE AP VIEW)   Final Result      Feeding tube in the stomach. IR NONTUNNELED VASCULAR CATHETER > 5 YEARS   Final Result   IMPRESSION :      Patent right internal jugular vein. Successful temporary dialysis catheter placement.           Fluoroscopy time : 0.5 minutes   Number of exposures obtained : 1   Blood loss : minimal (less than 5 cc)                  XR CHEST PORTABLE   Final Result      No significant interval change. XR ABDOMEN (KUB) (SINGLE AP VIEW)   Final Result      Feeding tube with tip overlying the fundus of the stomach should be repositioned as described      XR CHEST PORTABLE   Final Result      No significant change in bilateral lower lobe atelectasis or pneumonitis. NG tube as described            XR CHEST PORTABLE   Final Result      No significant change in mild left lower lobe consolidation. CT ABDOMEN PELVIS WO CONTRAST Additional Contrast? None   Final Result      No acute intra-abdominal process. Small amount of fluid in the leftward pelvis and perihepatic region with small foci of pneumoperitoneum, not to be unexpected given the peritoneal dialysis catheter. Diverticulosis. Renal cysts. Bibasilar atelectasis. XR CHEST PORTABLE   Final Result      No focal consolidation. Assessment/Plan:     Cardiac Arrest  GI bleed  - dnr cc  - will extubate when her family arrives (in route)      Code Status: DNR-CC No additional code details   FEN: No diet orders on file  PPX:   DISPO: ICU    Eddie Scott DO, PGY-1  03/21/20  12:58 PM    This patient has been staffed and discussed with ICU Attending. S/p PEA cardiac arrest.  During which she bleed large amount of dark maroon blood per rectum. During the code we gave IVF bolus through pressure bag followed by two units of non cross matched blood and several doses of epinephrine. She was intubated during the code by RT under my supervision, however ETT was kinking at the back of the throat making it difficult to ventilate. After ROSC she was transferred to the ICU. I changed the tube over tube exchanger, ABG checked and show metabolic acidosis with pH of 7.0 for which minute ventilation increased to 15L.    She was unresponsive but has some spontaneous blinking. She continue to have GI bleed but after placing OG to decompress the stomach due to significant distension there was no blood in the stomach. Additional two units of PRBC and IVF boluses given and four more ordered in addition to four FFP and 2 platelets, PPI also ordered however family decided to change code status to DNR CCA then DNR CC.    Blood product transfusion stopped and patient was extubated    Critical care time spent more than 65 minutes

## 2020-03-22 LAB
BLOOD BANK DISPENSE STATUS: NORMAL
BLOOD BANK PRODUCT CODE: NORMAL
BPU ID: NORMAL
DESCRIPTION BLOOD BANK: NORMAL

## 2020-03-22 NOTE — DISCHARGE SUMMARY
INTERNAL MEDICINE DEPARTMENT AT [address] [place] [address]  DISCHARGE SUMMARY    Patient ID: Cristian Skaggs                                             Discharge Date: 3/21/2020   Patient's PCP: Perla Moralse MD                                          Discharge Physician: Teresa Renteria DO  Admit Date: 3/10/2020   Admitting Physician: Sheryle Lather, MD      DISCHARGE DIAGNOSES:  1-    Hospital Course: Cristian Skaggs is a 67 y.o. F with PMH of ESRD (on PD), HTN, PVD (on plavix) and type II DM who presented three days ago with fatigue and subjective fever and chills x 3 days. While in the ED, patient was found to be hypotnesive with a lactic acidosis which improved after 2L IVFs. Work-up thus far is concerning for peritonitis as recent fluid analysis revealed 9856 nucleated cells with 85% neutrophils. Cultures with gram negative rods, still awaiting species. Patient was started on cefepime. Additionally, patient was being followed by EP cards for MAT, she was started on dilt gtt; however, her systolic blood pressure dropped to the 70s. She was switched to low doses of PO dilt and lopressor but had several episodes of emesis and was unable to keep the pills down. Additionally, midodrine was added to help with low blood pressures. Due to vomiting, CT abdomen WO contrast was ordered and was unremarkable.      On 3/13, a code blue was called as patient was hypotensive with systolic's in the 79C, HR in the 80s and O2 sats at 82% on RA. She was unresponsive. Patient never lost pulse. Prior to code, patient's BG was 33, she received D50 which improved glucose to 100s. She was given a 250mL bolus of IVFs which did not improve pressures. ABG and lactic acid reassuring. RFP unchanged from prior. CBC with slight leukocytosis of 12.4.      Patient was transferred to ICU. Pt was lethargic but oriented. She was transferred back to the progressive care unit.      On 3/21/2020, a code blue was called as she had a large bright red bloody BM then hypotension, and lost pulse. 3 round of ACLS were performed and ROSC achieved, she was transferred to ICU. 8 units of pRBC, 1 unit of plasma and 2 units of FFP were transfused. Her pupils were dilated and nonreactive bilaterally. Her family was contacted, code status was changed to comfort care when her family arrived. She was terminally extubated and time of death was called at 14:45 3/21/20. Physical Exam:  BP (!) 136/115   Pulse (!) 0   Temp 94.5 °F (34.7 °C)   Resp (!) 0   Ht 5' 2\" (1.575 m)   Wt 176 lb 5.9 oz (80 kg)   LMP  (LMP Unknown)   SpO2 96%   BMI 32.26 kg/m²   VS: No palpable pulse, no blood pressure   GEN: Pallor with no spontaneous movement   HEENT: Pupils fixed and dilated   CVS: No heart sounds audible   Chest: No audible lung sounds  Neuro: Absent reflexes    Consults: pulmonary/intensive care, cardiolgy and nephrology  Significant Diagnostic Studies:   XR CHEST PORTABLE   Final Result      More prominent right perihilar and left basilar opacities favored to be atelectasis. XR ABDOMEN (KUB) (SINGLE AP VIEW)   Final Result       Evaluation is markedly limited due to technique. The enteric tube appears    to course below the diaphragm but the tip cannot be identified. XR ABDOMEN (KUB) (SINGLE AP VIEW)   Final Result      Feeding tube in the stomach. IR NONTUNNELED VASCULAR CATHETER > 5 YEARS   Final Result   IMPRESSION :      Patent right internal jugular vein. Successful temporary dialysis catheter placement. Fluoroscopy time : 0.5 minutes   Number of exposures obtained : 1   Blood loss : minimal (less than 5 cc)                  XR CHEST PORTABLE   Final Result      No significant interval change.       XR ABDOMEN (KUB) (SINGLE AP VIEW)   Final Result      Feeding tube with tip overlying the fundus of the stomach should be repositioned as described      XR CHEST PORTABLE   Final Result      No significant change in bilateral lower lobe atelectasis or pneumonitis. NG tube as described            XR CHEST PORTABLE   Final Result      No significant change in mild left lower lobe consolidation. CT ABDOMEN PELVIS WO CONTRAST Additional Contrast? None   Final Result      No acute intra-abdominal process. Small amount of fluid in the leftward pelvis and perihepatic region with small foci of pneumoperitoneum, not to be unexpected given the peritoneal dialysis catheter. Diverticulosis. Renal cysts. Bibasilar atelectasis. XR CHEST PORTABLE   Final Result      No focal consolidation. Disposition:   Discharged Condition: terminal  Follow Up: na    DISCHARGE MEDICATION:     Medication List      ASK your doctor about these medications    acetaminophen 325 MG tablet  Commonly known as:  Tylenol  Take 2 tablets by mouth every 6 hours as needed for Pain     aspirin 81 MG EC tablet  Take 1 tablet by mouth daily     B complex-vitamin C-folic acid 1 MG tablet  Take 1 tablet by mouth daily (with breakfast)     bisacodyl 10 MG suppository  Commonly known as:  DULCOLAX     calcitRIOL 0.25 MCG capsule  Commonly known as:  ROCALTROL  TAKE ONE CAPSULE BY MOUTH ONCE A DAY. clopidogrel 75 MG tablet  Commonly known as:  PLAVIX      MG Tabs  Generic drug:  Docusate Sodium  TAKE ONE CAPSULE BY MOUTH TWICE A DAY.      fleet 7-19 GM/118ML     insulin glargine 100 UNIT/ML injection vial  Commonly known as:  LANTUS     insulin lispro 100 UNIT/ML pen  Commonly known as:  HUMALOG  Inject 0-6 Units into the skin 3 times daily (with meals)     Medical Compression Stockings Misc  1 each by Does not apply route daily     melatonin 1 MG tablet  Take 3 tablets by mouth nightly     midodrine 10 MG tablet  Commonly known as:  PROAMATINE  Ask about: Which instructions should I use?     ondansetron 4 MG tablet  Commonly known as:  Zofran  Take 1 tablet by mouth every 8 hours as needed for Nausea     pantoprazole 40 MG tablet  Commonly known as:  PROTONIX  Take 1 tablet by mouth 2 times daily (before meals)     phenol 1.4 % Liqd mouth spray  Take 1 spray by mouth every 2 hours as needed for Sore Throat     polyethylene glycol powder  Commonly known as:  GLYCOLAX     potassium chloride 10 MEQ extended release capsule  Commonly known as:  MICRO-K     pregabalin 75 MG capsule  Commonly known as:  LYRICA  Take 1 capsule by mouth daily for 30 days.      psyllium 95 % Pack packet  Commonly known as:  HYDROCIL  Take 1 packet by mouth daily     sevelamer 800 MG tablet  Commonly known as:  RENVELA  Take 2 tablets by mouth 3 times daily (with meals)     simvastatin 40 MG tablet  Commonly known as:  ZOCOR  Take 1 tablet by mouth nightly     vitamin D3 25 MCG (1000 UT) Tabs  Take 2 tablets by mouth daily     vitamin E 400 UNIT capsule  Take 1 capsule by mouth nightly            Time Spent on discharge is more than 45 minutes    Signed:  Ania Pagan DO   3/21/2020

## 2020-04-13 LAB
FUNGUS (MYCOLOGY) CULTURE: NORMAL
FUNGUS STAIN: NORMAL

## 2020-06-23 NOTE — DISCHARGE INSTR - COC
Continuity of Care Form    Patient Name: Mele Chavarria   :  1940  MRN:  [id number]    Admit date:  2019  Discharge date:  ***    Code Status Order: Full Code   Advance Directives:   Advance Care Flowsheet Documentation     Date/Time Healthcare Directive Type of Healthcare Directive Copy in [address] Agent's Name Healthcare Agent's Phone Number    [phone]  No, patient does not have an advance directive for healthcare treatment -- -- -- -- --          Admitting Physician:  Oneil Joseph MD  PCP: Duong Euceda MD (Inactive)    Discharging Nurse: [facility] Unit/Room#: 9404/7553-73  Discharging Unit Phone Number: ***    Emergency Contact:   Extended Emergency Contact Information  Primary Emergency Contact: Meri Thakkar, [address] [place] [address] Phone: [phone]  Work Phone: [phone]  Mobile Phone: [phone]  Relation: Brother/Sister    Past Surgical History:  Past Surgical History:   Procedure Laterality Date    CATARACT REMOVAL Left 13    had elevated BP post op    CORONARY ANGIOPLASTY      JOINT REPLACEMENT  2011    R WILDER    OTHER SURGICAL HISTORY Left 2017    INSERTION OF LAPAROSCOPIC PERITONEAL DIALYSIS CATHETER;    THYROIDECTOMY, PARTIAL         Immunization History:   Immunization History   Administered Date(s) Administered    Hepatitis B Adult (Engerix-B) 2019    Pneumococcal 13-valent Conjugate (Yzqzzze18) 2019    Pneumococcal Polysaccharide (Yudialfhi21) 2013       Active Problems:  Patient Active Problem List   Diagnosis Code    HTN (hypertension) J70    Systolic heart failure ([facility] I50.20    DM (diabetes mellitus) ([facility] E11.9    History of macrocytic anemia Z86.2    Smoking F17.200    Osteoarthritis M19.90    Hypertensive urgency I16.0    Hyperkalemia E87.5    Renal artery stenosis (HCC) I70.1    Acute renal insufficiency N28.9    Hyperlipidemia E78.5    CKD stage [P.O.:1035;  I.V.:352.7]  Out: 10     Safety Concerns:     [address] [facility] Safety Concerns:788125091}    Impairments/Disabilities:      [address] [facility] Impairments/Disabilities:956355270}    Nutrition Therapy:  Current Nutrition Therapy:   [address] [facility] Diet List:780216592}    Routes of Feeding: {CHP DME Other Feedings:638407188}  Liquids: {Slp liquid thickness:47684}  Daily Fluid Restriction: {CHP DME Yes amt example:677483247}  Last Modified Barium Swallow with Video (Video Swallowing Test): {Done Not Done RGVE:303795935}    Treatments at the Time of Hospital Discharge:   Respiratory Treatments: ***  Oxygen Therapy:  {Therapy; copd oxygen:88581}  Ventilator:    [address]  Vent ZLMF:451106332}    Rehab Therapies: Physical Therapy, Occupational Therapy and Skilled Nurse  Weight Bearing Status/Restrictions: [facility] Weight Bearin}  Other Medical Equipment (for information only, NOT a DME order):  {EQUIPMENT:006433788}  Other Treatments: ***    Patient's personal belongings (please select all that are sent with patient):  {CHP DME Belongings:325042039}    RN SIGNATURE:  {Esignature:774340854}    CASE MANAGEMENT/SOCIAL WORK SECTION    Inpatient Status Date: ***    Readmission Risk Assessment Score:  Readmission Risk              Risk of Unplanned Readmission:        49           · Discharging to Facility/ Agency   · Name:  [facility] care    · Address: [address] [place] [address] [address] [address], [address] [facility]  · Phone: [phone]  · Fax: [phone]      / signature: {Esignature:786844506}    PHYSICIAN SECTION    Prognosis: {Prognosis:7892465405}    Condition at Discharge: [address] Patient Condition:669950066}    Rehab Potential (if transferring to Rehab): {Prognosis:2565358271}    Recommended Labs or Other Treatments After Discharge: ***    Physician Certification: I certify the above information and transfer of Bebo Gonzales  is necessary for the continuing treatment of the diagnosis listed and that she requires Cephalexin Pregnancy And Lactation Text: This medication is Pregnancy Category B and considered safe during pregnancy.  It is also excreted in breast milk but can be used safely for shorter doses.

## 2020-09-16 NOTE — PROGRESS NOTES
-- DO NOT REPLY / DO NOT REPLY ALL --  -- Message is from the [facility]--    COVID-19 Universal Screening: N/A - Not about scheduling    General Patient Message      Reason for Call: patient Is calling to request alternative prescription for birth control as its on back order from pharmacy with no eta, patient also wants  To mention previous prescriptions utilized in the past being sprintec,progesterone sensitive to skin and aubra not supportive at all, states has acnea oily skin sensitivity and sprintec worked best for patient,please call for assistance    Caller Information       Type Contact Phone    09/16/2020 01:37 PM CDT Phone (Incoming) Tushar Westbrook (Self) [phone] (M)          Alternative phone number: n\a    Turnaround time given to caller:   \"This message will be sent to [state Provider's name]. The clinical team will fulfill your request as soon as they review your message.\"     NEPHROLOGY   Progress Note    Subjective:   Admit Date: 8/7/2019      Interval History:      Doing better   Awaiting placement     DIET:  Dietary Nutrition Supplements: Clear Liquid Oral Supplement  DIET GENERAL;    Medications:     Scheduled Meds:   dexamethasone  4 mg Intravenous Q6H    sodium chloride  250 mL Intravenous Once    [START ON 8/12/2019] sevelamer  400 mg Oral TID WC    [START ON 8/12/2019] potassium chloride  10 mEq Oral BID WC    melatonin  3 mg Oral Once    custom dialysis builder   Intraperitoneal Nightly    lidocaine  1 patch Transdermal Daily    heparin (porcine)  1,000 Units Intravenous Nightly    pregabalin  75 mg Oral Daily    scopolamine  1 patch Transdermal Q72H    [Held by provider] apixaban  2.5 mg Oral BID    aspirin  81 mg Oral Daily    b complex-C-folic acid  1 mg Oral Daily with breakfast    calcitRIOL  0.25 mcg Oral Daily    vitamin D  2,000 Units Oral Daily    docusate sodium  100 mg Oral BID    psyllium  1 packet Oral Daily    simvastatin  40 mg Oral Nightly    sodium chloride flush  10 mL Intravenous 2 times per day       Continuous Infusions:   dextrose         Labs:  CBC:   Recent Labs     08/09/19  0532 08/10/19  0430 08/11/19  0615   WBC 7.7 11.4* 10.7   HGB 12.9 14.1 13.0    217 228     BMP:    Recent Labs     08/10/19  0430 08/10/19  1908 08/11/19  0615    138 138   K 3.8 3.9 4.2   CL 97* 97* 96*   CO2 26 26 28   BUN 43* 44* 42*   CREATININE 8.7* 8.8* 8.1*   GLUCOSE 155* 106* 144*       Ca/Mg/Phos:   Recent Labs     08/09/19  0532  08/10/19  0430 08/10/19  1908 08/11/19  0615   CALCIUM  --    < > 7.0* 6.8* 6.9*   MG 3.00*  --  2.60*  --   --    PHOS  --    < > 2.8 2.5 2.3*    < > = values in this interval not displayed. Hepatic:   No results for input(s): AST, ALT, ALB, BILITOT, ALKPHOS in the last 72 hours.   Troponin:   Recent Labs     08/08/19 2322 08/09/19  0533   TROPONINI 0.12* 0.14*     INR:   No results for input(s): INR in the

## 2020-11-04 RX ORDER — CINACALCET HYDROCHLORIDE 60 MG/1
TABLET, COATED ORAL
Qty: 28 TABLET | Refills: 0 | OUTPATIENT
Start: 2020-11-04

## 2020-11-04 RX ORDER — SEVELAMER CARBONATE 800 MG/1
TABLET, FILM COATED ORAL
Qty: 168 TABLET | Refills: 0 | OUTPATIENT
Start: 2020-11-04

## 2020-11-04 RX ORDER — APIXABAN 2.5 MG/1
TABLET, FILM COATED ORAL
Qty: 56 TABLET | Refills: 0 | OUTPATIENT
Start: 2020-11-04

## 2021-09-07 NOTE — PROGRESS NOTES
Outpatient Clinic Visit Note    Patient: Mele Chavarria  : 1940 (89 y.o.)  Date: 2019    CC: Hospital followup    HPI:   Mele Chavarria is a 69 y.o. female presenting for hospital follow-up from admission 19 where she was found to have a L LE DVT. Was started on coumadin, discharged with INR 2.9. Notes that her symptoms are improving, swelling is decreasing. Of note, has not been seen on at coagulation clinic since discharge. Pt also complaining of R sided foot and ankle numbness and \"pins\". Notes this has been going on for 2 months. Has hx of ESRD on PD and DM (last A1C 3/2019 - 6.5). Noted that she mentioned this during recent admission. Per chart review, had similar symptoms in 2018 and found to have peripheral neuropathy. Hx of gabapentin use but had SE of gabapentin myoclonus. Past Medical History:    Past Medical History:   Diagnosis Date    DVT (deep venous thrombosis) (HCC)     End stage renal disease (HCC)     Hyperlipidemia     Hypertension     Osteoarthritis     Pancreatitis chronic     Peritoneal dialysis status ([facility]     Type II or unspecified type diabetes mellitus without mention of complication, not stated as uncontrolled     Vitamin D deficiency        Past Surgical History:  Past Surgical History:   Procedure Laterality Date    CATARACT REMOVAL Left 13    had elevated BP post op    CORONARY ANGIOPLASTY      JOINT REPLACEMENT  2011    R WILDER    OTHER SURGICAL HISTORY Left 2017    INSERTION OF LAPAROSCOPIC PERITONEAL DIALYSIS CATHETER;    THYROIDECTOMY, PARTIAL         Home Medications:  Has been reviewed and as documented. Allergies:    Patient has no known allergies. Family History:   No family history on file. ROS: A 10-organ Review Of Systems was obtained and otherwise unremarkable except as per HPI. Data: Old records have been reviewed electronically.       PHYSICAL EXAM:  /75   Pulse 85   Temp 98 °F (36.7 °C) (Oral)   Resp 18   Wt 143 lb 3.2 oz (65 kg)   LMP  (LMP Unknown)   BMI 22.43 kg/m²   Physical Exam   Constitutional: She is oriented to person, place, and time. HENT:   Head: Normocephalic and atraumatic. Hair is abnormal.   Eyes:   Bilateral ptosis. Cardiovascular: Normal rate, regular rhythm and normal heart sounds. Exam reveals no gallop and no friction rub. No murmur heard. Pulses:       Dorsalis pedis pulses are 1+ on the right side. Pulmonary/Chest: Effort normal. No respiratory distress. She has wheezes. Musculoskeletal: She exhibits edema and deformity. She exhibits no tenderness. Right foot: There is deformity. Feet:   Right Foot:   Skin Integrity: Positive for warmth and dry skin. Left Foot:   Skin Integrity: Positive for warmth and dry skin. Neurological: She is alert and oriented to person, place, and time. A sensory deficit is present. No cranial nerve deficit. Coordination normal.          Assessment & Plan:      Left Lower extremity DVT  Recent admission 5/24/19 for DVT. Was discharged on coumadin. INR today 1.1. Has not been seen in coagulation clinic, but states she will have issues making it weekly 2/2 transportation. Spoke with nephrologist over phone, ok to start Eliquis 2.5 BID.  - d/c coumadin  - start eliquis 2.5 BID    R foot neuropathy and bunion  Pt complaining of R foot pain for 2 months, numbness and pins-needle sensation. Dorsalis pedis pulse 1+ but intact. R foot with deformity, bunion, and poor nail care. Unclear if has history of peripheral neuropathy 2/2 to DM. Also likely has decreased sensation 2/2 to ESRD/DM. - referral to podiatry    ESRD on PD  Sees Dr. Rosaline Mahajan, will continue to follow with him. No issues today. Notes that she is taking her sevelamer. DM 2  Last A1C 3/2019 - 6.5. Dispo: Pt has been staffed with Dr. Vidal Runner    I have discussed and evaluated this patient with the resident physician.  A plan of care has been formulated and discussed with the patient. All questions have been answered.     Juan José Lowe  at 8:49 PM    _______________  Shamar Canales MD  Internal Medicine, PGY 1   6/11/2019 2:10 PM Yes

## 2022-08-16 NOTE — PROGRESS NOTES
OUTPATIENT SPECIALTY PODIATRY VISIT      Nursing Progress Note      September 6, 2019  Khurram Fowler    Patient description of the problem: followup    Observations: right great toe necrotic and noted drainage under necrtic area.      Ambulates:  assistance    Gait:      Assistive Devices: wheelchair    Fall History: No    Foot Hygiene: good    Foot Wear Proper: Yes    Impaired Skin Integrity: Yes      Pain Assessment:  Pain Present: yes  Pain Score: 5/10  Pain Quality/Description: Burning  Pain Onset: 1 weeks ago  Pain Goal of patient: 0/10    Education Assessment:    Identify the learner who is being assessed for education:  patient                    Ability to Learn:  Exhibits ability to grasp concepts and respond to questions: Medium  Ready to Learn: No  calm   Preferred Method of Learning:  written  Barriers to Learning: Verbalizes interest  Special Considerations due to cultural, Denominational, spiritual beliefs:  No  Language:  English  :  No    CommentsBlease Class Page  1:22 PM 9/6/2019 Post-Care Instructions: I reviewed with the patient in detail post-care instructions. Patient is to wear sunprotection, and avoid picking at any of the treated lesions. Pt may apply Vaseline to crusted or scabbing areas. Duration Of Freeze Thaw-Cycle (Seconds): 2 Render Note In Bullet Format When Appropriate: No Detail Level: Detailed Consent: The patient's consent was obtained including but not limited to risks of crusting, scabbing, blistering, scarring, darker or lighter pigmentary change, recurrence, incomplete removal and infection. Show Aperture Variable?: Yes Number Of Freeze-Thaw Cycles: 2 freeze-thaw cycles

## 2022-09-26 NOTE — PROGRESS NOTES
replacement (5/4/2011); Cataract removal (Left, 5/29/13); and other surgical history (Left, 05/19/2017). Restrictions  Position Activity Restriction  Other position/activity restrictions: up with assist     Vision/Hearing  Vision: Within Functional Limits  Hearing: Within functional limits       Subjective  General  Chart Reviewed: Yes  Additional Pertinent Hx: 68 yo admitted 5/24/19 for L LE swelling. LE dopplers positive for partially occluding chronic L LE DVT; pt started empiracally on Heparin. Pmhx:  R THR, ESRD on peritoneal dialysis, HTN, chronic pancreatitis, DM with peripheral neuropathy. Family / Caregiver Present: No  Diagnosis: L LE swelling  Follows Commands: Within Functional Limits  Subjective  Subjective: Pt found semi supine in bed and agreeable to PT.  Pt reports she hasn't been OOB much since admit to hospital.   Pain Screening  Patient Currently in Pain: Denies         Orientation  Orientation  Overall Orientation Status: Within Functional Limits     Social/Functional History  Social/Functional History  Lives With: Alone  Type of Home: Apartment(senior building)  Home Layout: One level  Home Access: Level entry, Elevator  Bathroom Shower/Tub: Tub/Shower unit  Bathroom Toilet: Handicap height  Bathroom Equipment: Grab bars in shower, Hand-held shower, Grab bars around toilet  Home Equipment: 4 wheeled walker, Lift chair, Alert Button(using 4 wheel walker PTA)  Receives Help From: Family, Home health  ADL Assistance: Independent  Homemaking Assistance: Needs assistance(laundry not in apt; has aide 1x per week for homemaking)  Ambulation Assistance: Independent  Transfer Assistance: Independent  Active : Yes  Occupation: Retired         Objective          AROM RLE (degrees)  RLE AROM: WFL  AROM LLE (degrees)  LLE AROM : WFL(mod edema throughout L LE)     Strength RLE  Strength RLE: WFL  Strength LLE  Strength LLE: WFL        Bed mobility  Supine to Sit: Supervision(HOB up) Transfers  Sit to Stand: Contact guard assistance(x3 trials; slow and effortful)  Stand to sit: Contact guard assistance(x3 trials with occ vc for hand placement)       Ambulation  Device: Rolling Walker  Assistance: Contact guard assistance  Quality of Gait: forward posture with slow kirk and decreased step length; decreased swing through/stance time on L LE; CEJA  Distance: 15 ft x2  Comments: Pt declined walking farther due to fatigue. Balance  Sitting - Static: Good  Sitting - Dynamic: Good  Comments: Pt able to sit unsupported x 10 mins with supervision. Plan   Plan  Times per week: 2-5  Current Treatment Recommendations: Strengthening, Transfer Training, Endurance Training, Gait Training, Balance Training, Functional Mobility Training  Safety Devices  Type of devices: Call light within reach, Chair alarm in place, Left in chair, Nurse notified(pt reclined)                        AM-PAC Score  AM-PAC Inpatient Mobility Raw Score : 18  AM-PAC Inpatient T-Scale Score : 43.63  Mobility Inpatient CMS 0-100% Score: 46.58  Mobility Inpatient CMS G-Code Modifier : CK          Goals  Short term goals  Time Frame for Short term goals: discharge  Short term goal 1: sit to/from stand supervision  Short term goal 2: ambulate 40 ft with rolling walker supervision  Patient Goals   Patient goals : return home       Therapy Time   Individual Concurrent Group Co-treatment   Time In 0905         Time Out 0950         Minutes 45           Timed Code Treatment Minutes:35       Total Treatment Minutes:  45    This note will serve as a discharge summary if patient is discharged from hospital before next treatment session.        Kirk Mcgowan, PT

## (undated) DEVICE — FORCEPS BX L240CM DIA2.4MM L NDL RAD JAW 4 133340

## (undated) DEVICE — SURGICAL SET UP - SURE SET: Brand: MEDLINE INDUSTRIES, INC.

## (undated) DEVICE — DRAPE,LAP,CHOLE,W/TROUGHS,STERILE: Brand: MEDLINE

## (undated) DEVICE — GLOVE SURG SZ 75 L12IN THK75MIL DK GRN LTX FREE

## (undated) DEVICE — GLOVE ORANGE PI 7   MSG9070

## (undated) DEVICE — ELECTROSURGICAL PENCIL ROCKER SWITCH NON COATED BLADE ELECTRODE 10 FT (3 M) CORD HOLSTER: Brand: MEGADYNE

## (undated) DEVICE — CONMED COLONOSCOPE SHEATHED CYTOLOGY BRUSH, RING HANDLE, 3 MM X 230 CM: Brand: CONMED

## (undated) DEVICE — SURE SET-DOUBLE BASIN-LF: Brand: MEDLINE INDUSTRIES, INC.

## (undated) DEVICE — PLATE ES AD W 9FT CRD 2

## (undated) DEVICE — SUTURE VCRL SZ 3-0 L27IN ABSRB UD L26MM SH 1/2 CIR J416H

## (undated) DEVICE — CHLORAPREP 26ML ORANGE

## (undated) DEVICE — SUTURE COAT VCRL SZ 4-0 L18IN ABSRB UD L19MM PS-2 1/2 CIR J496G

## (undated) DEVICE — STANDARD HYPODERMIC NEEDLE,POLYPROPYLENE HUB: Brand: MONOJECT

## (undated) DEVICE — COVER LT HNDL BLU PLAS

## (undated) DEVICE — SUTURE VCRL SZ 0 L27IN ABSRB UD L36MM CT-1 1/2 CIR J260H

## (undated) DEVICE — ADHESIVE SKIN CLSR 0.7ML TOP DERMBND ADV

## (undated) DEVICE — JEWISH HOSPITAL TURNOVER KIT: Brand: MEDLINE INDUSTRIES, INC.

## (undated) DEVICE — SOLUTION IV 1000ML 0.9% SOD CHL

## (undated) DEVICE — GARMENT,MEDLINE,DVT,INT,CALF,MED, GEN2: Brand: MEDLINE